# Patient Record
Sex: MALE | Race: WHITE | Employment: OTHER | URBAN - METROPOLITAN AREA
[De-identification: names, ages, dates, MRNs, and addresses within clinical notes are randomized per-mention and may not be internally consistent; named-entity substitution may affect disease eponyms.]

---

## 2019-04-10 ENCOUNTER — OFFICE VISIT (OUTPATIENT)
Dept: CARDIOLOGY CLINIC | Age: 84
End: 2019-04-10
Payer: MEDICARE

## 2019-04-10 VITALS
HEIGHT: 71 IN | WEIGHT: 181.4 LBS | RESPIRATION RATE: 18 BRPM | SYSTOLIC BLOOD PRESSURE: 136 MMHG | HEART RATE: 51 BPM | BODY MASS INDEX: 25.4 KG/M2 | DIASTOLIC BLOOD PRESSURE: 68 MMHG

## 2019-04-10 DIAGNOSIS — I25.83 CORONARY ARTERY DISEASE DUE TO LIPID RICH PLAQUE: ICD-10-CM

## 2019-04-10 DIAGNOSIS — I35.0 AORTIC VALVE STENOSIS, ETIOLOGY OF CARDIAC VALVE DISEASE UNSPECIFIED: Primary | ICD-10-CM

## 2019-04-10 DIAGNOSIS — I25.10 CORONARY ARTERY DISEASE DUE TO LIPID RICH PLAQUE: ICD-10-CM

## 2019-04-10 PROCEDURE — 4040F PNEUMOC VAC/ADMIN/RCVD: CPT | Performed by: INTERNAL MEDICINE

## 2019-04-10 PROCEDURE — 1123F ACP DISCUSS/DSCN MKR DOCD: CPT | Performed by: INTERNAL MEDICINE

## 2019-04-10 PROCEDURE — 1036F TOBACCO NON-USER: CPT | Performed by: INTERNAL MEDICINE

## 2019-04-10 PROCEDURE — G8417 CALC BMI ABV UP PARAM F/U: HCPCS | Performed by: INTERNAL MEDICINE

## 2019-04-10 PROCEDURE — 93000 ELECTROCARDIOGRAM COMPLETE: CPT | Performed by: INTERNAL MEDICINE

## 2019-04-10 PROCEDURE — G8598 ASA/ANTIPLAT THER USED: HCPCS | Performed by: INTERNAL MEDICINE

## 2019-04-10 PROCEDURE — 99214 OFFICE O/P EST MOD 30 MIN: CPT | Performed by: INTERNAL MEDICINE

## 2019-04-10 PROCEDURE — G8427 DOCREV CUR MEDS BY ELIG CLIN: HCPCS | Performed by: INTERNAL MEDICINE

## 2019-04-10 NOTE — PROGRESS NOTES
TOUCH ULTRASOFT LANCETS MISC 1 each by Does not apply route 2 times daily E11.9 100 each 5    metoprolol tartrate (LOPRESSOR) 25 MG tablet TAKE 1 TABLET BY MOUTH EVERY 12 HOURS 180 tablet 1    allopurinol (ZYLOPRIM) 100 MG tablet Take 1 tablet by mouth daily (Patient taking differently: Take 100 mg by mouth daily Indications: 2 pills daily ) 90 tablet 3    colchicine (COLCRYS) 0.6 MG tablet Take 1 tablet by mouth daily 90 tablet 5    aspirin 81 MG EC tablet Take 81 mg by mouth daily. No current facility-administered medications for this visit.         Social History     Socioeconomic History    Marital status:      Spouse name: Not on file    Number of children: Not on file    Years of education: Not on file    Highest education level: Not on file   Occupational History    Not on file   Social Needs    Financial resource strain: Not on file    Food insecurity:     Worry: Not on file     Inability: Not on file    Transportation needs:     Medical: Not on file     Non-medical: Not on file   Tobacco Use    Smoking status: Never Smoker    Smokeless tobacco: Never Used   Substance and Sexual Activity    Alcohol use: Yes     Comment: rarely- 1 beer    Drug use: No    Sexual activity: Not on file   Lifestyle    Physical activity:     Days per week: Not on file     Minutes per session: Not on file    Stress: Not on file   Relationships    Social connections:     Talks on phone: Not on file     Gets together: Not on file     Attends Evangelical service: Not on file     Active member of club or organization: Not on file     Attends meetings of clubs or organizations: Not on file     Relationship status: Not on file    Intimate partner violence:     Fear of current or ex partner: Not on file     Emotionally abused: Not on file     Physically abused: Not on file     Forced sexual activity: Not on file   Other Topics Concern    Not on file   Social History Narrative    Not on file       Family History   Problem Relation Age of Onset    High Blood Pressure Mother     Heart Disease Mother     Cancer Father      Review of Systems:  Heart: as above   Lungs: as above   Eyes: denies changes in vision or discharge. Ears: denies changes in hearing or pain. Nose: denies epistaxis or masses   Throat: denies sore throat or trouble swallowing. Neuro: denies numbness, tingling, tremors. Skin: denies rashes or itching. : denies hematuria, dysuria   GI: denies vomiting, diarrhea   Psych: denies mood changed, anxiety, depression. all others negative. Physical Exam   /68   Pulse 51   Resp 18   Ht 5' 11\" (1.803 m)   Wt 181 lb 6.4 oz (82.3 kg)   BMI 25.30 kg/m²   Constitutional: Oriented to person, place, and time. Well-developed and well-nourished. No distress. Head: Normocephalic and atraumatic. Eyes: EOM are normal. Pupils are equal, round, and reactive to light. Neck: Normal range of motion. Neck supple. No hepatojugular reflux and no JVD present. Carotid bruit is not present. No tracheal deviation present. No thyromegaly present. Cardiovascular: Normal rate, regular rhythm, MOLLY 3/6. Pulmonary/Chest: Effort normal and breath sounds normal. No respiratory distress. No wheezes. No rales. No tenderness. Abdominal: Soft. Bowel sounds are normal. No distension and no mass. No tenderness. No rebound and no guarding. Musculoskeletal: Normal range of motion. No edema and no tenderness. Lymphadenopathy:   No cervical adenopathy. Neurological: Alert and oriented to person, place, and time. Skin: Skin is warm and dry. No rash noted. Not diaphoretic. No erythema. Psychiatric: Normal mood and affect. Behavior is normal.       EKG: NSR. First degree AVB. LVH, Nonspecific ST-T wave changes.       Echo Summary 6/21/17:   Ejection fraction is visually estimated at 50-55%.   No regional wall motion abnormalities seen.  [place] Bearded is doppler evidence of stage I diastolic dysfunction.   Normal right ventricle structure and function.   Left atrial volume index of 50 ml per meters squared BSA.   The aortic valve is trileaflet. Moderate aortic stenosis is present. Peak aortic velocity is 3.26 m/s. The aortic valve area is 1.24 cm2 with a maximum gradient of 42 mmHg and a mean gradient of 22 mmHg. Mild aortic regurgitation is noted.   Mild mitral regurgitation is present.   Physiologic and/or trace tricuspid regurgitation. ASSESSMENT AND PLAN:   1. CAD/CABG: Continue ASA/BB/statin. 2. VHD: Moderate AS, mild AI and mild MR by 6/21/17 echo. Echo. 3. Atrial fibrillation:  Maintaining sinus on current medications. Patient is on therapeutic Coumadin. 4. Hyperlipidemia: Statin. 5. Hypertension: Controlled. 6. Diabetes:  As per PCP. Nigel Andrade D.O.   Cardiologist  Cardiology, [address] [facility]

## 2019-04-24 ENCOUNTER — HOSPITAL ENCOUNTER (OUTPATIENT)
Dept: CARDIOLOGY | Age: 84
Discharge: HOME | End: 2019-04-24
Payer: MEDICARE

## 2019-04-24 DIAGNOSIS — I35.0 AORTIC VALVE STENOSIS, ETIOLOGY OF CARDIAC VALVE DISEASE UNSPECIFIED: ICD-10-CM

## 2019-04-24 LAB
LV EF: 60 %
LVEF MODALITY: NORMAL

## 2019-04-24 PROCEDURE — 93306 TTE W/DOPPLER COMPLETE: CPT

## 2020-12-09 ENCOUNTER — OFFICE VISIT (OUTPATIENT)
Dept: CARDIOLOGY CLINIC | Age: 85
End: 2020-12-09
Payer: MEDICARE

## 2020-12-09 VITALS
RESPIRATION RATE: 18 BRPM | BODY MASS INDEX: 24.85 KG/M2 | HEIGHT: 71 IN | HEART RATE: 45 BPM | SYSTOLIC BLOOD PRESSURE: 160 MMHG | DIASTOLIC BLOOD PRESSURE: 84 MMHG | WEIGHT: 177.5 LBS

## 2020-12-09 PROCEDURE — 1123F ACP DISCUSS/DSCN MKR DOCD: CPT | Performed by: INTERNAL MEDICINE

## 2020-12-09 PROCEDURE — G8420 CALC BMI NORM PARAMETERS: HCPCS | Performed by: INTERNAL MEDICINE

## 2020-12-09 PROCEDURE — G8484 FLU IMMUNIZE NO ADMIN: HCPCS | Performed by: INTERNAL MEDICINE

## 2020-12-09 PROCEDURE — 93000 ELECTROCARDIOGRAM COMPLETE: CPT | Performed by: INTERNAL MEDICINE

## 2020-12-09 PROCEDURE — 99214 OFFICE O/P EST MOD 30 MIN: CPT | Performed by: INTERNAL MEDICINE

## 2020-12-09 PROCEDURE — 1036F TOBACCO NON-USER: CPT | Performed by: INTERNAL MEDICINE

## 2020-12-09 PROCEDURE — G8427 DOCREV CUR MEDS BY ELIG CLIN: HCPCS | Performed by: INTERNAL MEDICINE

## 2020-12-09 PROCEDURE — 4040F PNEUMOC VAC/ADMIN/RCVD: CPT | Performed by: INTERNAL MEDICINE

## 2020-12-09 RX ORDER — COLCHICINE 0.6 MG/1
0.6 TABLET ORAL DAILY
COMMUNITY
End: 2021-11-18

## 2020-12-09 NOTE — PROGRESS NOTES
MIRELA YOUNG   OFFICE VISIT   : 31    PROBLEM LIST:  1. Assessment for epigastric discomfort, abnormal cardiac enzymes 09. A. Heart catheterization 09. Mild mid inferior hypokinesis overall normal LV function. Calcified 20% left main. 95% calcified proximal LAD a short distance from the left main. Multiple 50-55% lesions in mid LAD. 70% lesion seen in proximal portion of circumflex. 90-95% ostial heavily calcified right coronary artery with subtotally occluded mid PDA artery. B. Status post bypass surgery 09. LIMA to LAD. Vein to first obtuse marginal branch, vein to posterior descending artery, vein to right coronary artery, vein to posterolateral branch of right coronary artery with modified Maze procedure and excision of left atrial appendage. 2. Diabetes mellitus. 3. Hypercholesterolemia. 4. Hypertension. CC: CAD/CABG/DM/HTN    HPI: The patient presents today for follow-up with a history of CAD-CABG, AS, diabetes, hypertension, and hyperlipidemia. The patient denies any chest pain, shortness of breath or any symptoms to suggest angina or heart failure.       Past Medical History:   Diagnosis Date    [facility]     BPH (benign prostatic hyperplasia)     CAD (coronary artery disease)     Diabetic neuropathy (HCC)     DM [facility]diabetes mellitus[facility]     HTN (hypertension)     Hyperlipemia     Pancreatitis 9/10    S/P CABG (coronary artery bypass graft)        No Known Allergies    Current Outpatient Medications   Medication Sig Dispense Refill    colchicine (COLCRYS) 0.6 MG tablet Take 0.6 mg by mouth daily      warfarin (COUMADIN) 5 MG tablet TAKE 1 TABLET BY MOUTH ON  AND WEDNESDAY AND THEN 1 AND 1/2 TABLETS THE REST OF THE DAYS 114 tablet 0    allopurinol (ZYLOPRIM) 100 MG tablet TAKE 1 TABLET BY MOUTH EVERY DAY 90 tablet 1    sitaGLIPtan-metformin (JANUMET)  MG per tablet TAKE 1 TABLET BY MOUTH TWICE A DAY WITH MEALS 180 tablet 0  metoprolol tartrate (LOPRESSOR) 25 MG tablet TAKE 1 TABLET BY MOUTH EVERY 12 HOURS 180 tablet 0    warfarin (COUMADIN) 5 MG tablet TAKE 1 TABLET BY MOUTH ON SUNDAY AND WEDNESDAY AND THEN 1 AND 1/2 TABLETS THE REST OF THE DAYS 114 tablet 3    doxazosin (CARDURA) 4 MG tablet TAKE 1 TABLET BY MOUTH EVERY DAY 90 tablet 1    pravastatin (PRAVACHOL) 40 MG tablet Take one daily 90 tablet 0    olmesartan-amLODIPine-HCTZ 20-5-12.5 MG TABS TAKE 1 TABLET BY MOUTH EVERY DAY 90 tablet 1    aspirin 81 MG EC tablet Take 81 mg by mouth daily. No current facility-administered medications for this visit.         Social History     Socioeconomic History    Marital status:      Spouse name: Not on file    Number of children: Not on file    Years of education: Not on file    Highest education level: Not on file   Occupational History    Not on file   Social Needs    Financial resource strain: Not on file    Food insecurity     Worry: Not on file     Inability: Not on file    Transportation needs     Medical: Not on file     Non-medical: Not on file   Tobacco Use    Smoking status: Never Smoker    Smokeless tobacco: Never Used   Substance and Sexual Activity    Alcohol use: Yes     Comment: rarely- 1 beer    Drug use: No    Sexual activity: Not on file   Lifestyle    Physical activity     Days per week: Not on file     Minutes per session: Not on file    Stress: Not on file   Relationships    Social connections     Talks on phone: Not on file     Gets together: Not on file     Attends Episcopalian service: Not on file     Active member of club or organization: Not on file     Attends meetings of clubs or organizations: Not on file     Relationship status: Not on file    Intimate partner violence     Fear of current or ex partner: Not on file     Emotionally abused: Not on file     Physically abused: Not on file     Forced sexual activity: Not on file   Other Topics Concern    Not on file   Social History Narrative    Not on file       Family History   Problem Relation Age of Onset    High Blood Pressure Mother     Heart Disease Mother     Cancer Father      Review of Systems:  Heart: as above   Lungs: as above   Eyes: denies changes in vision or discharge. Ears: denies changes in hearing or pain. Nose: denies epistaxis or masses   Throat: denies sore throat or trouble swallowing. Neuro: denies numbness, tingling, tremors. Skin: denies rashes or itching. : denies hematuria, dysuria   GI: denies vomiting, diarrhea   Psych: denies mood changed, anxiety, depression. all others negative. Physical Exam   BP (!) 160/84   Pulse (!) 45   Resp 18   Ht 5' 11\" (1.803 m)   Wt 177 lb 8 oz (80.5 kg)   BMI 24.76 kg/m²   Constitutional: Oriented to person, place, and time. Well-developed and well-nourished. No distress. Head: Normocephalic and atraumatic. Eyes: EOM are normal. Pupils are equal, round, and reactive to light. Neck: Normal range of motion. Neck supple. No hepatojugular reflux and no JVD present. Carotid bruit is not present. No tracheal deviation present. No thyromegaly present. Cardiovascular: Normal rate, regular rhythm, MOLLY 3/6. Pulmonary/Chest: Effort normal and breath sounds normal. No respiratory distress. No wheezes. No rales. No tenderness. Abdominal: Soft. Bowel sounds are normal. No distension and no mass. No tenderness. No rebound and no guarding. Musculoskeletal: Normal range of motion. No edema and no tenderness. Lymphadenopathy:   No cervical adenopathy. Neurological: Alert and oriented to person, place, and time. Skin: Skin is warm and dry. No rash noted. Not diaphoretic. No erythema. Psychiatric: Normal mood and affect. Behavior is normal.       EKG: Atrial flutter. LVH, Nonspecific ST-T wave changes. Echo Summary 4/24/2019:   Normal left ventricular systolic function. Ejection fraction is visually estimated at 60%.    Mildly dilated right ventricle with normal right ventricular function (TAPSE 1.9 cm). There is doppler evidence of stage I diastolic dysfunction. Severely dilated left atrium by volume index. Mild mitral regurgitation. Mild aortic regurgitation. Normal-flow low-gradient severe aortic stenosis (AV peak velocity 3.8 m/s, AV mean gradient 31 mmHg, AV area 1.0 cm2, dimensionless index 0.24, stroke volume index 50 mL/m2). Mild tricuspid regurgitation. PASP is estimated at 28 mmHg (normal estimated PASP). ASSESSMENT AND PLAN:   1. CAD/CABG: Continue ASA/BB/statin. 2. VHD: Severe AS, mild AI and mild MR by 4/24/19 echo. Asymptomatic. Echo next visit. 3. Atrial flutter: In aflutter. Asymptomatic. Warfarin. 4. Hyperlipidemia: Statin. 5. Hypertension: Controlled. 6. Diabetes:  As per PCP. Johnson Landers D.O.   Cardiologist  Cardiology, [address] [facility]

## 2021-10-29 ENCOUNTER — TELEPHONE (OUTPATIENT)
Dept: ADMINISTRATIVE | Age: 86
End: 2021-10-29

## 2021-10-29 NOTE — TELEPHONE ENCOUNTER
Pt's wife calling. Pt was due in June for a 6 mth OV with Dr. Karl Will. She states Dr. Williams Villalobos was supposed to be calling Dr. Karl Will - because they were having a hard time getting an apt. Please call pt with an appropriate apt. Thank you.

## 2021-11-18 ENCOUNTER — OFFICE VISIT (OUTPATIENT)
Dept: CARDIOLOGY CLINIC | Age: 86
End: 2021-11-18
Payer: MEDICARE

## 2021-11-18 VITALS
DIASTOLIC BLOOD PRESSURE: 82 MMHG | RESPIRATION RATE: 16 BRPM | SYSTOLIC BLOOD PRESSURE: 132 MMHG | BODY MASS INDEX: 22.36 KG/M2 | OXYGEN SATURATION: 98 % | WEIGHT: 159.7 LBS | HEIGHT: 71 IN | HEART RATE: 73 BPM

## 2021-11-18 DIAGNOSIS — I25.10 CORONARY ARTERY DISEASE INVOLVING NATIVE CORONARY ARTERY OF NATIVE HEART WITHOUT ANGINA PECTORIS: Primary | ICD-10-CM

## 2021-11-18 DIAGNOSIS — I35.0 AORTIC VALVE STENOSIS, ETIOLOGY OF CARDIAC VALVE DISEASE UNSPECIFIED: ICD-10-CM

## 2021-11-18 PROBLEM — M10.9 GOUT: Status: ACTIVE | Noted: 2021-11-18

## 2021-11-18 PROBLEM — I38 HEART VALVE DISEASE: Status: ACTIVE | Noted: 2021-11-18

## 2021-11-18 PROBLEM — N18.9 CHRONIC KIDNEY DISEASE: Status: ACTIVE | Noted: 2021-11-18

## 2021-11-18 PROCEDURE — 93000 ELECTROCARDIOGRAM COMPLETE: CPT | Performed by: INTERNAL MEDICINE

## 2021-11-18 PROCEDURE — 4040F PNEUMOC VAC/ADMIN/RCVD: CPT | Performed by: INTERNAL MEDICINE

## 2021-11-18 PROCEDURE — G8420 CALC BMI NORM PARAMETERS: HCPCS | Performed by: INTERNAL MEDICINE

## 2021-11-18 PROCEDURE — 1123F ACP DISCUSS/DSCN MKR DOCD: CPT | Performed by: INTERNAL MEDICINE

## 2021-11-18 PROCEDURE — G8484 FLU IMMUNIZE NO ADMIN: HCPCS | Performed by: INTERNAL MEDICINE

## 2021-11-18 PROCEDURE — 99214 OFFICE O/P EST MOD 30 MIN: CPT | Performed by: INTERNAL MEDICINE

## 2021-11-18 PROCEDURE — 1036F TOBACCO NON-USER: CPT | Performed by: INTERNAL MEDICINE

## 2021-11-18 PROCEDURE — G8427 DOCREV CUR MEDS BY ELIG CLIN: HCPCS | Performed by: INTERNAL MEDICINE

## 2021-11-18 RX ORDER — ICOSAPENT ETHYL 1000 MG/1
2 CAPSULE ORAL 2 TIMES DAILY
COMMUNITY
End: 2022-01-27 | Stop reason: ALTCHOICE

## 2021-11-18 NOTE — PROGRESS NOTES
MEALS 180 tablet 0    metoprolol tartrate (LOPRESSOR) 25 MG tablet TAKE 1 TABLET BY MOUTH EVERY 12 HOURS 180 tablet 0    warfarin (COUMADIN) 5 MG tablet TAKE 1 TABLET BY MOUTH ON SUNDAY AND WEDNESDAY AND THEN 1 AND 1/2 TABLETS THE REST OF THE DAYS 114 tablet 3    doxazosin (CARDURA) 4 MG tablet TAKE 1 TABLET BY MOUTH EVERY DAY 90 tablet 1    pravastatin (PRAVACHOL) 40 MG tablet Take one daily 90 tablet 0    aspirin 81 MG EC tablet Take 81 mg by mouth daily.  colchicine (COLCRYS) 0.6 MG tablet Take 0.6 mg by mouth daily (Patient not taking: Reported on 11/18/2021)      olmesartan-amLODIPine-HCTZ 20-5-12.5 MG TABS TAKE 1 TABLET BY MOUTH EVERY DAY (Patient not taking: Reported on 11/18/2021) 90 tablet 1     No current facility-administered medications for this visit. Social History     Socioeconomic History    Marital status:      Spouse name: Not on file    Number of children: Not on file    Years of education: Not on file    Highest education level: Not on file   Occupational History    Not on file   Tobacco Use    Smoking status: Never Smoker    Smokeless tobacco: Never Used   Vaping Use    Vaping Use: Never used   Substance and Sexual Activity    Alcohol use: Yes     Comment: rarely- 1 beer    Drug use: No    Sexual activity: Not on file   Other Topics Concern    Not on file   Social History Narrative    Not on file     Social Determinants of Health     Financial Resource Strain:     Difficulty of Paying Living Expenses: Not on file   Food Insecurity:     Worried About Running Out of Food in the Last Year: Not on file    Alisha of Food in the Last Year: Not on file   Transportation Needs:     Lack of Transportation (Medical): Not on file    Lack of Transportation (Non-Medical):  Not on file   Physical Activity:     Days of Exercise per Week: Not on file    Minutes of Exercise per Session: Not on file   Stress:     Feeling of Stress : Not on file   Social Connections:  Frequency of Communication with Friends and Family: Not on file    Frequency of Social Gatherings with Friends and Family: Not on file    Attends Protestant Services: Not on file    Active Member of Clubs or Organizations: Not on file    Attends Club or Organization Meetings: Not on file    Marital Status: Not on file   Intimate Partner Violence:     Fear of Current or Ex-Partner: Not on file    Emotionally Abused: Not on file    Physically Abused: Not on file    Sexually Abused: Not on file   Housing Stability:     Unable to Pay for Housing in the Last Year: Not on file    Number of [place] in the Last Year: Not on file    Unstable Housing in the Last Year: Not on file       Family History   Problem Relation Age of Onset    High Blood Pressure Mother     Heart Disease Mother     Cancer Father      Review of Systems:  Heart: as above   Lungs: as above   Eyes: denies changes in vision or discharge. Ears: denies changes in hearing or pain. Nose: denies epistaxis or masses   Throat: denies sore throat or trouble swallowing. Neuro: denies numbness, tingling, tremors. Skin: denies rashes or itching. : denies hematuria, dysuria   GI: denies vomiting, diarrhea   Psych: denies mood changed, anxiety, depression. all others negative. Physical Exam   /82 (Site: Left Upper Arm, Position: Sitting, Cuff Size: Medium Adult)   Pulse 73   Resp 16   Ht 5' 11\" (1.803 m)   Wt 159 lb 11.2 oz (72.4 kg)   SpO2 98%   BMI 22.27 kg/m²   Constitutional: Oriented to person, place, and time. Well-developed and well-nourished. No distress. Head: Normocephalic and atraumatic. Eyes: EOM are normal. Pupils are equal, round, and reactive to light. Neck: Normal range of motion. Neck supple. No hepatojugular reflux and no JVD present. Carotid bruit is not present. No tracheal deviation present. No thyromegaly present. Cardiovascular: Normal rate, regular rhythm, MOLLY 3/6.   Pulmonary/Chest: Effort normal and breath sounds normal. No respiratory distress. No wheezes. No rales. No tenderness. Abdominal: Soft. Bowel sounds are normal. No distension and no mass. No tenderness. No rebound and no guarding. Musculoskeletal: Normal range of motion. No edema and no tenderness. Lymphadenopathy:   No cervical adenopathy. Neurological: Alert and oriented to person, place, and time. Skin: Skin is warm and dry. No rash noted. Not diaphoretic. No erythema. Psychiatric: Normal mood and affect. Behavior is normal.       EKG: Atrial flutter. LVH, Nonspecific ST-T wave changes. Echo Summary 4/24/2019:   Normal left ventricular systolic function. Ejection fraction is visually estimated at 60%. Mildly dilated right ventricle with normal right ventricular function (TAPSE 1.9 cm). There is doppler evidence of stage I diastolic dysfunction. Severely dilated left atrium by volume index. Mild mitral regurgitation. Mild aortic regurgitation. Normal-flow low-gradient severe aortic stenosis (AV peak velocity 3.8 m/s, AV mean gradient 31 mmHg, AV area 1.0 cm2, dimensionless index 0.24, stroke volume index 50 mL/m2). Mild tricuspid regurgitation. PASP is estimated at 28 mmHg (normal estimated PASP). ASSESSMENT AND PLAN:   1. CAD/CABG: Continue ASA/BB/statin. 2. VHD: Severe AS, mild AI and mild MR by 4/24/19 echo. Asymptomatic. Echo to guide. 3. Atrial flutter: In aflutter. Asymptomatic. Warfarin. 4. Hyperlipidemia: Statin. 5. Hypertension: Controlled. 6. Diabetes:  As per PCP. Luca Yang D.O.   Cardiologist  Cardiology, [facility]

## 2022-01-13 ENCOUNTER — HOSPITAL ENCOUNTER (OUTPATIENT)
Dept: CARDIOLOGY | Age: 87
Discharge: HOME | End: 2022-01-13
Payer: MEDICARE

## 2022-01-13 DIAGNOSIS — I35.0 AORTIC VALVE STENOSIS, ETIOLOGY OF CARDIAC VALVE DISEASE UNSPECIFIED: ICD-10-CM

## 2022-01-13 LAB
LV EF: 30 %
LVEF MODALITY: NORMAL

## 2022-01-13 PROCEDURE — 93306 TTE W/DOPPLER COMPLETE: CPT | Performed by: PSYCHIATRY & NEUROLOGY

## 2022-01-26 ENCOUNTER — TELEPHONE (OUTPATIENT)
Dept: CARDIOLOGY CLINIC | Age: 87
End: 2022-01-26

## 2022-01-26 NOTE — TELEPHONE ENCOUNTER
Nurse from valve clinic called regarding echo, she said  wants patient at the valve clinic asap for severe aortic stenosis, they would like to see patient tomorrow if you agree, please advise

## 2022-01-26 NOTE — TELEPHONE ENCOUNTER
Yes, I agree with the evaluation. Kathy Doss D.O.   Cardiologist  Cardiology, [facility]

## 2022-01-26 NOTE — TELEPHONE ENCOUNTER
Miguel Fernandez, Moise's wife, called back & is agreeable to a valve clinic consultation. I told her we have an availability tomorrow & that Andie Nick will call her back with a time. MELY Celis notified.

## 2022-01-26 NOTE — TELEPHONE ENCOUNTER
I called Keanu Pineda on both his home & cell phone to let him know that he showed up on the [facility] alert\" for Critical AS & to see if he would like to come to the [facility] for a consultation. There was no answer. I left a message stating that our valve coordinator, Prabha Josselyn, [address] [facility], talked with Dr. Andrea Ceron, his cardiologist & that Dr. Andrea Ceron was in agreement with the consultation. I left my number ([phone]) so he can return the call.

## 2022-01-27 ENCOUNTER — TELEPHONE (OUTPATIENT)
Dept: CARDIOLOGY CLINIC | Age: 87
End: 2022-01-27

## 2022-01-27 ENCOUNTER — HOSPITAL ENCOUNTER (OUTPATIENT)
Age: 87
Discharge: HOME | DRG: 266 | End: 2022-01-27
Payer: MEDICARE

## 2022-01-27 ENCOUNTER — OFFICE VISIT (OUTPATIENT)
Dept: CARDIOLOGY CLINIC | Age: 87
End: 2022-01-27
Payer: MEDICARE

## 2022-01-27 VITALS
HEIGHT: 71 IN | HEART RATE: 77 BPM | RESPIRATION RATE: 24 BRPM | DIASTOLIC BLOOD PRESSURE: 90 MMHG | SYSTOLIC BLOOD PRESSURE: 192 MMHG | TEMPERATURE: 98.5 F | BODY MASS INDEX: 23.66 KG/M2 | WEIGHT: 169 LBS

## 2022-01-27 DIAGNOSIS — I35.0 NODULAR CALCIFIC AORTIC VALVE STENOSIS: Primary | ICD-10-CM

## 2022-01-27 DIAGNOSIS — I10 PRIMARY HYPERTENSION: ICD-10-CM

## 2022-01-27 LAB
ALBUMIN SERPL-MCNC: 4.2 G/DL (ref 3.5–5.2)
ALP BLD-CCNC: 84 U/L (ref 40–129)
ALT SERPL-CCNC: 17 U/L (ref 0–40)
ANION GAP SERPL CALCULATED.3IONS-SCNC: 15 MMOL/L (ref 7–16)
AST SERPL-CCNC: 20 U/L (ref 0–39)
BILIRUB SERPL-MCNC: 0.8 MG/DL (ref 0–1.2)
BUN BLDV-MCNC: 28 MG/DL (ref 6–23)
CALCIUM SERPL-MCNC: 10 MG/DL (ref 8.6–10.2)
CHLORIDE BLD-SCNC: 104 MMOL/L (ref 98–107)
CO2: 24 MMOL/L (ref 22–29)
CREAT SERPL-MCNC: 1.9 MG/DL (ref 0.7–1.2)
GFR AFRICAN AMERICAN: 40
GFR NON-AFRICAN AMERICAN: 33 ML/MIN/1.73
GLUCOSE BLD-MCNC: 144 MG/DL (ref 74–99)
POTASSIUM SERPL-SCNC: 5 MMOL/L (ref 3.5–5)
SODIUM BLD-SCNC: 143 MMOL/L (ref 132–146)
TOTAL PROTEIN: 7.6 G/DL (ref 6.4–8.3)

## 2022-01-27 PROCEDURE — 1123F ACP DISCUSS/DSCN MKR DOCD: CPT | Performed by: INTERNAL MEDICINE

## 2022-01-27 PROCEDURE — 36415 COLL VENOUS BLD VENIPUNCTURE: CPT

## 2022-01-27 PROCEDURE — 80053 COMPREHEN METABOLIC PANEL: CPT

## 2022-01-27 PROCEDURE — 1036F TOBACCO NON-USER: CPT | Performed by: INTERNAL MEDICINE

## 2022-01-27 PROCEDURE — G8484 FLU IMMUNIZE NO ADMIN: HCPCS | Performed by: INTERNAL MEDICINE

## 2022-01-27 PROCEDURE — G8427 DOCREV CUR MEDS BY ELIG CLIN: HCPCS | Performed by: INTERNAL MEDICINE

## 2022-01-27 PROCEDURE — 4040F PNEUMOC VAC/ADMIN/RCVD: CPT | Performed by: INTERNAL MEDICINE

## 2022-01-27 PROCEDURE — 99205 OFFICE O/P NEW HI 60 MIN: CPT | Performed by: INTERNAL MEDICINE

## 2022-01-27 PROCEDURE — G8420 CALC BMI NORM PARAMETERS: HCPCS | Performed by: INTERNAL MEDICINE

## 2022-01-27 RX ORDER — AMLODIPINE BESYLATE 2.5 MG/1
2.5 TABLET ORAL DAILY
Qty: 30 TABLET | Refills: 3 | Status: SHIPPED
Start: 2022-01-27 | End: 2022-02-15 | Stop reason: ALTCHOICE

## 2022-01-27 NOTE — PROGRESS NOTES
[address] [facility]        Patient name: Annette Uribe    Reason for consult: AS    Referring Physician: Dr. Noelle Angel    Primary Care Physician: Jhoana Mendoza MD    Date of service: 1/27/2022    Chief Complaint: Dyspnea on exertion    HPI:   This is a very pleasant 80-year-old [address] of the [employer] [facility] who presents as a new patient accompanied by his wife. Over the past year or so he has noted significant slowing down in his physical activity due to getting tired and short of breath. He denies chest discomfort, syncope, presyncope, palpitations, orthopnea, PND. A focused history review includes:  1. CAD status post CABG in 2009 with LIMA to LAD, SVG to OM1, SVG to RPDA, SVG to RPL with modified maze and SALENA excision (Dr. Dirk Dance)  2. PAF and atrial flutter on warfarin  3. Type 2 diabetes on orals    Allergies: No Known Allergies    Home medications:    Current Outpatient Medications   Medication Sig Dispense Refill    amLODIPine (NORVASC) 2.5 MG tablet Take 1 tablet by mouth daily 30 tablet 3    warfarin (COUMADIN) 5 MG tablet TAKE 1 TABLET BY MOUTH ON SUNDAY AND WEDNESDAY AND THEN 1 AND 1/2 TABLETS THE REST OF THE DAYS 114 tablet 0    sitaGLIPtan-metformin (JANUMET)  MG per tablet TAKE 1 TABLET BY MOUTH TWICE A DAY WITH MEALS 180 tablet 0    metoprolol tartrate (LOPRESSOR) 25 MG tablet TAKE 1 TABLET BY MOUTH EVERY 12 HOURS 180 tablet 0    warfarin (COUMADIN) 5 MG tablet TAKE 1 TABLET BY MOUTH ON SUNDAY AND WEDNESDAY AND THEN 1 AND 1/2 TABLETS THE REST OF THE DAYS 114 tablet 3    doxazosin (CARDURA) 4 MG tablet TAKE 1 TABLET BY MOUTH EVERY DAY 90 tablet 1    pravastatin (PRAVACHOL) 40 MG tablet Take one daily 90 tablet 0     No current facility-administered medications for this visit.        Past Medical History:  Past Medical History:   Diagnosis Date    [facility] [address] [facility]     BPH (benign prostatic hyperplasia)     CAD (coronary artery disease)     Diabetic neuropathy ([facility] [facility]     [facility]di[facility]     HTN (hypertension)     Hyperlipemia     Pancreatitis 9/10    S/P CABG (coronary artery bypass graft)        Past Surgical History:  Past Surgical History:   Procedure Laterality Date    CARDIAC SURGERY      CHOLECYSTECTOMY  09/27/2010    COLONOSCOPY      CORONARY ARTERY BYPASS GRAFT      CABG x4 with left internal mammary artery to the LAD. reverse saphenous vein grafts to the 1st marginal branches ofcircumflex, posterior descending branch of the right coronary artery, and posterolateral branch to the right coronary artery, and modified maze procedure with Medtronic bipolar radiofrequency ablation, and excision of left atrial appendage.  TONSILLECTOMY         Social History:  Social History     Socioeconomic History    Marital status:      Spouse name: Not on file    Number of children: Not on file    Years of education: Not on file    Highest education level: Not on file   Occupational History    Not on file   Tobacco Use    Smoking status: Never Smoker    Smokeless tobacco: Never Used   Vaping Use    Vaping Use: Never used   Substance and Sexual Activity    Alcohol use: Yes     Comment: rarely- 1 beer    Drug use: No    Sexual activity: Not on file   Other Topics Concern    Not on file   Social History Narrative    Not on file     Social Determinants of Health     Financial Resource Strain:     Difficulty of Paying Living Expenses: Not on file   Food Insecurity:     Worried About Running Out of Food in the Last Year: Not on file    Alisha of Food in the Last Year: Not on file   Transportation Needs:     Lack of Transportation (Medical): Not on file    Lack of Transportation (Non-Medical):  Not on file   Physical Activity:     Days of Exercise per Week: Not on file    Minutes of Exercise per Session: Not on file   Stress:     Feeling of Stress : Not on file   Social Connections:     Frequency of Communication with Friends and Family: Not on file    Frequency of Social Gatherings with Friends and Family: Not on file    Attends Christian Services: Not on file    Active Member of Clubs or Organizations: Not on file    Attends Club or Organization Meetings: Not on file    Marital Status: Not on file   Intimate Partner Violence:     Fear of Current or Ex-Partner: Not on file    Emotionally Abused: Not on file    Physically Abused: Not on file    Sexually Abused: Not on file   Housing Stability:     Unable to Pay for Housing in the Last Year: Not on file    Number of [place] in the Last Year: Not on file    Unstable Housing in the Last Year: Not on file       Family History:  Family History   Problem Relation Age of Onset    High Blood Pressure Mother     Heart Disease Mother     Cancer Father        Review of Systems:  Constitutional: Denies fevers, chills, or weight loss. HEENT: Denies visual changes or hearing loss. Heart: As per HPI. Lungs: Denies shortness of breath, cough, or wheezing. Gastrointestinal: Denies nausea, vomiting, constipation, or diarrhea. Genitourinary: Denies dysuria or hematuria. Psychiatric: Patient denies anxiety or depression. Neurologic: Patient denies weakness of the extremities, dizziness, or headaches. All other ROS checked and found to be negative. Objective:  Vitals BP (!) 192/90   Pulse 77   Temp 98.5 °F (36.9 °C) (Temporal)   Resp 24   Ht 5' 11\" (1.803 m)   Wt 169 lb (76.7 kg)   BMI 23.57 kg/m²   Body surface area is 1.96 meters squared. General Appearance: Pleasant 80y.o. year old male who appears stated age. Communicates well, no acute distress. HEENT: Head is normocephalic, atraumatic. EOMs intact, PERRL. Trachea midline. Lungs: Normal respiratory rate and normal effort. He is not in respiratory distress. Breath sounds clear to auscultation. No wheezes. Heart: Normal rate. Regular rhythm.   S1 is normal.  There is a late peaking 3/6 ejection murmur over the upper right sternal border with very diminished S2. Chest: Symmetric chest wall expansion. Extremities: Normal range of motion. 1+ pitting edema  Neurological: Patient is alert and oriented to person, place and time. Patient has normal reflexes. Skin: Warm and dry. Abdomen: Abdomen is soft and non-distended. Bowel sounds are normal. There is no abdominal tenderness tenderness. There is no guarding. There is no mass. Pulses: Distal pulses are intact. Skin: Warm and dry without lesions. Lab Results   Component Value Date     01/27/2022    K 5.0 01/27/2022     01/27/2022    CO2 24 01/27/2022    BUN 28 (H) 01/27/2022    CREATININE 1.9 (H) 01/27/2022    GLUCOSE 144 (H) 01/27/2022    CALCIUM 10.0 01/27/2022    PROT 7.6 01/27/2022    LABALBU 4.2 01/27/2022    BILITOT 0.8 01/27/2022    ALKPHOS 84 01/27/2022    AST 20 01/27/2022    ALT 17 01/27/2022    LABGLOM 33 01/27/2022    GFRAA 40 01/27/2022       Lab Results   Component Value Date    WBC 9.6 11/15/2015    HGB 12.4 (L) 11/15/2015    HCT 37.8 11/15/2015    MCV 90.8 11/15/2015     11/15/2015       EKG 11/18/2021:  Atrial fibrillation with right bundle branch block and left anterior fascicular block    TTE 1/13/2022:  Mildly dilated LV with severely reduced LV systolic function (LVEF 30 to 35%)  Normal RV size and mildly to moderately reduced function  Mild TR  Moderate MR  RVSP 66  Critical aortic stenosis with mean gradient of 69 and no significant AR. Assessment: This is a very functional 80-year-old  male with critical aortic stenosis and severe LV systolic dysfunction. He has no angina. We discussed the indications for AVR (i.e. TAVR given his age) and the risks and benefits versus conservative management given his severe LV dysfunction (new compared to 2019) and critical aortic stenosis.   He would like to pursue therapy to prolong life and prevent heart failure and I was clear in stating that the only way to achieve that would be to pursue AVR in an expedited fashion. NYHA class 3      Plan: We are very concerned about his severe LV dysfunction and critical aortic stenosis due to the high risk of heart failure and death in the absence of expeditious treatment of his valvulopathy. We will plan tentatively on TAVR next week. Due to his age and kidney dysfunction and lack of angina despite critical aortic stenosis we will forego coronary angiography and rely on CTA to rule out left main disease. He may have to be admitted 1 or 2 days before TAVR for medical optimization and inpatient CTAs with hydration. Severe aortic stenosis Shared Decision Making discussion took place using the [address] of Cardiology AS: TAVR/SAVR tool.  The treatment plan formulated by the Heart Team and the patient & the patients preference for AVR is TAVR Sri Farah MD, [facility]  Interventional Cardiology/Structural Heart Disease  Office: [phone]  Structural Heart Coordinator: Woodrow Nelson PA-C    Electronically signed by Leopold Cornell, MD on 1/27/2022 at 5:32 PM

## 2022-01-27 NOTE — PATIENT INSTRUCTIONS
Have blood work drawn at [facility] now - make sure to go to registration first    Please call your dentist to schedule clearance for heart valve surgery.  They can fax a clearance note to [phone] will be in touch regarding CT scans and heart valve procedure once scheduled

## 2022-01-27 NOTE — TELEPHONE ENCOUNTER
Pt's wife called and he needs a cardiac clearance for the dentist. [phone] is the fax # Dr. Angel Zarate

## 2022-01-28 ENCOUNTER — APPOINTMENT (OUTPATIENT)
Dept: CT IMAGING | Age: 87
DRG: 266 | End: 2022-01-28
Payer: MEDICARE

## 2022-01-28 ENCOUNTER — HOSPITAL ENCOUNTER (INPATIENT)
Age: 87
LOS: 8 days | Discharge: HOME | DRG: 266 | End: 2022-02-05
Attending: EMERGENCY MEDICINE | Admitting: INTERNAL MEDICINE
Payer: MEDICARE

## 2022-01-28 ENCOUNTER — APPOINTMENT (OUTPATIENT)
Dept: ULTRASOUND IMAGING | Age: 87
DRG: 266 | End: 2022-01-28
Payer: MEDICARE

## 2022-01-28 ENCOUNTER — APPOINTMENT (OUTPATIENT)
Dept: GENERAL RADIOLOGY | Age: 87
DRG: 266 | End: 2022-01-28
Payer: MEDICARE

## 2022-01-28 DIAGNOSIS — R55 SYNCOPE, UNSPECIFIED SYNCOPE TYPE: Primary | ICD-10-CM

## 2022-01-28 PROBLEM — R09.89 SYNCOPE WITHOUT OTHER CARDIOVASCULAR SYMPTOMS: Status: ACTIVE | Noted: 2022-01-28

## 2022-01-28 LAB
ALBUMIN SERPL-MCNC: 3.5 G/DL (ref 3.5–5.2)
ALP BLD-CCNC: 73 U/L (ref 40–129)
ALT SERPL-CCNC: 14 U/L (ref 0–40)
ANION GAP SERPL CALCULATED.3IONS-SCNC: 12 MMOL/L (ref 7–16)
AST SERPL-CCNC: 20 U/L (ref 0–39)
BASOPHILS ABSOLUTE: 0.03 E9/L (ref 0–0.2)
BASOPHILS RELATIVE PERCENT: 0.6 % (ref 0–2)
BILIRUB SERPL-MCNC: 0.6 MG/DL (ref 0–1.2)
BUN BLDV-MCNC: 30 MG/DL (ref 6–23)
CALCIUM SERPL-MCNC: 9.3 MG/DL (ref 8.6–10.2)
CHLORIDE BLD-SCNC: 102 MMOL/L (ref 98–107)
CO2: 24 MMOL/L (ref 22–29)
CREAT SERPL-MCNC: 1.8 MG/DL (ref 0.7–1.2)
EOSINOPHILS ABSOLUTE: 0.03 E9/L (ref 0.05–0.5)
EOSINOPHILS RELATIVE PERCENT: 0.6 % (ref 0–6)
GFR AFRICAN AMERICAN: 43
GFR NON-AFRICAN AMERICAN: 36 ML/MIN/1.73
GLUCOSE BLD-MCNC: 166 MG/DL (ref 74–99)
HCT VFR BLD CALC: 37.2 % (ref 37–54)
HEMOGLOBIN: 12.1 G/DL (ref 12.5–16.5)
IMMATURE GRANULOCYTES #: 0.04 E9/L
IMMATURE GRANULOCYTES %: 0.8 % (ref 0–5)
LYMPHOCYTES ABSOLUTE: 0.85 E9/L (ref 1.5–4)
LYMPHOCYTES RELATIVE PERCENT: 16.2 % (ref 20–42)
MAGNESIUM: 1.8 MG/DL (ref 1.6–2.6)
MCH RBC QN AUTO: 31 PG (ref 26–35)
MCHC RBC AUTO-ENTMCNC: 32.5 % (ref 32–34.5)
MCV RBC AUTO: 95.4 FL (ref 80–99.9)
METER GLUCOSE: 123 MG/DL (ref 74–99)
MONOCYTES ABSOLUTE: 0.42 E9/L (ref 0.1–0.95)
MONOCYTES RELATIVE PERCENT: 8 % (ref 2–12)
NEUTROPHILS ABSOLUTE: 3.88 E9/L (ref 1.8–7.3)
NEUTROPHILS RELATIVE PERCENT: 73.8 % (ref 43–80)
PDW BLD-RTO: 13.4 FL (ref 11.5–15)
PLATELET # BLD: 120 E9/L (ref 130–450)
PMV BLD AUTO: 11.9 FL (ref 7–12)
POTASSIUM REFLEX MAGNESIUM: 4.8 MMOL/L (ref 3.5–5)
RBC # BLD: 3.9 E12/L (ref 3.8–5.8)
SARS-COV-2, NAAT: NOT DETECTED
SODIUM BLD-SCNC: 138 MMOL/L (ref 132–146)
TOTAL PROTEIN: 6.3 G/DL (ref 6.4–8.3)
TROPONIN, HIGH SENSITIVITY: 69 NG/L (ref 0–11)
WBC # BLD: 5.3 E9/L (ref 4.5–11.5)

## 2022-01-28 PROCEDURE — 82962 GLUCOSE BLOOD TEST: CPT

## 2022-01-28 PROCEDURE — 99284 EMERGENCY DEPT VISIT MOD MDM: CPT

## 2022-01-28 PROCEDURE — 84484 ASSAY OF TROPONIN QUANT: CPT

## 2022-01-28 PROCEDURE — 71250 CT THORAX DX C-: CPT

## 2022-01-28 PROCEDURE — 99291 CRITICAL CARE FIRST HOUR: CPT | Performed by: INTERNAL MEDICINE

## 2022-01-28 PROCEDURE — 83735 ASSAY OF MAGNESIUM: CPT

## 2022-01-28 PROCEDURE — 71046 X-RAY EXAM CHEST 2 VIEWS: CPT

## 2022-01-28 PROCEDURE — 93880 EXTRACRANIAL BILAT STUDY: CPT

## 2022-01-28 PROCEDURE — 85025 COMPLETE CBC W/AUTO DIFF WBC: CPT

## 2022-01-28 PROCEDURE — 80053 COMPREHEN METABOLIC PANEL: CPT

## 2022-01-28 PROCEDURE — 6370000000 HC RX 637 (ALT 250 FOR IP): Performed by: INTERNAL MEDICINE

## 2022-01-28 PROCEDURE — 36415 COLL VENOUS BLD VENIPUNCTURE: CPT

## 2022-01-28 PROCEDURE — 70450 CT HEAD/BRAIN W/O DYE: CPT

## 2022-01-28 PROCEDURE — 93005 ELECTROCARDIOGRAM TRACING: CPT | Performed by: STUDENT IN AN ORGANIZED HEALTH CARE EDUCATION/TRAINING PROGRAM

## 2022-01-28 PROCEDURE — 2140000000 HC CCU INTERMEDIATE R&B

## 2022-01-28 PROCEDURE — 87635 SARS-COV-2 COVID-19 AMP PRB: CPT

## 2022-01-28 RX ORDER — DEXTROSE MONOHYDRATE 25 G/50ML
12.5 INJECTION, SOLUTION INTRAVENOUS PRN
Status: DISCONTINUED | OUTPATIENT
Start: 2022-01-28 | End: 2022-01-29 | Stop reason: SDUPTHER

## 2022-01-28 RX ORDER — DEXTROSE MONOHYDRATE 50 MG/ML
100 INJECTION, SOLUTION INTRAVENOUS PRN
Status: DISCONTINUED | OUTPATIENT
Start: 2022-01-28 | End: 2022-01-29 | Stop reason: SDUPTHER

## 2022-01-28 RX ORDER — CARVEDILOL 6.25 MG/1
6.25 TABLET ORAL 2 TIMES DAILY WITH MEALS
Status: DISCONTINUED | OUTPATIENT
Start: 2022-01-28 | End: 2022-02-03

## 2022-01-28 RX ORDER — ASPIRIN 81 MG/1
81 TABLET, CHEWABLE ORAL DAILY
Status: DISCONTINUED | OUTPATIENT
Start: 2022-01-28 | End: 2022-02-05 | Stop reason: HOSPADM

## 2022-01-28 RX ORDER — NICOTINE POLACRILEX 4 MG
15 LOZENGE BUCCAL PRN
Status: DISCONTINUED | OUTPATIENT
Start: 2022-01-28 | End: 2022-01-29 | Stop reason: SDUPTHER

## 2022-01-28 RX ADMIN — ASPIRIN 81 MG CHEWABLE TABLET 81 MG: 81 TABLET CHEWABLE at 19:30

## 2022-01-28 RX ADMIN — CARVEDILOL 6.25 MG: 6.25 TABLET, FILM COATED ORAL at 19:30

## 2022-01-28 ASSESSMENT — ENCOUNTER SYMPTOMS
ABDOMINAL PAIN: 0
TROUBLE SWALLOWING: 0
PHOTOPHOBIA: 0
COUGH: 0
VOMITING: 0
SHORTNESS OF BREATH: 0
NAUSEA: 0
SORE THROAT: 0
DIARRHEA: 0
VOICE CHANGE: 0

## 2022-01-28 NOTE — ED PROVIDER NOTES
HPI   Patient is a 80year old male with pmhx of CAD s/p CABG, diabetes, hyperlipidemia, HTN, afib on coumadin and severe aortic stenosis presenting to the ED due to AMS. On initial evaluation, patient was awake, alert and oriented x 3. He seemed to be mildly confused about why exactly he was in the ED but was following commands and answering questions appropriately. Patient was denying any symptoms including chest pain, shortness of breath, fever, chills, nausea, vomiting, abdominal pain, weakness, dizziness, urinary symptoms, constipation or diarrhea. Some history was gather from speaking with his wife over the phone. Apparently, around noon, patient's wife heard him groaning from the other rooms. Upon checking on him, wife noted that the patient becaome \"unresponsive\" with weak pulse and swallow breath. She called 911 and before they arrived he became more responsive, but still slightly confused. Of note, patient sees Dr. Dolores Anna and is scheduled for TAVR next week. Dr. Dolores Anna called the ED and notified that the patient coming in. Review of Systems   Constitutional: Negative for chills and fever. HENT: Negative for congestion, sore throat, trouble swallowing and voice change. Eyes: Negative for photophobia and visual disturbance. Respiratory: Negative for cough and shortness of breath. Cardiovascular: Negative for chest pain and palpitations. Gastrointestinal: Negative for abdominal pain, diarrhea, nausea and vomiting. Genitourinary: Negative for dysuria, frequency and urgency. Musculoskeletal: Negative for arthralgias. Skin: Negative for rash and wound. Neurological: Negative for dizziness and headaches. Psychiatric/Behavioral: Negative for behavioral problems and confusion. Physical Exam  Constitutional:       General: He is not in acute distress. Appearance: Normal appearance. He is not ill-appearing. HENT:      Head: Normocephalic and atraumatic.       Mouth/Throat: Mouth: Mucous membranes are moist.      Pharynx: Oropharynx is clear. Eyes:      Extraocular Movements: Extraocular movements intact. Pupils: Pupils are equal, round, and reactive to light. Cardiovascular:      Rate and Rhythm: Normal rate and regular rhythm. Pulses: Normal pulses. Heart sounds: Normal heart sounds. Pulmonary:      Effort: Pulmonary effort is normal. No respiratory distress. Breath sounds: Normal breath sounds. No wheezing or rales. Abdominal:      Tenderness: There is no abdominal tenderness. There is no guarding or rebound. Musculoskeletal:      Cervical back: Normal range of motion and neck supple. Skin:     General: Skin is warm and dry. Neurological:      General: No focal deficit present. Mental Status: He is alert and oriented to person, place, and time. Mental status is at baseline. GCS: GCS eye subscore is 4. GCS verbal subscore is 5. GCS motor subscore is 6. Cranial Nerves: No cranial nerve deficit. Coordination: Coordination normal.        Procedures   EKG: This EKG is signed and interpreted by me. atrial fibrillation with normal ventricular response. Ventricular rate of 61 bpm. Left axis deviation. QTc mildly prolonged at 473 ms. No evidence of acute STEMI. T wave inversions in the anterior and lateral leads. Lateral lead T wave inversion new compared to previous on 11/18/21    MDM   Patient is a 80year old male with pmhx of CAD s/p CABG, diabetes, hyperlipidemia, HTN, afib on coumadin and severe aortic stenosis presenting to the ED due to [address] [place] [address] Avenue. Patient was brought in by ambulance from home due to acute episode of altered mental status. Patient's wife called 911 after she found the patient \"unresponsive\" with weak pulse shallow breathing. By the time EMS came to get the patient, he became responsive and oriented. Initial vitals in the emergency department within appropriate limits.   On initial evaluation, patient was awake, alert oriented x3. He was mildly confused about why he was at the emergency department but otherwise following commands and answering questions appropriately. No focal neuro deficits noted. Patient evaluated while ambulating and there was no ataxia or unstable gait noted. He is a patient of Dr. Gayatri Youngblood who called the ED and notified that the patient was coming in for evaluation. Patient apparently has TAVR scheduled for next week. Syncope work-up completed in the ED and unremarkable. Patient Covid negative. CT head with no evidence of acute intracranial processes. Patient remained hemodynamically stable in the emergency department without any complaints. Plan to admit the patient for further work-up and evaluation until his procedure. Patient agreeable with this plan. Spoke with Dr. Suni Medina who agreed to accept the patient for admission. ED Course as of 01/28/22 1721 Fri Jan 28, 2022   1429 Spoke with wife on phone. Wife states that around 12pm today the patient was groaning and became unresponsive. Wife states that his radial pulse was weak and his breaths were shallow. She called 911 and by the time EMS came he was responsive, but confused. Patient did not know where he was. Wife initially concerned for stroke although she did not appreciate unilateral weakness or slurred speech. Normally AAO x 3. Glucose en route to ED normal by EMS [PP]   7418 Patient has TAVR done next week due to severe aortic stenosis. [PP]   2027 Patient ambulated. He was able to walk without any difficulty. No ataxia or unstable gait noted.  [PP]   1 Spoke with Dr. Suni Medina who agreed to admit the patient [PP]      ED Course User Index  [PP] Willem Burkett DO        --------------------------------------------- PAST HISTORY ---------------------------------------------  Past Medical History:  has a past medical history of A-fib ([facility] [address] BPH (benign prostatic hyperplasia), CAD (coronary artery disease), Diabetic neuropathy ([facility] [address] DM (diabetes mellitus) ([facility] [address] HTN (hypertension), Hyperlipemia, Pancreatitis, and S/P CABG (coronary artery bypass graft). Past Surgical History:  has a past surgical history that includes Cholecystectomy (09/27/2010); Cardiac surgery; Colonoscopy; Tonsillectomy; and Coronary artery bypass graft. Social History:  reports that he has never smoked. He has never used smokeless tobacco. He reports current alcohol use. He reports that he does not use drugs. Family History: family history includes Cancer in his father; Heart Disease in his mother; High Blood Pressure in his mother. The patients home medications have been reviewed. Allergies: Patient has no known allergies.     -------------------------------------------------- RESULTS -------------------------------------------------    LABS:  Results for orders placed or performed during the hospital encounter of 01/28/22   COVID-19, Rapid    Specimen: Nasopharyngeal Swab   Result Value Ref Range    SARS-CoV-2, NAAT Not Detected Not Detected   CBC Auto Differential   Result Value Ref Range    WBC 5.3 4.5 - 11.5 E9/L    RBC 3.90 3.80 - 5.80 E12/L    Hemoglobin 12.1 (L) 12.5 - 16.5 g/dL    Hematocrit 37.2 37.0 - 54.0 %    MCV 95.4 80.0 - 99.9 fL    MCH 31.0 26.0 - 35.0 pg    MCHC 32.5 32.0 - 34.5 %    RDW 13.4 11.5 - 15.0 fL    Platelets 387 (L) 114 - 450 E9/L    MPV 11.9 7.0 - 12.0 fL    Neutrophils % 73.8 43.0 - 80.0 %    Immature Granulocytes % 0.8 0.0 - 5.0 %    Lymphocytes % 16.2 (L) 20.0 - 42.0 %    Monocytes % 8.0 2.0 - 12.0 %    Eosinophils % 0.6 0.0 - 6.0 %    Basophils % 0.6 0.0 - 2.0 %    Neutrophils Absolute 3.88 1.80 - 7.30 E9/L    Immature Granulocytes # 0.04 E9/L    Lymphocytes Absolute 0.85 (L) 1.50 - 4.00 E9/L    Monocytes Absolute 0.42 0.10 - 0.95 E9/L    Eosinophils Absolute 0.03 (L) 0.05 - 0.50 E9/L    Basophils Absolute 0.03 0.00 - 0.20 E9/L   Comprehensive Metabolic Panel w/ Reflex to MG   Result Value Ref Range    Sodium 138 132 - 146 mmol/L    Potassium reflex Magnesium 4.8 3.5 - 5.0 mmol/L    Chloride 102 98 - 107 mmol/L    CO2 24 22 - 29 mmol/L    Anion Gap 12 7 - 16 mmol/L    Glucose 166 (H) 74 - 99 mg/dL    BUN 30 (H) 6 - 23 mg/dL    CREATININE 1.8 (H) 0.7 - 1.2 mg/dL    GFR Non-African American 36 >=60 mL/min/1.73    GFR African American 43     Calcium 9.3 8.6 - 10.2 mg/dL    Total Protein 6.3 (L) 6.4 - 8.3 g/dL    Albumin 3.5 3.5 - 5.2 g/dL    Total Bilirubin 0.6 0.0 - 1.2 mg/dL    Alkaline Phosphatase 73 40 - 129 U/L    ALT 14 0 - 40 U/L    AST 20 0 - 39 U/L   Troponin   Result Value Ref Range    Troponin, High Sensitivity 69 (H) 0 - 11 ng/L   Magnesium   Result Value Ref Range    Magnesium 1.8 1.6 - 2.6 mg/dL   POCT Glucose   Result Value Ref Range    Meter Glucose 123 (H) 74 - 99 mg/dL   EKG 12 Lead   Result Value Ref Range    Ventricular Rate 61 BPM    Atrial Rate 51 BPM    QRS Duration 170 ms    Q-T Interval 470 ms    QTc Calculation (Bazett) 473 ms    R Axis -56 degrees    T Axis 111 degrees       RADIOLOGY:  CT CHEST WO CONTRAST   Final Result   Moderate bilateral pleural effusions, greater towards the right. Mild compressive atelectasis in the lung bases. Developing infiltrates from   pneumonia thought less likely. Heterogeneous area along the lateral margin of the dome of the liver could   represent volume averaging. A developing neoplastic process is thought   somewhat less likely but not entirely excluded and a short-term follow-up   hepatic mass protocol MRI or CT would be useful on a nonemergent basis when   clinically feasible. Cardiomegaly. RECOMMENDATIONS:   Unavailable         US CAROTID ARTERY BILATERAL   Final Result   The right internal carotid artery demonstrates 0-50% stenosis. The left internal carotid artery demonstrates 0-50% stenosis. Bilateral vertebral arteries are patent with flow in the normal direction. Bilateral ICA atherosclerotic plaque.   Small amount of plaque in the right   carotid bulb. CT Head WO Contrast   Final Result   1. There is no acute intracranial abnormality. Specifically, there is no   intracranial hemorrhage. 2. Atrophy and periventricular leukomalacia,         XR CHEST (2 VW)   Final Result   Bilateral lower lobe pneumonia, right greater than left               ------------------------- NURSING NOTES AND VITALS REVIEWED ---------------------------  Date / Time Roomed:  1/28/2022  2:10 PM  ED Bed Assignment:  [facility]    The nursing notes within the ED encounter and vital signs as below have been reviewed. Patient Vitals for the past 24 hrs:   BP Temp Pulse Resp SpO2 Height Weight   01/28/22 1413 (!) 168/91 97.2 °F (36.2 °C) 65 17 96 % 5' 11\" (1.803 m) 169 lb (76.7 kg)       Oxygen Saturation Interpretation: Normal    ------------------------------------------ PROGRESS NOTES ------------------------------------------  Re-evaluation(s):  See ED course    Counseling:  I have spoken with the patient and discussed todays results, in addition to providing specific details for the plan of care and counseling regarding the diagnosis and prognosis. Their questions are answered at this time and they are agreeable with the plan of admission.    --------------------------------- ADDITIONAL PROVIDER NOTES ---------------------------------  Consultations:  Time: 9393. Spoke with Dr. Maureen Loza. Discussed case. They will admit the patient. This patient's ED course included: a personal history and physicial examination, re-evaluation prior to disposition, multiple bedside re-evaluations, IV medications, cardiac monitoring and continuous pulse oximetry    This patient has remained hemodynamically stable during their ED course. Diagnosis:  1. Syncope, unspecified syncope type        Disposition:  Patient's disposition: Admit to telemetry  Patient's condition is stable.          Lisa Barth DO  Resident  01/29/22 9284

## 2022-01-29 ENCOUNTER — APPOINTMENT (OUTPATIENT)
Dept: CT IMAGING | Age: 87
DRG: 266 | End: 2022-01-29
Payer: MEDICARE

## 2022-01-29 LAB
ALBUMIN SERPL-MCNC: 3.6 G/DL (ref 3.5–5.2)
ALP BLD-CCNC: 70 U/L (ref 40–129)
ALT SERPL-CCNC: 12 U/L (ref 0–40)
ANION GAP SERPL CALCULATED.3IONS-SCNC: 11 MMOL/L (ref 7–16)
ANION GAP SERPL CALCULATED.3IONS-SCNC: 9 MMOL/L (ref 7–16)
APTT: 34 SEC (ref 24.5–35.1)
APTT: 53.3 SEC (ref 24.5–35.1)
APTT: 53.9 SEC (ref 24.5–35.1)
APTT: 71.4 SEC (ref 24.5–35.1)
AST SERPL-CCNC: 15 U/L (ref 0–39)
BACTERIA: ABNORMAL /HPF
BASOPHILS ABSOLUTE: 0.03 E9/L (ref 0–0.2)
BASOPHILS RELATIVE PERCENT: 0.5 % (ref 0–2)
BILIRUB SERPL-MCNC: 0.8 MG/DL (ref 0–1.2)
BILIRUBIN URINE: NEGATIVE
BLOOD, URINE: ABNORMAL
BUN BLDV-MCNC: 28 MG/DL (ref 6–23)
BUN BLDV-MCNC: 28 MG/DL (ref 6–23)
CALCIUM SERPL-MCNC: 9.5 MG/DL (ref 8.6–10.2)
CALCIUM SERPL-MCNC: 9.6 MG/DL (ref 8.6–10.2)
CHLORIDE BLD-SCNC: 103 MMOL/L (ref 98–107)
CHLORIDE BLD-SCNC: 104 MMOL/L (ref 98–107)
CHP ED QC CHECK: NORMAL
CLARITY: CLEAR
CO2: 25 MMOL/L (ref 22–29)
CO2: 26 MMOL/L (ref 22–29)
COLOR: YELLOW
CREAT SERPL-MCNC: 1.8 MG/DL (ref 0.7–1.2)
CREAT SERPL-MCNC: 1.8 MG/DL (ref 0.7–1.2)
CREATININE URINE: 109 MG/DL (ref 40–278)
CREATININE URINE: 111 MG/DL (ref 40–278)
EOSINOPHILS ABSOLUTE: 0.09 E9/L (ref 0.05–0.5)
EOSINOPHILS RELATIVE PERCENT: 1.4 % (ref 0–6)
GFR AFRICAN AMERICAN: 43
GFR AFRICAN AMERICAN: 43
GFR NON-AFRICAN AMERICAN: 36 ML/MIN/1.73
GFR NON-AFRICAN AMERICAN: 36 ML/MIN/1.73
GLUCOSE BLD-MCNC: 105 MG/DL
GLUCOSE BLD-MCNC: 116 MG/DL (ref 74–99)
GLUCOSE BLD-MCNC: 117 MG/DL (ref 74–99)
GLUCOSE URINE: NEGATIVE MG/DL
HCT VFR BLD CALC: 39.4 % (ref 37–54)
HEMOGLOBIN: 12.8 G/DL (ref 12.5–16.5)
IMMATURE GRANULOCYTES #: 0.02 E9/L
IMMATURE GRANULOCYTES %: 0.3 % (ref 0–5)
INR BLD: 2.4
KETONES, URINE: NEGATIVE MG/DL
LEUKOCYTE ESTERASE, URINE: NEGATIVE
LYMPHOCYTES ABSOLUTE: 2.03 E9/L (ref 1.5–4)
LYMPHOCYTES RELATIVE PERCENT: 32 % (ref 20–42)
MCH RBC QN AUTO: 30.3 PG (ref 26–35)
MCHC RBC AUTO-ENTMCNC: 32.5 % (ref 32–34.5)
MCV RBC AUTO: 93.1 FL (ref 80–99.9)
METER GLUCOSE: 105 MG/DL (ref 74–99)
METER GLUCOSE: 136 MG/DL (ref 74–99)
MICROALBUMIN UR-MCNC: 2455.3 MG/L
MICROALBUMIN/CREAT UR-RTO: 2212 (ref 0–30)
MONOCYTES ABSOLUTE: 0.54 E9/L (ref 0.1–0.95)
MONOCYTES RELATIVE PERCENT: 8.5 % (ref 2–12)
NEUTROPHILS ABSOLUTE: 3.64 E9/L (ref 1.8–7.3)
NEUTROPHILS RELATIVE PERCENT: 57.3 % (ref 43–80)
NITRITE, URINE: NEGATIVE
PDW BLD-RTO: 13.4 FL (ref 11.5–15)
PH UA: 6 (ref 5–9)
PLATELET # BLD: 149 E9/L (ref 130–450)
PMV BLD AUTO: 12 FL (ref 7–12)
POTASSIUM REFLEX MAGNESIUM: 4.8 MMOL/L (ref 3.5–5)
POTASSIUM SERPL-SCNC: 4.7 MMOL/L (ref 3.5–5)
PRO-BNP: ABNORMAL PG/ML (ref 0–450)
PROTEIN PROTEIN: 321 MG/DL (ref 0–12)
PROTEIN UA: >=300 MG/DL
PROTEIN/CREAT RATIO: 2.9
PROTEIN/CREAT RATIO: 2.9 (ref 0–0.2)
PROTHROMBIN TIME: 26.4 SEC (ref 9.3–12.4)
RBC # BLD: 4.23 E12/L (ref 3.8–5.8)
RBC UA: ABNORMAL /HPF (ref 0–2)
SODIUM BLD-SCNC: 138 MMOL/L (ref 132–146)
SODIUM BLD-SCNC: 140 MMOL/L (ref 132–146)
SPECIFIC GRAVITY UA: >=1.03 (ref 1–1.03)
TOTAL PROTEIN: 6.5 G/DL (ref 6.4–8.3)
UROBILINOGEN, URINE: 0.2 E.U./DL
WBC # BLD: 6.4 E9/L (ref 4.5–11.5)
WBC UA: ABNORMAL /HPF (ref 0–5)

## 2022-01-29 PROCEDURE — 74176 CT ABD & PELVIS W/O CONTRAST: CPT

## 2022-01-29 PROCEDURE — 2580000003 HC RX 258: Performed by: INTERNAL MEDICINE

## 2022-01-29 PROCEDURE — 82570 ASSAY OF URINE CREATININE: CPT

## 2022-01-29 PROCEDURE — 6370000000 HC RX 637 (ALT 250 FOR IP): Performed by: INTERNAL MEDICINE

## 2022-01-29 PROCEDURE — 84156 ASSAY OF PROTEIN URINE: CPT

## 2022-01-29 PROCEDURE — 83880 ASSAY OF NATRIURETIC PEPTIDE: CPT

## 2022-01-29 PROCEDURE — 6360000002 HC RX W HCPCS: Performed by: INTERNAL MEDICINE

## 2022-01-29 PROCEDURE — 82962 GLUCOSE BLOOD TEST: CPT

## 2022-01-29 PROCEDURE — 80053 COMPREHEN METABOLIC PANEL: CPT

## 2022-01-29 PROCEDURE — 85610 PROTHROMBIN TIME: CPT

## 2022-01-29 PROCEDURE — 87088 URINE BACTERIA CULTURE: CPT

## 2022-01-29 PROCEDURE — 99233 SBSQ HOSP IP/OBS HIGH 50: CPT | Performed by: INTERNAL MEDICINE

## 2022-01-29 PROCEDURE — 36415 COLL VENOUS BLD VENIPUNCTURE: CPT

## 2022-01-29 PROCEDURE — 85025 COMPLETE CBC W/AUTO DIFF WBC: CPT

## 2022-01-29 PROCEDURE — 81001 URINALYSIS AUTO W/SCOPE: CPT

## 2022-01-29 PROCEDURE — 2140000000 HC CCU INTERMEDIATE R&B

## 2022-01-29 PROCEDURE — 82044 UR ALBUMIN SEMIQUANTITATIVE: CPT

## 2022-01-29 PROCEDURE — 85730 THROMBOPLASTIN TIME PARTIAL: CPT

## 2022-01-29 PROCEDURE — 80048 BASIC METABOLIC PNL TOTAL CA: CPT

## 2022-01-29 RX ORDER — SODIUM CHLORIDE 0.9 % (FLUSH) 0.9 %
5-40 SYRINGE (ML) INJECTION PRN
Status: DISCONTINUED | OUTPATIENT
Start: 2022-01-29 | End: 2022-02-02

## 2022-01-29 RX ORDER — HEPARIN SODIUM 10000 [USP'U]/100ML
5-30 INJECTION, SOLUTION INTRAVENOUS CONTINUOUS
Status: DISCONTINUED | OUTPATIENT
Start: 2022-01-29 | End: 2022-01-30

## 2022-01-29 RX ORDER — DOXAZOSIN MESYLATE 4 MG/1
4 TABLET ORAL DAILY
Status: DISCONTINUED | OUTPATIENT
Start: 2022-01-29 | End: 2022-01-31

## 2022-01-29 RX ORDER — PRAVASTATIN SODIUM 20 MG
40 TABLET ORAL NIGHTLY
Status: DISCONTINUED | OUTPATIENT
Start: 2022-01-29 | End: 2022-02-05 | Stop reason: HOSPADM

## 2022-01-29 RX ORDER — HEPARIN SODIUM 1000 [USP'U]/ML
4000 INJECTION, SOLUTION INTRAVENOUS; SUBCUTANEOUS PRN
Status: DISCONTINUED | OUTPATIENT
Start: 2022-01-29 | End: 2022-01-30

## 2022-01-29 RX ORDER — POLYETHYLENE GLYCOL 3350 17 G/17G
17 POWDER, FOR SOLUTION ORAL DAILY PRN
Status: DISCONTINUED | OUTPATIENT
Start: 2022-01-29 | End: 2022-02-05 | Stop reason: HOSPADM

## 2022-01-29 RX ORDER — ONDANSETRON 2 MG/ML
4 INJECTION INTRAMUSCULAR; INTRAVENOUS EVERY 6 HOURS PRN
Status: DISCONTINUED | OUTPATIENT
Start: 2022-01-29 | End: 2022-02-05 | Stop reason: HOSPADM

## 2022-01-29 RX ORDER — ONDANSETRON 4 MG/1
4 TABLET, ORALLY DISINTEGRATING ORAL EVERY 8 HOURS PRN
Status: DISCONTINUED | OUTPATIENT
Start: 2022-01-29 | End: 2022-02-05 | Stop reason: HOSPADM

## 2022-01-29 RX ORDER — ACETAMINOPHEN 325 MG/1
650 TABLET ORAL EVERY 6 HOURS PRN
Status: DISCONTINUED | OUTPATIENT
Start: 2022-01-29 | End: 2022-02-05 | Stop reason: HOSPADM

## 2022-01-29 RX ORDER — HEPARIN SODIUM 1000 [USP'U]/ML
2000 INJECTION, SOLUTION INTRAVENOUS; SUBCUTANEOUS PRN
Status: DISCONTINUED | OUTPATIENT
Start: 2022-01-29 | End: 2022-02-02

## 2022-01-29 RX ORDER — ACETAMINOPHEN 650 MG/1
650 SUPPOSITORY RECTAL EVERY 6 HOURS PRN
Status: DISCONTINUED | OUTPATIENT
Start: 2022-01-29 | End: 2022-02-05 | Stop reason: HOSPADM

## 2022-01-29 RX ORDER — DEXTROSE MONOHYDRATE 25 G/50ML
12.5 INJECTION, SOLUTION INTRAVENOUS PRN
Status: DISCONTINUED | OUTPATIENT
Start: 2022-01-29 | End: 2022-02-02 | Stop reason: SDUPTHER

## 2022-01-29 RX ORDER — DEXTROSE MONOHYDRATE 50 MG/ML
100 INJECTION, SOLUTION INTRAVENOUS PRN
Status: DISCONTINUED | OUTPATIENT
Start: 2022-01-29 | End: 2022-02-05 | Stop reason: HOSPADM

## 2022-01-29 RX ORDER — SODIUM CHLORIDE 0.9 % (FLUSH) 0.9 %
5-40 SYRINGE (ML) INJECTION EVERY 12 HOURS SCHEDULED
Status: DISCONTINUED | OUTPATIENT
Start: 2022-01-29 | End: 2022-02-02 | Stop reason: SDUPTHER

## 2022-01-29 RX ORDER — SODIUM CHLORIDE 9 MG/ML
25 INJECTION, SOLUTION INTRAVENOUS PRN
Status: DISCONTINUED | OUTPATIENT
Start: 2022-01-29 | End: 2022-02-02

## 2022-01-29 RX ORDER — NICOTINE POLACRILEX 4 MG
15 LOZENGE BUCCAL PRN
Status: DISCONTINUED | OUTPATIENT
Start: 2022-01-29 | End: 2022-02-02

## 2022-01-29 RX ORDER — HEPARIN SODIUM 1000 [USP'U]/ML
4000 INJECTION, SOLUTION INTRAVENOUS; SUBCUTANEOUS ONCE
Status: COMPLETED | OUTPATIENT
Start: 2022-01-29 | End: 2022-01-29

## 2022-01-29 RX ADMIN — PRAVASTATIN SODIUM 40 MG: 20 TABLET ORAL at 21:00

## 2022-01-29 RX ADMIN — Medication 12 UNITS/KG/HR: at 05:08

## 2022-01-29 RX ADMIN — SODIUM CHLORIDE, PRESERVATIVE FREE 10 ML: 5 INJECTION INTRAVENOUS at 21:00

## 2022-01-29 RX ADMIN — HEPARIN SODIUM 4000 UNITS: 1000 INJECTION INTRAVENOUS; SUBCUTANEOUS at 05:07

## 2022-01-29 RX ADMIN — SODIUM CHLORIDE, PRESERVATIVE FREE 10 ML: 5 INJECTION INTRAVENOUS at 08:33

## 2022-01-29 RX ADMIN — DOXAZOSIN 4 MG: 4 TABLET ORAL at 08:32

## 2022-01-29 RX ADMIN — ASPIRIN 81 MG CHEWABLE TABLET 81 MG: 81 TABLET CHEWABLE at 08:32

## 2022-01-29 RX ADMIN — CARVEDILOL 6.25 MG: 6.25 TABLET, FILM COATED ORAL at 08:32

## 2022-01-29 ASSESSMENT — PAIN SCALES - GENERAL: PAINLEVEL_OUTOF10: 0

## 2022-01-29 NOTE — PROGRESS NOTES
INPATIENT CARDIOLOGY FOLLOW-UP    Name: Bowen Burger    Age: 80 y.o. Date of Admission: 1/28/2022  2:10 PM    Date of Service: 1/29/2022    Chief Complaint: Follow-up for syncope and altered mental status    Interim History:  No new overnight cardiac complaints. Currently with no complaints of CP, SOB, palpitations, dizziness, or lightheadedness. SR on telemetry.     Review of Systems:   Cardiac: As per HPI  General: No fever, chills  Pulmonary: As per HPI  HEENT: No visual disturbances, difficult swallowing  GI: No nausea, vomiting  Endocrine: No thyroid disease or DM  Musculoskeletal: RIVAS x 4, no focal motor deficits  Skin: Intact, no rashes  Neuro/Psych: No headache or seizures    Problem List:  Patient Active Problem List   Diagnosis    Cellulitis    CAD (coronary artery disease)    S/P CABG (coronary artery bypass graft)    DM [facility]di[facility] mellitus) ([facility] [address]    Hyperlipemia    HTN (hypertension)    A-fib ([facility] [address]    BPH (benign prostatic hyperplasia)    Diabetic neuropathy (HCC)    Pancreatitis    Encounter for therapeutic drug monitoring    Long term current use of anticoagulant therapy    Aortic valve stenosis    Chronic kidney disease    Heart valve disease    Gout    Syncope without other cardiovascular symptoms       Allergies:  No Known Allergies    Current Medications:  Current Facility-Administered Medications   Medication Dose Route Frequency Provider Last Rate Last Admin    doxazosin (CARDURA) tablet 4 mg  4 mg Oral Daily Rona Monzon MD   4 mg at 01/29/22 0832    pravastatin (PRAVACHOL) tablet 40 mg  40 mg Oral Nightly Rona Monzon MD        heparin (porcine) injection 4,000 Units  4,000 Units IntraVENous PRN Rona Monzon MD        heparin 25,000 units in dextrose 5% 250 mL (premix) infusion  5-30 Units/kg/hr IntraVENous Continuous Rona Monzon MD 9.2 mL/hr at 01/29/22 0816 11.995 Units/kg/hr at 01/29/22 0816    insulin lispro (HUMALOG) injection vial 0-6 Units  0-6 Units SubCUTAneous TID  Jet Roca MD        glucose (GLUTOSE) 40 % oral gel 15 g  15 g Oral PRN Sana Barbosa MD        dextrose 50 % IV solution  12.5 g IntraVENous PRN Sana Barbosa MD        glucagon (rDNA) injection 1 mg  1 mg IntraMUSCular PRN Sana Barbosa MD        dextrose 5 % solution  100 mL/hr IntraVENous PRN Sana Barbosa MD        sodium chloride flush 0.9 % injection 5-40 mL  5-40 mL IntraVENous 2 times per day Sana Barbosa MD   10 mL at 01/29/22 0833    sodium chloride flush 0.9 % injection 5-40 mL  5-40 mL IntraVENous PRN Sana Barbosa MD        0.9 % sodium chloride infusion  25 mL IntraVENous DELANEY Barbosa MD        ondansetron (ZOFRAN-ODT) disintegrating tablet 4 mg  4 mg Oral Q8H PRN Sana Barbosa MD        Or    ondansetron (ZOFRAN) injection 4 mg  4 mg IntraVENous Q6H PRN Sana Barbosa MD        polyethylene glycol (GLYCOLAX) packet 17 g  17 g Oral Daily PRN Sana Barbosa MD        acetaminophen (TYLENOL) tablet 650 mg  650 mg Oral Q6H PRN Sana Barbosa MD        Or    acetaminophen (TYLENOL) suppository 650 mg  650 mg Rectal Q6H PRN Sana Barbosa MD        heparin (porcine) injection 2,000 Units  2,000 Units IntraVENous DELANEY Barbosa MD        aspirin chewable tablet 81 mg  81 mg Oral Daily David Rizzo MD   81 mg at 01/29/22 0974    carvedilol (COREG) tablet 6.25 mg  6.25 mg Oral BID  David Rizzo MD   6.25 mg at 01/29/22 1193     Current Outpatient Medications   Medication Sig Dispense Refill    amLODIPine (NORVASC) 2.5 MG tablet Take 1 tablet by mouth daily 30 tablet 3    warfarin (COUMADIN) 5 MG tablet TAKE 1 TABLET BY MOUTH ON SUNDAY AND WEDNESDAY AND THEN 1 AND 1/2 TABLETS THE REST OF THE DAYS 114 tablet 0    sitaGLIPtan-metformin (JANUMET)  MG per tablet TAKE 1 TABLET BY MOUTH TWICE A DAY WITH MEALS 180 tablet 0    metoprolol tartrate (LOPRESSOR) 25 MG tablet TAKE 1 TABLET BY MOUTH EVERY 12 HOURS 180 tablet 0    warfarin (COUMADIN) 5 MG tablet TAKE 1 TABLET BY MOUTH ON SUNDAY AND WEDNESDAY AND THEN 1 AND 1/2 TABLETS THE REST OF THE DAYS 114 tablet 3    doxazosin (CARDURA) 4 MG tablet TAKE 1 TABLET BY MOUTH EVERY DAY 90 tablet 1    pravastatin (PRAVACHOL) 40 MG tablet Take one daily 90 tablet 0      heparin (PORCINE) Infusion 11.995 Units/kg/hr (01/29/22 0816)    dextrose      sodium chloride         Physical Exam:  BP (!) 158/106   Pulse 71   Temp 98.3 °F (36.8 °C) (Oral)   Resp 20   Ht 5' 11\" (1.803 m)   Wt 169 lb (76.7 kg)   SpO2 94%   BMI 23.57 kg/m²   Wt Readings from Last 3 Encounters:   01/29/22 169 lb (76.7 kg)   01/27/22 169 lb (76.7 kg)   11/18/21 159 lb 11.2 oz (72.4 kg)     Appearance: Awake, alert, no acute respiratory distress  Skin: Intact, no rash  Head: Normocephalic, atraumatic  Eyes: EOMI, no conjunctival erythema  ENMT: No pharyngeal erythema, MMM, no rhinorrhea  Neck: Supple, no elevated JVP, no carotid bruits  Lungs: Clear to auscultation bilaterally. No wheezes, rales, or rhonchi. Cardiac: Irregularly irregular rate and rhythm, +S1S2, ESM 4/6 over right upper sternal border with radiation to carotids murmurs apparent  Abdomen: Soft, nontender, +bowel sounds  Extremities: Moves all extremities x 4, no lower extremity edema  Neurologic: No focal motor deficits apparent, normal mood and affect  Peripheral Pulses: Intact posterior tibial pulses bilaterally    Intake/Output:  No intake or output data in the 24 hours ending 01/29/22 0930  No intake/output data recorded. Laboratory Tests:  Recent Labs     01/27/22  1324 01/27/22  1324 01/28/22  1606 01/29/22  0457 01/29/22  0808     --  138 138  140  --    K 5.0  --  4.8 4.7  4.8  --      --  102 103  104  --    CO2 24  --  24 26  25  --    BUN 28*  --  30* 28*  28*  --    CREATININE 1.9*  --  1.8* 1.8*  1.8*  --    GLUCOSE 144*   < > 166* 117*  116* 105   CALCIUM 10.0  --  9.3 9.6  9.5  --     < > = values in this interval not displayed. Lab Results   Component Value Date    MG 1.8 01/28/2022     Recent Labs     01/27/22  1324 01/28/22  1606 01/29/22  0457   ALKPHOS 84 73 70   ALT 17 14 12   AST 20 20 15   PROT 7.6 6.3* 6.5   BILITOT 0.8 0.6 0.8   LABALBU 4.2 3.5 3.6     Recent Labs     01/28/22  1606 01/29/22  0457   WBC 5.3 6.4   RBC 3.90 4.23   HGB 12.1* 12.8   HCT 37.2 39.4   MCV 95.4 93.1   MCH 31.0 30.3   MCHC 32.5 32.5   RDW 13.4 13.4   * 149   MPV 11.9 12.0     No results found for: CKTOTAL, CKMB, CKMBINDEX, TROPONINI  Lab Results   Component Value Date    INR 2.4 01/29/2022    INR 2.5 06/24/2020    INR 2.3 05/20/2020    PROTIME 26.4 (H) 01/29/2022    PROTIME 29.8 06/24/2020    PROTIME 27.1 05/20/2020     No results found for: TSHFT4, TSH  Lab Results   Component Value Date    LABA1C 5.9 06/24/2020     No results found for: EAG  Lab Results   Component Value Date    CHOL 156 10/03/2018    CHOL 171 03/14/2018    CHOL 150 02/08/2017     Lab Results   Component Value Date    TRIG 182 06/24/2020    TRIG 218 02/28/2020    TRIG 122 10/30/2019     Lab Results   Component Value Date     (A) 06/24/2020    HDL 40 02/28/2020    HDL 36 10/30/2019     Lab Results   Component Value Date    LDLCALC 96 10/03/2018    LDLCALC 96 03/14/2018    LDLCALC 70 02/08/2017    LDLCHOLESTEROL 122 06/24/2020    LDLCHOLESTEROL 142 02/28/2020    LDLCHOLESTEROL 108 10/30/2019     Lab Results   Component Value Date    VLDL 145 06/24/2020    VLDL 185 02/28/2020    VLDL 132 10/30/2019     Lab Results   Component Value Date    CHOLHDLRATIO 3.3 06/24/2020    CHOLHDLRATIO 3.5 02/28/2020    CHOLHDLRATIO 3.0 10/30/2019       Cardiac Tests:  ECG: Atrial fibrillation controlled rate, left intrafascicular block, posterior infarct age undetermined, LVH, nonspecific interventricular conduction delay, abnormal EKG. Telemetry findings reviewed: Off the monitor    Labs and vitals were reviewed: Blood pressure 158/106 heart rate 71 O2 sats 94.   On room air    Labs-BUN/creatinine 28/1.9>> 28/1.8, K4.8 proBNP 38,017, troponin, high-sensitivity 69, liver LFTs normal, CBC normal.    CT chest without contrast- 1/28/2022: Moderate bilateral pleural effusions, greater towards the right.       Mild compressive atelectasis in the lung bases.  Developing infiltrates from   pneumonia thought less likely.       Heterogeneous area along the lateral margin of the dome of the liver could   represent volume averaging.  A developing neoplastic process is thought   somewhat less likely but not entirely excluded and a short-term follow-up   hepatic mass protocol MRI or CT would be useful on a nonemergent basis when   clinically feasible. Carotid Dopplers-1/28/2022: Mild carotid artery stenosis. TTE-4/24/2019:Normal left ventricular systolic function. Ejection fraction is visually estimated at 60%. Mildly dilated right ventricle with normal right ventricular function   (TAPSE 1.9 cm). There is doppler evidence of stage I diastolic dysfunction. Severely dilated left atrium by volume index. Mild mitral regurgitation. Mild aortic regurgitation. Normal-flow low-gradient severe aortic stenosis (AV peak velocity 3.8 m/s,   AV mean gradient 31 mmHg, AV area 1.0 cm2, dimensionless index 0.24,   stroke volume index 50 mL/m2). Mild tricuspid regurgitation. PASP is estimated at 28 mmHg (normal estimated PASP). Echocardiogram-1/13/2022:   Ejection fraction is visually estimated at 30%. Overall ejection fraction severely decreased. Severe left ventricular concentric hypertrophy noted. Dilated right ventricle with reduced function. Left atrial volume index of 79 ml per meters squared BSA. Severe aortic stenosis is present. The aortic valve area is 0.6 cm2 with a   maximum gradient of 113 mmHg and a mean gradient of 69 mmHg. Physiologic   and/or trace aortic regurgitation is noted. Moderate mitral regurgitation is present. Mild tricuspid regurgitation. Pulmonary hypertension is moderate to severe. RVSP is 66 mmHg. Stress test:        Cardiac catheterization:     CT head-no acute intracranial abnormality. ASSESSMENT:  · VHD: Critical aortic stenosis  · Syncope most likely from critical aortic stenosis  · Altered mental status rule out CVA, improved  · Hypertension not well controlled but stable  · New LV dysfunction EF 30%, rule out ischemic heart disease  · New HFrEF, euvolemic  · History of CAD, status post CABG x 4V (LIMA to LAD, SVG to OM1, SVG to RPDA, SVG to RPL) with modified maze and left atrial appendage exclusion-2009, no recent ischemic evaluation  · Paroxysmal atrial fibrillation/flutter on warfarin for anticoagulation  · Type 2 diabetes on oral hypoglycemic agents  · CKD stage III, creatinine 1.8  · He appears euvolemic    Plan:   · Continue Coreg 6.5 mg p.o. twice daily (added on 1/28/2022) for hypertension and also for HFrEF and underlying A. Fib. · Avoid vasodilators and nitroglycerin at this time. Will consider adding vasodilators after the TAVR implantation  · Ischemic eval as per Dr. Constantino Bermeo, pre or post TAVR. · Continue aspirin 81 mg p.o. daily and pravastatin 40 mg p.o. daily  · Continue heparin and if needed heparin can be safely stopped  · Evaluation of altered mental status and to rule out CVA as per primary service  · CT surgery consult is pending  · TAVR evaluation  · Gentle diuresis, he appears euvolemic at this time. We will use diuretics only as needed. Roger Sullivan MD., Teetee Nguyen.   [facility]) Cardiology

## 2022-01-29 NOTE — H&P
negative  EYES:  negative  HEENT:  negative  RESPIRATORY:  negative  CARDIOVASCULAR:  Syncope  GASTROINTESTINAL:  negative  GENITOURINARY:  negative  INTEGUMENT/BREAST:  negative  HEMATOLOGIC/LYMPHATIC:  negative  ALLERGIC/IMMUNOLOGIC:  negative  ENDOCRINE:  negative  MUSCULOSKELETAL:  negative  NEUROLOGICAL:  positive for syncope  BEHAVIOR/PSYCH:  negative  PHYSICAL EXAM:    Vitals:  BP (!) 158/106   Pulse 71   Temp 98.3 °F (36.8 °C) (Oral)   Resp 20   Ht 5' 11\" (1.803 m)   Wt 169 lb (76.7 kg)   SpO2 94%   BMI 23.57 kg/m²     CONSTITUTIONAL:  awake, alert, cooperative, no apparent distress, and appears stated age  EYES:  Lids and lashes normal, pupils equal, round and reactive to light, extra ocular muscles intact, sclera clear, conjunctiva normal  ENT:  Normocephalic, without obvious abnormality, atraumatic, sinuses nontender on palpation, external ears without lesions, oral pharynx with moist mucus membranes, tonsils without erythema or exudates, gums normal and good dentition. NECK:  Supple, symmetrical, trachea midline, no adenopathy, thyroid symmetric, not enlarged and no tenderness, skin normal  HEMATOLOGIC/LYMPHATICS:  no cervical lymphadenopathy  BACK:  Symmetric, no curvature, spinous processes are non-tender on palpation, paraspinous muscles are non-tender on palpation, no costal vertebral tenderness  LUNGS:  No increased work of breathing, good air exchange, clear to auscultation bilaterally, no crackles or wheezing  CARDIOVASCULAR:  Normal Sinus with  murmur posterboardABDOMEN:  Soft, nontender. Normal bs    MUSCULOSKELETAL:  There is no redness, warmth, or swelling of the joints. Full range of motion noted. Motor strength is 5 out of 5 all extremities bilaterally. Tone is normal.  NEUROLOGIC:  Awake, alert, oriented to name, place and time. Cranial nerves II-XII are grossly intact. Motor is 5 out of 5 bilaterally. Cerebellar finger to nose, heel to shin intact. Sensory is intact. Babinski down going, Romberg negative, and gait is normal.  SKIN:  no bruising or bleeding    DATA:  CBC:   Lab Results   Component Value Date    WBC 6.4 01/29/2022    RBC 4.23 01/29/2022    HGB 12.8 01/29/2022    HCT 39.4 01/29/2022    MCV 93.1 01/29/2022    MCH 30.3 01/29/2022    MCHC 32.5 01/29/2022    RDW 13.4 01/29/2022     01/29/2022    MPV 12.0 01/29/2022     CMP:    Lab Results   Component Value Date     01/29/2022     01/29/2022    K 4.7 01/29/2022    K 4.8 01/29/2022     01/29/2022     01/29/2022    CO2 26 01/29/2022    CO2 25 01/29/2022    BUN 28 01/29/2022    BUN 28 01/29/2022    CREATININE 1.8 01/29/2022    CREATININE 1.8 01/29/2022    GFRAA 43 01/29/2022    GFRAA 43 01/29/2022    LABGLOM 36 01/29/2022    LABGLOM 36 01/29/2022    GLUCOSE 105 01/29/2022    GLUCOSE 101 12/14/2011    PROT 6.5 01/29/2022    LABALBU 3.6 01/29/2022    CALCIUM 9.6 01/29/2022    CALCIUM 9.5 01/29/2022    BILITOT 0.8 01/29/2022    ALKPHOS 70 01/29/2022    AST 15 01/29/2022    ALT 12 01/29/2022     LDH:  No results found for: LDH  Warfarin PT/INR:  No components found for: PTPATWAR, PTINRWAR  Last 3 Troponin:  No results found for: TROPONINI  ABG:  No results found for: PH, PCO2, PO2, HCO3, BE, THGB, TCO2, O2SAT  HgBA1c:    Lab Results   Component Value Date    LABA1C 5.9 06/24/2020     TSH:  No results found for: TSH  VITAMIN B12: No components found for: B12  FOLATE:  No results found for: FOLATE  IRON:  No results found for: IRON  Iron Saturation:  No components found for: PERCENTFE  TIBC:  No results found for: TIBC  FERRITIN:  No results found for: FERRITIN  RPR:  No results found for: RPR  BETO:  No results found for: ANATITER, BETO  AMYLASE:  No results found for: AMYLASE  LIPASE:  No results found for: LIPASE    IMAGING    ECHO COMPLETE    Result Date: 1/13/2022  Transthoracic Echocardiography Report (TTE)  Demographics   Patient Name       [address] [place] [address]        Gender               Male [address] [place] [address] Record     [id number]       Room Number  Number   Account #          [de-identified]      Procedure Date       01/13/2022   Corporate ID                      Ordering Physician   Glenn Swift DO   Accession Number   [id number]     Referring Physician  Vanesa Friedman   Date of Birth      05/26/1931     Sonographer          Judith Timmons RDCS   Age                80 year(s)     Interpreting         Glenn Swift DO                                    Physician                                     Any Other  Procedure Type of Study   TTE procedure:Echo Complete W/ Dop & Color Flow. Procedure Date Date: 01/13/2022 Start: 10:20 AM Study Location: Echo Lab Technical Quality: Adequate visualization Indications: Aortic stenosis. Patient Status: Routine Height: 71 inches Weight: 159 pounds BSA: 1.91 m^2 BMI: 22.18 kg/m^2 BP: 132/82 mmHg  Findings   Left Ventricle  Ejection fraction is visually estimated at 30%. Overall ejection fraction  severely decreased. Severe left ventricular concentric hypertrophy noted. Left ventricle size is normal. Indeterminate diastolic function. Right Ventricle  Dilated right ventricle with reduced function. Left Atrium  Left atrial volume index of 79 ml per meters squared BSA. Right Atrium  Mildly enlarged right atrium size. Mitral Valve  Mild thickening of the mitral valve leaflets. No evidence of mitral valve  stenosis. Moderate mitral regurgitation is present. Tricuspid Valve  The tricuspid valve appears structurally normal.  Mild tricuspid regurgitation. Pulmonary hypertension is moderate to severe. RVSP is 66 mmHg. Aortic Valve  Individual aortic valve leaflets are not clearly visualized. Severe aortic  stenosis is present. The aortic valve area is 0.6 cm2 with a maximum  gradient of 113 mmHg and a mean gradient of 69 mmHg. Physiologic and/or  trace aortic regurgitation is noted.    Pulmonic Valve  Pulmonic valve is structurally normal. Physiologic and/or trace pulmonic  regurgitation present. Pericardial Effusion  No evidence for hemodynamically significant pericardial effusion. Aorta  Normal aortic root and ascending aorta. Miscellaneous  Dilated Inferior Vena Cava. Conclusions   Summary  Ejection fraction is visually estimated at 30%. Overall ejection fraction severely decreased. Severe left ventricular concentric hypertrophy noted. Dilated right ventricle with reduced function. Left atrial volume index of 79 ml per meters squared BSA. Severe aortic stenosis is present. The aortic valve area is 0.6 cm2 with a  maximum gradient of 113 mmHg and a mean gradient of 69 mmHg. Physiologic  and/or trace aortic regurgitation is noted. Moderate mitral regurgitation is present. Mild tricuspid regurgitation. Pulmonary hypertension is moderate to severe. RVSP is 66 mmHg.    Signature   ----------------------------------------------------------------  Electronically signed by Mary Abdi DO(Interpreting  physician) on 01/14/2022 04:08 PM  ----------------------------------------------------------------  M-Mode/2D Measurements & Calculations   LV Diastolic      LV Systolic          AV Cusp Separation: 1.2 cmLA  Dimension: 5.5 cm Dimension: 4.2 cm    Dimension: 4.6 cmAO Root Dimension:  LV FS:23.6 %      LV Volume Diastolic: 2.8 cm  LV PW Diastolic:  087 ml  1.5 cm            LV EDV/LV EDV Index:  LV PW Systolic:   317 HW/00 ml/m^2  1.5 cm  Septum Diastolic: LV Mass Index: 101   LA/Aorta: 2.11  1.6 cm            l/min*m^2            Ascending Aorta: 3 cm  Septum Systolic:                       LA volume/Index: 150.6 ml /79ml/m^2  1.5 cm                                 RA Area: 20.4 cm^2                    LVOT: 2.1 cm  LV Mass: 391.37 g                      IVC Expiration: 2.1 cm  Doppler Measurements & Calculations   MV Peak E-Wave: 1.55 m/s   AV Peak Velocity:    LVOT Peak Velocity: 1.03  MV Peak A-Wave: 0.34 m/s   5.33 m/s             m/s  MV E/A Ratio: 4.52         AV Peak Gradient:    LVOT Mean Velocity: 0.63  MV Peak Gradient: 8.4 mmHg 113.42 mmHg          m/s  MV Mean Gradient: 2.4 mmHg AV Mean Velocity:    LVOT Peak Gradient: 4.2  MV Mean Velocity: 0.67 m/s 3.93 m/s             mmHgLVOT Mean Gradient: 2  MV Deceleration Time:      AV Mean Gradient: 69 mmHg  158.2 msec                 mmHg                 Estimated RVSP: 66 mmHg  MV P1/2t: 75.9 msec        AV VTI: 121 cm       Estimated RAP:3 mmHg  MVA by PHT:2.9 cm^2        AV Area  MV Area (continuity): 2.3  (Continuity):0.56  cm^2                       cm^2                 TR Velocity:3.98 m/s  MV E' Septal Velocity:                          TR Gradient:63.36 mmHg  0.04 m/s                   LVOT VTI: 19.5 cm    PV Peak Velocity: 0.73 m/s  MV E' Lateral Velocity: 4  IVRT: 69.2 msec      PV Peak Gradient: 2.11  m/s                        Estimated PASP:      mmHg  MR Velocity: 6.34 m/s      66.36 mmHg           PV Mean Velocity: 0.42 m/s  MV MARGARTEH PISA: 0.15 cm^2                          PV Mean Gradient: 0.9 mmHg  MR VTI: 217.5 cm           Pulm. Vein D  Alias Velocity: 0.31       Velocity:0.91 m/s    HI ED Velocity: 1.89 m/s  m/sPISA Radius: 0.7 cm     Pulm. Vein S                             Velocity: 0.26 m/s  PISA area: 3.08 cm^2MR  flow rate: 95.48 ml/sMR  volume:32.62 ml  [email or web address] [id number]    XR CHEST (2 VW)    Result Date: 1/28/2022  EXAMINATION: TWO XRAY VIEWS OF THE CHEST 1/28/2022 3:36 pm COMPARISON: 09/27/2010 HISTORY: ORDERING SYSTEM PROVIDED HISTORY: ams TECHNOLOGIST PROVIDED HISTORY: Reason for exam:->ams What reading provider will be dictating this exam?->CRC FINDINGS: The heart is enlarged. Bilateral lower lobe infiltrates are noted more prominent within the right lower lobe. There is no pneumothorax or pneumomediastinum Postoperative sternotomy changes are noted.      Bilateral lower lobe pneumonia, right greater than left     CT Head WO Contrast    Result Date: 1/28/2022  EXAMINATION: CT OF THE HEAD WITHOUT CONTRAST  1/28/2022 3:40 pm TECHNIQUE: CT of the head was performed without the administration of intravenous contrast. Dose modulation, iterative reconstruction, and/or weight based adjustment of the mA/kV was utilized to reduce the radiation dose to as low as reasonably achievable. COMPARISON: 11/15/2015 HISTORY: ORDERING SYSTEM PROVIDED HISTORY: ams TECHNOLOGIST PROVIDED HISTORY: Reason for exam:->ams Has a \"code stroke\" or \"stroke alert\" been called? ->No Decision Support Exception - unselect if not a suspected or confirmed emergency medical condition->Emergency Medical Condition (MA) What reading provider will be dictating this exam?->CRC FINDINGS: BRAIN/VENTRICLES: There is no acute intracranial hemorrhage, mass effect or midline shift. No abnormal extra-axial fluid collection. The gray-white differentiation is maintained without evidence of an acute infarct. There is no evidence of hydrocephalus. The ventricles, cisterns and sulci are prominent consistent with atrophy. There is decreased attenuation within the periventricular white matter consistent with periventricular leukomalacia. ORBITS: The visualized portion of the orbits demonstrate no acute abnormality. SINUSES: The visualized paranasal sinuses and mastoid air cells demonstrate no acute abnormality. SOFT TISSUES/SKULL:  No acute abnormality of the visualized skull or soft tissues. 1. There is no acute intracranial abnormality. Specifically, there is no intracranial hemorrhage. 2. Atrophy and periventricular leukomalacia,     CT CHEST WO CONTRAST    Result Date: 1/28/2022  EXAMINATION: CT OF THE CHEST WITHOUT CONTRAST 1/28/2022 8:41 pm TECHNIQUE: CT of the chest was performed without the administration of intravenous contrast. Multiplanar reformatted images are provided for review.  Dose modulation, iterative reconstruction, and/or weight based adjustment of the mA/kV was utilized to reduce the radiation dose to as low as reasonably achievable. COMPARISON: Chest radiograph performed earlier today at 1526 hours HISTORY: ORDERING SYSTEM PROVIDED HISTORY: r/o pneumonia TECHNOLOGIST PROVIDED HISTORY: Reason for exam:->r/o pneumonia What reading provider will be dictating this exam?->CRC Abnormal chest radiograph with evidence of pneumonia. FINDINGS: Exam is limited without intravenous contrast.  Evidence of prior CABG with median sternotomy wires and mediastinal clips. Mild cardiomegaly. No pericardial effusion. Scattered vascular calcifications. Scattered bilateral mediastinal lymph nodes, some of which are borderline prominent. Moderate bilateral pleural effusions greater towards the right. Cholecystectomy clips. Prominent vascular calcifications in the visualized abdomen. Area of heterogeneity along the lateral margin of the dome of the liver, not seen on abdominal CT dated 09/25/2010. Thickening of the adrenal glands, likely due to hyperplasia. Visualized portions of the upper abdomen demonstrate no acute abnormality. No pneumothorax. Scattered airspace opacities in the lung bases favoring compressive atelectasis from the effusions. Developing infiltrates from pneumonia less likely. Scattered calcifications, nonspecific but suggestive of old granulomatous disease. Some images are mildly degraded by patient motion. Allowing for this, there are a few scattered areas of scarring and or linear atelectasis otherwise in each lung. Diffuse ankylosis of the spine. Degenerative changes are present in the spine and shoulders. Moderate bilateral pleural effusions, greater towards the right. Mild compressive atelectasis in the lung bases. Developing infiltrates from pneumonia thought less likely. Heterogeneous area along the lateral margin of the dome of the liver could represent volume averaging.   A developing neoplastic process is thought somewhat less likely but not entirely excluded and a short-term follow-up hepatic mass protocol MRI or CT would be useful on a nonemergent basis when clinically feasible. Cardiomegaly. RECOMMENDATIONS: Unavailable     US CAROTID ARTERY BILATERAL    Result Date: 1/28/2022  EXAMINATION: ULTRASOUND EVALUATION OF THE CAROTID ARTERIES 1/28/2022 TECHNIQUE: Duplex ultrasound using B-mode/gray scaled imaging, Doppler spectral analysis and color flow Doppler was obtained of the carotid arteries. COMPARISON: None. HISTORY: ORDERING SYSTEM PROVIDED HISTORY: pre-TAVR TECHNOLOGIST PROVIDED HISTORY: Reason for exam:->pre-TAVR What reading provider will be dictating this exam?->CRC FINDINGS: RIGHT: The right common carotid artery demonstrates peak systolic velocities of 22.9 and 56.0 cm/sec in the proximal and distal segments respectively. The right internal carotid artery demonstrates the systolic velocities of 54.8, 64.9 and 92.8  cm/sec in the proximal, mid and distal segments respectively. The external carotid artery is patent. The vertebral artery demonstrates normal antegrade flow. Atherosclerotic plaque involving the proximal ICA. Neyda Gin Small amount of plaque in the right carotid bulb. ICA/CCA ratio of 1.5 LEFT: The left common carotid artery demonstrates peak systolic velocities of 28.1 and 44.0 cm/sec in the proximal and distal segments respectively. The left internal carotid artery demonstrates the systolic velocities of 73.4, 60.0 and 72.2 cm/sec in the proximal, mid and distal segments respectively. The external carotid artery is patent. The vertebral artery demonstrates normal antegrade flow. Atherosclerotic plaque involving the proximal ICA. ICA/CCA ratio of 1.3     The right internal carotid artery demonstrates 0-50% stenosis. The left internal carotid artery demonstrates 0-50% stenosis. Bilateral vertebral arteries are patent with flow in the normal direction. Bilateral ICA atherosclerotic plaque.   Small amount of plaque in the right carotid bulb. ASSESSMENT AND PLAN:    Critical aortic stenosis with syncopal episode  CRI stage III- Nephrology consulted for preop maximization of renal function  DM - metformin On hold. Will use insulin sliding scale  A fib - Coumadin hold.  On heparin  HTN - Norvasc on hold  Questionable dentition - dentistry consult for evaluation Preop Valve replacement  Cardiology/CTS consults for AVR    I can be reached though [facility] or [facility] at [phone]

## 2022-01-29 NOTE — CONSULTS
Emergency Structural Heart consult:    I did not meet the patient in person today as he was off the floor for testing. I had seen the patient yesterday in structural heart clinic where we discussed treatment options for his severe LV dysfunction and critical aortic stenosis with expedited TAVR (tentatively planned for Wednesday, February 2). Earlier today we received a phone call from the patient's wife stating that he was en route to the emergency room due to loss of consciousness and altered mental status. Dr. Carrie Ya in ED  who examined the patient informed me that he had no neurologic deficits on exam and was back to baseline. At this juncture I think we should assume that the patient had an episode of syncope due to his critical aortic stenosis which is a very high risk symptom. · STAT EKG  · I recommend keeping the patient in the hospital until TAVR is performed. · He should be admitted to cardiac telemetry   · Chest CT without contrast:  · If pneumonia as read on the chest x-ray then consult ID  · Otherwise consult cardiology to initiate oral diuresis very gently  · Start carvedilol 6.25 mg p.o. twice daily for hypertension and HFrEF and AF  · Aspirin 81 mg daily due to history of CABG  · Hold off on anticoagulation pending TAVR. The patient had SALENA excision at time of CABG  · Consulted hospital dentistry to clear the patient for TAVR  · We also consulted CT surgery to see and examine the patient and get their opinion regarding candidacy for TAVR  · Carotid ultrasound  · TAVR CTAs will be ordered over the next 2 days depending on the patient's renal function  · The plan remains for TAVR on Wednesday, February 2.  · I am available over the weekend by phone for urgent matters regarding the TAVR; Otherwise I will see the patient on Monday. Dr. Frank Wilson will see the patient over the weekend.          Critical care time 50 minutes spent reviewing data, documentation, exam, formulating a therapeutic plan and discussing with family/care team.     Leopold Cornell, MD, [facility]  Interventional Cardiology & Structural Heart Disease  Office: [phone]  Fax: [phone]  General Coordinator: Cherri Cabrera

## 2022-01-29 NOTE — CONSULTS
[facility] MD Katherin Ferguson MD Wilfred Cai, MD Domingo Areola, DO, ALI PATIENT’S [facility] OF [place] YANET HOLGUIN .  Consultation  Patient's Name: Porsha Waldrop  3:52 PM  1/29/2022    Nephrologist: Yulia Hoang MD    Reason for Consult:  CKD III   Requesting Physician:  Miguel Tenorio MD    Chief Complaint:  syncope    History Obtained From:  Patient , recods , staff     History of Present Ilness:      79 y/o M presented to ER with cc of passing out while seated   Has hx of critical aortic Stenosis and TAVR had been planned  Has hx of CKD and baseline cr appear to be around 1.8   He was seen in ED he is on RA , appear euvolemic . I did d/w him and his wife risk of VISHAL including risk of dialysis they verbalzied understanding and informed me they d like to proceed with TAVR as scheduled   No reported n/v/d . Past Medical History:   Diagnosis Date    A-fib [facility])     BPH (benign prostatic hyperplasia)     CAD (coronary artery disease)     Diabetic neuropathy (HCC)     DM (diabetes mellitus) ([facility] [address]     HTN (hypertension)     Hyperlipemia     Pancreatitis 9/10    S/P CABG (coronary artery bypass graft)        Past Surgical History:   Procedure Laterality Date    CARDIAC SURGERY      CHOLECYSTECTOMY  09/27/2010    COLONOSCOPY      CORONARY ARTERY BYPASS GRAFT      CABG x4 with left internal mammary artery to the LAD. reverse saphenous vein grafts to the 1st marginal branches ofcircumflex, posterior descending branch of the right coronary artery, and posterolateral branch to the right coronary artery, and modified maze procedure with [employer] bipolar radiofrequency ablation, and excision of left atrial appendage.  TONSILLECTOMY         Family History   Problem Relation Age of Onset    High Blood Pressure Mother     Heart Disease Mother     Cancer Father         reports that he has never smoked. He has never used smokeless tobacco. He reports current alcohol use.  He reports that he does not use drugs. Allergies:  Patient has no known allergies. Current Medications:    doxazosin (CARDURA) tablet 4 mg, Daily  pravastatin (PRAVACHOL) tablet 40 mg, Nightly  heparin (porcine) injection 4,000 Units, PRN  heparin 25,000 units in dextrose 5% 250 mL (premix) infusion, Continuous  insulin lispro (HUMALOG) injection vial 0-6 Units, TID WC  glucose (GLUTOSE) 40 % oral gel 15 g, PRN  dextrose 50 % IV solution, PRN  glucagon (rDNA) injection 1 mg, PRN  dextrose 5 % solution, PRN  sodium chloride flush 0.9 % injection 5-40 mL, 2 times per day  sodium chloride flush 0.9 % injection 5-40 mL, PRN  0.9 % sodium chloride infusion, PRN  ondansetron (ZOFRAN-ODT) disintegrating tablet 4 mg, Q8H PRN   Or  ondansetron (ZOFRAN) injection 4 mg, Q6H PRN  polyethylene glycol (GLYCOLAX) packet 17 g, Daily PRN  acetaminophen (TYLENOL) tablet 650 mg, Q6H PRN   Or  acetaminophen (TYLENOL) suppository 650 mg, Q6H PRN  heparin (porcine) injection 2,000 Units, PRN  aspirin chewable tablet 81 mg, Daily  carvedilol (COREG) tablet 6.25 mg, BID WC        Review of Systems:   Constitutional: no fevers , no chills , feels ok   Eyes: no eye pain , no itching , no drainage  Ears, nose, mouth, throat, and face: no ear ,nose pain , hearing is ok ,no nasal drainage   Respiratory: no sob ,no cough ,no wheezing . Cardiovascular: no chest pain , no palpitation ,no sob . Gastrointestinal: no nausea, vomiting , constipation , no abdominal pain . Genitourinary:no urinary retention , no burning , dysuria . No polyuria   Hematologic/lymphatic: no bleeding , no cougulation issues . Musculoskeletal:no joint pain , no swelling . Neurological: no headaches ,no weakness , no numbness . Endocrine: no thirst , no weight issues .      Physical exam:   Vital signs /83   Pulse 58   Temp 99.7 °F (37.6 °C)   Resp 18   Ht 5' 11\" (1.803 m)   Wt 169 lb (76.7 kg)   SpO2 94%   BMI 23.57 kg/m²   Gen : NAD , appropriate for stated age . Head : at , nc   Neck : supple , no thyromegaly noted . Eyes : EOMI , PERRLA   CV : RRR , No M/R/G . Lungs: CTAB , no wheezing , good flow heard b/l   Abd : soft , NT , BS + , No Organomegaly appreciated . Skin : soft, dry . Neuro : CN  II-XII grossly intact , no focal neurologic deficit . Psych : cooperative .      Data:   Labs:  CBC with Differential:    Lab Results   Component Value Date    WBC 6.4 01/29/2022    RBC 4.23 01/29/2022    HGB 12.8 01/29/2022    HCT 39.4 01/29/2022     01/29/2022    MCV 93.1 01/29/2022    MCH 30.3 01/29/2022    MCHC 32.5 01/29/2022    RDW 13.4 01/29/2022    SEGSPCT 59 12/12/2011    LYMPHOPCT 32.0 01/29/2022    MONOPCT 8.5 01/29/2022    BASOPCT 0.5 01/29/2022    MONOSABS 0.54 01/29/2022    LYMPHSABS 2.03 01/29/2022    EOSABS 0.09 01/29/2022    BASOSABS 0.03 01/29/2022     CMP:    Lab Results   Component Value Date     01/29/2022     01/29/2022    K 4.7 01/29/2022    K 4.8 01/29/2022     01/29/2022     01/29/2022    CO2 26 01/29/2022    CO2 25 01/29/2022    BUN 28 01/29/2022    BUN 28 01/29/2022    CREATININE 1.8 01/29/2022    CREATININE 1.8 01/29/2022    GFRAA 43 01/29/2022    GFRAA 43 01/29/2022    LABGLOM 36 01/29/2022    LABGLOM 36 01/29/2022    GLUCOSE 105 01/29/2022    GLUCOSE 101 12/14/2011    PROT 6.5 01/29/2022    LABALBU 3.6 01/29/2022    CALCIUM 9.6 01/29/2022    CALCIUM 9.5 01/29/2022    BILITOT 0.8 01/29/2022    ALKPHOS 70 01/29/2022    AST 15 01/29/2022    ALT 12 01/29/2022     Ionized Calcium:  No results found for: IONCA  Magnesium:    Lab Results   Component Value Date    MG 1.8 01/28/2022     Phosphorus:  No results found for: PHOS  U/A:    Lab Results   Component Value Date    COLORU Yellow 01/29/2022    PHUR 6.0 01/29/2022    WBCUA 0-1 01/29/2022    RBCUA 0-1 01/29/2022    BACTERIA NONE SEEN 01/29/2022    CLARITYU Clear 01/29/2022    SPECGRAV >=1.030 01/29/2022    LEUKOCYTESUR Negative 01/29/2022    UROBILINOGEN and informed me they d like to proceed with TAVR as scheduled     Would start Ns at 50 cc/hr 12 hour prior to planned procedure and 12 hours after   Use the lowest iv load possible   Will follow along with you      Thank you       Electronically signed by Jnae Castorena MD on 1/29/2022 at 3:52 PM

## 2022-01-29 NOTE — ED NOTES
Pt self ambulted to BR at this time. Denies dizziness/ no complaints at this time.       Samantha De La Cruz RN  01/29/22 9941

## 2022-01-30 LAB
ALBUMIN SERPL-MCNC: 3.4 G/DL (ref 3.5–5.2)
ALP BLD-CCNC: 67 U/L (ref 40–129)
ALT SERPL-CCNC: 10 U/L (ref 0–40)
ANION GAP SERPL CALCULATED.3IONS-SCNC: 14 MMOL/L (ref 7–16)
APTT: 33.2 SEC (ref 24.5–35.1)
APTT: 47.6 SEC (ref 24.5–35.1)
APTT: 50.3 SEC (ref 24.5–35.1)
AST SERPL-CCNC: 16 U/L (ref 0–39)
BASOPHILS ABSOLUTE: 0.03 E9/L (ref 0–0.2)
BASOPHILS RELATIVE PERCENT: 0.5 % (ref 0–2)
BILIRUB SERPL-MCNC: 0.7 MG/DL (ref 0–1.2)
BUN BLDV-MCNC: 30 MG/DL (ref 6–23)
CALCIUM SERPL-MCNC: 9 MG/DL (ref 8.6–10.2)
CHLORIDE BLD-SCNC: 107 MMOL/L (ref 98–107)
CO2: 22 MMOL/L (ref 22–29)
CREAT SERPL-MCNC: 2 MG/DL (ref 0.7–1.2)
EOSINOPHILS ABSOLUTE: 0.11 E9/L (ref 0.05–0.5)
EOSINOPHILS RELATIVE PERCENT: 1.8 % (ref 0–6)
GFR AFRICAN AMERICAN: 38
GFR NON-AFRICAN AMERICAN: 31 ML/MIN/1.73
GLUCOSE BLD-MCNC: 102 MG/DL (ref 74–99)
HCT VFR BLD CALC: 35.8 % (ref 37–54)
HEMOGLOBIN: 11.5 G/DL (ref 12.5–16.5)
IMMATURE GRANULOCYTES #: 0.02 E9/L
IMMATURE GRANULOCYTES %: 0.3 % (ref 0–5)
LYMPHOCYTES ABSOLUTE: 2.24 E9/L (ref 1.5–4)
LYMPHOCYTES RELATIVE PERCENT: 36 % (ref 20–42)
MCH RBC QN AUTO: 30.7 PG (ref 26–35)
MCHC RBC AUTO-ENTMCNC: 32.1 % (ref 32–34.5)
MCV RBC AUTO: 95.5 FL (ref 80–99.9)
METER GLUCOSE: 135 MG/DL (ref 74–99)
METER GLUCOSE: 143 MG/DL (ref 74–99)
METER GLUCOSE: 213 MG/DL (ref 74–99)
METER GLUCOSE: 95 MG/DL (ref 74–99)
MONOCYTES ABSOLUTE: 0.5 E9/L (ref 0.1–0.95)
MONOCYTES RELATIVE PERCENT: 8 % (ref 2–12)
NEUTROPHILS ABSOLUTE: 3.33 E9/L (ref 1.8–7.3)
NEUTROPHILS RELATIVE PERCENT: 53.4 % (ref 43–80)
PDW BLD-RTO: 13.3 FL (ref 11.5–15)
PLATELET # BLD: 119 E9/L (ref 130–450)
PMV BLD AUTO: 11.9 FL (ref 7–12)
POTASSIUM REFLEX MAGNESIUM: 4.2 MMOL/L (ref 3.5–5)
POTASSIUM SERPL-SCNC: 4.2 MMOL/L (ref 3.5–5)
RBC # BLD: 3.75 E12/L (ref 3.8–5.8)
SODIUM BLD-SCNC: 143 MMOL/L (ref 132–146)
TOTAL PROTEIN: 6.1 G/DL (ref 6.4–8.3)
WBC # BLD: 6.2 E9/L (ref 4.5–11.5)

## 2022-01-30 PROCEDURE — 6370000000 HC RX 637 (ALT 250 FOR IP): Performed by: INTERNAL MEDICINE

## 2022-01-30 PROCEDURE — 80048 BASIC METABOLIC PNL TOTAL CA: CPT

## 2022-01-30 PROCEDURE — 36415 COLL VENOUS BLD VENIPUNCTURE: CPT

## 2022-01-30 PROCEDURE — 2580000003 HC RX 258: Performed by: INTERNAL MEDICINE

## 2022-01-30 PROCEDURE — 85025 COMPLETE CBC W/AUTO DIFF WBC: CPT

## 2022-01-30 PROCEDURE — 6370000000 HC RX 637 (ALT 250 FOR IP): Performed by: CLINICAL NURSE SPECIALIST

## 2022-01-30 PROCEDURE — 99232 SBSQ HOSP IP/OBS MODERATE 35: CPT | Performed by: INTERNAL MEDICINE

## 2022-01-30 PROCEDURE — 85730 THROMBOPLASTIN TIME PARTIAL: CPT

## 2022-01-30 PROCEDURE — 2140000000 HC CCU INTERMEDIATE R&B

## 2022-01-30 PROCEDURE — 82962 GLUCOSE BLOOD TEST: CPT

## 2022-01-30 PROCEDURE — 80053 COMPREHEN METABOLIC PANEL: CPT

## 2022-01-30 RX ORDER — SODIUM CHLORIDE 9 MG/ML
INJECTION, SOLUTION INTRAVENOUS CONTINUOUS
Status: DISCONTINUED | OUTPATIENT
Start: 2022-01-31 | End: 2022-01-31

## 2022-01-30 RX ADMIN — INSULIN LISPRO 1 UNITS: 100 INJECTION, SOLUTION INTRAVENOUS; SUBCUTANEOUS at 11:59

## 2022-01-30 RX ADMIN — PRAVASTATIN SODIUM 40 MG: 20 TABLET ORAL at 22:25

## 2022-01-30 RX ADMIN — CARVEDILOL 6.25 MG: 6.25 TABLET, FILM COATED ORAL at 08:23

## 2022-01-30 RX ADMIN — SODIUM CHLORIDE: 9 INJECTION, SOLUTION INTRAVENOUS at 23:16

## 2022-01-30 RX ADMIN — DOXAZOSIN 4 MG: 4 TABLET ORAL at 08:23

## 2022-01-30 RX ADMIN — SODIUM CHLORIDE, PRESERVATIVE FREE 10 ML: 5 INJECTION INTRAVENOUS at 22:25

## 2022-01-30 RX ADMIN — ASPIRIN 81 MG CHEWABLE TABLET 81 MG: 81 TABLET CHEWABLE at 08:23

## 2022-01-30 RX ADMIN — SODIUM CHLORIDE, PRESERVATIVE FREE 10 ML: 5 INJECTION INTRAVENOUS at 08:24

## 2022-01-30 ASSESSMENT — PAIN SCALES - GENERAL
PAINLEVEL_OUTOF10: 0

## 2022-01-30 NOTE — PATIENT CARE CONFERENCE
P Quality Flow/Interdisciplinary Rounds Progress Note        Quality Flow Rounds held on January 30, 2022    Disciplines Attending:  Bedside Nurse, ,  and Nursing Unit Leadership    Leticia Romero was admitted on 1/28/2022  2:10 PM    Anticipated Discharge Date:       Disposition:    Bhavesh Score:  Bhavesh Scale Score: 15    Readmission Risk              Risk of Unplanned Readmission:  14           Discussed patient goal for the day, patient clinical progression, and barriers to discharge.   The following Goal(s) of the Day/Commitment(s) have been identified:  Diagnostics - Report Results and Education/Learning - Alternate Learner      Aure Rolle RN  January 30, 2022

## 2022-01-30 NOTE — PLAN OF CARE
Reviewed CT abd/pelvis. Right femoral access may be amenable for use for TAVR access percutaneously. Will order CTA chest and CTA cardiac tomorrow with low contrast for rest of TAVR planning. Hydration will be ordered as well. I also d/c'd heparin due to developing thrombocytopenia. He had SALENA excision at time of CABG.  Continued anticoagulation is not necessary      Leopold Cornell, MD, [facility] [place]  Interventional Cardiology & Structural Heart Disease  Office: [phone]  Fax: [phone]  Structural Heart Coordinator: [facility], [place]

## 2022-01-30 NOTE — PROGRESS NOTES
INPATIENT CARDIOLOGY FOLLOW-UP    Name: Saad Archibald    Age: 80 y.o. Date of Admission: 1/28/2022  2:10 PM    Date of Service: 1/30/2022    Chief Complaint: Follow-up for syncope and altered mental status    Interim History:  No new overnight cardiac complaints. Currently with no complaints of CP, SOB, palpitations, dizziness, or lightheadedness. SR on telemetry.     Review of Systems:   Cardiac: As per HPI  General: No fever, chills  Pulmonary: As per HPI  HEENT: No visual disturbances, difficult swallowing  GI: No nausea, vomiting  Endocrine: No thyroid disease or DM  Musculoskeletal: RIVAS x 4, no focal motor deficits  Skin: Intact, no rashes  Neuro/Psych: No headache or seizures    Problem List:  Patient Active Problem List   Diagnosis    Cellulitis    CAD (coronary artery disease)    S/P CABG (coronary artery bypass graft)    DM ([facility] mellitus) ([facility] [address]    Hyperlipemia    HTN (hypertension)    A-fib ([facility] [address]    BPH (benign prostatic hyperplasia)    [facility]    Encounter for therapeutic drug monitoring    Long term current use of anticoagulant therapy    Aortic valve stenosis    Chronic kidney disease    Heart valve disease    Gout    Syncope without other cardiovascular symptoms       Allergies:  No Known Allergies    Current Medications:  Current Facility-Administered Medications   Medication Dose Route Frequency Provider Last Rate Last Admin    [START ON 1/31/2022] 0.9 % sodium chloride infusion   IntraVENous Continuous Nannette Gruber MD        doxazosin (CARDURA) tablet 4 mg  4 mg Oral Daily Lanny Teran MD   4 mg at 01/30/22 0823    pravastatin (PRAVACHOL) tablet 40 mg  40 mg Oral Nightly Jet Roca MD   40 mg at 01/29/22 2100    insulin lispro (HUMALOG) injection vial 0-6 Units  0-6 Units SubCUTAneous TID  Jet Roca MD   1 Units at 01/30/22 1159    glucose (GLUTOSE) 40 % oral gel 15 g  15 g Oral PRN Lanny Teran MD        dextrose 50 % IV solution 12.5 g IntraVENous PRN Bowling FriMD donny        glucagon (rDNA) injection 1 mg  1 mg IntraMUSCular PRN Bowling FriMD donny        dextrose 5 % solution  100 mL/hr IntraVENous PRN Tawnya FriMD donny        sodium chloride flush 0.9 % injection 5-40 mL  5-40 mL IntraVENous 2 times per day Tawnya Crowder MD   10 mL at 01/30/22 0824    sodium chloride flush 0.9 % injection 5-40 mL  5-40 mL IntraVENous PRN Tawnya FriMD donny        0.9 % sodium chloride infusion  25 mL IntraVENous PRN Bowling Friday, MD        ondansetron (ZOFRAN-ODT) disintegrating tablet 4 mg  4 mg Oral Q8H PRN Bowling Friday, MD        Or    ondansetron (ZOFRAN) injection 4 mg  4 mg IntraVENous Q6H PRN Bowling Friday, MD        polyethylene glycol (GLYCOLAX) packet 17 g  17 g Oral Daily PRN Bowling Friday, MD        acetaminophen (TYLENOL) tablet 650 mg  650 mg Oral Q6H PRN Bowling Friday, MD        Or    acetaminophen (TYLENOL) suppository 650 mg  650 mg Rectal Q6H PRN Bowling Friday, MD        heparin (porcine) injection 2,000 Units  2,000 Units IntraVENous PRN Bowling Friday, MD        aspirin chewable tablet 81 mg  81 mg Oral Daily Jarektyree Suarez MD   81 mg at 01/30/22 0823    carvedilol (COREG) tablet 6.25 mg  6.25 mg Oral BID [facility]   6.25 mg at 01/30/22 0823      [START ON 1/31/2022] sodium chloride      dextrose      sodium chloride         Physical Exam:  /73   Pulse 68   Temp 97.7 °F (36.5 °C)   Resp 16   Ht 5' 11\" (1.803 m)   Wt 169 lb (76.7 kg)   SpO2 96%   BMI 23.57 kg/m²   Wt Readings from Last 3 Encounters:   01/29/22 169 lb (76.7 kg)   01/27/22 169 lb (76.7 kg)   11/18/21 159 lb 11.2 oz (72.4 kg)     Appearance: Awake, alert, no acute respiratory distress  Skin: Intact, no rash  Head: Normocephalic, atraumatic  Eyes: EOMI, no conjunctival erythema  ENMT: No pharyngeal erythema, MMM, no rhinorrhea  Neck: Supple, no elevated JVP, no carotid bruits  Lungs: Clear to auscultation bilaterally.  No wheezes, rales, or rhonchi. Cardiac: Irregularly irregular rate and rhythm, +S1S2, ESM 4/6 over right upper sternal border with radiation to carotids murmurs apparent  Abdomen: Soft, nontender, +bowel sounds  Extremities: Moves all extremities x 4, no lower extremity edema  Neurologic: No focal motor deficits apparent, normal mood and affect  Peripheral Pulses: Intact posterior tibial pulses bilaterally    Intake/Output:    Intake/Output Summary (Last 24 hours) at 1/30/2022 1748  Last data filed at 1/30/2022 1420  Gross per 24 hour   Intake 660 ml   Output 250 ml   Net 410 ml     I/O this shift: In: 540 [P.O.:540]  Out: 150 [Urine:150]    Laboratory Tests:  Recent Labs     01/28/22  1606 01/28/22  1606 01/29/22  0457 01/29/22  0808 01/30/22  0608     --  138  140  --  143   K 4.8   < > 4.7  4.8  --  4.2  4.2     --  103  104  --  107   CO2 24  --  26  25  --  22   BUN 30*  --  28*  28*  --  30*   CREATININE 1.8*  --  1.8*  1.8*  --  2.0*   GLUCOSE 166*   < > 117*  116* 105 102*   CALCIUM 9.3  --  9.6  9.5  --  9.0    < > = values in this interval not displayed.      Lab Results   Component Value Date    MG 1.8 01/28/2022     Recent Labs     01/28/22  1606 01/29/22 0457 01/30/22  0608   ALKPHOS 73 70 67   ALT 14 12 10   AST 20 15 16   PROT 6.3* 6.5 6.1*   BILITOT 0.6 0.8 0.7   LABALBU 3.5 3.6 3.4*     Recent Labs     01/28/22  1606 01/29/22  0457 01/30/22  0608   WBC 5.3 6.4 6.2   RBC 3.90 4.23 3.75*   HGB 12.1* 12.8 11.5*   HCT 37.2 39.4 35.8*   MCV 95.4 93.1 95.5   MCH 31.0 30.3 30.7   MCHC 32.5 32.5 32.1   RDW 13.4 13.4 13.3   * 149 119*   MPV 11.9 12.0 11.9     No results found for: CKTOTAL, CKMB, CKMBINDEX, TROPONINI  Lab Results   Component Value Date    INR 2.4 01/29/2022    INR 2.5 06/24/2020    INR 2.3 05/20/2020    PROTIME 26.4 (H) 01/29/2022    PROTIME 29.8 06/24/2020    PROTIME 27.1 05/20/2020     No results found for: TSHFT4, TSH  Lab Results   Component Value Date    LABA1C 5.9 06/24/2020     No results found for: EAG  Lab Results   Component Value Date    CHOL 156 10/03/2018    CHOL 171 03/14/2018    CHOL 150 02/08/2017     Lab Results   Component Value Date    TRIG 182 06/24/2020    TRIG 218 02/28/2020    TRIG 122 10/30/2019     Lab Results   Component Value Date     (A) 06/24/2020    HDL 40 02/28/2020    HDL 36 10/30/2019     Lab Results   Component Value Date    LDLCALC 96 10/03/2018    LDLCALC 96 03/14/2018    LDLCALC 70 02/08/2017    LDLCHOLESTEROL 122 06/24/2020    LDLCHOLESTEROL 142 02/28/2020    LDLCHOLESTEROL 108 10/30/2019     Lab Results   Component Value Date    VLDL 145 06/24/2020    VLDL 185 02/28/2020    VLDL 132 10/30/2019     Lab Results   Component Value Date    CHOLHDLRATIO 3.3 06/24/2020    CHOLHDLRATIO 3.5 02/28/2020    CHOLHDLRATIO 3.0 10/30/2019       Cardiac Tests:  ECG: Atrial fibrillation controlled rate, left intrafascicular block, posterior infarct age undetermined, LVH, nonspecific interventricular conduction delay, abnormal EKG. Telemetry findings reviewed: Off the monitor    Labs and vitals were reviewed: Blood pressure 134/73 heart rate 68 O2 sats 96. On room air    Labs-BUN/creatinine 28/1.9>> 28/1.8>> 30/2.0, K4.8 proBNP 38,017, troponin, high-sensitivity 69, liver LFTs normal, CBC normal>> hemoglobin 11.5 and platelets 484. CT chest without contrast- 1/28/2022: Moderate bilateral pleural effusions, greater towards the right.       Mild compressive atelectasis in the lung bases.  Developing infiltrates from   pneumonia thought less likely.       Heterogeneous area along the lateral margin of the dome of the liver could   represent volume averaging.  A developing neoplastic process is thought   somewhat less likely but not entirely excluded and a short-term follow-up   hepatic mass protocol MRI or CT would be useful on a nonemergent basis when   clinically feasible.        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 Plan:   · Continue Coreg 6.5 mg p.o. twice daily (added on 1/28/2022) for hypertension and also for HFrEF and underlying A. Fib. · Avoid vasodilators and nitroglycerin at this time. Will consider adding vasodilators after the TAVR implantation  · Ischemic eval as per Dr. Krishna Escobar, prior to TAVR. Patient is scheduled for CTA chest as well as coronaries in a.m. · Renal input was noted. Patient was initiated on low-dose IV fluids. · Continue aspirin 81 mg p.o. daily and pravastatin 40 mg p.o. daily  · Agree with stopping heparin  · CT surgery consult is pending  · Gentle diuresis, he appears euvolemic at this time. We will use diuretics only as needed. Arley Sánchez MD., Latisha Lopes.   [facility]) Cardiology

## 2022-01-30 NOTE — PROGRESS NOTES
[facility] MD Hermes Venegas MD Dava Cage, MD Regena Rhein, DO, [facility] PATIENT’S [facility] .  Progress note  Patient's Name: Saad Archibald  2:37 PM  1/30/2022      Stable vitals   For cta in am per cardiology     History of Present Ilness:      81 y/o M presented to ER with cc of passing out while seated   Has hx of critical aortic Stenosis and TAVR had been planned  Has hx of CKD and baseline cr appear to be around 1.8   He was seen in ED he is on RA , appear euvolemic . I did d/w him and his wife risk of VISHAL including risk of dialysis they verbalzied understanding and informed me they d like to proceed with TAVR as scheduled   No reported n/v/d . Past Medical History:   Diagnosis Date    A-fib [facility])     BPH (benign prostatic hyperplasia)     CAD (coronary artery disease)     Diabetic neuropathy (HCC)     DM (diabetes mellitus) ([facility] [address]     HTN (hypertension)     Hyperlipemia     Pancreatitis 9/10    S/P CABG (coronary artery bypass graft)        Past Surgical History:   Procedure Laterality Date    CARDIAC SURGERY      CHOLECYSTECTOMY  09/27/2010    COLONOSCOPY      CORONARY ARTERY BYPASS GRAFT      CABG x4 with left internal mammary artery to the LAD. reverse saphenous vein grafts to the 1st marginal branches ofcircumflex, posterior descending branch of the right coronary artery, and posterolateral branch to the right coronary artery, and modified maze procedure with [employer] bipolar radiofrequency ablation, and excision of left atrial appendage.  TONSILLECTOMY         Family History   Problem Relation Age of Onset    High Blood Pressure Mother     Heart Disease Mother     Cancer Father         reports that he has never smoked. He has never used smokeless tobacco. He reports current alcohol use. He reports that he does not use drugs. Allergies:  Patient has no known allergies.     Current Medications:    [START ON 1/31/2022] 0.9 % sodium chloride infusion, Continuous  doxazosin (CARDURA) tablet 4 mg, Daily  pravastatin (PRAVACHOL) tablet 40 mg, Nightly  insulin lispro (HUMALOG) injection vial 0-6 Units, TID WC  glucose (GLUTOSE) 40 % oral gel 15 g, PRN  dextrose 50 % IV solution, PRN  glucagon (rDNA) injection 1 mg, PRN  dextrose 5 % solution, PRN  sodium chloride flush 0.9 % injection 5-40 mL, 2 times per day  sodium chloride flush 0.9 % injection 5-40 mL, PRN  0.9 % sodium chloride infusion, PRN  ondansetron (ZOFRAN-ODT) disintegrating tablet 4 mg, Q8H PRN   Or  ondansetron (ZOFRAN) injection 4 mg, Q6H PRN  polyethylene glycol (GLYCOLAX) packet 17 g, Daily PRN  acetaminophen (TYLENOL) tablet 650 mg, Q6H PRN   Or  acetaminophen (TYLENOL) suppository 650 mg, Q6H PRN  heparin (porcine) injection 2,000 Units, PRN  aspirin chewable tablet 81 mg, Daily  carvedilol (COREG) tablet 6.25 mg, BID         Review of Systems:   Constitutional: no fevers , no chills , feels ok   Eyes: no eye pain , no itching , no drainage  Ears, nose, mouth, throat, and face: no ear ,nose pain , hearing is ok ,no nasal drainage   Respiratory: no sob ,no cough ,no wheezing . Cardiovascular: no chest pain , no palpitation ,no sob . Gastrointestinal: no nausea, vomiting , constipation , no abdominal pain . Genitourinary:no urinary retention , no burning , dysuria . No polyuria   Hematologic/lymphatic: no bleeding , no cougulation issues . Musculoskeletal:no joint pain , no swelling . Neurological: no headaches ,no weakness , no numbness . Endocrine: no thirst , no weight issues . Physical exam:   Vital signs /71   Pulse 54   Temp 97.9 °F (36.6 °C)   Resp 16   Ht 5' 11\" (1.803 m)   Wt 169 lb (76.7 kg)   SpO2 96%   BMI 23.57 kg/m²   Gen : NAD , appropriate for stated age . Head : at , nc   Neck : supple , no thyromegaly noted . Eyes : EOMI , PERRLA   CV : RRR , No M/R/G .    Lungs: CTAB , no wheezing , good flow heard b/l   Abd : soft , NT , BS + , No Organomegaly appreciated . Skin : soft, dry . Neuro : CN  II-XII grossly intact , no focal neurologic deficit . Psych : cooperative .      Data:   Labs:  CBC with Differential:    Lab Results   Component Value Date    WBC 6.2 01/30/2022    RBC 3.75 01/30/2022    HGB 11.5 01/30/2022    HCT 35.8 01/30/2022     01/30/2022    MCV 95.5 01/30/2022    MCH 30.7 01/30/2022    MCHC 32.1 01/30/2022    RDW 13.3 01/30/2022    SEGSPCT 59 12/12/2011    LYMPHOPCT 36.0 01/30/2022    MONOPCT 8.0 01/30/2022    BASOPCT 0.5 01/30/2022    MONOSABS 0.50 01/30/2022    LYMPHSABS 2.24 01/30/2022    EOSABS 0.11 01/30/2022    BASOSABS 0.03 01/30/2022     CMP:    Lab Results   Component Value Date     01/30/2022    K 4.2 01/30/2022    K 4.2 01/30/2022     01/30/2022    CO2 22 01/30/2022    BUN 30 01/30/2022    CREATININE 2.0 01/30/2022    GFRAA 38 01/30/2022    LABGLOM 31 01/30/2022    GLUCOSE 102 01/30/2022    GLUCOSE 101 12/14/2011    PROT 6.1 01/30/2022    LABALBU 3.4 01/30/2022    CALCIUM 9.0 01/30/2022    BILITOT 0.7 01/30/2022    ALKPHOS 67 01/30/2022    AST 16 01/30/2022    ALT 10 01/30/2022     Ionized Calcium:  No results found for: IONCA  Magnesium:    Lab Results   Component Value Date    MG 1.8 01/28/2022     Phosphorus:  No results found for: PHOS  U/A:    Lab Results   Component Value Date    COLORU Yellow 01/29/2022    PHUR 6.0 01/29/2022    WBCUA 0-1 01/29/2022    RBCUA 0-1 01/29/2022    BACTERIA NONE SEEN 01/29/2022    CLARITYU Clear 01/29/2022    SPECGRAV >=1.030 01/29/2022    LEUKOCYTESUR Negative 01/29/2022    UROBILINOGEN 0.2 01/29/2022    BILIRUBINUR Negative 01/29/2022    BLOODU TRACE-INTACT 01/29/2022    GLUCOSEU Negative 01/29/2022     Microalbumen/Creatinine ratio:  No components found for: RUCREAT  Iron Saturation:  No components found for: PERCENTFE  TIBC:  No results found for: TIBC  FERRITIN:  No results found for: FERRITIN     Imaging:  CT ABDOMEN PELVIS WO CONTRAST Additional Contrast? None   Final Result   1. Moderate bilateral pleural effusions with adjacent compressive atelectasis   2. Advanced coronary artery calcifications   3. Nonspecific intestinal gas pattern   4. No signs of urolithiasis or obstructive uropathy. RECOMMENDATIONS:   Unavailable         CT CHEST WO CONTRAST   Final Result   Moderate bilateral pleural effusions, greater towards the right. Mild compressive atelectasis in the lung bases. Developing infiltrates from   pneumonia thought less likely. Heterogeneous area along the lateral margin of the dome of the liver could   represent volume averaging. A developing neoplastic process is thought   somewhat less likely but not entirely excluded and a short-term follow-up   hepatic mass protocol MRI or CT would be useful on a nonemergent basis when   clinically feasible. Cardiomegaly. RECOMMENDATIONS:   Unavailable         US CAROTID ARTERY BILATERAL   Final Result   The right internal carotid artery demonstrates 0-50% stenosis. The left internal carotid artery demonstrates 0-50% stenosis. Bilateral vertebral arteries are patent with flow in the normal direction. Bilateral ICA atherosclerotic plaque. Small amount of plaque in the right   carotid bulb. CT Head WO Contrast   Final Result   1. There is no acute intracranial abnormality. Specifically, there is no   intracranial hemorrhage. 2. Atrophy and periventricular leukomalacia,         XR CHEST (2 VW)   Final Result   Bilateral lower lobe pneumonia, right greater than left         CTA CHEST W CONTRAST    (Results Pending)   CTA TRANSCATHETER AORTIC VALVE REPLACEMENT    (Results Pending)       Assessment    -Chronic kidney disease stage III baseline cr 1.8-1.9 : stable   -critical aortic Stenosis and TAVR had been planned  -Hx of HTN at goal on coreg   -NIDDM     Plan :     I did d/w with mr and ms storms risk of VISHAL including risk of dialysis .  I informed then that he is considered moderated to high risk .     they verbalzied understanding and informed me they d like to proceed with TAVR as scheduled     Would start Ns at 50 cc/hr 12 hour prior to planned procedure and 12 hours after   Use the lowest iv load possible   Will follow along with you          Electronically signed by Avery Latham MD on 1/30/2022 at 2:37 PM

## 2022-01-30 NOTE — PROGRESS NOTES
PT SEEN AND EXAMINED. Chart reviewed. meds reviewed. D/w nursing + family as available. EXAM: IN GENERAL, NAD. AWAKE AND ALERT. ROS NEGx10 EXCEPT: Discuss with him process for AVR  BP (!) 154/67   Pulse 56   Temp 97.8 °F (36.6 °C)   Resp 18   Ht 5' 11\" (1.803 m)   Wt 169 lb (76.7 kg)   SpO2 96%   BMI 23.57 kg/m²   GEN: A+O NAD. HEENT: NCAT. EOMI. JESSENIA  NECK: NO JVD. TRACH MIDLINE. NO BRUITS. NO THYROMEGALY. LUNGS: CTA BL NO RALES, RHONCHI OR WHEEZES. GOOD EXCURSION. CV: Regular rate and rhythm, NO Murmurs, Rubs, Or gallops  ABD: Soft. Nontender. Normal bowel sounds. No organomegaly  EXT:No clubbing cyanosis or edema  Neuro: Alert and oriented x 3. No focal motor deficits. No sensory deficits. Reflexes appear intact.   Labs/data reviewedLABS: CBC with Differential:    Lab Results   Component Value Date    WBC 6.2 01/30/2022    RBC 3.75 01/30/2022    HGB 11.5 01/30/2022    HCT 35.8 01/30/2022     01/30/2022    MCV 95.5 01/30/2022    MCH 30.7 01/30/2022    MCHC 32.1 01/30/2022    RDW 13.3 01/30/2022    SEGSPCT 59 12/12/2011    LYMPHOPCT 36.0 01/30/2022    MONOPCT 8.0 01/30/2022    BASOPCT 0.5 01/30/2022    MONOSABS 0.50 01/30/2022    LYMPHSABS 2.24 01/30/2022    EOSABS 0.11 01/30/2022    BASOSABS 0.03 01/30/2022     Platelets:    Lab Results   Component Value Date     01/30/2022     CMP:    Lab Results   Component Value Date     01/30/2022    K 4.2 01/30/2022    K 4.2 01/30/2022     01/30/2022    CO2 22 01/30/2022    BUN 30 01/30/2022    CREATININE 2.0 01/30/2022    GFRAA 38 01/30/2022    LABGLOM 31 01/30/2022    GLUCOSE 102 01/30/2022    GLUCOSE 101 12/14/2011    PROT 6.1 01/30/2022    LABALBU 3.4 01/30/2022    CALCIUM 9.0 01/30/2022    BILITOT 0.7 01/30/2022    ALKPHOS 67 01/30/2022    AST 16 01/30/2022    ALT 10 01/30/2022     Magnesium:    Lab Results   Component Value Date    MG 1.8 01/28/2022     LDH:  No results found for: LDH  PT/INR:    Lab Results   Component Value Date PROTIME 26.4 01/29/2022    PROTIME 29.8 06/24/2020    INR 2.4 01/29/2022     Last 3 Troponin:  No results found for: TROPONINI  ABG:  No results found for: PH, PCO2, PO2, HCO3, BE, THGB, TCO2, O2SAT  IRON:  No results found for: IRON  IMAGING    ECHO COMPLETE    Result Date: 1/13/2022  Transthoracic Echocardiography Report (TTE)  Demographics   Patient Name       Yury Labor        Gender               Male                     [address] [place] [address] Record     [id number]       Room Number  Number   Account #          [de-identified]      Procedure Date       01/13/2022   Corporate ID                      Ordering Physician   Eric Dorman DO   Accession Number   [id number]     Referring Physician  [place]   Date of Birth      05/26/1931     Sonographer          Arnav Mcclelland RDCS   Age                80 year(s)     Interpreting         Eric Dorman DO                                    Physician                                     Any Other  Procedure Type of Study   TTE procedure:Echo Complete W/ Dop & Color Flow. Procedure Date Date: 01/13/2022 Start: 10:20 AM Study Location: Echo Lab Technical Quality: Adequate visualization Indications: Aortic stenosis. Patient Status: Routine Height: 71 inches Weight: 159 pounds BSA: 1.91 m^2 BMI: 22.18 kg/m^2 BP: 132/82 mmHg  Findings   Left Ventricle  Ejection fraction is visually estimated at 30%. Overall ejection fraction  severely decreased. Severe left ventricular concentric hypertrophy noted. Left ventricle size is normal. Indeterminate diastolic function. Right Ventricle  Dilated right ventricle with reduced function. Left Atrium  Left atrial volume index of 79 ml per meters squared BSA. Right Atrium  Mildly enlarged right atrium size. Mitral Valve  Mild thickening of the mitral valve leaflets. No evidence of mitral valve  stenosis. Moderate mitral regurgitation is present.    Tricuspid Valve  The tricuspid valve appears structurally normal.  Mild tricuspid regurgitation. Pulmonary hypertension is moderate to severe. RVSP is 66 mmHg. Aortic Valve  Individual aortic valve leaflets are not clearly visualized. Severe aortic  stenosis is present. The aortic valve area is 0.6 cm2 with a maximum  gradient of 113 mmHg and a mean gradient of 69 mmHg. Physiologic and/or  trace aortic regurgitation is noted. Pulmonic Valve  Pulmonic valve is structurally normal. Physiologic and/or trace pulmonic  regurgitation present. Pericardial Effusion  No evidence for hemodynamically significant pericardial effusion. Aorta  Normal aortic root and ascending aorta. Miscellaneous  Dilated Inferior Vena Cava. Conclusions   Summary  Ejection fraction is visually estimated at 30%. Overall ejection fraction severely decreased. Severe left ventricular concentric hypertrophy noted. Dilated right ventricle with reduced function. Left atrial volume index of 79 ml per meters squared BSA. Severe aortic stenosis is present. The aortic valve area is 0.6 cm2 with a  maximum gradient of 113 mmHg and a mean gradient of 69 mmHg. Physiologic  and/or trace aortic regurgitation is noted. Moderate mitral regurgitation is present. Mild tricuspid regurgitation. Pulmonary hypertension is moderate to severe. RVSP is 66 mmHg.    Signature   ----------------------------------------------------------------  Electronically signed by Chetna Mcleod DO(Interpreting  physician) on 01/14/2022 04:08 PM  ----------------------------------------------------------------  M-Mode/2D Measurements & Calculations   LV Diastolic      LV Systolic          AV Cusp Separation: 1.2 cmLA  Dimension: 5.5 cm Dimension: 4.2 cm    Dimension: 4.6 cmAO Root Dimension:  LV FS:23.6 %      LV Volume Diastolic: 2.8 cm  LV PW Diastolic:  512 ml  1.5 cm            LV EDV/LV EDV Index:  LV PW Systolic:   159 RP/70 ml/m^2  1.5 cm  Septum Diastolic: LV Mass Index: 442   LA/Aorta: 2.11  1.6 cm            l/min*m^2            Ascending Aorta: 3 cm  Septum Systolic:                       LA volume/Index: 150.6 ml /79ml/m^2  1.5 cm                                 RA Area: 20.4 cm^2                    LVOT: 2.1 cm  LV Mass: 391.37 g                      IVC Expiration: 2.1 cm  Doppler Measurements & Calculations   MV Peak E-Wave: 1.55 m/s   AV Peak Velocity:    LVOT Peak Velocity: 1.03  MV Peak A-Wave: 0.34 m/s   5.33 m/s             m/s  MV E/A Ratio: 4.52         AV Peak Gradient:    LVOT Mean Velocity: 0.63  MV Peak Gradient: 8.4 mmHg 113.42 mmHg          m/s  MV Mean Gradient: 2.4 mmHg AV Mean Velocity:    LVOT Peak Gradient: 4.2  MV Mean Velocity: 0.67 m/s 3.93 m/s             mmHgLVOT Mean Gradient: 2  MV Deceleration Time:      AV Mean Gradient: 69 mmHg  158.2 msec                 mmHg                 Estimated RVSP: 66 mmHg  MV P1/2t: 75.9 msec        AV VTI: 121 cm       Estimated RAP:3 mmHg  MVA by PHT:2.9 cm^2        AV Area  MV Area (continuity): 2.3  (Continuity):0.56  cm^2                       cm^2                 TR Velocity:3.98 m/s  MV E' Septal Velocity:                          TR Gradient:63.36 mmHg  0.04 m/s                   LVOT VTI: 19.5 cm    PV Peak Velocity: 0.73 m/s  MV E' Lateral Velocity: 4  IVRT: 69.2 msec      PV Peak Gradient: 2.11  m/s                        Estimated PASP:      mmHg  MR Velocity: 6.34 m/s      66.36 mmHg           PV Mean Velocity: 0.42 m/s  MV MARGARETH PISA: 0.15 cm^2                          PV Mean Gradient: 0.9 mmHg  MR VTI: 217.5 cm           Pulm. Vein D  Alias Velocity: 0.31       Velocity:0.91 m/s    DC ED Velocity: 1.89 m/s  m/sPISA Radius: 0.7 cm     Pulm. Vein S                             Velocity: 0.26 m/s  PISA area: 3.08 cm^2MR  flow rate: 95.48 ml/sMR  volume:32.62 ml  [email or web address] [id number]    CT ABDOMEN PELVIS WO CONTRAST Additional Contrast? None    Result Date: 1/29/2022  EXAMINATION: CT OF THE ABDOMEN AND PELVIS WITHOUT CONTRAST 1/29/2022 8:03 pm TECHNIQUE: CT of the abdomen and pelvis was performed without the administration of intravenous contrast. Multiplanar reformatted images are provided for review. Dose modulation, iterative reconstruction, and/or weight based adjustment of the mA/kV was utilized to reduce the radiation dose to as low as reasonably achievable. COMPARISON: 09/25/2010 HISTORY: ORDERING SYSTEM PROVIDED HISTORY: preTAVR TECHNOLOGIST PROVIDED HISTORY: Reason for exam:->preTAVR Additional Contrast?->None What reading provider will be dictating this exam?->CRC FINDINGS: Lower Chest: There are moderate bilateral pleural effusions with adjacent atelectasis. No discrete pulmonary nodules are identified. The heart size is felt to be at the upper limits of normal.  There are advanced coronary artery calcifications as well as aortic valvular calcifications present. No significant pericardial effusion is observed. Organs: The liver reveals a tiny low-density focus in the right lobe measuring 7 mm on axial image 73 that could represent a cyst.  There are no signs of abnormal duct dilation. The gallbladder is surgically absent. Pancreas spleen and adrenal glands appear within the normal range. There is a subtle nodular appearance of the left adrenal gland but this is unchanged when compared the study of 09/25/2010. Given the chronicity of this finding this is felt to be benign. The kidneys appear normal in size shape and position without signs of stones or hydronephrosis. Arterial vascular calcifications are observed. GI/Bowel: The stomach appears distended and food filled. The small bowel pattern is within the normal range. There are no signs of mechanical obstruction. The region of the terminal ileum appendix and cecum revealed no acute abnormalities. There is a moderate volume of retained fecal material within the colon.   Scattered sigmoid diverticula are observed but there are no signs of diverticulitis. Pelvis: The urinary bladder reveals normal rounded contour. The prostate gland measures 4.8 cm that is within the normal range there are no signs of significant lymphadenopathy or ascites within the pelvis Peritoneum/Retroperitoneum: There are no signs of retroperitoneal lymphadenopathy aneurysm or hemorrhage present. Bones/Soft Tissues: No acute abnormalities of the spine or pelvis are observed. At the L4-L5 level there is a 3 mm anterolisthesis of L4 on L5. There is sclerosis within the facet joints suggestive of facet arthropathy. No fractures are identified. 1. Moderate bilateral pleural effusions with adjacent compressive atelectasis 2. Advanced coronary artery calcifications 3. Nonspecific intestinal gas pattern 4. No signs of urolithiasis or obstructive uropathy. RECOMMENDATIONS: Unavailable     XR CHEST (2 VW)    Result Date: 1/28/2022  EXAMINATION: TWO XRAY VIEWS OF THE CHEST 1/28/2022 3:36 pm COMPARISON: 09/27/2010 HISTORY: ORDERING SYSTEM PROVIDED HISTORY: [facility] TECHNOLOGIST PROVIDED HISTORY: Reason for exam:->[facility] What reading provider will be dictating this exam?->CRC FINDINGS: The heart is enlarged. Bilateral lower lobe infiltrates are noted more prominent within the right lower lobe. There is no pneumothorax or pneumomediastinum Postoperative sternotomy changes are noted. Bilateral lower lobe pneumonia, right greater than left     CT Head WO Contrast    Result Date: 1/28/2022  EXAMINATION: CT OF THE HEAD WITHOUT CONTRAST  1/28/2022 3:40 pm TECHNIQUE: CT of the head was performed without the administration of intravenous contrast. Dose modulation, iterative reconstruction, and/or weight based adjustment of the mA/kV was utilized to reduce the radiation dose to as low as reasonably achievable.  COMPARISON: 11/15/2015 HISTORY: ORDERING SYSTEM PROVIDED HISTORY: [facility] TECHNOLOGIST PROVIDED HISTORY: Reason for exam:->[facility] Has a \"code stroke\" or \"stroke alert\" been called? ->No Decision Support Exception - unselect if not a suspected or confirmed emergency medical condition->Emergency Medical Condition (MA) What reading provider will be dictating this exam?->CRC FINDINGS: BRAIN/VENTRICLES: There is no acute intracranial hemorrhage, mass effect or midline shift. No abnormal extra-axial fluid collection. The gray-white differentiation is maintained without evidence of an acute infarct. There is no evidence of hydrocephalus. The ventricles, cisterns and sulci are prominent consistent with atrophy. There is decreased attenuation within the periventricular white matter consistent with periventricular leukomalacia. ORBITS: The visualized portion of the orbits demonstrate no acute abnormality. SINUSES: The visualized paranasal sinuses and mastoid air cells demonstrate no acute abnormality. SOFT TISSUES/SKULL:  No acute abnormality of the visualized skull or soft tissues. 1. There is no acute intracranial abnormality. Specifically, there is no intracranial hemorrhage. 2. Atrophy and periventricular leukomalacia,     CT CHEST WO CONTRAST    Result Date: 1/28/2022  EXAMINATION: CT OF THE CHEST WITHOUT CONTRAST 1/28/2022 8:41 pm TECHNIQUE: CT of the chest was performed without the administration of intravenous contrast. Multiplanar reformatted images are provided for review. Dose modulation, iterative reconstruction, and/or weight based adjustment of the mA/kV was utilized to reduce the radiation dose to as low as reasonably achievable. COMPARISON: Chest radiograph performed earlier today at 1526 hours HISTORY: ORDERING SYSTEM PROVIDED HISTORY: r/o pneumonia TECHNOLOGIST PROVIDED HISTORY: Reason for exam:->r/o pneumonia What reading provider will be dictating this exam?->CRC Abnormal chest radiograph with evidence of pneumonia.  FINDINGS: Exam is limited without intravenous contrast.  Evidence of prior CABG with median sternotomy wires and mediastinal clips. Mild cardiomegaly. No pericardial effusion. Scattered vascular calcifications. Scattered bilateral mediastinal lymph nodes, some of which are borderline prominent. Moderate bilateral pleural effusions greater towards the right. Cholecystectomy clips. Prominent vascular calcifications in the visualized abdomen. Area of heterogeneity along the lateral margin of the dome of the liver, not seen on abdominal CT dated 09/25/2010. Thickening of the adrenal glands, likely due to hyperplasia. Visualized portions of the upper abdomen demonstrate no acute abnormality. No pneumothorax. Scattered airspace opacities in the lung bases favoring compressive atelectasis from the effusions. Developing infiltrates from pneumonia less likely. Scattered calcifications, nonspecific but suggestive of old granulomatous disease. Some images are mildly degraded by patient motion. Allowing for this, there are a few scattered areas of scarring and or linear atelectasis otherwise in each lung. Diffuse ankylosis of the spine. Degenerative changes are present in the spine and shoulders. Moderate bilateral pleural effusions, greater towards the right. Mild compressive atelectasis in the lung bases. Developing infiltrates from pneumonia thought less likely. Heterogeneous area along the lateral margin of the dome of the liver could represent volume averaging. A developing neoplastic process is thought somewhat less likely but not entirely excluded and a short-term follow-up hepatic mass protocol MRI or CT would be useful on a nonemergent basis when clinically feasible. Cardiomegaly. RECOMMENDATIONS: Unavailable     US CAROTID ARTERY BILATERAL    Result Date: 1/28/2022  EXAMINATION: ULTRASOUND EVALUATION OF THE CAROTID ARTERIES 1/28/2022 TECHNIQUE: Duplex ultrasound using B-mode/gray scaled imaging, Doppler spectral analysis and color flow Doppler was obtained of the carotid arteries. COMPARISON: None.  HISTORY: ORDERING SYSTEM PROVIDED HISTORY: pre-TAVR TECHNOLOGIST PROVIDED HISTORY: Reason for exam:->pre-TAVR What reading provider will be dictating this exam?->CRC FINDINGS: RIGHT: The right common carotid artery demonstrates peak systolic velocities of 78.7 and 56.0 cm/sec in the proximal and distal segments respectively. The right internal carotid artery demonstrates the systolic velocities of 45.9, 64.9 and 92.8  cm/sec in the proximal, mid and distal segments respectively. The external carotid artery is patent. The vertebral artery demonstrates normal antegrade flow. Atherosclerotic plaque involving the proximal ICA. Oneil Settler Small amount of plaque in the right carotid bulb. ICA/CCA ratio of 1.5 LEFT: The left common carotid artery demonstrates peak systolic velocities of 61.6 and 44.0 cm/sec in the proximal and distal segments respectively. The left internal carotid artery demonstrates the systolic velocities of 45.8, 60.0 and 72.2 cm/sec in the proximal, mid and distal segments respectively. The external carotid artery is patent. The vertebral artery demonstrates normal antegrade flow. Atherosclerotic plaque involving the proximal ICA. ICA/CCA ratio of 1.3     The right internal carotid artery demonstrates 0-50% stenosis. The left internal carotid artery demonstrates 0-50% stenosis. Bilateral vertebral arteries are patent with flow in the normal direction. Bilateral ICA atherosclerotic plaque. Small amount of plaque in the right carotid bulb. DIET:  ADULT DIET; Regular;  Low Sodium (2 gm)    Medications:    Scheduled Meds:   doxazosin  4 mg Oral Daily    pravastatin  40 mg Oral Nightly    insulin lispro  0-6 Units SubCUTAneous TID WC    sodium chloride flush  5-40 mL IntraVENous 2 times per day    aspirin  81 mg Oral Daily    carvedilol  6.25 mg Oral BID WC       Continuous Infusions:   heparin (PORCINE) Infusion 11.995 Units/kg/hr (01/29/22 1318)    dextrose      sodium chloride         PRN Meds:heparin (porcine), glucose, dextrose, glucagon (rDNA), dextrose, sodium chloride flush, sodium chloride, ondansetron **OR** ondansetron, polyethylene glycol, acetaminophen **OR** acetaminophen, heparin (porcine)    A/P:      Patient Active Problem List   Diagnosis    Cellulitis    CAD (coronary artery disease)    S/P CABG (coronary artery bypass graft)    DM (diabetes mellitus) ([facility]    Hyperlipemia    HTN (hypertension)    [facility]    BPH (benign prostatic hyperplasia)    Diabetic neuropathy [facility]    Encounter for therapeutic drug monitoring    Long term current use of anticoagulant therapy    Aortic valve stenosis    Chronic kidney disease    Heart valve disease    Gout    Syncope without other cardiovascular symptoms    I don't think there was any altered mental status just syncope. That was from the critical aortic stenosis. PLAN:Will wait for CTS and dental input and then plan for TAVR. Maximize renal function per nephrology.  Continue Heparin for now    I can be reached though [employer] or [facility] [place] at [phone]

## 2022-01-31 ENCOUNTER — APPOINTMENT (OUTPATIENT)
Dept: CT IMAGING | Age: 87
DRG: 266 | End: 2022-01-31
Payer: MEDICARE

## 2022-01-31 PROBLEM — N19 RENAL FAILURE: Status: ACTIVE | Noted: 2022-01-31

## 2022-01-31 LAB
ALBUMIN SERPL-MCNC: 2.8 G/DL (ref 3.5–5.2)
ALP BLD-CCNC: 52 U/L (ref 40–129)
ALT SERPL-CCNC: 8 U/L (ref 0–40)
ANION GAP SERPL CALCULATED.3IONS-SCNC: 10 MMOL/L (ref 7–16)
APTT: 30.5 SEC (ref 24.5–35.1)
AST SERPL-CCNC: 15 U/L (ref 0–39)
BASOPHILS ABSOLUTE: 0.02 E9/L (ref 0–0.2)
BASOPHILS RELATIVE PERCENT: 0.3 % (ref 0–2)
BILIRUB SERPL-MCNC: 0.5 MG/DL (ref 0–1.2)
BUN BLDV-MCNC: 29 MG/DL (ref 6–23)
CALCIUM SERPL-MCNC: 7.4 MG/DL (ref 8.6–10.2)
CHLORIDE BLD-SCNC: 112 MMOL/L (ref 98–107)
CO2: 23 MMOL/L (ref 22–29)
CREAT SERPL-MCNC: 1.6 MG/DL (ref 0.7–1.2)
EKG ATRIAL RATE: 51 BPM
EKG Q-T INTERVAL: 470 MS
EKG QRS DURATION: 170 MS
EKG QTC CALCULATION (BAZETT): 473 MS
EKG R AXIS: -56 DEGREES
EKG T AXIS: 111 DEGREES
EKG VENTRICULAR RATE: 61 BPM
EOSINOPHILS ABSOLUTE: 0.09 E9/L (ref 0.05–0.5)
EOSINOPHILS RELATIVE PERCENT: 1.5 % (ref 0–6)
GFR AFRICAN AMERICAN: 49
GFR NON-AFRICAN AMERICAN: 41 ML/MIN/1.73
GLUCOSE BLD-MCNC: 81 MG/DL (ref 74–99)
HCT VFR BLD CALC: 34 % (ref 37–54)
HEMOGLOBIN: 10.9 G/DL (ref 12.5–16.5)
IMMATURE GRANULOCYTES #: 0.01 E9/L
IMMATURE GRANULOCYTES %: 0.2 % (ref 0–5)
LYMPHOCYTES ABSOLUTE: 2.1 E9/L (ref 1.5–4)
LYMPHOCYTES RELATIVE PERCENT: 36.1 % (ref 20–42)
MCH RBC QN AUTO: 30.7 PG (ref 26–35)
MCHC RBC AUTO-ENTMCNC: 32.1 % (ref 32–34.5)
MCV RBC AUTO: 95.8 FL (ref 80–99.9)
METER GLUCOSE: 117 MG/DL (ref 74–99)
METER GLUCOSE: 128 MG/DL (ref 74–99)
METER GLUCOSE: 179 MG/DL (ref 74–99)
METER GLUCOSE: 94 MG/DL (ref 74–99)
MONOCYTES ABSOLUTE: 0.52 E9/L (ref 0.1–0.95)
MONOCYTES RELATIVE PERCENT: 8.9 % (ref 2–12)
NEUTROPHILS ABSOLUTE: 3.08 E9/L (ref 1.8–7.3)
NEUTROPHILS RELATIVE PERCENT: 53 % (ref 43–80)
PDW BLD-RTO: 13.4 FL (ref 11.5–15)
PLATELET # BLD: 103 E9/L (ref 130–450)
PMV BLD AUTO: 12.5 FL (ref 7–12)
POTASSIUM REFLEX MAGNESIUM: 3.6 MMOL/L (ref 3.5–5)
POTASSIUM SERPL-SCNC: 3.6 MMOL/L (ref 3.5–5)
RBC # BLD: 3.55 E12/L (ref 3.8–5.8)
SODIUM BLD-SCNC: 145 MMOL/L (ref 132–146)
TOTAL PROTEIN: 4.9 G/DL (ref 6.4–8.3)
URINE CULTURE, ROUTINE: NORMAL
WBC # BLD: 5.8 E9/L (ref 4.5–11.5)

## 2022-01-31 PROCEDURE — 2140000000 HC CCU INTERMEDIATE R&B

## 2022-01-31 PROCEDURE — 80048 BASIC METABOLIC PNL TOTAL CA: CPT

## 2022-01-31 PROCEDURE — 6370000000 HC RX 637 (ALT 250 FOR IP): Performed by: INTERNAL MEDICINE

## 2022-01-31 PROCEDURE — 82962 GLUCOSE BLOOD TEST: CPT

## 2022-01-31 PROCEDURE — 2580000003 HC RX 258: Performed by: INTERNAL MEDICINE

## 2022-01-31 PROCEDURE — 75572 CT HRT W/3D IMAGE: CPT

## 2022-01-31 PROCEDURE — 2580000003 HC RX 258: Performed by: RADIOLOGY

## 2022-01-31 PROCEDURE — 93010 ELECTROCARDIOGRAM REPORT: CPT | Performed by: INTERNAL MEDICINE

## 2022-01-31 PROCEDURE — 85025 COMPLETE CBC W/AUTO DIFF WBC: CPT

## 2022-01-31 PROCEDURE — 99233 SBSQ HOSP IP/OBS HIGH 50: CPT | Performed by: INTERNAL MEDICINE

## 2022-01-31 PROCEDURE — 99222 1ST HOSP IP/OBS MODERATE 55: CPT | Performed by: THORACIC SURGERY (CARDIOTHORACIC VASCULAR SURGERY)

## 2022-01-31 PROCEDURE — 36415 COLL VENOUS BLD VENIPUNCTURE: CPT

## 2022-01-31 PROCEDURE — 6370000000 HC RX 637 (ALT 250 FOR IP): Performed by: CLINICAL NURSE SPECIALIST

## 2022-01-31 PROCEDURE — 80053 COMPREHEN METABOLIC PANEL: CPT

## 2022-01-31 PROCEDURE — 85730 THROMBOPLASTIN TIME PARTIAL: CPT

## 2022-01-31 PROCEDURE — 6360000002 HC RX W HCPCS: Performed by: INTERNAL MEDICINE

## 2022-01-31 PROCEDURE — 71275 CT ANGIOGRAPHY CHEST: CPT

## 2022-01-31 PROCEDURE — 6360000004 HC RX CONTRAST MEDICATION: Performed by: RADIOLOGY

## 2022-01-31 RX ORDER — TAMSULOSIN HYDROCHLORIDE 0.4 MG/1
0.4 CAPSULE ORAL DAILY
Status: DISCONTINUED | OUTPATIENT
Start: 2022-01-31 | End: 2022-02-05 | Stop reason: HOSPADM

## 2022-01-31 RX ORDER — FUROSEMIDE 10 MG/ML
20 INJECTION INTRAMUSCULAR; INTRAVENOUS ONCE
Status: COMPLETED | OUTPATIENT
Start: 2022-01-31 | End: 2022-01-31

## 2022-01-31 RX ORDER — SODIUM CHLORIDE 0.9 % (FLUSH) 0.9 %
10 SYRINGE (ML) INJECTION ONCE
Status: COMPLETED | OUTPATIENT
Start: 2022-01-31 | End: 2022-01-31

## 2022-01-31 RX ORDER — DIPHENHYDRAMINE HCL 25 MG
25 TABLET ORAL EVERY 8 HOURS PRN
Status: DISCONTINUED | OUTPATIENT
Start: 2022-01-31 | End: 2022-02-05 | Stop reason: HOSPADM

## 2022-01-31 RX ADMIN — IOPAMIDOL 50 ML: 755 INJECTION, SOLUTION INTRAVENOUS at 07:59

## 2022-01-31 RX ADMIN — PRAVASTATIN SODIUM 40 MG: 20 TABLET ORAL at 22:27

## 2022-01-31 RX ADMIN — FUROSEMIDE 20 MG: 10 INJECTION, SOLUTION INTRAMUSCULAR; INTRAVENOUS at 08:57

## 2022-01-31 RX ADMIN — CARVEDILOL 6.25 MG: 6.25 TABLET, FILM COATED ORAL at 08:58

## 2022-01-31 RX ADMIN — SODIUM CHLORIDE, PRESERVATIVE FREE 10 ML: 5 INJECTION INTRAVENOUS at 22:27

## 2022-01-31 RX ADMIN — Medication 10 ML: at 07:59

## 2022-01-31 RX ADMIN — TAMSULOSIN HYDROCHLORIDE 0.4 MG: 0.4 CAPSULE ORAL at 08:57

## 2022-01-31 RX ADMIN — ASPIRIN 81 MG CHEWABLE TABLET 81 MG: 81 TABLET CHEWABLE at 08:58

## 2022-01-31 ASSESSMENT — PAIN SCALES - GENERAL
PAINLEVEL_OUTOF10: 0
PAINLEVEL_OUTOF10: 0

## 2022-01-31 NOTE — CONSULTS
CTS Consult    Patient name: Armando Miller    Reason for consult: Critical AS    Primary Care Physician: Mecca Hernandez MD    Date of service: 1/31/2022    Chief Complaint:  Syncope     HPI:  79 yo male previously evaluated in the valve clinic for critical AS. He was noted to have LV dysfunction as well. The plan was for elective TAVR in the future. However, he experienced a syncopal episode at home at which point his wife called EMS and he presented to the ED.       Allergies: No Known Allergies    Home medications:    Current Facility-Administered Medications   Medication Dose Route Frequency Provider Last Rate Last Admin    tamsulosin (FLOMAX) capsule 0.4 mg  0.4 mg Oral Daily Samy Alvarez MD   0.4 mg at 01/31/22 0857    0.9 % sodium chloride infusion   IntraVENous Continuous Samy Alvarez MD 75 mL/hr at 01/30/22 2316 New Bag at 01/30/22 2316    pravastatin (PRAVACHOL) tablet 40 mg  40 mg Oral Nightly Billie Artis MD   40 mg at 01/30/22 2225    insulin lispro (HUMALOG) injection vial 0-6 Units  0-6 Units SubCUTAneous TID STEVE Artis MD   1 Units at 01/30/22 1159    glucose (GLUTOSE) 40 % oral gel 15 g  15 g Oral PRN Billie Artis MD        dextrose 50 % IV solution  12.5 g IntraVENous PRN Billie Artis MD        glucagon (rDNA) injection 1 mg  1 mg IntraMUSCular PRN Billie Artis MD        dextrose 5 % solution  100 mL/hr IntraVENous PRN Billie Artis MD        sodium chloride flush 0.9 % injection 5-40 mL  5-40 mL IntraVENous 2 times per day Billie Artis MD   10 mL at 01/30/22 2225    sodium chloride flush 0.9 % injection 5-40 mL  5-40 mL IntraVENous PRN Jet Roca MD        0.9 % sodium chloride infusion  25 mL IntraVENous PRN Billie Artis MD        ondansetron (ZOFRAN-ODT) disintegrating tablet 4 mg  4 mg Oral Q8H PRN Billie Artis MD        Or    ondansetron (ZOFRAN) injection 4 mg  4 mg IntraVENous Q6H PRN Billie Artis MD        polyethylene glycol (GLYCOLAX) packet 17 g  17 g Oral Daily PRN Hugo Verdugo MD        acetaminophen (TYLENOL) tablet 650 mg  650 mg Oral Q6H PRN Hugo Verdugo MD        Or    acetaminophen (TYLENOL) suppository 650 mg  650 mg Rectal Q6H PRN Hugo Verdugo MD        heparin (porcine) injection 2,000 Units  2,000 Units IntraVENous PRN Hugo Verdugo MD        aspirin chewable tablet 81 mg  81 mg Oral Daily Ronel Marquez MD   81 mg at 01/31/22 0858    carvedilol (COREG) tablet 6.25 mg  6.25 mg Oral BID  Ygnacio Rubinstein, APRN - CNS   6.25 mg at 01/31/22 8015       Past Medical History:  Past Medical History:   Diagnosis Date    A-fib [facility])     BPH (benign prostatic hyperplasia)     CAD (coronary artery disease)     Di[facility] neuropathy [facility] [facility]     DM (diabetes mellitus) ([facility] [facility]     HTN (hypertension)     Hyperlipemia     Pancreatitis 9/10    S/P CABG (coronary artery bypass graft)        Past Surgical History:  Past Surgical History:   Procedure Laterality Date    CARDIAC SURGERY      CHOLECYSTECTOMY  09/27/2010    COLONOSCOPY      CORONARY ARTERY BYPASS GRAFT      CABG x4 with left internal mammary artery to the LAD. reverse saphenous vein grafts to the 1st marginal branches ofcircumflex, posterior descending branch of the right coronary artery, and posterolateral branch to the right coronary artery, and modified maze procedure with [employer] bipolar radiofrequency ablation, and excision of left atrial appendage.     TONSILLECTOMY         Social History:  Social History     Socioeconomic History    Marital status:      Spouse name: Not on file    Number of children: Not on file    Years of education: Not on file    Highest education level: Not on file   Occupational History    Not on file   Tobacco Use    Smoking status: Never Smoker    Smokeless tobacco: Never Used   Vaping Use    Vaping Use: Never used   Substance and Sexual Activity    Alcohol use: Yes     Comment: rarely- 1 beer    Drug use: No    Sexual activity: Not on file   Other Topics Concern    Not on file   Social History Narrative    Not on file     Social Determinants of Health     Financial Resource Strain:     Difficulty of Paying Living Expenses: Not on file   Food Insecurity:     Worried About [address] in the Last Year: Not on file    Alisha of Food in the Last Year: Not on file   Transportation Needs:     Lack of Transportation (Medical): Not on file    Lack of Transportation (Non-Medical): Not on file   Physical Activity:     Days of Exercise per Week: Not on file    Minutes of Exercise per Session: Not on file   Stress:     Feeling of Stress : Not on file   Social Connections:     Frequency of Communication with Friends and Family: Not on file    Frequency of Social Gatherings with Friends and Family: Not on file    Attends [employer] Services: Not on file    Active Member of [address] [place] [address] or Organizations: Not on file    Attends Club or Organization Meetings: Not on file    Marital Status: Not on file   Intimate Partner Violence:     Fear of Current or Ex-Partner: Not on file    Emotionally Abused: Not on file    Physically Abused: Not on file    Sexually Abused: Not on file   Housing Stability:     Unable to Pay for Housing in the Last Year: Not on file    Number of [place] in the Last Year: Not on file    Unstable Housing in the Last Year: Not on file       Family History:  Family History   Problem Relation Age of Onset    High Blood Pressure Mother     Heart Disease Mother     Cancer Father        Review of Systems:  Constitutional: Denies fevers, chills, or weight loss. HEENT: Denies visual changes or hearing loss. Heart: As per HPI. Lungs: Denies shortness of breath, cough, or wheezing. Gastrointestinal: Denies nausea, vomiting, constipation, or diarrhea. Genitourinary: dysuria or hematuria. Psychiatric: Patient denies anxiety or depression.    Neurologic: Patient denies weakness of the extremities, dizziness, or headaches. All other ROS checked and found to be negative. Labs:  Recent Labs     01/29/22  0457 01/30/22  0608 01/31/22  0638   WBC 6.4 6.2 5.8   HGB 12.8 11.5* 10.9*   HCT 39.4 35.8* 34.0*    119* 103*      Recent Labs     01/29/22  0457 01/30/22  0608 01/31/22  0638   BUN 28*  28* 30* 29*   CREATININE 1.8*  1.8* 2.0* 1.6*       Objective:  Vitals BP (!) 153/72   Pulse 67   Temp 98.5 °F (36.9 °C)   Resp 18   Ht 5' 11\" (1.803 m)   Wt 169 lb (76.7 kg)   SpO2 95%   BMI 23.57 kg/m²   General Appearance: Pleasant 80y.o. year old male who appears stated age. Communicates well, no acute distress. HEENT: Head is normocephalic, atraumatic. EOMs intact, PERRL. Trachea midline. Lungs: Normal respiratory rate and normal effort. He is not in respiratory distress. Breath sounds clear to auscultation. No wheezes. Heart: Normal rate. Regular rhythm. S1 normal and S2 normal. Positive for murmur. Chest: Symmetric chest wall expansion. Extremities: Normal range of motion. Neurological: Patient is alert and oriented to person, place and time. Patient has normal reflexes. Skin: Warm and dry. Abdomen: Abdomen is soft and non-distended. Bowel sounds are normal. There is no abdominal tenderness tenderness. There is no guarding. There is no mass. Pulses: Distal pulses are intact. Skin: Warm and dry without lesions. Echocardiogram-1/13/2022:   Ejection fraction is visually estimated at 30%.   Overall ejection fraction severely decreased.   Severe left ventricular concentric hypertrophy noted.   Dilated right ventricle with reduced function.   Left atrial volume index of 79 ml per meters squared BSA.   Severe aortic stenosis is present. The aortic valve area is 0.6 cm2 with a   maximum gradient of 113 mmHg and a mean gradient of 69 mmHg.  Physiologic   and/or trace aortic regurgitation is noted.   Moderate mitral regurgitation is present.   Mild tricuspid regurgitation.   Pulmonary hypertension is moderate to severe.  RVSP is 66 mmHg.       Assessment/plan  79 yo male s/p previous sternotomy for CABG now with critical AS   Admitted for syncope   Given his advanced age, redo status, comorbidities, and frailty he is high risk for open AVR is best served with TAVR       Electronically signed by Sampson Staples DO on 1/31/2022 at 10:06 AM

## 2022-01-31 NOTE — PROGRESS NOTES
[facility] MD Jay Jay Mercado MD Kathyanne Christmas, MD Inis Schooner, DO, ALI PATIENT’S [facility]ILO HOLT MD .  Progress note  Patient's Name: Mere Given  3:11 PM  1/31/2022 1/31: Overall doing better, back to his baseline according to his wife was at the bedside. His BUN today is 29 and creatinine down to 1.6  Stable vitals   For cta in am per cardiology     History of Present Ilness:      81 y/o M presented to ER with cc of passing out while seated   Has hx of critical aortic Stenosis and TAVR had been planned  Has hx of CKD and baseline cr appear to be around 1.8   He was seen in ED he is on RA , appear euvolemic . I did d/w him and his wife risk of VISHAL including risk of dialysis they verbalzied understanding and informed me they d like to proceed with TAVR as scheduled   No reported n/v/d . Past Medical History:   Diagnosis Date    A-fib [facility])     BPH (benign prostatic hyperplasia)     CAD (coronary artery disease)     Diabetic neuropathy (H[facility])     DM [facility]diabetes mellitus) ([facility] [address]     HTN (hypertension)     Hyperlipemia     Pancreatitis 9/10    S/P CABG (coronary artery bypass graft)        Past Surgical History:   Procedure Laterality Date    CARDIAC SURGERY      CHOLECYSTECTOMY  09/27/2010    COLONOSCOPY      CORONARY ARTERY BYPASS GRAFT      CABG x4 with left internal mammary artery to the LAD. reverse saphenous vein grafts to the 1st marginal branches ofcircumflex, posterior descending branch of the right coronary artery, and posterolateral branch to the right coronary artery, and modified maze procedure with [employer] bipolar radiofrequency ablation, and excision of left atrial appendage.  TONSILLECTOMY         Family History   Problem Relation Age of Onset    High Blood Pressure Mother     Heart Disease Mother     Cancer Father         reports that he has never smoked. He has never used smokeless tobacco. He reports current alcohol use.  He reports that he does not use drugs. Allergies:  Patient has no known allergies. Current Medications:    tamsulosin (FLOMAX) capsule 0.4 mg, Daily  pravastatin (PRAVACHOL) tablet 40 mg, Nightly  insulin lispro (HUMALOG) injection vial 0-6 Units, TID WC  glucose (GLUTOSE) 40 % oral gel 15 g, PRN  dextrose 50 % IV solution, PRN  glucagon (rDNA) injection 1 mg, PRN  dextrose 5 % solution, PRN  sodium chloride flush 0.9 % injection 5-40 mL, 2 times per day  sodium chloride flush 0.9 % injection 5-40 mL, PRN  0.9 % sodium chloride infusion, PRN  ondansetron (ZOFRAN-ODT) disintegrating tablet 4 mg, Q8H PRN   Or  ondansetron (ZOFRAN) injection 4 mg, Q6H PRN  polyethylene glycol (GLYCOLAX) packet 17 g, Daily PRN  acetaminophen (TYLENOL) tablet 650 mg, Q6H PRN   Or  acetaminophen (TYLENOL) suppository 650 mg, Q6H PRN  heparin (porcine) injection 2,000 Units, PRN  aspirin chewable tablet 81 mg, Daily  carvedilol (COREG) tablet 6.25 mg, BID WC        Review of Systems:   Constitutional: no fevers , no chills , feels ok   Eyes: no eye pain , no itching , no drainage  Ears, nose, mouth, throat, and face: no ear ,nose pain , hearing is ok ,no nasal drainage   Respiratory: no sob ,no cough ,no wheezing . Cardiovascular: no chest pain , no palpitation ,no sob . Gastrointestinal: no nausea, vomiting , constipation , no abdominal pain . Genitourinary:no urinary retention , no burning , dysuria . No polyuria   Hematologic/lymphatic: no bleeding , no cougulation issues . Musculoskeletal:no joint pain , no swelling . Neurological: no headaches ,no weakness , no numbness . Endocrine: no thirst , no weight issues . Physical exam:   Vital signs BP (!) 182/84   Pulse 66   Temp 96.3 °F (35.7 °C)   Resp 16   Ht 5' 11\" (1.803 m)   Wt 169 lb (76.7 kg)   SpO2 95%   BMI 23.57 kg/m²   Gen : NAD , appropriate for stated age . Head : at , nc   Neck : supple , no thyromegaly noted . Eyes : EOMI , PERRLA   CV : RRR , No M/R/G . Lungs: CTAB , no wheezing , good flow heard b/l   Abd : soft , NT , BS + , No Organomegaly appreciated . Skin : soft, dry . Neuro : CN  II-XII grossly intact , no focal neurologic deficit . Psych : cooperative .      Data:   Labs:  CBC with Differential:    Lab Results   Component Value Date    WBC 5.8 01/31/2022    RBC 3.55 01/31/2022    HGB 10.9 01/31/2022    HCT 34.0 01/31/2022     01/31/2022    MCV 95.8 01/31/2022    MCH 30.7 01/31/2022    MCHC 32.1 01/31/2022    RDW 13.4 01/31/2022    SEGSPCT 59 12/12/2011    LYMPHOPCT 36.1 01/31/2022    MONOPCT 8.9 01/31/2022    BASOPCT 0.3 01/31/2022    MONOSABS 0.52 01/31/2022    LYMPHSABS 2.10 01/31/2022    EOSABS 0.09 01/31/2022    BASOSABS 0.02 01/31/2022     CMP:    Lab Results   Component Value Date     01/31/2022    K 3.6 01/31/2022    K 3.6 01/31/2022     01/31/2022    CO2 23 01/31/2022    BUN 29 01/31/2022    CREATININE 1.6 01/31/2022    GFRAA 49 01/31/2022    LABGLOM 41 01/31/2022    GLUCOSE 81 01/31/2022    GLUCOSE 101 12/14/2011    PROT 4.9 01/31/2022    LABALBU 2.8 01/31/2022    CALCIUM 7.4 01/31/2022    BILITOT 0.5 01/31/2022    ALKPHOS 52 01/31/2022    AST 15 01/31/2022    ALT 8 01/31/2022     Ionized Calcium:  No results found for: IONCA  Magnesium:    Lab Results   Component Value Date    MG 1.8 01/28/2022     Phosphorus:  No results found for: PHOS  U/A:    Lab Results   Component Value Date    COLORU Yellow 01/29/2022    PHUR 6.0 01/29/2022    WBCUA 0-1 01/29/2022    RBCUA 0-1 01/29/2022    BACTERIA NONE SEEN 01/29/2022    CLARITYU Clear 01/29/2022    SPECGRAV >=1.030 01/29/2022    LEUKOCYTESUR Negative 01/29/2022    UROBILINOGEN 0.2 01/29/2022    BILIRUBINUR Negative 01/29/2022    BLOODU TRACE-INTACT 01/29/2022    GLUCOSEU Negative 01/29/2022     Microalbumen/Creatinine ratio:  No components found for: RUCREAT  Iron Saturation:  No components found for: PERCENTFE  TIBC:  No results found for: TIBC  FERRITIN:  No results found for: FERRITIN     Imaging:  CTA CHEST W CONTRAST   Final Result   There is small to moderate bilateral pleural effusions right greater than   left with overlying atelectasis. Clinical correlation and follow-up to   resolution recommended. Diffuse coronary artery disease status post CABG. Scattered atherosclerotic   plaque thoracic and visualized abdominal aorta. However diameter of the   subclavian measures down to 5.3 mm on the right and 5.9 mm on the left. Heterogeneously enhancing at lesion anterior segment right lobe of the liver   peripherally. Correlation with any history of malignancy recommended. Recommend further characterization with MRI with contrast or dedicated liver   protocol CT abdomen pelvis to re-evaluate this lesion and potentially screen   for primary. Extensive calcification aortic valve. Correlation with pre TAVR study same   day. There is a lymph node in the subcarinal region which is poorly defined but   does just meet size criteria to be considered adenopathy. Clinical   correlation and follow-up to resolution recommended. CT ABDOMEN PELVIS WO CONTRAST Additional Contrast? None   Final Result   1. Moderate bilateral pleural effusions with adjacent compressive atelectasis   2. Advanced coronary artery calcifications   3. Nonspecific intestinal gas pattern   4. No signs of urolithiasis or obstructive uropathy. RECOMMENDATIONS:   Unavailable         CT CHEST WO CONTRAST   Final Result   Moderate bilateral pleural effusions, greater towards the right. Mild compressive atelectasis in the lung bases. Developing infiltrates from   pneumonia thought less likely. Heterogeneous area along the lateral margin of the dome of the liver could   represent volume averaging.   A developing neoplastic process is thought   somewhat less likely but not entirely excluded and a short-term follow-up   hepatic mass protocol MRI or CT would be useful on a nonemergent basis when   clinically feasible. Cardiomegaly. RECOMMENDATIONS:   Unavailable         US CAROTID ARTERY BILATERAL   Final Result   The right internal carotid artery demonstrates 0-50% stenosis. The left internal carotid artery demonstrates 0-50% stenosis. Bilateral vertebral arteries are patent with flow in the normal direction. Bilateral ICA atherosclerotic plaque. Small amount of plaque in the right   carotid bulb. CT Head WO Contrast   Final Result   1. There is no acute intracranial abnormality. Specifically, there is no   intracranial hemorrhage. 2. Atrophy and periventricular leukomalacia,         XR CHEST (2 VW)   Final Result   Bilateral lower lobe pneumonia, right greater than left         CTA TRANSCATHETER AORTIC VALVE REPLACEMENT    (Results Pending)       Assessment    -Chronic kidney disease stage III baseline cr 1.8-1.9 : stable   -critical aortic Stenosis and TAVR had been planned  -Hx of HTN at goal on coreg   -NIDDM     Plan :     I did d/w with mr and [place] risk of VISHAL including risk of dialysis . I informed then that he is considered moderated to high risk .     they verbalzied understanding and informed me they d like to proceed with TAVR as scheduled     Would start Ns at 50 cc/hr 12 hour prior to planned procedure and 12 hours after   Use the lowest iv load possible   Will follow along with you      We will follow patient daily reviewing labs and reviewing all the consultants notes overall doing well,    Electronically signed by Janene Teresa MD on 1/31/2022 at 3:11 PM

## 2022-01-31 NOTE — PATIENT CARE CONFERENCE
P Quality Flow/Interdisciplinary Rounds Progress Note        Quality Flow Rounds held on January 31, 2022    Disciplines Attending:  Bedside Nurse, ,  and Nursing Unit Leadership    Annette Uribe was admitted on 1/28/2022  2:10 PM    Anticipated Discharge Date:       Disposition:    Bhavesh Score:  Bhavesh Scale Score: 15    Readmission Risk              Risk of Unplanned Readmission:  14           Discussed patient goal for the day, patient clinical progression, and barriers to discharge.   The following Goal(s) of the Day/Commitment(s) have been identified:  Diagnostics - Report Results      Yunior Manuel RN  January 31, 2022

## 2022-01-31 NOTE — CARE COORDINATION
SOCIAL WORK/CASEMANAGEMENT TRANSITION OF CARE [id number] Elizabeth Torres, [address], [place], [phone]): I met with pt and his wife this after noon to complete the assessment. The couple is both retired and they live in a 2 story home with 2nd floor bed and bath. No 1/2 bath on the first floor but a bath is in the basement. Pt has been sleeping on a recliner on the first floor. There is no steps from the garage to enter but there is 2 steps to get in to the kitchen. Pt is a  but doesn't use the [place]. No [facility] pta. Pt is independent and drives very little pta. Wife drives most of the time. The pt has a used 3:1 commode, fww, and cane. Pt is diabetic not on insulin pta but has a working glucometer. The couple has 2 grown children with 1 local and 1 in [place], both work. The plan is home after TAVR. No needs anticipated. Minor/clyde to follow.  Theresa Hammans, LSW  1/31/2022

## 2022-01-31 NOTE — CONSULTS
ConsultationNote    Patient's Name/Date of Birth: Alexandra Fair / 5/26/1931 (82 y.o.)    Date: January 31, 2022     Reason for Consult:  Pt presents to dental clinic for clearance prior to TARV. Requesting Physician:  DO JANETH Lyons  TARV scheduled for Wednesday 02/02/2022    Past Medical History:   Diagnosis Date    A-fib [facility])     BPH (benign prostatic hyperplasia)     CAD (coronary artery disease)     Diabetic neuropathy ([facility]     DM (diabetes mellitus) ([facility] [address]     HTN (hypertension)     Hyperlipemia     Pancreatitis 9/10    S/P CABG (coronary artery bypass graft)        Past Surgical History:   Procedure Laterality Date    CARDIAC SURGERY      CHOLECYSTECTOMY  09/27/2010    COLONOSCOPY      CORONARY ARTERY BYPASS GRAFT      CABG x4 with left internal mammary artery to the LAD. reverse saphenous vein grafts to the 1st marginal branches ofcircumflex, posterior descending branch of the right coronary artery, and posterolateral branch to the right coronary artery, and modified maze procedure with Hospitalists Nowtronic bipolar radiofrequency ablation, and excision of left atrial appendage. TONSILLECTOMY         Prior to Admission medications    Medication Sig Start Date End Date Taking?  Authorizing Provider   amLODIPine (NORVASC) 2.5 MG tablet Take 1 tablet by mouth daily 1/27/22   MELY Torres   warfarin (COUMADIN) 5 MG tablet TAKE 1 TABLET BY MOUTH ON SUNDAY AND WEDNESDAY AND THEN 1 AND 1/2 TABLETS THE REST OF THE DAYS 7/13/20   Umesh Alexandre MD   sitaGLIPtan-metformin (JANUMET)  MG per tablet TAKE 1 TABLET BY MOUTH TWICE A DAY WITH MEALS 5/26/20   Umesh Alexandre MD   metoprolol tartrate (LOPRESSOR) 25 MG tablet TAKE 1 TABLET BY MOUTH EVERY 12 HOURS 4/21/20   Umesh Alexandre MD   warfarin (COUMADIN) 5 MG tablet TAKE 1 TABLET BY MOUTH ON SUNDAY AND WEDNESDAY AND THEN 1 AND 1/2 TABLETS THE REST OF THE DAYS 2/13/20   Umesh Alexandre MD   doxazosin (CARDURA) 4 MG tablet TAKE 1 TABLET BY MOUTH EVERY DAY 2/5/20   Donavan Worley MD   pravastatin (PRAVACHOL) 40 MG tablet Take one daily 1/30/20   Donavan Worley MD       No Known Allergies    Family History   Problem Relation Age of Onset    High Blood Pressure Mother     Heart Disease Mother     Cancer Father        Social History     Socioeconomic History    Marital status:      Spouse name: Not on file    Number of children: Not on file    Years of education: Not on file    Highest education level: Not on file   Occupational History    Not on file   Tobacco Use    Smoking status: Never Smoker    Smokeless tobacco: Never Used   Vaping Use    Vaping Use: Never used   Substance and Sexual Activity    Alcohol use: Yes     Comment: rarely- 1 beer    Drug use: No    Sexual activity: Not on file   Other Topics Concern    Not on file   Social History Narrative    Not on file     Social Determinants of Health     Financial Resource Strain:     Difficulty of Paying Living Expenses: Not on file   Food Insecurity:     Worried About [address] in the Last Year: Not on file    Ran Out of Food in the Last Year: Not on file   Transportation Needs:     Lack of Transportation (Medical): Not on file    Lack of Transportation (Non-Medical):  Not on file   Physical Activity:     Days of Exercise per Week: Not on file    Minutes of Exercise per Session: Not on file   Stress:     Feeling of Stress : Not on file   Social Connections:     Frequency of Communication with Friends and Family: Not on file    Frequency of Social Gatherings with Friends and Family: Not on file    Attends Shinto Services: Not on file    Active Member of Clubs or Organizations: Not on file    Attends Club or Organization Meetings: Not on file    Marital Status: Not on file   Intimate Partner Violence:     Fear of Current or Ex-Partner: Not on file    Emotionally Abused: Not on file    Physically Abused: Not on file    Sexually Abused: Not on file   Housing Stability:     Unable to Pay for Housing in the Last Year: Not on file    Number of Places Lived in the Last Year: Not on file    Unstable Housing in the Last Year: Not on file       Review of Systems      Vitals:    01/30/22 1630 01/30/22 2007 01/30/22 2320 01/31/22 0754   BP:  138/76 (!) 156/87 (!) 153/72   Pulse: 52 60 60 67   Resp:  16 18 18   Temp:  97.6 °F (36.4 °C) 97.3 °F (36.3 °C) 98.5 °F (36.9 °C)   TempSrc:  Temporal Temporal    SpO2:  97% 95% 95%   Weight:       Height:         Physical Exam  80 y.o. male presents to dental clinic for clearance prior to TARV. Pt has no chief complaints. Assessment:  Active Problems:    Syncope without other cardiovascular symptoms  Resolved Problems:    * No resolved hospital problems. *      Plan:  Panoramic radiograph and 3 periapical radiographs taken and reviewed. Radiographs reveal PARL's present on teeth # 5, #13, and #19. Clinical exam reveals gross caries #5, #13, and #19. #5, #13, and #19 require extraction prior to cardiac surgery. Pt is to be scheduled early Tuesday 02/01/2022 for extractions. Patient not yet cleared from a dental standpoint. Electronically signed by David Britton DMD on 1/31/22 at 11:14 AM EST      Inpatient Dental Consult on 1/31/2022 : I saw and evaluated the patient and agree with the dental resident's findings and the plan of care as documented in the dental resident's note on 1/31/2022. Please see dental resident's inpatient dental consult note for complete details.       Teaching Attending:  Judah Miller DDS

## 2022-01-31 NOTE — PLAN OF CARE
Patient's chart updated to reflect:      . - HF care plan, HF education points and HF discharge instructions.  -Orders: 2 gram sodium diet, daily weights, I/O.  -PCP and cardiology follow up appointments to be scheduled within 7 days of hospital discharge.   -CHF education session will be provided to the patient prior to hospital discharge.  -See patient following TAVR 2/3/22    Elsa Oleary RN RN, BSN  Heart Failure Navigator

## 2022-01-31 NOTE — PROGRESS NOTES
Structural Cardiology Progress Note:    Narrative:  · NAEON  · New rash, papular, erythematous and pruritic on the back since yesterday. Objective:  -150s    BP (!) 156/87   Pulse 60   Temp 97.3 °F (36.3 °C) (Temporal)   Resp 18   Ht 5' 11\" (1.803 m)   Wt 169 lb (76.7 kg)   SpO2 95%   BMI 23.57 kg/m²    General: NAD  Lungs: CTAB  Heart: RRR. No M/R/G  Abdomen: soft, NT/ND  Extremities: no pitting edema. Warm. Neuro: A&Ox3, MAEx4    Lab Results   Component Value Date    WBC 6.2 01/30/2022    HGB 11.5 (L) 01/30/2022    HCT 35.8 (L) 01/30/2022    MCV 95.5 01/30/2022     (L) 01/30/2022     Lab Results   Component Value Date     01/30/2022    K 4.2 01/30/2022    K 4.2 01/30/2022     01/30/2022    CO2 22 01/30/2022    BUN 30 01/30/2022    CREATININE 2.0 01/30/2022    GLUCOSE 102 01/30/2022    GLUCOSE 101 12/14/2011    CALCIUM 9.0 01/30/2022      TAVR CTAs personally reviewed:  Valve morphology: Trileaflet with heavy calcification. Single nodule of protruding annular calcium between the left and noncoronary cusps. No significant LVOT calcium. Moderate STJ protruding calcium. Annular area: 593  Annular perimeter: 88  Annular dimensions: 24.7 x 31.8  LVOT dimensions: Area 630, premature 96, 22.5 x 33.78 -4 mm  Sinus of Valsalva dimensions: L 35.3, R 32.9, N 35.1  STJ height: 25.2  STJ dimensions: 27.0 x 29.7  Left coronary height: 14.4  Right coronary height: 18.7  Annular angle to the horizontal plane: 48 (3-cusp view); 39 (2-cusp view)  Implant angle: 3-cusp view LAO38/CAU12; 2-cusp view LAO12/CAU36    Aortic arch: Tortuous brachiocephalic  Bypass grafts: patent LIMA to LAD, patent aortocoronary SVG to OM, patent aortocoronary SVG to RPDA, patent aortocoronary SVG to RPL. Conclusion: anatomy amenable to TAVR with 34-mm Evolut PRO+ with pre-BAV (22-mm ZMED)        Assessment:  1. Syncope in setting of critical aortic stenosis with severe LV dysfunction  1.  Certainly hypervolemic as evidenced by his markedly elevated proBNP and moderate bilateral pleural effusions  2. CAD with history of CABG in 2009 with LIMA to LAD, SVG to OM1, SVG to RPDA, SVG to RPL, modified Maze and SALENA exclusion  3. PAF, on warfarin PTA  4. Type 2 diabetes on orals  5. DGO0T-1 -at baseline        Recommendations:  · Continue aspirin 81 mg daily for history of CAD  · Continue carvedilol 6.25 mg p.o. twice daily for HFrEF, hypertension, PAF rate control  · Continue pravastatin as is-less important given the patient's age  · Switch doxazosin to tamsulosin to minimize the vasodilator effect in the setting of critical aortic stenosis   · TAVR planning:  · Dermatology consult for back rash  · CTS to see today for second opinion  · Dental consult today  · CT abdomen/pelvis without contrast reviewed. The right femoral artery appears grossly amenable to TAVR access  · CTA chest and cardiac today for rest of TAVR planning using low contrast and pre and post hydration. Will give a dose of IV diuretic after the procedure. · See results above  · Tentatively TAVR on Wednesday second case. We will follow.       Ankit Ortiz MD, [facility] [place]  Interventional Cardiology/Structural Heart Disease  Office: [phone]

## 2022-02-01 ENCOUNTER — APPOINTMENT (OUTPATIENT)
Dept: GENERAL RADIOLOGY | Age: 87
DRG: 266 | End: 2022-02-01
Payer: MEDICARE

## 2022-02-01 ENCOUNTER — ANESTHESIA EVENT (OUTPATIENT)
Dept: OPERATING ROOM | Age: 87
DRG: 266 | End: 2022-02-01
Payer: MEDICARE

## 2022-02-01 PROBLEM — E44.1 MILD PROTEIN-CALORIE MALNUTRITION (HCC): Chronic | Status: ACTIVE | Noted: 2022-02-01

## 2022-02-01 LAB
ABO/RH: NORMAL
ALBUMIN SERPL-MCNC: 3.5 G/DL (ref 3.5–5.2)
ALP BLD-CCNC: 66 U/L (ref 40–129)
ALT SERPL-CCNC: 11 U/L (ref 0–40)
ANION GAP SERPL CALCULATED.3IONS-SCNC: 14 MMOL/L (ref 7–16)
ANTIBODY SCREEN: NORMAL
APTT: 31.7 SEC (ref 24.5–35.1)
AST SERPL-CCNC: 17 U/L (ref 0–39)
BASOPHILS ABSOLUTE: 0.03 E9/L (ref 0–0.2)
BASOPHILS RELATIVE PERCENT: 0.5 % (ref 0–2)
BILIRUB SERPL-MCNC: 0.8 MG/DL (ref 0–1.2)
BUN BLDV-MCNC: 35 MG/DL (ref 6–23)
CALCIUM SERPL-MCNC: 9.5 MG/DL (ref 8.6–10.2)
CHLORIDE BLD-SCNC: 104 MMOL/L (ref 98–107)
CO2: 24 MMOL/L (ref 22–29)
CREAT SERPL-MCNC: 1.9 MG/DL (ref 0.7–1.2)
EKG ATRIAL RATE: 46 BPM
EKG Q-T INTERVAL: 484 MS
EKG QRS DURATION: 156 MS
EKG QTC CALCULATION (BAZETT): 471 MS
EKG R AXIS: -50 DEGREES
EKG T AXIS: 147 DEGREES
EKG VENTRICULAR RATE: 57 BPM
EOSINOPHILS ABSOLUTE: 0.11 E9/L (ref 0.05–0.5)
EOSINOPHILS RELATIVE PERCENT: 1.7 % (ref 0–6)
GFR AFRICAN AMERICAN: 40
GFR NON-AFRICAN AMERICAN: 33 ML/MIN/1.73
GLUCOSE BLD-MCNC: 114 MG/DL (ref 74–99)
HBA1C MFR BLD: 5.9 % (ref 4–5.6)
HCT VFR BLD CALC: 37.6 % (ref 37–54)
HEMOGLOBIN: 12.3 G/DL (ref 12.5–16.5)
IMMATURE GRANULOCYTES #: 0.02 E9/L
IMMATURE GRANULOCYTES %: 0.3 % (ref 0–5)
INR BLD: 1.6
LYMPHOCYTES ABSOLUTE: 2.09 E9/L (ref 1.5–4)
LYMPHOCYTES RELATIVE PERCENT: 32.7 % (ref 20–42)
MCH RBC QN AUTO: 30.8 PG (ref 26–35)
MCHC RBC AUTO-ENTMCNC: 32.7 % (ref 32–34.5)
MCV RBC AUTO: 94 FL (ref 80–99.9)
METER GLUCOSE: 102 MG/DL (ref 74–99)
METER GLUCOSE: 115 MG/DL (ref 74–99)
METER GLUCOSE: 125 MG/DL (ref 74–99)
METER GLUCOSE: 161 MG/DL (ref 74–99)
MONOCYTES ABSOLUTE: 0.57 E9/L (ref 0.1–0.95)
MONOCYTES RELATIVE PERCENT: 8.9 % (ref 2–12)
NEUTROPHILS ABSOLUTE: 3.57 E9/L (ref 1.8–7.3)
NEUTROPHILS RELATIVE PERCENT: 55.9 % (ref 43–80)
PDW BLD-RTO: 13.2 FL (ref 11.5–15)
PLATELET # BLD: 117 E9/L (ref 130–450)
PMV BLD AUTO: 12.1 FL (ref 7–12)
POTASSIUM REFLEX MAGNESIUM: 4.1 MMOL/L (ref 3.5–5)
POTASSIUM SERPL-SCNC: 4.1 MMOL/L (ref 3.5–5)
PROTHROMBIN TIME: 17.7 SEC (ref 9.3–12.4)
RBC # BLD: 4 E12/L (ref 3.8–5.8)
SODIUM BLD-SCNC: 142 MMOL/L (ref 132–146)
TOTAL PROTEIN: 6.3 G/DL (ref 6.4–8.3)
WBC # BLD: 6.4 E9/L (ref 4.5–11.5)

## 2022-02-01 PROCEDURE — 6370000000 HC RX 637 (ALT 250 FOR IP): Performed by: PHYSICIAN ASSISTANT

## 2022-02-01 PROCEDURE — 86901 BLOOD TYPING SEROLOGIC RH(D): CPT

## 2022-02-01 PROCEDURE — 6370000000 HC RX 637 (ALT 250 FOR IP): Performed by: INTERNAL MEDICINE

## 2022-02-01 PROCEDURE — 83036 HEMOGLOBIN GLYCOSYLATED A1C: CPT

## 2022-02-01 PROCEDURE — 2580000003 HC RX 258: Performed by: INTERNAL MEDICINE

## 2022-02-01 PROCEDURE — 36415 COLL VENOUS BLD VENIPUNCTURE: CPT

## 2022-02-01 PROCEDURE — 80053 COMPREHEN METABOLIC PANEL: CPT

## 2022-02-01 PROCEDURE — 80048 BASIC METABOLIC PNL TOTAL CA: CPT

## 2022-02-01 PROCEDURE — 2580000003 HC RX 258: Performed by: PHYSICIAN ASSISTANT

## 2022-02-01 PROCEDURE — 6370000000 HC RX 637 (ALT 250 FOR IP): Performed by: CLINICAL NURSE SPECIALIST

## 2022-02-01 PROCEDURE — 85025 COMPLETE CBC W/AUTO DIFF WBC: CPT

## 2022-02-01 PROCEDURE — 85610 PROTHROMBIN TIME: CPT

## 2022-02-01 PROCEDURE — 85730 THROMBOPLASTIN TIME PARTIAL: CPT

## 2022-02-01 PROCEDURE — 82962 GLUCOSE BLOOD TEST: CPT

## 2022-02-01 PROCEDURE — 99233 SBSQ HOSP IP/OBS HIGH 50: CPT | Performed by: INTERNAL MEDICINE

## 2022-02-01 PROCEDURE — 86850 RBC ANTIBODY SCREEN: CPT

## 2022-02-01 PROCEDURE — 6360000002 HC RX W HCPCS: Performed by: INTERNAL MEDICINE

## 2022-02-01 PROCEDURE — 2140000000 HC CCU INTERMEDIATE R&B

## 2022-02-01 PROCEDURE — 71045 X-RAY EXAM CHEST 1 VIEW: CPT

## 2022-02-01 PROCEDURE — 86900 BLOOD TYPING SEROLOGIC ABO: CPT

## 2022-02-01 PROCEDURE — 86923 COMPATIBILITY TEST ELECTRIC: CPT

## 2022-02-01 PROCEDURE — 93010 ELECTROCARDIOGRAM REPORT: CPT | Performed by: INTERNAL MEDICINE

## 2022-02-01 PROCEDURE — 93005 ELECTROCARDIOGRAM TRACING: CPT | Performed by: PHYSICIAN ASSISTANT

## 2022-02-01 RX ORDER — CHLORHEXIDINE GLUCONATE 4 G/100ML
SOLUTION TOPICAL SEE ADMIN INSTRUCTIONS
Status: DISCONTINUED | OUTPATIENT
Start: 2022-02-01 | End: 2022-02-02 | Stop reason: HOSPADM

## 2022-02-01 RX ORDER — CHLORHEXIDINE GLUCONATE 0.12 MG/ML
15 RINSE ORAL ONCE
Status: COMPLETED | OUTPATIENT
Start: 2022-02-02 | End: 2022-02-02

## 2022-02-01 RX ORDER — SODIUM CHLORIDE 0.9 % (FLUSH) 0.9 %
5-40 SYRINGE (ML) INJECTION EVERY 12 HOURS SCHEDULED
Status: DISCONTINUED | OUTPATIENT
Start: 2022-02-01 | End: 2022-02-02

## 2022-02-01 RX ORDER — SODIUM CHLORIDE 0.9 % (FLUSH) 0.9 %
10 SYRINGE (ML) INJECTION PRN
Status: DISCONTINUED | OUTPATIENT
Start: 2022-02-01 | End: 2022-02-02 | Stop reason: SDUPTHER

## 2022-02-01 RX ORDER — PREDNISONE 10 MG/1
10 TABLET ORAL EVERY 8 HOURS
Status: DISCONTINUED | OUTPATIENT
Start: 2022-02-01 | End: 2022-02-01

## 2022-02-01 RX ORDER — METHYLPREDNISOLONE SODIUM SUCCINATE 125 MG/2ML
80 INJECTION, POWDER, LYOPHILIZED, FOR SOLUTION INTRAMUSCULAR; INTRAVENOUS ONCE
Status: COMPLETED | OUTPATIENT
Start: 2022-02-01 | End: 2022-02-01

## 2022-02-01 RX ORDER — SODIUM CHLORIDE 9 MG/ML
INJECTION, SOLUTION INTRAVENOUS CONTINUOUS
Status: DISCONTINUED | OUTPATIENT
Start: 2022-02-01 | End: 2022-02-02 | Stop reason: SDUPTHER

## 2022-02-01 RX ORDER — SODIUM CHLORIDE 9 MG/ML
25 INJECTION, SOLUTION INTRAVENOUS PRN
Status: DISCONTINUED | OUTPATIENT
Start: 2022-02-01 | End: 2022-02-02 | Stop reason: SDUPTHER

## 2022-02-01 RX ADMIN — SODIUM CHLORIDE, PRESERVATIVE FREE 10 ML: 5 INJECTION INTRAVENOUS at 20:35

## 2022-02-01 RX ADMIN — SODIUM CHLORIDE, PRESERVATIVE FREE 10 ML: 5 INJECTION INTRAVENOUS at 09:16

## 2022-02-01 RX ADMIN — METHYLPREDNISOLONE SODIUM SUCCINATE 80 MG: 125 INJECTION, POWDER, FOR SOLUTION INTRAMUSCULAR; INTRAVENOUS at 13:35

## 2022-02-01 RX ADMIN — INSULIN LISPRO 1 UNITS: 100 INJECTION, SOLUTION INTRAVENOUS; SUBCUTANEOUS at 17:05

## 2022-02-01 RX ADMIN — ASPIRIN 81 MG CHEWABLE TABLET 81 MG: 81 TABLET CHEWABLE at 09:15

## 2022-02-01 RX ADMIN — CARVEDILOL 6.25 MG: 6.25 TABLET, FILM COATED ORAL at 17:05

## 2022-02-01 RX ADMIN — MUPIROCIN 1 EACH: 20 OINTMENT TOPICAL at 20:30

## 2022-02-01 RX ADMIN — SODIUM CHLORIDE, PRESERVATIVE FREE 10 ML: 5 INJECTION INTRAVENOUS at 20:37

## 2022-02-01 RX ADMIN — CARVEDILOL 6.25 MG: 6.25 TABLET, FILM COATED ORAL at 09:15

## 2022-02-01 RX ADMIN — TAMSULOSIN HYDROCHLORIDE 0.4 MG: 0.4 CAPSULE ORAL at 09:15

## 2022-02-01 RX ADMIN — SODIUM CHLORIDE: 9 INJECTION, SOLUTION INTRAVENOUS at 20:37

## 2022-02-01 RX ADMIN — SODIUM CHLORIDE, PRESERVATIVE FREE 10 ML: 5 INJECTION INTRAVENOUS at 13:36

## 2022-02-01 RX ADMIN — PRAVASTATIN SODIUM 40 MG: 20 TABLET ORAL at 20:30

## 2022-02-01 ASSESSMENT — PAIN SCALES - GENERAL
PAINLEVEL_OUTOF10: 0

## 2022-02-01 ASSESSMENT — PAIN SCALES - WONG BAKER
WONGBAKER_NUMERICALRESPONSE: 0
WONGBAKER_NUMERICALRESPONSE: 0

## 2022-02-01 ASSESSMENT — LIFESTYLE VARIABLES: SMOKING_STATUS: 0

## 2022-02-01 NOTE — PROGRESS NOTES
Spoke with Dr Huy Valdez office (derm) re: cx. Explained pt is 2nd case tomorrow and needs seen today If possible. They will discuss with  and call back.

## 2022-02-01 NOTE — PROGRESS NOTES
Subjective: The patient is awake and alert. No problems overnight. Denies chest pain, angina, and dyspnea. Denies abdominal pain. Tolerating diet. No nausea or vomiting. He states he feels better overall. Objective:  Pt is resting in nad   BP (!) 147/78   Pulse 56   Temp 97.1 °F (36.2 °C) (Temporal)   Resp 16   Ht 5' 11\" (1.803 m)   Wt 169 lb (76.7 kg)   SpO2 96%   BMI 23.57 kg/m²   HEENT no adenopathy no bruits  Heart:  RRR, no murmurs, gallops, or rubs.   Lungs:  CTA bilaterally, no wheeze, rales or rhonchi  Abd: bowel sounds present, nontender, nondistended, no masses  Extrem:  No clubbing, cyanosis, or edema  WBC/Hgb/Hct/Plts:  6.4/12.3/37.6/117 (02/01 3523) basic metabolic panel     Assessment:    Patient Active Problem List   Diagnosis    Cellulitis    CAD (coronary artery disease)    S/P CABG (coronary artery bypass graft)    DM (diabetes mellitus) ([address]    Hyperlipemia    HTN (hypertension)    A-fib ([address]    BPH (benign prostatic hyperplasia)    Diabetic neuropathy ([address]    Encounter for therapeutic drug monitoring    Long term current use of anticoagulant therapy    Aortic valve stenosis    Chronic kidney disease    Heart valve disease    Gout    Syncope without other cardiovascular symptoms    Renal failure       Plan:  He is to have TAVR in next couple of days        Robin Leonard DO  7:14 AM  2/1/2022

## 2022-02-01 NOTE — PATIENT CARE CONFERENCE
P Quality Flow/Interdisciplinary Rounds Progress Note        Quality Flow Rounds held on February 1, 2022    Disciplines Attending:  Bedside Nurse, ,  and Nursing Unit Leadership    Melva Lazaro was admitted on 1/28/2022  2:10 PM    Anticipated Discharge Date:  Expected Discharge Date: 02/03/22    Disposition:    Bhavesh Score:  Bhavesh Scale Score: 19    Readmission Risk              Risk of Unplanned Readmission:  16           Discussed patient goal for the day, patient clinical progression, and barriers to discharge. The following Goal(s) of the Day/Commitment(s) have been identified:   To dental clinic today      Jr Calloway RN  February 1, 2022

## 2022-02-01 NOTE — PROGRESS NOTES
Comprehensive Nutrition Assessment    Type and Reason for Visit:  Initial,Consult    Nutrition Recommendations/Plan: Patient with mild malnutrition d/t 5.6% wt loss x 2 months, unable to due full malnutrition assessment at this with patient not present in room upon assessment. Will add Gelatein BID and continue to monitor. Nutrition Assessment:  Pt adm s/p syncope w/ critical aortic stenosis, pending TAVR. Note CKD/DM/HTN/Afib/CAD s/p CABG. Note mild malnutriton w/ 5.6% wt loss x 2 months. Pt currently tolerating diet, eating >75% meals, will add ONS & monitor. Malnutrition Assessment:  Malnutrition Status:  Mild malnutrition    Context:  Chronic Illness     Findings of the 6 clinical characteristics of malnutrition:  Energy Intake:  Mild decrease in energy intake (Comment)  Weight Loss:   (5.6% x 2 months)     Body Fat Loss:  Unable to assess (not in room)     Muscle Mass Loss:  Unable to assess (not in room)    Fluid Accumulation:  No significant fluid accumulation     Strength:  Not Performed    Estimated Daily Nutrient Needs:  Energy (kcal):  3234-5865; Weight Used for Energy Requirements:  Current     Protein (g):  100-115; Weight Used for Protein Requirements:  Current (1.5-1.7)        Fluid (ml/day):  6926-4305; Method Used for Fluid Requirements:  1 ml/kcal      Nutrition Related Findings:  A&Ox4 w/ intermittent confusion, abd WNL, +1 BLE edema, I/Os WNL      Wounds:  None       Current Nutrition Therapies:    ADULT DIET; Regular; Low Sodium (2 gm)  Diet NPO  ADULT ORAL NUTRITION SUPPLEMENT; Lunch, Dinner;  Other Oral Supplement; Gelatein    Anthropometric Measures:  · Height: 5' 11\" (180.3 cm)  · Current Body Weight: 150 lb 12.8 oz (68.4 kg) (2/1 standing scale)   · Admission Body Weight: 150 lb 12.8 oz (68.4 kg) (2/1 first taken standing)    · Usual Body Weight: 159 lb 11.2 oz (72.4 kg) (11/2021 actual)     · Ideal Body Weight: 172 lbs; % Ideal Body Weight 87.7 %   · BMI: 21  · BMI

## 2022-02-01 NOTE — PROGRESS NOTES
Structural Cardiology Progress Note:    Narrative:  · NAEON  · Rash on the back is less pruritic today. · Creatinine 1.9  · Platelets slowly recovering. Objective:  BP (!) 147/69   Pulse 56   Temp 96.8 °F (36 °C) (Temporal)   Resp 16   Ht 5' 11\" (1.803 m)   Wt 150 lb 12.8 oz (68.4 kg)   SpO2 95%   BMI 21.03 kg/m²    General: NAD  Lungs: CTAB  Heart: RRR.  3/6 late peaking systolic ejection murmur over the upper right sternal border with very diminished S2. Abdomen: soft, NT/ND  Extremities: no pitting edema. Warm. Neuro: A&Ox3, MAEx4    Lab Results   Component Value Date    WBC 6.4 02/01/2022    HGB 12.3 (L) 02/01/2022    HCT 37.6 02/01/2022    MCV 94.0 02/01/2022     (L) 02/01/2022     Lab Results   Component Value Date     02/01/2022    K 4.1 02/01/2022    K 4.1 02/01/2022     02/01/2022    CO2 24 02/01/2022    BUN 35 02/01/2022    CREATININE 1.9 02/01/2022    GLUCOSE 114 02/01/2022    GLUCOSE 101 12/14/2011    CALCIUM 9.5 02/01/2022      TAVR CTAs personally reviewed:  Valve morphology: Trileaflet with heavy calcification. Single nodule of protruding annular calcium between the left and noncoronary cusps. No significant LVOT calcium. Moderate STJ protruding calcium. Annular area: 593  Annular perimeter: 88  Annular dimensions: 24.7 x 31.8  LVOT dimensions: Area 630, premature 96, 22.5 x 33.78 -4 mm  Sinus of Valsalva dimensions: L 35.3, R 32.9, N 35.1  STJ height: 25.2  STJ dimensions: 27.0 x 29.7  Left coronary height: 14.4  Right coronary height: 18.7  Annular angle to the horizontal plane: 48 (3-cusp view); 39 (2-cusp view)  Implant angle: 3-cusp view LAO38/CAU12; 2-cusp view LAO12/CAU36    Aortic arch: Tortuous brachiocephalic  Bypass grafts: patent LIMA to LAD, patent aortocoronary SVG to OM, patent aortocoronary SVG to RPDA, patent aortocoronary SVG to RPL.     Conclusion: anatomy amenable to TAVR with 34-mm Evolut PRO+ with pre-BAV (22-mm ZMED)        Assessment:  1. Syncope in setting of critical aortic stenosis with severe LV dysfunction  1. Certainly hypervolemic as evidenced by his markedly elevated proBNP and moderate bilateral pleural effusions  2. We will consider diuresis after TAVR  2. CAD with history of CABG in 2009 with LIMA to LAD, SVG to OM1, SVG to RPDA, SVG to RPL, modified Maze and SALENA exclusion  3. PAF, on warfarin PTA  4. Type 2 diabetes on orals  5. NPX2Y-0 -at baseline        Recommendations:  · Continue aspirin 81 mg daily for history of CAD  · Continue carvedilol 6.25 mg p.o. twice daily for HFrEF, hypertension, PAF rate control  · Continue pravastatin as is-less important given the patient's age  · Switch doxazosin to tamsulosin to minimize the vasodilator effect in the setting of critical aortic stenosis   · TAVR planning:  · Dermatology consult for back rash  · Cleared by dental.  I appreciate their assistance. · CT abdomen/pelvis without contrast reviewed. The right femoral artery appears grossly amenable to TAVR access  · Tentatively TAVR tomorrow second case. We will follow.       Ila Rosenbaum MD, [facility] [place]  Interventional Cardiology/Structural Heart Disease  Office: [phone]

## 2022-02-01 NOTE — PROGRESS NOTES
Dr Merlyn Goldberg paged re: opinion on back rash. Awaiting call back. Ok to give 80 solumed x 1. Will also place more orders later.

## 2022-02-01 NOTE — ANESTHESIA PRE PROCEDURE
Department of Anesthesiology  Preprocedure Note       Name:  Elvira Caro   Age:  80 y.o.  :  1931                                          MRN:  [id number]         Date:  2022      Surgeon: Jasvir Chavez):  Ray Langdon, MD Isidor Dakin, DO    Procedure: Procedure(s):  TRANSCATHETER AORTIC VALVE REPLACEMENT FEMORAL APPROACH    Medications prior to admission:   Prior to Admission medications    Medication Sig Start Date End Date Taking?  Authorizing Provider   amLODIPine (NORVASC) 2.5 MG tablet Take 1 tablet by mouth daily 22   MELY Delong   warfarin (COUMADIN) 5 MG tablet TAKE 1 TABLET BY MOUTH ON  AND WEDNESDAY AND THEN 1 AND 1/2 TABLETS THE REST OF THE DAYS 20   Umesh Alexandre MD   sitaGLIPtan-metformin (JANUMET)  MG per tablet TAKE 1 TABLET BY MOUTH TWICE A DAY WITH MEALS 20   Umesh Alexandre MD   metoprolol tartrate (LOPRESSOR) 25 MG tablet TAKE 1 TABLET BY MOUTH EVERY 12 HOURS 20   Umesh Alexandre MD   warfarin (COUMADIN) 5 MG tablet TAKE 1 TABLET BY MOUTH ON  AND WEDNESDAY AND THEN 1 AND 1/2 TABLETS THE REST OF THE DAYS 20   Vanesa Friedman MD   doxazosin (CARDURA) 4 MG tablet TAKE 1 TABLET BY MOUTH EVERY DAY 20   Umesh Alexandre MD   pravastatin (PRAVACHOL) 40 MG tablet Take one daily 20   Vanesa Friedman MD       Current medications:    Current Facility-Administered Medications   Medication Dose Route Frequency Provider Last Rate Last Admin    tamsulosin (FLOMAX) capsule 0.4 mg  0.4 mg Oral Daily Dorian Wong MD   0.4 mg at 22 0857    diphenhydrAMINE (BENADRYL) tablet 25 mg  25 mg Oral Q8H PRN Dorian Wong MD        pravastatin (PRAVACHOL) tablet 40 mg  40 mg Oral Nightly Jet Roca MD   40 mg at 22 2227    insulin lispro (HUMALOG) injection vial 0-6 Units  0-6 Units SubCUTAneous TID  Jet Roca MD   1 Units at 22 1159    glucose (GLUTOSE) 40 % oral gel 15 g  15 g Oral PRN Florentino Bell MD        dextrose 50 % IV solution  12.5 g IntraVENous PRN Florentino Bell MD        glucagon (rDNA) injection 1 mg  1 mg IntraMUSCular PRN Florentino Bell MD        dextrose 5 % solution  100 mL/hr IntraVENous PRN Florentino Bell MD        sodium chloride flush 0.9 % injection 5-40 mL  5-40 mL IntraVENous 2 times per day Florentino Bell MD   10 mL at 01/31/22 2227    sodium chloride flush 0.9 % injection 5-40 mL  5-40 mL IntraVENous PRN Florentino Bell MD        0.9 % sodium chloride infusion  25 mL IntraVENous PRN Florentino Bell MD        ondansetron (ZOFRAN-ODT) disintegrating tablet 4 mg  4 mg Oral Q8H PRN Florentino Bell MD        Or    ondansetron (ZOFRAN) injection 4 mg  4 mg IntraVENous Q6H PRN Florentino Bell MD        polyethylene glycol (GLYCOLAX) packet 17 g  17 g Oral Daily PRN Florentino Bell MD        acetaminophen (TYLENOL) tablet 650 mg  650 mg Oral Q6H PRN Florentino Bell MD        Or    acetaminophen (TYLENOL) suppository 650 mg  650 mg Rectal Q6H PRN Florentino Bell MD        heparin (porcine) injection 2,000 Units  2,000 Units IntraVENous PRN Florentino Bell MD        aspirin chewable tablet 81 mg  81 mg Oral Daily Jorge Robledo MD   81 mg at 01/31/22 0858    carvedilol (COREG) tablet 6.25 mg  6.25 mg Oral BID  EDMUNDO Anthony - CNS   6.25 mg at 01/31/22 7731       Allergies:  No Known Allergies    Problem List:    Patient Active Problem List   Diagnosis Code    Cellulitis L03.90    CAD (coronary artery disease) I25.10    S/P CABG (coronary artery bypass graft) Z95.1    DM (diabetes mellitus) ([facility] E11.9    Hyperlipemia E78.5    HTN (hypertension) I10    A-fib ([facility] I48.91    BPH (benign prostatic hyperplasia) N40.0    Diabetic neuropathy ([facility] E11.40    Encounter for therapeutic drug monitoring Z51.81    Long term current use of anticoagulant therapy Z79.01    Aortic valve stenosis I35.0    Chronic kidney disease N18.9    Heart valve disease I38    Gout M10.9    Syncope without other cardiovascular symptoms R55, R09.89    Renal failure N19       Past Medical History:        Diagnosis Date    [facility]     BPH (benign prostatic hyperplasia)     CAD (coronary artery disease)     Diabetic neuropathy (HCC)     DM [facility]diabetes mellitus) ([facility]     HTN (hypertension)     Hyperlipemia     Pancreatitis 9/10    S/P CABG (coronary artery bypass graft)        Past Surgical History:        Procedure Laterality Date    CARDIAC SURGERY      CHOLECYSTECTOMY  09/27/2010    COLONOSCOPY      CORONARY ARTERY BYPASS GRAFT      CABG x4 with left internal mammary artery to the LAD. reverse saphenous vein grafts to the 1st marginal branches ofcircumflex, posterior descending branch of the right coronary artery, and posterolateral branch to the right coronary artery, and modified maze procedure with [employer] bipolar radiofrequency ablation, and excision of left atrial appendage.  TONSILLECTOMY         Social History:    Social History     Tobacco Use    Smoking status: Never Smoker    Smokeless tobacco: Never Used   Substance Use Topics    Alcohol use: Yes     Comment: rarely- 1 beer                                Counseling given: Not Answered      Vital Signs (Current):   Vitals:    01/31/22 1714 01/31/22 2000 02/01/22 0000 02/01/22 0735   BP:  128/78 (!) 147/78    Pulse: (!) 49 58 56    Resp:  16 16    Temp:  36.2 °C (97.2 °F) 36.2 °C (97.1 °F)    TempSrc:  Temporal Temporal    SpO2:  97% 96%    Weight:    150 lb 12.8 oz (68.4 kg)   Height:                                                  BP Readings from Last 3 Encounters:   02/01/22 (!) 147/78   01/27/22 (!) 192/90   11/18/21 132/82       NPO Status:                                                                                 BMI:   Wt Readings from Last 3 Encounters:   02/01/22 150 lb 12.8 oz (68.4 kg)   01/27/22 169 lb (76.7 kg)   11/18/21 159 lb 11.2 oz (72.4 kg)     Body mass index is 21.03 kg/m².     CBC: Lab Results   Component Value Date    WBC 6.4 02/01/2022    RBC 4.00 02/01/2022    HGB 12.3 02/01/2022    HCT 37.6 02/01/2022    MCV 94.0 02/01/2022    RDW 13.2 02/01/2022     02/01/2022       CMP:   Lab Results   Component Value Date     02/01/2022    K 4.1 02/01/2022    K 4.1 02/01/2022     02/01/2022    CO2 24 02/01/2022    BUN 35 02/01/2022    CREATININE 1.9 02/01/2022    GFRAA 40 02/01/2022    LABGLOM 33 02/01/2022    GLUCOSE 114 02/01/2022    GLUCOSE 101 12/14/2011    PROT 6.3 02/01/2022    CALCIUM 9.5 02/01/2022    BILITOT 0.8 02/01/2022    ALKPHOS 66 02/01/2022    AST 17 02/01/2022    ALT 11 02/01/2022       POC Tests: No results for input(s): POCGLU, POCNA, POCK, POCCL, POCBUN, POCHEMO, POCHCT in the last 72 hours. Coags:   Lab Results   Component Value Date    PROTIME 26.4 01/29/2022    PROTIME 29.8 06/24/2020    INR 2.4 01/29/2022    APTT 30.5 01/31/2022       HCG (If Applicable): No results found for: PREGTESTUR, PREGSERUM, HCG, HCGQUANT     ABGs: No results found for: PHART, PO2ART, QFV8FNS, XUJ2JHX, BEART, A8LDKANM     Type & Screen (If Applicable):  No results found for: LABABO, LABRH    Drug/Infectious Status (If Applicable):  No results found for: HIV, HEPCAB    COVID-19 Screening (If Applicable):   Lab Results   Component Value Date    COVID19 Not Detected 01/28/2022     EKG 01/28/22  Narrative & Impression    Atrial fibrillation  Left axis deviation  Left ventricular hypertrophy with QRS widening  Cannot rule out Anterior infarct , age undetermined  Abnormal ECG  No previous ECGs available  Confirmed by Morales Thurman ([id number]) on 1/31/2022 9:53:16 AM     ECHO 1/13/22   Summary   Ejection fraction is visually estimated at 30%. Overall ejection fraction severely decreased. Severe left ventricular concentric hypertrophy noted. Dilated right ventricle with reduced function. Left atrial volume index of 79 ml per meters squared BSA. Severe aortic stenosis is present.  The aortic valve area is 0.6 cm2 with a   maximum gradient of 113 mmHg and a mean gradient of 69 mmHg. Physiologic   and/or trace aortic regurgitation is noted. Moderate mitral regurgitation is present. Mild tricuspid regurgitation. Pulmonary hypertension is moderate to severe. RVSP is 66 mmHg. Signature      ----------------------------------------------------------------   Electronically signed by Amanda Aleman DO(Interpreting   physician) on 01/14/2022 04:08 PM   ----------------------------------------------------------------        Anesthesia Evaluation  Patient summary reviewed and Nursing notes reviewed no history of anesthetic complications:   Airway: Mallampati: III  TM distance: >3 FB   Neck ROM: full  Mouth opening: > = 3 FB Dental:          Pulmonary:Negative Pulmonary ROS breath sounds clear to auscultation      (-) not a current smoker                           Cardiovascular:  Exercise tolerance: poor (<4 METS),   (+) hypertension:, valvular problems/murmurs: AS and MR, CAD:, CABG/stent:, dysrhythmias: atrial fibrillation,       ECG reviewed  Rhythm: regular  Rate: abnormal  Echocardiogram reviewed         Beta Blocker:  Dose within 24 Hrs         Neuro/Psych:   Negative Neuro/Psych ROS              GI/Hepatic/Renal:            ROS comment: BPH. Endo/Other:    (+) Diabetes, blood dyscrasia: anticoagulation therapy, arthritis:., .                 Abdominal:             Vascular: negative vascular ROS. Other Findings:           Anesthesia Plan      MAC     ASA 4     (General as back up)  Induction: intravenous. arterial line and BIS    Anesthetic plan and risks discussed with patient. Use of blood products discussed with patient whom. Plan discussed with CRNA and attending.                 Betina Harrell, RN   2/1/2022

## 2022-02-01 NOTE — PLAN OF CARE
Problem: Skin Integrity:  Goal: Will show no infection signs and symptoms  Description: Will show no infection signs and symptoms  Outcome: Met This Shift     Problem: Falls - Risk of:  Goal: Will remain free from falls  Description: Will remain free from falls  Outcome: Met This Shift     Problem: OXYGENATION/RESPIRATORY FUNCTION  Goal: Patient will maintain patent airway  Outcome: Met This Shift     Problem: HEMODYNAMIC STATUS  Goal: Patient has stable vital signs and fluid balance  Outcome: Met This Shift

## 2022-02-02 ENCOUNTER — ANESTHESIA (OUTPATIENT)
Dept: OPERATING ROOM | Age: 87
DRG: 266 | End: 2022-02-02
Payer: MEDICARE

## 2022-02-02 VITALS — DIASTOLIC BLOOD PRESSURE: 107 MMHG | OXYGEN SATURATION: 91 % | SYSTOLIC BLOOD PRESSURE: 171 MMHG

## 2022-02-02 LAB
ANION GAP SERPL CALCULATED.3IONS-SCNC: 9 MMOL/L (ref 7–16)
BASOPHILS ABSOLUTE: 0.03 E9/L (ref 0–0.2)
BASOPHILS RELATIVE PERCENT: 0.4 % (ref 0–2)
BUN BLDV-MCNC: 33 MG/DL (ref 6–23)
CALCIUM SERPL-MCNC: 9.1 MG/DL (ref 8.6–10.2)
CHLORIDE BLD-SCNC: 105 MMOL/L (ref 98–107)
CO2: 27 MMOL/L (ref 22–29)
CREAT SERPL-MCNC: 1.9 MG/DL (ref 0.7–1.2)
EKG ATRIAL RATE: 30 BPM
EKG Q-T INTERVAL: 608 MS
EKG QRS DURATION: 158 MS
EKG QTC CALCULATION (BAZETT): 422 MS
EKG R AXIS: -45 DEGREES
EKG T AXIS: 171 DEGREES
EKG VENTRICULAR RATE: 29 BPM
EOSINOPHILS ABSOLUTE: 0.11 E9/L (ref 0.05–0.5)
EOSINOPHILS RELATIVE PERCENT: 1.6 % (ref 0–6)
GFR AFRICAN AMERICAN: 40
GFR NON-AFRICAN AMERICAN: 33 ML/MIN/1.73
GLUCOSE BLD-MCNC: 110 MG/DL (ref 74–99)
HCT VFR BLD CALC: 38.1 % (ref 37–54)
HEMOGLOBIN: 12.2 G/DL (ref 12.5–16.5)
IMMATURE GRANULOCYTES #: 0.02 E9/L
IMMATURE GRANULOCYTES %: 0.3 % (ref 0–5)
LYMPHOCYTES ABSOLUTE: 1.74 E9/L (ref 1.5–4)
LYMPHOCYTES RELATIVE PERCENT: 25.3 % (ref 20–42)
MAGNESIUM: 1.6 MG/DL (ref 1.6–2.6)
MCH RBC QN AUTO: 30.4 PG (ref 26–35)
MCHC RBC AUTO-ENTMCNC: 32 % (ref 32–34.5)
MCV RBC AUTO: 95 FL (ref 80–99.9)
METER GLUCOSE: 119 MG/DL (ref 74–99)
METER GLUCOSE: 123 MG/DL (ref 74–99)
METER GLUCOSE: 99 MG/DL (ref 74–99)
MONOCYTES ABSOLUTE: 0.6 E9/L (ref 0.1–0.95)
MONOCYTES RELATIVE PERCENT: 8.7 % (ref 2–12)
NEUTROPHILS ABSOLUTE: 4.38 E9/L (ref 1.8–7.3)
NEUTROPHILS RELATIVE PERCENT: 63.7 % (ref 43–80)
PDW BLD-RTO: 13.4 FL (ref 11.5–15)
PHOSPHORUS: 3.4 MG/DL (ref 2.5–4.5)
PLATELET # BLD: 118 E9/L (ref 130–450)
PMV BLD AUTO: 12 FL (ref 7–12)
POTASSIUM REFLEX MAGNESIUM: 4.6 MMOL/L (ref 3.5–5)
POTASSIUM SERPL-SCNC: 4.6 MMOL/L (ref 3.5–5)
RBC # BLD: 4.01 E12/L (ref 3.8–5.8)
SODIUM BLD-SCNC: 141 MMOL/L (ref 132–146)
WBC # BLD: 6.9 E9/L (ref 4.5–11.5)

## 2022-02-02 PROCEDURE — 2580000003 HC RX 258: Performed by: NURSE ANESTHETIST, CERTIFIED REGISTERED

## 2022-02-02 PROCEDURE — 2720000010 HC SURG SUPPLY STERILE: Performed by: INTERNAL MEDICINE

## 2022-02-02 PROCEDURE — 02RF38Z REPLACEMENT OF AORTIC VALVE WITH ZOOPLASTIC TISSUE, PERCUTANEOUS APPROACH: ICD-10-PCS | Performed by: INTERNAL MEDICINE

## 2022-02-02 PROCEDURE — 33361 REPLACE AORTIC VALVE PERQ: CPT | Performed by: STUDENT IN AN ORGANIZED HEALTH CARE EDUCATION/TRAINING PROGRAM

## 2022-02-02 PROCEDURE — 92953 TEMPORARY EXTERNAL PACING: CPT

## 2022-02-02 PROCEDURE — 2500000003 HC RX 250 WO HCPCS: Performed by: INTERNAL MEDICINE

## 2022-02-02 PROCEDURE — C1769 GUIDE WIRE: HCPCS

## 2022-02-02 PROCEDURE — 36415 COLL VENOUS BLD VENIPUNCTURE: CPT

## 2022-02-02 PROCEDURE — 6370000000 HC RX 637 (ALT 250 FOR IP): Performed by: INTERNAL MEDICINE

## 2022-02-02 PROCEDURE — 7100000001 HC PACU RECOVERY - ADDTL 15 MIN

## 2022-02-02 PROCEDURE — C1894 INTRO/SHEATH, NON-LASER: HCPCS | Performed by: INTERNAL MEDICINE

## 2022-02-02 PROCEDURE — A4217 STERILE WATER/SALINE, 500 ML: HCPCS | Performed by: INTERNAL MEDICINE

## 2022-02-02 PROCEDURE — C1760 CLOSURE DEV, VASC: HCPCS | Performed by: INTERNAL MEDICINE

## 2022-02-02 PROCEDURE — 80048 BASIC METABOLIC PNL TOTAL CA: CPT

## 2022-02-02 PROCEDURE — 37799 UNLISTED PX VASCULAR SURGERY: CPT

## 2022-02-02 PROCEDURE — 6360000002 HC RX W HCPCS: Performed by: PHYSICIAN ASSISTANT

## 2022-02-02 PROCEDURE — 2580000003 HC RX 258: Performed by: INTERNAL MEDICINE

## 2022-02-02 PROCEDURE — 82962 GLUCOSE BLOOD TEST: CPT

## 2022-02-02 PROCEDURE — 3600000018 HC SURGERY OHS ADDTL 15MIN: Performed by: INTERNAL MEDICINE

## 2022-02-02 PROCEDURE — 3700000001 HC ADD 15 MINUTES (ANESTHESIA): Performed by: INTERNAL MEDICINE

## 2022-02-02 PROCEDURE — B3101ZZ FLUOROSCOPY OF THORACIC AORTA USING LOW OSMOLAR CONTRAST: ICD-10-PCS | Performed by: INTERNAL MEDICINE

## 2022-02-02 PROCEDURE — 2780000006 HC MISC HEART VALVE: Performed by: INTERNAL MEDICINE

## 2022-02-02 PROCEDURE — 6370000000 HC RX 637 (ALT 250 FOR IP): Performed by: PHYSICIAN ASSISTANT

## 2022-02-02 PROCEDURE — C1894 INTRO/SHEATH, NON-LASER: HCPCS

## 2022-02-02 PROCEDURE — 2000000000 HC ICU R&B

## 2022-02-02 PROCEDURE — 93010 ELECTROCARDIOGRAM REPORT: CPT | Performed by: INTERNAL MEDICINE

## 2022-02-02 PROCEDURE — 2500000003 HC RX 250 WO HCPCS: Performed by: NURSE ANESTHETIST, CERTIFIED REGISTERED

## 2022-02-02 PROCEDURE — 2580000003 HC RX 258: Performed by: PHYSICIAN ASSISTANT

## 2022-02-02 PROCEDURE — 93005 ELECTROCARDIOGRAM TRACING: CPT | Performed by: NURSE PRACTITIONER

## 2022-02-02 PROCEDURE — 6360000002 HC RX W HCPCS: Performed by: NURSE ANESTHETIST, CERTIFIED REGISTERED

## 2022-02-02 PROCEDURE — 83735 ASSAY OF MAGNESIUM: CPT

## 2022-02-02 PROCEDURE — 84100 ASSAY OF PHOSPHORUS: CPT

## 2022-02-02 PROCEDURE — 2709999900 HC NON-CHARGEABLE SUPPLY: Performed by: INTERNAL MEDICINE

## 2022-02-02 PROCEDURE — 85025 COMPLETE CBC W/AUTO DIFF WBC: CPT

## 2022-02-02 PROCEDURE — C1760 CLOSURE DEV, VASC: HCPCS

## 2022-02-02 PROCEDURE — 85347 COAGULATION TIME ACTIVATED: CPT

## 2022-02-02 PROCEDURE — 93308 TTE F-UP OR LMTD: CPT

## 2022-02-02 PROCEDURE — 6360000002 HC RX W HCPCS: Performed by: INTERNAL MEDICINE

## 2022-02-02 PROCEDURE — 3700000000 HC ANESTHESIA ATTENDED CARE: Performed by: INTERNAL MEDICINE

## 2022-02-02 PROCEDURE — 7100000000 HC PACU RECOVERY - FIRST 15 MIN

## 2022-02-02 PROCEDURE — C1725 CATH, TRANSLUMIN NON-LASER: HCPCS

## 2022-02-02 PROCEDURE — 33361 REPLACE AORTIC VALVE PERQ: CPT | Performed by: INTERNAL MEDICINE

## 2022-02-02 PROCEDURE — 3600000008 HC SURGERY OHS BASE: Performed by: INTERNAL MEDICINE

## 2022-02-02 PROCEDURE — 99223 1ST HOSP IP/OBS HIGH 75: CPT | Performed by: STUDENT IN AN ORGANIZED HEALTH CARE EDUCATION/TRAINING PROGRAM

## 2022-02-02 PROCEDURE — C1769 GUIDE WIRE: HCPCS | Performed by: INTERNAL MEDICINE

## 2022-02-02 PROCEDURE — 2709999900 HC NON-CHARGEABLE SUPPLY

## 2022-02-02 DEVICE — ANGIO-SEAL VIP VASCULAR CLOSURE DEVICE
Type: IMPLANTABLE DEVICE | Site: HEART | Status: FUNCTIONAL
Brand: ANGIO-SEAL

## 2022-02-02 DEVICE — CATHETER PACE 5FR L110CM 6FR INTRO D10CM 1MM SPACE POLYUR: Type: IMPLANTABLE DEVICE | Site: HEART | Status: FUNCTIONAL

## 2022-02-02 DEVICE — VLV EVPROPLUS-34 COMM US
Type: IMPLANTABLE DEVICE | Site: HEART | Status: FUNCTIONAL
Brand: EVOLUT™ PRO+

## 2022-02-02 RX ORDER — NITROGLYCERIN 5 MG/ML
INJECTION, SOLUTION INTRAVENOUS PRN
Status: DISCONTINUED | OUTPATIENT
Start: 2022-02-02 | End: 2022-02-02 | Stop reason: SDUPTHER

## 2022-02-02 RX ORDER — SODIUM CHLORIDE 9 MG/ML
25 INJECTION, SOLUTION INTRAVENOUS PRN
Status: DISCONTINUED | OUTPATIENT
Start: 2022-02-02 | End: 2022-02-05 | Stop reason: HOSPADM

## 2022-02-02 RX ORDER — SODIUM CHLORIDE 9 MG/ML
INJECTION, SOLUTION INTRAVENOUS CONTINUOUS
Status: DISCONTINUED | OUTPATIENT
Start: 2022-02-02 | End: 2022-02-02

## 2022-02-02 RX ORDER — SODIUM CHLORIDE 0.9 % (FLUSH) 0.9 %
5-40 SYRINGE (ML) INJECTION EVERY 12 HOURS SCHEDULED
Status: DISCONTINUED | OUTPATIENT
Start: 2022-02-02 | End: 2022-02-05 | Stop reason: HOSPADM

## 2022-02-02 RX ORDER — FENTANYL CITRATE 50 UG/ML
INJECTION, SOLUTION INTRAMUSCULAR; INTRAVENOUS PRN
Status: DISCONTINUED | OUTPATIENT
Start: 2022-02-02 | End: 2022-02-02 | Stop reason: SDUPTHER

## 2022-02-02 RX ORDER — EPINEPHRINE 1 MG/ML
INJECTION, SOLUTION, CONCENTRATE INTRAVENOUS PRN
Status: DISCONTINUED | OUTPATIENT
Start: 2022-02-02 | End: 2022-02-02 | Stop reason: SDUPTHER

## 2022-02-02 RX ORDER — CEFAZOLIN SODIUM 1 G/3ML
INJECTION, POWDER, FOR SOLUTION INTRAMUSCULAR; INTRAVENOUS PRN
Status: DISCONTINUED | OUTPATIENT
Start: 2022-02-02 | End: 2022-02-02 | Stop reason: SDUPTHER

## 2022-02-02 RX ORDER — PANTOPRAZOLE SODIUM 20 MG/1
20 TABLET, DELAYED RELEASE ORAL
Status: DISCONTINUED | OUTPATIENT
Start: 2022-02-03 | End: 2022-02-05 | Stop reason: HOSPADM

## 2022-02-02 RX ORDER — DEXTROSE MONOHYDRATE 50 MG/ML
100 INJECTION, SOLUTION INTRAVENOUS PRN
Status: DISCONTINUED | OUTPATIENT
Start: 2022-02-02 | End: 2022-02-05 | Stop reason: HOSPADM

## 2022-02-02 RX ORDER — LIDOCAINE HYDROCHLORIDE 10 MG/ML
INJECTION, SOLUTION EPIDURAL; INFILTRATION; INTRACAUDAL; PERINEURAL PRN
Status: DISCONTINUED | OUTPATIENT
Start: 2022-02-02 | End: 2022-02-02 | Stop reason: ALTCHOICE

## 2022-02-02 RX ORDER — SODIUM CHLORIDE, SODIUM LACTATE, POTASSIUM CHLORIDE, CALCIUM CHLORIDE 600; 310; 30; 20 MG/100ML; MG/100ML; MG/100ML; MG/100ML
INJECTION, SOLUTION INTRAVENOUS CONTINUOUS PRN
Status: DISCONTINUED | OUTPATIENT
Start: 2022-02-02 | End: 2022-02-02 | Stop reason: SDUPTHER

## 2022-02-02 RX ORDER — HEPARIN SODIUM 1000 [USP'U]/ML
INJECTION, SOLUTION INTRAVENOUS; SUBCUTANEOUS PRN
Status: DISCONTINUED | OUTPATIENT
Start: 2022-02-02 | End: 2022-02-02 | Stop reason: SDUPTHER

## 2022-02-02 RX ORDER — NOREPINEPHRINE BITARTRATE 1 MG/ML
INJECTION, SOLUTION INTRAVENOUS PRN
Status: DISCONTINUED | OUTPATIENT
Start: 2022-02-02 | End: 2022-02-02 | Stop reason: SDUPTHER

## 2022-02-02 RX ORDER — DEXTROSE MONOHYDRATE 25 G/50ML
12.5 INJECTION, SOLUTION INTRAVENOUS PRN
Status: DISCONTINUED | OUTPATIENT
Start: 2022-02-02 | End: 2022-02-05 | Stop reason: HOSPADM

## 2022-02-02 RX ORDER — PROPOFOL 10 MG/ML
INJECTION, EMULSION INTRAVENOUS PRN
Status: DISCONTINUED | OUTPATIENT
Start: 2022-02-02 | End: 2022-02-02 | Stop reason: SDUPTHER

## 2022-02-02 RX ORDER — SODIUM CHLORIDE 0.9 % (FLUSH) 0.9 %
5-40 SYRINGE (ML) INJECTION PRN
Status: DISCONTINUED | OUTPATIENT
Start: 2022-02-02 | End: 2022-02-05 | Stop reason: HOSPADM

## 2022-02-02 RX ORDER — VASOPRESSIN 20 U/ML
INJECTION PARENTERAL PRN
Status: DISCONTINUED | OUTPATIENT
Start: 2022-02-02 | End: 2022-02-02 | Stop reason: SDUPTHER

## 2022-02-02 RX ORDER — HYDRALAZINE HYDROCHLORIDE 20 MG/ML
INJECTION INTRAMUSCULAR; INTRAVENOUS PRN
Status: DISCONTINUED | OUTPATIENT
Start: 2022-02-02 | End: 2022-02-02 | Stop reason: SDUPTHER

## 2022-02-02 RX ORDER — NICOTINE POLACRILEX 4 MG
15 LOZENGE BUCCAL PRN
Status: DISCONTINUED | OUTPATIENT
Start: 2022-02-02 | End: 2022-02-05 | Stop reason: HOSPADM

## 2022-02-02 RX ORDER — PROTAMINE SULFATE 10 MG/ML
INJECTION, SOLUTION INTRAVENOUS PRN
Status: DISCONTINUED | OUTPATIENT
Start: 2022-02-02 | End: 2022-02-02 | Stop reason: SDUPTHER

## 2022-02-02 RX ORDER — OXYCODONE HYDROCHLORIDE AND ACETAMINOPHEN 5; 325 MG/1; MG/1
1 TABLET ORAL EVERY 4 HOURS PRN
Status: DISCONTINUED | OUTPATIENT
Start: 2022-02-02 | End: 2022-02-05 | Stop reason: HOSPADM

## 2022-02-02 RX ADMIN — PHENYLEPHRINE HYDROCHLORIDE 100 MCG: 10 INJECTION INTRAVENOUS at 11:45

## 2022-02-02 RX ADMIN — PROPOFOL 20 MG: 10 INJECTION, EMULSION INTRAVENOUS at 11:42

## 2022-02-02 RX ADMIN — NITROGLYCERIN 50 MCG: 5 INJECTION, SOLUTION INTRAVENOUS at 11:08

## 2022-02-02 RX ADMIN — FENTANYL CITRATE 25 MCG: 50 INJECTION, SOLUTION INTRAMUSCULAR; INTRAVENOUS at 11:41

## 2022-02-02 RX ADMIN — PROPOFOL 20 MG: 10 INJECTION, EMULSION INTRAVENOUS at 11:41

## 2022-02-02 RX ADMIN — PHENYLEPHRINE HYDROCHLORIDE 100 MCG: 10 INJECTION INTRAVENOUS at 11:38

## 2022-02-02 RX ADMIN — Medication 2000 MG: at 18:43

## 2022-02-02 RX ADMIN — EPINEPHRINE 20 MCG: 1 INJECTION, SOLUTION INTRAMUSCULAR; SUBCUTANEOUS at 11:45

## 2022-02-02 RX ADMIN — EPINEPHRINE 80 MCG: 1 INJECTION, SOLUTION INTRAMUSCULAR; SUBCUTANEOUS at 11:46

## 2022-02-02 RX ADMIN — 0.12% CHLORHEXIDINE GLUCONATE 15 ML: 1.2 RINSE ORAL at 05:26

## 2022-02-02 RX ADMIN — CEFAZOLIN SODIUM 2000 MG: 1 INJECTION, POWDER, FOR SOLUTION INTRAMUSCULAR; INTRAVENOUS at 11:06

## 2022-02-02 RX ADMIN — PRAVASTATIN SODIUM 40 MG: 20 TABLET ORAL at 20:28

## 2022-02-02 RX ADMIN — PROPOFOL 20 MG: 10 INJECTION, EMULSION INTRAVENOUS at 11:33

## 2022-02-02 RX ADMIN — EPINEPHRINE 1 MG: 1 INJECTION, SOLUTION INTRAMUSCULAR; SUBCUTANEOUS at 11:47

## 2022-02-02 RX ADMIN — FENTANYL CITRATE 25 MCG: 50 INJECTION, SOLUTION INTRAMUSCULAR; INTRAVENOUS at 11:43

## 2022-02-02 RX ADMIN — PROPOFOL 20 MG: 10 INJECTION, EMULSION INTRAVENOUS at 11:06

## 2022-02-02 RX ADMIN — VASOPRESSIN 2 UNITS: 20 INJECTION INTRAVENOUS at 11:48

## 2022-02-02 RX ADMIN — PHENYLEPHRINE HYDROCHLORIDE 200 MCG: 10 INJECTION INTRAVENOUS at 11:44

## 2022-02-02 RX ADMIN — SODIUM CHLORIDE, POTASSIUM CHLORIDE, SODIUM LACTATE AND CALCIUM CHLORIDE: 600; 310; 30; 20 INJECTION, SOLUTION INTRAVENOUS at 10:52

## 2022-02-02 RX ADMIN — NITROGLYCERIN 50 MCG: 5 INJECTION, SOLUTION INTRAVENOUS at 11:21

## 2022-02-02 RX ADMIN — NOREPINEPHRINE BITARTRATE 8 MCG: 1 INJECTION INTRAVENOUS at 11:37

## 2022-02-02 RX ADMIN — SODIUM CHLORIDE 5 MG/HR: 9 INJECTION, SOLUTION INTRAVENOUS at 12:25

## 2022-02-02 RX ADMIN — NITROGLYCERIN 50 MCG: 5 INJECTION, SOLUTION INTRAVENOUS at 11:10

## 2022-02-02 RX ADMIN — ASPIRIN 81 MG CHEWABLE TABLET 81 MG: 81 TABLET CHEWABLE at 08:06

## 2022-02-02 RX ADMIN — VASOPRESSIN 2 UNITS: 20 INJECTION INTRAVENOUS at 11:49

## 2022-02-02 RX ADMIN — FENTANYL CITRATE 25 MCG: 50 INJECTION, SOLUTION INTRAMUSCULAR; INTRAVENOUS at 11:08

## 2022-02-02 RX ADMIN — PROPOFOL 20 MG: 10 INJECTION, EMULSION INTRAVENOUS at 11:22

## 2022-02-02 RX ADMIN — Medication 10 ML: at 20:22

## 2022-02-02 RX ADMIN — HEPARIN SODIUM 7000 UNITS: 1000 INJECTION INTRAVENOUS; SUBCUTANEOUS at 11:20

## 2022-02-02 RX ADMIN — PROTAMINE SULFATE 70 MG: 10 INJECTION, SOLUTION INTRAVENOUS at 12:06

## 2022-02-02 RX ADMIN — CARVEDILOL 6.25 MG: 6.25 TABLET, FILM COATED ORAL at 17:07

## 2022-02-02 RX ADMIN — NITROGLYCERIN 50 MCG: 5 INJECTION, SOLUTION INTRAVENOUS at 11:50

## 2022-02-02 RX ADMIN — PHENYLEPHRINE HYDROCHLORIDE 100 MCG: 10 INJECTION INTRAVENOUS at 11:42

## 2022-02-02 RX ADMIN — MUPIROCIN: 20 OINTMENT TOPICAL at 20:22

## 2022-02-02 RX ADMIN — PHENYLEPHRINE HYDROCHLORIDE 100 MCG: 10 INJECTION INTRAVENOUS at 11:43

## 2022-02-02 RX ADMIN — SODIUM CHLORIDE: 9 INJECTION, SOLUTION INTRAVENOUS at 08:06

## 2022-02-02 RX ADMIN — OXYCODONE AND ACETAMINOPHEN 1 TABLET: 5; 325 TABLET ORAL at 16:07

## 2022-02-02 RX ADMIN — MUPIROCIN: 20 OINTMENT TOPICAL at 08:06

## 2022-02-02 RX ADMIN — PROPOFOL 20 MG: 10 INJECTION, EMULSION INTRAVENOUS at 11:43

## 2022-02-02 RX ADMIN — HYDRALAZINE HYDROCHLORIDE 5 MG: 20 INJECTION INTRAMUSCULAR; INTRAVENOUS at 11:24

## 2022-02-02 RX ADMIN — FENTANYL CITRATE 25 MCG: 50 INJECTION, SOLUTION INTRAMUSCULAR; INTRAVENOUS at 10:52

## 2022-02-02 ASSESSMENT — PULMONARY FUNCTION TESTS
PIF_VALUE: 1
PIF_VALUE: 0
PIF_VALUE: 1
PIF_VALUE: 0
PIF_VALUE: 1
PIF_VALUE: 0
PIF_VALUE: 0
PIF_VALUE: 1
PIF_VALUE: 0
PIF_VALUE: 1
PIF_VALUE: 0
PIF_VALUE: 1
PIF_VALUE: 0
PIF_VALUE: 1
PIF_VALUE: 0
PIF_VALUE: 1
PIF_VALUE: 0
PIF_VALUE: 1
PIF_VALUE: 0
PIF_VALUE: 1
PIF_VALUE: 0
PIF_VALUE: 1
PIF_VALUE: 1
PIF_VALUE: 0
PIF_VALUE: 1
PIF_VALUE: 0
PIF_VALUE: 1
PIF_VALUE: 0
PIF_VALUE: 0
PIF_VALUE: 1
PIF_VALUE: 1
PIF_VALUE: 0
PIF_VALUE: 1
PIF_VALUE: 0
PIF_VALUE: 1
PIF_VALUE: 0
PIF_VALUE: 1
PIF_VALUE: 1
PIF_VALUE: 0
PIF_VALUE: 1
PIF_VALUE: 0
PIF_VALUE: 1
PIF_VALUE: 0
PIF_VALUE: 1
PIF_VALUE: 1
PIF_VALUE: 0
PIF_VALUE: 1
PIF_VALUE: 0
PIF_VALUE: 0

## 2022-02-02 ASSESSMENT — PAIN SCALES - GENERAL
PAINLEVEL_OUTOF10: 0
PAINLEVEL_OUTOF10: 6
PAINLEVEL_OUTOF10: 0
PAINLEVEL_OUTOF10: 2
PAINLEVEL_OUTOF10: 0
PAINLEVEL_OUTOF10: 6

## 2022-02-02 ASSESSMENT — PAIN DESCRIPTION - ORIENTATION: ORIENTATION: MID;LOWER

## 2022-02-02 ASSESSMENT — PAIN DESCRIPTION - ONSET: ONSET: ON-GOING

## 2022-02-02 ASSESSMENT — PAIN DESCRIPTION - FREQUENCY: FREQUENCY: CONTINUOUS

## 2022-02-02 ASSESSMENT — PAIN DESCRIPTION - PAIN TYPE: TYPE: ACUTE PAIN

## 2022-02-02 ASSESSMENT — PAIN DESCRIPTION - LOCATION: LOCATION: BACK

## 2022-02-02 ASSESSMENT — PAIN DESCRIPTION - DESCRIPTORS: DESCRIPTORS: ACHING;CONSTANT

## 2022-02-02 NOTE — CONSULTS
Patient currently admitted with diagnosis of Systolic heart failure. Patient  And wife  Wife was in room, patient in bed sitting up and wife in chair during the consultation. He was engaged and asked appropriate questions throughout the education session. No future appointments. Post TAVR follow up cardiology to follow. We reviewed the introduction to Heart Failure, the HF zones, signs and symptoms to report on day 1 of onset, medications, medication compliance, the importance of obtaining daily weights, following a low sodium diet, reading food labels for the sodium content, keeping physician appointments, and smoking cessation. We discussed writing / tracking daily weights on a calendar / log because a 5 pound gain in 1 week can sneak up if you are not tracking it. You can also take your charted weights to your doctor appointments to be reviewed. Contributing risk factors for Heart Failure are identified as Sleeps in recliner. Was to have TAVR set but had syncope. Came in with Syncopal episode . Severe Aortic STenosis. follows day [facility]- cardiology and DR Talia Goetz- structural.  .  Has good system to remember to take meds. Wife and patient shop and cook, she is low sodium cooking. Discussed with patient and wife f/u visits with cardiology and structural heart. He and wife will check with surgeon on when f/u will be needed his wife does not drive. He will need to know how long he can't drive for and would during that time need transportation assistance. ([employer] has [employer] can [employer], also discussed he would need to verify with his insurance if any transportation services available. If  or  on his case they can assist with this). I advised patient they can reduce the risk for Heart Failure exacerbations by modifying / controlling the risk factors.  We discussed self-managed care which includes the following:  to take medications as prescribed, report any intolerable side effects of medications to the cardiologist / doctor, do not just stop taking the medication; follow a cardiac heart healthy / low sodium diet; weigh yourself daily, exercise regularly- per doctor recommendation and not to smoke or use an excess amount of alcohol. We discussed calling the cardiologist / doctor with a weight gain of 3 pounds in one day or a total of 5 pounds or more in one week. Also, if you should have a significant weight loss of 3# or more in one day to call the doctor, they may need to decrease or hold the diuretic dose. On days you feels nauseated and not eating / drinking, having emesis or diarrhea,  informed to call the cardiologist  / doctor, they may need to decrease or hold diuretic to avoid dehydration. I stressed the importance of informing their cardiologist the first day of onset of any of the signs and symptoms in the \"Yellow Zone\" of the HF Zones. Patient verbalizes understanding. Greater than 30 minutes was spent educating patient. BNP:   Lab Results   Component Value Date    PROBNP 38,017 (H) 01/29/2022       History of:    has a past medical history of A-fib ([facility] BPH (benign prostatic hyperplasia), CAD (coronary artery disease), Diabetic neuropathy ([facility] DM (diabetes mellitus) ([facility] HTN (hypertension), Hyperlipemia, Pancreatitis, and S/P CABG (coronary artery bypass graft). has a past surgical history that includes Cholecystectomy (09/27/2010); Cardiac surgery; Colonoscopy; Tonsillectomy; and Coronary artery bypass graft.     Medications:    sodium chloride flush  5-40 mL IntraVENous 2 times per day    mupirocin   Nasal BID    chlorhexidine   Topical See Admin Instructions    ceFAZolin (ANCEF) IVPB  2,000 mg IntraVENous See Admin Instructions    tamsulosin  0.4 mg Oral Daily    pravastatin  40 mg Oral Nightly    insulin lispro  0-6 Units SubCUTAneous TID WC    sodium chloride flush  5-40 mL IntraVENous 2 times per day    aspirin  81 mg Oral Daily    carvedilol  6.25 mg Oral BID WC      sodium chloride      sodium chloride      dextrose      sodium chloride         Code Status:Full Code    The patient is ordered:  Diet: Diet NPO   Sodium controlled diet No   NPO for TAVR today. Providers will decide /determine diet post op  Fluid restriction daily ordered (fluid restriction recommended if patient is hyponatremic and/or diuretic is initiated or increased) No  FR:   Daily Weights:   Patient Vitals for the past 96 hrs (Last 3 readings):   Weight   02/02/22 0655 150 lb 3.2 oz (68.1 kg)   02/01/22 0735 150 lb 12.8 oz (68.4 kg)     I/O:     Intake/Output Summary (Last 24 hours) at 2/2/2022 0805  Last data filed at 2/2/2022 6713  Gross per 24 hour   Intake 990 ml   Output 610 ml   Net 380 ml        The Heart Failure Booklet given to the patient with additional patient education addressing:  · What is Heart Failure? · Things You Can Do to Live Well with HF  · How to Take Your Medications  · How to Eat Less Salt  · Exercising Well with Heart Failure  · Signs and symptoms of HF to report  · Weight Yourself Each Day  · Home Self Management- activity, weight tracking, taking medications as prescribed, meals /diet planning (sodium and fluid restriction), how to read food labels, keeping physician follow ups, smoking cessation, follow the Heart Failure Zones  · The Heart Failure zones  · Sodium content pamphlet  · What foods I should avoid tip sheet    Instructed  to call 911 if you have any of the following symptoms:    ·    Struggling to breathe unrelieved with rest,  ·    Having chest pain, confusion or can't think clearly  ·    Have confusion or cant think clearly    Readmission Risk Score: 14.3 ( )        Discharge Plan:  I placed the Heart Failure Home Instructions in patient's discharge instructions. Per Heart Failure GWTG, the patient should have a follow-up appointment made within 7 days of discharge.     Bernabe Richardson RN BSN, RN  Heart Failure Navigator        CONGESTIVE HEART FAILURE (CHF) AHA GUIDELINES  (Must be completed for Primary Diagnosis CHF or History of CHF)    Type of CHF:    [] Acute   [] Chronic     [x] Acute on Chronic     Ventricular Function Assessment (check):    [] HFpEF  [] HFpEF, borderline  [] HFpEF, improved  [x] HFrEF    Echocardiogram:  1 13 22  Ejection Fraction (%): 30       Summary   Ejection fraction is visually estimated at 30%. Overall ejection fraction severely decreased. Severe left ventricular concentric hypertrophy noted. Dilated right ventricle with reduced function. Left atrial volume index of 79 ml per meters squared BSA. Severe aortic stenosis is present. The aortic valve area is 0.6 cm2 with a   maximum gradient of 113 mmHg and a mean gradient of 69 mmHg. Physiologic   and/or trace aortic regurgitation is noted. Moderate mitral regurgitation is present. Mild tricuspid regurgitation. Pulmonary hypertension is moderate to severe. RVSP is 66 mmHg.                                                              Angiotensin-Converting-Enzyme (ACE) inhibitor ordered:  [] Yes  [x] No (specify contraindication):    [x] Contraindicated  [] Hypotensive patient who was at immediate risk of cardiogenic shock  [] Hospitalized patient who experienced marked azotemia  [] Other Contraindications  [] Not Eligible  [] Not Tolerant  [] Patient Reason  [] System Reason  [x] Other Reason severe AS  Angiotensin II receptor blockers (ARB) ordered:  [] Yes  [x] No (specify contraindication):    [x] Contraindicated  [] Hypotensive patient who was at immediate risk of cardiogenic shock  [] Hospitalized patient who experienced marked azotemia  [] Other Contraindications    ARNI - Angiotensin Receptor Neprilysin Inhibitor ordered:  [] Yes  [x] No (specify contraindication):    [] ACE inhibitor use within the prior 36 hours  [] Allergy  [] Hyperkalemia  [] Hypotension  [] Renal dysfunction defined as creatinine > 2.5 mg/dL in men or > 2.0 mg/dL in women  [] Other Contraindications  [] Not Eligible  [] Not Tolerant  [] Patient Reason  []System Reason  []Other Reason      Beta Blocker (Carvedilol, Metoprolol Succinate, or Bisoprolol) ordered:    [x] Yes  [] No (specify contraindication):    [] Contraindicated  [] Asthma  [] Fluid Overload  [] Low Blood Pressure  [] Patient recently treated with an intravenous positive inotropic agent  [] Other Contraindications  [] Not Eligible  [] Not Tolerant  [] Patient Reason  [] System Reason    SGLT2 Inhibitor ordered:  [] Yes  [x] No (specify contraindication):    [] Contraindicated  [] Patient currently on dialysis  [] Ketoacidosis  [] Known hypersensitivity to the medication  [] Type I diabetes (not approved for use in patients with Type I diabetes due to increased risk of ketoacidosis)  [] Other Contraindications  [] Not Eligible  [] Not Tolerant  [] Patient Reason  [] System Reason  [] Other Reason    Aldosterone Antagonist ordered:  [] Yes  [x] No (specify contraindication):    [x] Contraindicated  [] Allergy due to aldosterone receptor antagonist  [] Hyperkalemia  [] Renal dysfunction defined as creatinine >2.5 mg/dL in men or >2.0 mg/dL in women.   [] Other contraindications  [] Not Eligible  [] Not Tolerant  [] Patient Reason  [] System Reason  [] Other Reason   Nephrology on

## 2022-02-02 NOTE — PROGRESS NOTES
Patient arrived to CVICU room [phone], from PACU post-TAVR. Sites are clean dry and intact, no hematomas. vitals stable at this time.

## 2022-02-02 NOTE — CONSULTS
[facility],[address] and [address] [facility] Electrophysiology  Consultation Report  PATIENT: Shawn Esparza  MEDICAL RECORD NUMBER: [id number]  DATE OF SERVICE:  2/2/2022  ATTENDING ELECTROPHYSIOLOGIST: Devika Redd DO   PRIMARY ELECTROPHYSIOLOGIST: Devika Redd DO   REFERRING PHYSICIAN: Tayo Clancy MD  and Abdirahman Rosado MD  CHIEF COMPLAINT: Altered Mental status. HPI: Shawn Esparza  is a 80 y.o. 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 He is managed by Dr. Eleazar Morris with Norvasc 2.5 mg daily, Coumadin, Lopressor, Cardura, and pravastatin. At some point prior to 2009 he was diagnosed with nonvalvular AF and CAD. In 2009, he was treated with CABG, surgical MAZE, and SALENA exclusion. The details of this procedure are not available to me. In 2017, he was diagnosed with moderate AS in the setting of LVEF of 50-55% and SR with 1* AVB and LAFB. In 4/2019, he was noted to have moderate-severe AS, LVEF of 60%. In 2020, he was diagnosed with recurrence of AF/AFL with RBBB and LAFB. Appears no attempt to restore SR was made. In 1/2022, he was diagnosed with severe AS and LVEF of 30% in setting of ongoing AF/AFL. He had a syncopal event at home and was admitted to [facility] (1-). Due to his CKD he saw nephrology who noted he was a moderate to high risk of progression of his CKD. He underwent extraction of multiple teeth on 02/01/22. CTA chest was performed instead of [facility]. Per Interventional Cardiology grafts were felt to be patent on CTA chest. CT also reported a heterogenous liver mass concerning for malignancy. On 2/2/22, he underwent TAVR, which was complicated by 3* AV block. A temporary transvenous pacing wire via right groin was placed during TAVR.  EP service is now consulted by admitting physician for evaluation and management of 3* AV block. His wife is at bedside. Patient denies any complaints at this time. Prior Cardiac testing   · ECG (02/02/22): AF with VE rhythm rate 29 bpm.  · CTA (01/31/22): Prior to TAVR Patent LIMA-LAD, SVG-OM, SVG to RPDA, and SVG RPL    · ECG (02/01/22): AF rate 57 bpm, RBBB, LAFB   · ECG (01/28/22): AF with LAD rate 61 bpm   · TTE (01/14/22): LVEF 30%, severe AS valve area 0.6 mean gradient 69mmHg   · ECG (12/09/20): AF rate 45 BPM with LAFB and RBBB   · TTE (04/24/19): LVEF 60%, severly dilated LA moderate AS   · ECG (04/10/19): SB rate 51 BPM.   · TTE (06/22/17):LVEF 50-55%, Moderate AS. Past Medical History:   Diagnosis Date    A-fib [facility])     BPH (benign prostatic hyperplasia)     CAD (coronary artery disease)     Diabetic neuropathy (HCC)     DM (diabetes mellitus) ([facility] [address]     HTN (hypertension)     Hyperlipemia     Pancreatitis 9/10    S/P CABG (coronary artery bypass graft)      Past Surgical History:   Procedure Laterality Date    CARDIAC SURGERY      CHOLECYSTECTOMY  09/27/2010    COLONOSCOPY      CORONARY ARTERY BYPASS GRAFT      CABG x4 with left internal mammary artery to the LAD. reverse saphenous vein grafts to the 1st marginal branches ofcircumflex, posterior descending branch of the right coronary artery, and posterolateral branch to the right coronary artery, and modified maze procedure with Medtronic bipolar radiofrequency ablation, and excision of left atrial appendage.     TONSILLECTOMY        Family History   Problem Relation Age of Onset    High Blood Pressure Mother     Heart Disease Mother     Cancer Father        Social History     Tobacco Use    Smoking status: Never Smoker    Smokeless tobacco: Never Used   Substance Use Topics    Alcohol use: Yes     Comment: rarely- 1 beer       Current Facility-Administered Medications   Medication Dose Route Frequency Provider Last Rate Last Admin    0.9 % sodium chloride infusion   IntraVENous Continuous MELY Scott   Stopped at 02/02/22 1226    niCARdipine (CARDENE) 50 mg in sodium chloride 0.9 % 250 mL infusion  3-15 mg/hr IntraVENous Continuous MELY Scott 25 mL/hr at 02/02/22 1348 5 mg/hr at 02/02/22 1348    sodium chloride flush 0.9 % injection 5-40 mL  5-40 mL IntraVENous 2 times per day MELY Scott        sodium chloride flush 0.9 % injection 5-40 mL  5-40 mL IntraVENous PRN MELY Scott        0.9 % sodium chloride infusion  25 mL IntraVENous PRN MELY Scott        oxyCODONE-acetaminophen (PERCOCET) 5-325 MG per tablet 1 tablet  1 tablet Oral Q4H PRN MELY Scott        glucose (GLUTOSE) 40 % oral gel 15 g  15 g Oral PRN MELY Scott        dextrose 50 % IV solution  12.5 g IntraVENous PRN MELY Scott        glucagon (rDNA) injection 1 mg  1 mg IntraMUSCular PRN MELY Scott        dextrose 5 % solution  100 mL/hr IntraVENous PRN MELY Scott        ceFAZolin (ANCEF) 2000 mg in sterile water 20 mL IV syringe  2,000 mg IntraVENous Q8H MELY Scott        mupirocin (BACTROBAN) 2 % ointment   Nasal BID MELY Scott   Given at 02/02/22 4299    ceFAZolin (ANCEF) 2000 mg in sterile water 20 mL IV syringe  2,000 mg IntraVENous See Admin Instructions MELY Scott        tamsulosin [facility]) capsule 0.4 mg  0.4 mg Oral Daily MELY Scott   0.4 mg at 02/01/22 0915    diphenhydrAMINE (BENADRYL) tablet 25 mg  25 mg Oral Q8H PRN MELY Scott        pravastatin (PRAVACHOL) tablet 40 mg  40 mg Oral Nightly MELY Scott   40 mg at 02/01/22 2030    insulin lispro (HUMALOG) injection vial 0-6 Units  0-6 Units SubCUTAneous TID WC MELY Scott   1 Units at 02/01/22 1705    dextrose 5 % solution  100 mL/hr IntraVENous PRN MELY Scott        ondansetron (ZOFRAN-ODT) disintegrating tablet 4 mg  4 mg Oral Q8H PRN MELY Roth        Or    ondansetron [facility]) injection 4 mg  4 mg IntraVENous Q6H PRN MELY Roth        polyethylene glycol (GLYCOLAX) packet 17 g  17 g Oral Daily PRN MELY Roth        acetaminophen (TYLENOL) tablet 650 mg  650 mg Oral Q6H PRN MELY Roth        Or    acetaminophen (TYLENOL) suppository 650 mg  650 mg Rectal Q6H PRN MELY Roth        aspirin chewable tablet 81 mg  81 mg Oral Daily MELY Roth   81 mg at 02/02/22 0806    carvedilol (COREG) tablet 6.25 mg  6.25 mg Oral BID WC MELY Roth   6.25 mg at 02/01/22 1705        No Known Allergies    PHYSICAL EXAM:   Vitals:    02/02/22 1400 02/02/22 1416 02/02/22 1430 02/02/22 1445   BP:       Pulse: 58 58 58 58   Resp: 9 15 14 16   Temp: 97 °F (36.1 °C)      TempSrc: Rectal      SpO2: 97% 96% 96% 96%   Weight:       Height:          Constitutional: In NAD, lethargic   Head: Normocephalic and atraumatic. Neck: No hepatojugular reflux and no JVD present  Cardiovascular: S1 , S2 present bradycardic   Pulmonary/Chest:  No respiratory distress. Laying nearly flat   Abdominal: Soft. Normal appearance   Musculoskeletal: Normal range of motion of all extremities  Neurological: lethargic   Skin: Skin is warm and dry. Extremity:   No edema. Psychiatric: Normal mood and affect. Thought content normal.     I have personally reviewed the laboratory, cardiac diagnostic and radiographic testing as outlined below:    Data:    Recent Labs     01/31/22  0638 02/01/22  0606 02/02/22  0526   WBC 5.8 6.4 6.9   HGB 10.9* 12.3* 12.2*   HCT 34.0* 37.6 38.1   * 117* 118*     Recent Labs     01/31/22  0638 01/31/22  0638 02/01/22  0606 02/02/22  0526     --  142 141   K 3.6  3.6   < > 4.1  4.1 4.6  4.6   *  --  104 105   CO2 23  --  24 27   BUN 29*  --  35* 33*   CREATININE 1.6*  --  1.9* 1.9*   CALCIUM 7.4*  --  9.5 9.1    < > = values in this interval not displayed. Lab Results   Component Value Date    MG 1.6 02/02/2022     No results for input(s): TSH in the last 72 hours. Recent Labs     02/01/22  1437   INR 1.6     Telemetry:  at 60 bpm       I have independently reviewed all of the ECGs and rhythm strips per above     Assessment/Plan:   1. Complete heart block   - Pre TAVR noted to have RBBB and LAFB. - Temporary transvenous pacer placed during TAVR via right groin. Threshold at 1 mA. Programmed VVI 60 bpm.  - Currently 100% RV paced with underling VE at 29 bpm.   - Continue bedrest with telemetry monitoring.  - TTE pending to re-evaluate LVEF. Anticipate CRT-P implant as LVEF reportedly improved on intraop GILDA to > 35%, but no official report yet available. Additionally, GILDA not ideal method to evaluate LVEF.  - NPO at midnight. If LVEF < 50%, plan for CRT-P. If LVEF > 50%, plan for dual chamber pacemaker. 2. Nonvalvular Longstanding Persistent AF sp MAZE and SALENA exlusion (2009)  -Initially diagnosed at some point prior to 2009. -CHADSVASC = (age, HTN, CAD, HF, DM). Recommend [address] in males with score >/= 1.  -HAS-BLED = 2 (age, Concomitant antiplatelet therapy). Moderate bleeding risk. Recommend PPI to reduce risk of GI bleed. -Patient had SALENA exclusion in 2009. Details are not available to me. No SALENA evaluation available since that time. Surgical SALENA ligation is not an alternative to [address], but rather complimentary to [address] for further reduction in thromboembolic events. He is on VKA at home. INR goal 2-3. Consider change to DOAC after CIED implant. I will discuss with Cardiology.  -He had surgical MAZE in 2009. Details of procedure are unavailable to me. Patient appears to have been left in AF/AFL since ~2020. No prior DCCV or AA per available chart review. Recommend rhythm control in the setting of HFrEF. His LVEF reduced from 60% to 30% after being left in AF/AFL.  Will consider attempt to restore SR in future after discharge.   -Modifiable risk factors:  --Obesity: BMI goal < 27  --GUILLERMO: consider screening in future  --HTN: BP goal < 130/80  --EtOH: avoid    3. HFrEF-presumed nonischemic 2/2 AS +/- AF  - Medical management per Cardiology    4. Severe AS sp 34mm Medtronic Evolut Pro+ TAVR (2/2/22)  -Management per Cardiology/Structural Heart Disease. Aspirin x 3-6 months. 5. CAD sp CABG x 4 (2009)   -Per Cardiology, all graft patient on CTA of the chest prior to TAVR. -Management per Cardiology. 6. Acantholytic dermatosis, or Mynor's disease  - Seen by Demonology who note no contraindication to TAVR and treatment to be Triamcinolone cream.     7. Liver Mass  -Management per Primary Service. -CT comments: Heterogeneously enhancing at lesion anterior segment right lobe of the liver peripherally.  Correlation with any history of malignancy recommended. Recommend further characterization with MRI with contrast or dedicated liver protocol CT abdomen pelvis to re-evaluate this lesion and potentially screen for primary. Thank you for allowing me to participate in your patient's care. Please call me if there are any questions or concerns. Discussed with Dr. Reggie Herrera.    EDMUNDO Padron - CNP  Cardiac Electrophysiology  [facility]) Physicians  [facility] [place]: [place] [facility]  3:08 PM  2/2/2022    Attending Supervising Physicians Attestation Statement  I was present with the nurse practitioner during the history and exam. I discussed the findings and plans with the nurse practitioner and agree as documented in his note     Electronically signed by Rolando Duque DO on 2/2/22 at 7:11 PM EST

## 2022-02-02 NOTE — CONSULTS
Dermatology Consult Note      Reason for Consult: New rash on back  Requesting Physician:  Dr. Daryn Mancia:  New rash on back    History Obtained From: Patient    HISTORY OF PRESENT ILLNESS:                The patient is a 80 y.o. male who presented to the ER following a syncopal episode and was admitted. He has know to have critical aortic stenosis with LV dysfunction. He is currently scheduled to have a TAVR tomorrow. The first mention of the rash on the patient's back is on January 31st in Dr. Campos Screen note. The patient said his back occasionally gets itchy but he was unaware of the rash. Current Medications:    Reviewed    Allergies:  Patient has no known allergies. PHYSICAL EXAM:    Vitals:    BP (!) 153/83   Pulse 53   Temp 97.5 °F (36.4 °C) (Tympanic)   Resp 18   Ht 5' 11\" (1.803 m)   Wt 150 lb 12.8 oz (68.4 kg)   SpO2 98%   BMI 21.03 kg/m²     On physical exam, the patient is noted to have 2-3 mm erythematous superficial excoriated and crusted papules involving only his back. His abdomen, extremities, face and buttock are spared. There is no evidence of excoriation noted. DATA:  Reviewed. IMPRESSION/RECOMMENDATIONS:    The findings are consistent with transient acantholytic dermatosis, or Mynor's disease. This rash can come and go,  is often triggered by heat, and can be seen with prolonged confinement in bed. Mr. Musa Winchester was unaware of the rash and states he is not interested in pursuing treatment at this time. Triamcinolone 0.1% cream twice a day may be beneficial should he become symptomatic. This rash is not a contraindication to having his TAVR tomorrow.    Any questions, please feel free to call me.  ([phone] cell)    Jose Sommer DO  February 1, 2022   8:12 PM

## 2022-02-02 NOTE — PROGRESS NOTES
HEART VALVE TEAM PROGRESS NOTE    Evaluated in ICU POD # 0. No complaints. No pain in groins. Bradycardia post valve deployment, TVP in left groin. PE:   BP (!) 173/77   Pulse 53   Temp 97.5 °F (36.4 °C) (Temporal)   Resp 16   Ht 5' 11\" (1.803 m)   Wt 150 lb 3.2 oz (68.1 kg)   SpO2 96%   BMI 20.95 kg/m²   CARDIOVASCULAR:   Heart Inspection shows no noted pulsations  Heart Palpation: no heaves or thrills  Heart Ausculation -Normal S1 and S2, RRR  PV: no lower extremity edema. BLE +DP/PT signals, no clubbing or cyanosis     Monitor: EKG pending       Lab Review     Recent Labs     01/31/22 0638 02/01/22  0606 02/02/22  0526   WBC 5.8 6.4 6.9   HGB 10.9* 12.3* 12.2*   HCT 34.0* 37.6 38.1   * 117* 118*       Recent Labs     01/31/22 0638 01/31/22 0638 02/01/22  0606 02/02/22  0526     --  142 141   K 3.6  3.6   < > 4.1  4.1 4.6  4.6   *  --  104 105   CO2 23  --  24 27   PHOS  --   --   --  3.4   BUN 29*  --  35* 33*   CREATININE 1.6*  --  1.9* 1.9*    < > = values in this interval not displayed. Recent Labs     02/01/22  0606   AST 17   ALT 11   ALKPHOS 66       No results for input(s): BNP, CKTOTAL, CKMB in the last 72 hours. Recent Labs     02/01/22  1437   INR 1.6         Assessment:  1. POD # 0 TAVR with 34mm Medtronic Evolut Pro+ valve. 2. Stable groin sites bilaterally, Left groin with TVP and sheath remaining. 3. Bradycardia- TVP @ VVI @ 40, mA 10  4. CAD h/o CABG (LIMA-LAD, SVG- OM1, SVG-RPDA), MAZE and SALENA exclusion 2006  5. PAF- on warfarin  6. DM Type 2   7. CKD 3b-4- Nephrology following  8. Transient acantholytic dermatosis- evaluated by Dermatology, recommends Triamcinoloine 0.1% cream     Plan:  1. Continue ICU care. Monitor rhythm closely. 2. Limited TTE, EKG, labs in AM  3.  ASA, warfarin daily- resume when okay with Cardiology

## 2022-02-02 NOTE — ANESTHESIA PROCEDURE NOTES
Arterial Line:    An arterial line was placed using ultrasound guidance, in the OR for the following indication(s): continuous blood pressure monitoring and blood sampling needed. A 20 gauge (size), 1 and 3/4 inch (length), Arrow (type) catheter was placed, Seldinger technique used, into the right radial artery, secured by Tegaderm. Anesthesia type: Local  Local infiltration: Injection    Events:  hematoma during insertion, greater than 3 attempts and patient tolerated procedure well with no complications.   Anesthesiologist: Tanja Zhao MD  Performed: Anesthesiologist   Preanesthetic Checklist  Completed: patient identified, IV checked, site marked, risks and benefits discussed, surgical consent, monitors and equipment checked, pre-op evaluation, timeout performed, anesthesia consent given, oxygen available and patient being monitored

## 2022-02-02 NOTE — PROGRESS NOTES
[facility] MD Chris Henry MD Leeroy Holster, MD Gwendalyn Kind, DO, ALI PATIENT’S [facility]IL[facility] HOLT MD .  Progress note  Patient's Name: Javier Ingram  8:26 PM  2/1/2022 1/31: Overall doing better, back to his baseline according to his wife was at the bedside. His BUN today is 29 and creatinine down to 1.6  Stable vitals   For cta in am per cardiology (note: Had the CTA on Monday, 1/31)    2/1: Status post dental surgery today without complication. Feeling better. Denies complaint including dyspnea, pain, chest pain or palpitations. No peripheral swelling. History of Present Ilness:      79 y/o M presented to ER with cc of passing out while seated   Has hx of critical aortic Stenosis and TAVR had been planned  Has hx of CKD and baseline cr appear to be around 1.8   He was seen in ED he is on RA , appear euvolemic . I did d/w him and his wife risk of VISHAL including risk of dialysis they verbalzied understanding and informed me they d like to proceed with TAVR as scheduled   No reported n/v/d . Past Medical History:   Diagnosis Date    A-fib [facility])     BPH (benign prostatic hyperplasia)     CAD (coronary artery disease)     Diabetic neuropathy (HCC)     DM (diabetes mellitus) ([facility] [address]     HTN (hypertension)     Hyperlipemia     Pancreatitis 9/10    S/P CABG (coronary artery bypass graft)        Past Surgical History:   Procedure Laterality Date    CARDIAC SURGERY      CHOLECYSTECTOMY  09/27/2010    COLONOSCOPY      CORONARY ARTERY BYPASS GRAFT      CABG x4 with left internal mammary artery to the LAD. reverse saphenous vein grafts to the 1st marginal branches ofcircumflex, posterior descending branch of the right coronary artery, and posterolateral branch to the right coronary artery, and modified maze procedure with [employer]tr[employer] bipolar radiofrequency ablation, and excision of left atrial appendage.     TONSILLECTOMY         Family History   Problem Relation Age of Onset    High Blood Pressure Mother     Heart Disease Mother     Cancer Father         reports that he has never smoked. He has never used smokeless tobacco. He reports current alcohol use. He reports that he does not use drugs. Allergies:  Patient has no known allergies. Current Medications:    sodium chloride flush 0.9 % injection 5-40 mL, 2 times per day  sodium chloride flush 0.9 % injection 10 mL, PRN  0.9 % sodium chloride infusion, PRN  mupirocin (BACTROBAN) 2 % ointment, BID  [START ON 2/2/2022] chlorhexidine (PERIDEX) 0.12 % solution 15 mL, Once  chlorhexidine (HIBICLENS) 4 % liquid, See Admin Instructions  [START ON 2/2/2022] ceFAZolin (ANCEF) 2000 mg in sterile water 20 mL IV syringe, See Admin Instructions  tamsulosin (FLOMAX) capsule 0.4 mg, Daily  diphenhydrAMINE (BENADRYL) tablet 25 mg, Q8H PRN  pravastatin (PRAVACHOL) tablet 40 mg, Nightly  insulin lispro (HUMALOG) injection vial 0-6 Units, TID WC  glucose (GLUTOSE) 40 % oral gel 15 g, PRN  dextrose 50 % IV solution, PRN  glucagon (rDNA) injection 1 mg, PRN  dextrose 5 % solution, PRN  sodium chloride flush 0.9 % injection 5-40 mL, 2 times per day  sodium chloride flush 0.9 % injection 5-40 mL, PRN  0.9 % sodium chloride infusion, PRN  ondansetron (ZOFRAN-ODT) disintegrating tablet 4 mg, Q8H PRN   Or  ondansetron (ZOFRAN) injection 4 mg, Q6H PRN  polyethylene glycol (GLYCOLAX) packet 17 g, Daily PRN  acetaminophen (TYLENOL) tablet 650 mg, Q6H PRN   Or  acetaminophen (TYLENOL) suppository 650 mg, Q6H PRN  heparin (porcine) injection 2,000 Units, PRN  aspirin chewable tablet 81 mg, Daily  carvedilol (COREG) tablet 6.25 mg, BID         Review of Systems:   Constitutional: no fevers , no chills , feels ok   Eyes: no eye pain , no itching , no drainage  Ears, nose, mouth, throat, and face: no ear ,nose pain , hearing is ok ,no nasal drainage   Respiratory: no sob ,no cough ,no wheezing .    Cardiovascular: no chest pain , no palpitation ,no sob .   Gastrointestinal: no nausea, vomiting , constipation , no abdominal pain . Genitourinary:no urinary retention , no burning , dysuria . No polyuria   Hematologic/lymphatic: no bleeding , no cougulation issues . Musculoskeletal:no joint pain , no swelling . Neurological: no headaches ,no weakness , no numbness . Endocrine: no thirst , no weight issues . Physical exam:   Vital signs BP (!) 153/83   Pulse 53   Temp 97.5 °F (36.4 °C) (Tympanic)   Resp 18   Ht 5' 11\" (1.803 m)   Wt 150 lb 12.8 oz (68.4 kg)   SpO2 98%   BMI 21.03 kg/m²   Gen : NAD , appropriate for stated age . Head : at , nc   Neck : supple , no thyromegaly noted . Eyes : EOMI , PERRLA   CV : RRR , No M/R/G . Lungs: CTAB , no wheezing , good flow heard b/l   Abd : soft , NT , BS + , No Organomegaly appreciated . Skin : soft, dry . Neuro : CN  II-XII grossly intact , no focal neurologic deficit . Psych : cooperative .      Data:   Labs:  CBC with Differential:    Lab Results   Component Value Date    WBC 6.4 02/01/2022    RBC 4.00 02/01/2022    HGB 12.3 02/01/2022    HCT 37.6 02/01/2022     02/01/2022    MCV 94.0 02/01/2022    MCH 30.8 02/01/2022    MCHC 32.7 02/01/2022    RDW 13.2 02/01/2022    SEGSPCT 59 12/12/2011    LYMPHOPCT 32.7 02/01/2022    MONOPCT 8.9 02/01/2022    BASOPCT 0.5 02/01/2022    MONOSABS 0.57 02/01/2022    LYMPHSABS 2.09 02/01/2022    EOSABS 0.11 02/01/2022    BASOSABS 0.03 02/01/2022     CMP:    Lab Results   Component Value Date     02/01/2022    K 4.1 02/01/2022    K 4.1 02/01/2022     02/01/2022    CO2 24 02/01/2022    BUN 35 02/01/2022    CREATININE 1.9 02/01/2022    GFRAA 40 02/01/2022    LABGLOM 33 02/01/2022    GLUCOSE 114 02/01/2022    GLUCOSE 101 12/14/2011    PROT 6.3 02/01/2022    LABALBU 3.5 02/01/2022    CALCIUM 9.5 02/01/2022    BILITOT 0.8 02/01/2022    ALKPHOS 66 02/01/2022    AST 17 02/01/2022    ALT 11 02/01/2022     Ionized Calcium:  No results found for: IONCA  Magnesium:    Lab Results   Component Value Date    MG 1.8 01/28/2022     Phosphorus:  No results found for: PHOS  U/A:    Lab Results   Component Value Date    COLORU Yellow 01/29/2022    PHUR 6.0 01/29/2022    WBCUA 0-1 01/29/2022    RBCUA 0-1 01/29/2022    BACTERIA NONE SEEN 01/29/2022    CLARITYU Clear 01/29/2022    SPECGRAV >=1.030 01/29/2022    LEUKOCYTESUR Negative 01/29/2022    UROBILINOGEN 0.2 01/29/2022    BILIRUBINUR Negative 01/29/2022    BLOODU TRACE-INTACT 01/29/2022    GLUCOSEU Negative 01/29/2022     Microalbumen/Creatinine ratio:  No components found for: RUCREAT  Iron Saturation:  No components found for: PERCENTFE  TIBC:  No results found for: TIBC  FERRITIN:  No results found for: FERRITIN     Imaging:  XR CHEST PORTABLE   Final Result   Development of right basilar atelectasis and small effusion         CTA CHEST W CONTRAST   Final Result   There is small to moderate bilateral pleural effusions right greater than   left with overlying atelectasis. Clinical correlation and follow-up to   resolution recommended. Diffuse coronary artery disease status post CABG. Scattered atherosclerotic   plaque thoracic and visualized abdominal aorta. However diameter of the   subclavian measures down to 5.3 mm on the right and 5.9 mm on the left. Heterogeneously enhancing at lesion anterior segment right lobe of the liver   peripherally. Correlation with any history of malignancy recommended. Recommend further characterization with MRI with contrast or dedicated liver   protocol CT abdomen pelvis to re-evaluate this lesion and potentially screen   for primary. Extensive calcification aortic valve. Correlation with pre TAVR study same   day. There is a lymph node in the subcarinal region which is poorly defined but   does just meet size criteria to be considered adenopathy. Clinical   correlation and follow-up to resolution recommended.          CT ABDOMEN PELVIS WO CONTRAST Additional Contrast? None   Final Result   1. Moderate bilateral pleural effusions with adjacent compressive atelectasis   2. Advanced coronary artery calcifications   3. Nonspecific intestinal gas pattern   4. No signs of urolithiasis or obstructive uropathy. RECOMMENDATIONS:   Unavailable         CT CHEST WO CONTRAST   Final Result   Moderate bilateral pleural effusions, greater towards the right. Mild compressive atelectasis in the lung bases. Developing infiltrates from   pneumonia thought less likely. Heterogeneous area along the lateral margin of the dome of the liver could   represent volume averaging. A developing neoplastic process is thought   somewhat less likely but not entirely excluded and a short-term follow-up   hepatic mass protocol MRI or CT would be useful on a nonemergent basis when   clinically feasible. Cardiomegaly. RECOMMENDATIONS:   Unavailable         US CAROTID ARTERY BILATERAL   Final Result   The right internal carotid artery demonstrates 0-50% stenosis. The left internal carotid artery demonstrates 0-50% stenosis. Bilateral vertebral arteries are patent with flow in the normal direction. Bilateral ICA atherosclerotic plaque. Small amount of plaque in the right   carotid bulb. CT Head WO Contrast   Final Result   1. There is no acute intracranial abnormality. Specifically, there is no   intracranial hemorrhage. 2. Atrophy and periventricular leukomalacia,         XR CHEST (2 VW)   Final Result   Bilateral lower lobe pneumonia, right greater than left         CTA TRANSCATHETER AORTIC VALVE REPLACEMENT    (Results Pending)       Assessment  -Chronic kidney disease stage III baseline cr 1.8-1.9 : stable   -critical aortic Stenosis and TAVR had been planned  -Hx of HTN at goal on coreg   -NIDDM       N.p.o earlie. , and oral intake is quite poor currently. Feels little better though.   Creatinine did bump since yesterday, possibly the result of contrast-induced nephropathy despite NS drip, which was continued for about 6 hours post procedure     Recommendations  IV normal saline at 50 cc/h overnight  Reevaluate in a.m., consider discontinuing it  Continue supportive care  Follow labs, UO    Electronically signed by Barry Miramontes MD on 2/1/2022

## 2022-02-02 NOTE — CONSULTS
Seen patient in consult today . See 805am note today.  Patient name remains on consult list.  Attaching this note in attempt to complete the consult which was completed this am.

## 2022-02-02 NOTE — DISCHARGE INSTR - COC
Therapy:  Wound 22 Buttocks Inner (Active)   Dressing/Treatment Open to air 22   Wound Assessment Pink/red 22   Drainage Amount None 22   Lexis-wound Assessment Blanchable erythema 22   Number of days: 1        Elimination:  Continence: Bowel: {YES / YJ:63541}  Bladder: {YES / RF:78769}  Urinary Catheter: {Urinary Catheter:281438854}   Colostomy/Ileostomy/Ileal Conduit: {YES / BU:74209}       Date of Last BM: ***    Intake/Output Summary (Last 24 hours) at 2022 0805  Last data filed at 2022 0529  Gross per 24 hour   Intake 990 ml   Output 610 ml   Net 380 ml     I/O last 3 completed shifts:   In: 18 [P.O.:540; I.V.:450]  Out: 1185 [Urine:1185]    Safety Concerns:     [address] [employer] Safety Concerns:847912766}    Impairments/Disabilities:      [address] [facility] KWABENA Impairments/Disabilities:175540169}    Nutrition Therapy:  Current Nutrition Therapy:   {[facility] Diet List:973395399}    Routes of Feeding: {[facility] Other Feedings:066239920}  Liquids: {Slp liquid thickness:33649}  Daily Fluid Restriction: {[facility] Yes amt example:850837541}  Last Modified Barium Swallow with Video (Video Swallowing Test): {Done Not Done SNCP:119959253}    Treatments at the Time of Hospital Discharge:   Respiratory Treatments: ***  Oxygen Therapy:  {Therapy; copd oxygen:40822}  Ventilator:    { CC Vent GDDU:690805349}    Rehab Therapies: {THERAPEUTIC INTERVENTION:7201391222}  Weight Bearing Status/Restrictions: [address] [facility] Weight Bearin}  Other Medical Equipment (for information only, NOT a DME order):  {EQUIPMENT:004170848}  Other Treatments: ***    Patient's personal belongings (please select all that are sent with patient):  {[facility] Belongings:727040996}    RN SIGNATURE:  {Esignature:303291738}    CASE MANAGEMENT/SOCIAL WORK SECTION    Inpatient Status Date: ***    Readmission Risk Assessment Score:  Readmission Risk              Risk of Unplanned Readmission:  14           Discharging to Facility/ Agency   Name:   Address:  Phone:  Fax:    Dialysis Facility (if applicable)   Name:  Address:  Dialysis Schedule:  Phone:  Fax:    / signature: {Esignature:801542617}    PHYSICIAN SECTION    Prognosis: {Prognosis:0956270976}    Condition at Discharge: Tahir Orellana Patient Condition:436133264}    Rehab Potential (if transferring to Rehab): {Prognosis:3420143255}    Recommended Labs or Other Treatments After Discharge: ***    Physician Certification: I certify the above information and transfer of Eloise Beatty  is necessary for the continuing treatment of the diagnosis listed and that he requires {Admit to Appropriate Level of Care:99477} for {GREATER/LESS:630985064} 30 days. Update Admission H&P: {CHP DME Changes in XGCIR:231054452}    PHYSICIAN SIGNATURE:  {Esignature:653502593}    ***HEART FAILURE - CONGESTIVE HEART FAILURE***  DISCHARGE INSTRUCTIONS:  GUIDELINES TO FOLLOW AT [facility]/LTAC/SNF/ Assisted Living    No future appointments. MEDICATIONS:  Please notify the doctor if patient is not able to take their medications or if medications are being held for any reasons (such as low blood pressure ect.)  Do not give the patient ibuprofen (Advil or Motrin), naproxen (Aleve) without talking to the doctor first. This could make their heart failure worse.          WEIGHT MONITORING:   Weigh patient every day in the morning after they void (If patient is able to stand, please get a standing weight.)   Notify the doctor of a weight gain of 3 pounds or more in 1 day   OR  a total of 5 pounds or more in 1 week             DIET   Cardiac heart healthy diet:  Low sodium diet: no  more than 2,000mg (2 grams) of salt / sodium per day (which equals to a little less than  a teaspoon of salt)/ Cardiac Diet: Low saturated / low trans fat, no added salt, caffeine restricted    If patient is there for rehab and will be returning home in the near future; reinforce with the patient and the family to follow a low sodium diet (2,000 mg)- avoid using salt at the table, avoid / limit use of canned soups, processed / packaged foods, salted snacks, olives and pickles. Do not use a salt substitute without checking with the doctor. (Mrs. Cecilia King is safe to use).        NOTIFY THE DOCTOR THE FIRST DAY OF ONSET OF ANY OF THESE   SYMPTOMS:   Weight gain of 3 pounds or more in 1 day         OR 5 pounds or more in one week  More shortness of breath  More swelling in stomach, legs, ankles or feet  Feeling more tired, No energy  Dry hacky cough  Dizziness  More chest pain / discomfort  Hard time breathing laying down

## 2022-02-02 NOTE — ANESTHESIA POSTPROCEDURE EVALUATION
Department of Anesthesiology  Postprocedure Note    Patient: Adelfo Lynch  MRN: [id number]  YOB: 1931  Date of evaluation: 2/2/2022  Time:  12:49 PM     Procedure Summary     Date: 02/02/22 Room / Location: [facility] OR 03 / [facility]    Anesthesia Start: 5074 Anesthesia Stop: 1244    Procedure: TRANSCATHETER AORTIC VALVE REPLACEMENT FEMORAL APPROACH (N/A ) Diagnosis: (AORTIC STENOSIS)    Surgeons: Abran Tillman MD Responsible Provider: Ary Perry MD    Anesthesia Type: MAC ASA Status: 4          Anesthesia Type: MAC    Woody Phase I:      Woody Phase II:      Last vitals: Reviewed and per EMR flowsheets.        Anesthesia Post Evaluation    Patient location during evaluation: PACU  Patient participation: complete - patient participated  Level of consciousness: awake  Pain score: 0  Airway patency: patent  Nausea & Vomiting: no nausea  Complications: no  Cardiovascular status: hemodynamically stable  Respiratory status: acceptable  Hydration status: stable

## 2022-02-02 NOTE — PROGRESS NOTES
[facility] MD Steven Eisenberg MD Bradly Bast, MD Lillia Second, DO, ALI PATIENT’S [facility] MD .  Progress note  Patient's Name: Armando Miller  12:51 PM  2/2/2022 1/31: Overall doing better, back to his baseline according to his wife was at the bedside. His BUN today is 29 and creatinine down to 1.6  Stable vitals   For cta in am per cardiology (note: Had the CTA on Monday, 1/31)    2/1: Status post dental surgery today without complication. Feeling better. Denies complaint including dyspnea, pain, chest pain or palpitations. No peripheral swelling. 2/2:  Off Unit to surgery for TAVR. History of Present Ilness:      79 y/o M presented to ER with cc of passing out while seated   Has hx of critical aortic Stenosis and TAVR had been planned  Has hx of CKD and baseline cr appear to be around 1.8   He was seen in ED he is on RA , appear euvolemic . I did d/w him and his wife risk of VISHAL including risk of dialysis they verbalzied understanding and informed me they d like to proceed with TAVR as scheduled   No reported n/v/d . Past Medical History:   Diagnosis Date    A-fib [facility])     BPH (benign prostatic hyperplasia)     CAD (coronary artery disease)     Diabetic neuropathy (HCC)     DM (diabetes mellitus) ([facility] [address]     HTN (hypertension)     Hyperlipemia     Pancreatitis 9/10    S/P CABG (coronary artery bypass graft)        Past Surgical History:   Procedure Laterality Date    CARDIAC SURGERY      CHOLECYSTECTOMY  09/27/2010    COLONOSCOPY      CORONARY ARTERY BYPASS GRAFT      CABG x4 with left internal mammary artery to the LAD. reverse saphenous vein grafts to the 1st marginal branches ofcircumflex, posterior descending branch of the right coronary artery, and posterolateral branch to the right coronary artery, and modified maze procedure with Medtronic bipolar radiofrequency ablation, and excision of left atrial appendage.     TONSILLECTOMY Family History   Problem Relation Age of Onset    High Blood Pressure Mother     Heart Disease Mother     Cancer Father         reports that he has never smoked. He has never used smokeless tobacco. He reports current alcohol use. He reports that he does not use drugs. Allergies:  Patient has no known allergies.     Current Medications:    0.9 % sodium chloride infusion, Continuous  perflutren lipid microspheres (DEFINITY) injection 1.65 mg, ONCE PRN  dexmedetomidine (PRECEDEX) 1,000 mcg in sodium chloride 0.9 % 250 mL infusion, Continuous  niCARdipine (CARDENE) 50 mg in sodium chloride 0.9 % 250 mL infusion, Continuous  sodium chloride flush 0.9 % injection 5-40 mL, 2 times per day  sodium chloride flush 0.9 % injection 10 mL, PRN  0.9 % sodium chloride infusion, PRN  mupirocin (BACTROBAN) 2 % ointment, BID  ceFAZolin (ANCEF) 2000 mg in sterile water 20 mL IV syringe, See Admin Instructions  tamsulosin (FLOMAX) capsule 0.4 mg, Daily  diphenhydrAMINE (BENADRYL) tablet 25 mg, Q8H PRN  pravastatin (PRAVACHOL) tablet 40 mg, Nightly  insulin lispro (HUMALOG) injection vial 0-6 Units, TID WC  glucose (GLUTOSE) 40 % oral gel 15 g, PRN  dextrose 50 % IV solution, PRN  glucagon (rDNA) injection 1 mg, PRN  dextrose 5 % solution, PRN  sodium chloride flush 0.9 % injection 5-40 mL, 2 times per day  sodium chloride flush 0.9 % injection 5-40 mL, PRN  0.9 % sodium chloride infusion, PRN  ondansetron (ZOFRAN-ODT) disintegrating tablet 4 mg, Q8H PRN   Or  ondansetron (ZOFRAN) injection 4 mg, Q6H PRN  polyethylene glycol (GLYCOLAX) packet 17 g, Daily PRN  acetaminophen (TYLENOL) tablet 650 mg, Q6H PRN   Or  acetaminophen (TYLENOL) suppository 650 mg, Q6H PRN  heparin (porcine) injection 2,000 Units, PRN  aspirin chewable tablet 81 mg, Daily  carvedilol (COREG) tablet 6.25 mg, BID WC        Review of Systems:   Constitutional: no fevers , no chills , feels ok   Eyes: no eye pain , no itching , no drainage  Ears, nose, mouth, throat, and face: no ear ,nose pain , hearing is ok ,no nasal drainage   Respiratory: no sob ,no cough ,no wheezing . Cardiovascular: no chest pain , no palpitation ,no sob . Gastrointestinal: no nausea, vomiting , constipation , no abdominal pain . Genitourinary:no urinary retention , no burning , dysuria . No polyuria   Hematologic/lymphatic: no bleeding , no cougulation issues . Musculoskeletal:no joint pain , no swelling . Neurological: no headaches ,no weakness , no numbness . Endocrine: no thirst , no weight issues . Physical exam:   Vital signs BP (!) 130/35   Pulse (!) 38   Temp (!) 36.5 °F (2.5 °C) (Temporal)   Resp 16   Ht 5' 11\" (1.803 m)   Wt 150 lb 3.2 oz (68.1 kg)   SpO2 92%   BMI 20.95 kg/m²   Gen : NAD , appropriate for stated age . Head : at , nc   Neck : supple , no thyromegaly noted . Eyes : EOMI , PERRLA   CV : RRR , No M/R/G . Lungs: CTAB , no wheezing , good flow heard b/l   Abd : soft , NT , BS + , No Organomegaly appreciated . Skin : soft, dry . Neuro : CN  II-XII grossly intact , no focal neurologic deficit . Psych : cooperative .      Data:   Labs:  CBC with Differential:    Lab Results   Component Value Date    WBC 6.9 02/02/2022    RBC 4.01 02/02/2022    HGB 12.2 02/02/2022    HCT 38.1 02/02/2022     02/02/2022    MCV 95.0 02/02/2022    MCH 30.4 02/02/2022    MCHC 32.0 02/02/2022    RDW 13.4 02/02/2022    SEGSPCT 59 12/12/2011    LYMPHOPCT 25.3 02/02/2022    MONOPCT 8.7 02/02/2022    BASOPCT 0.4 02/02/2022    MONOSABS 0.60 02/02/2022    LYMPHSABS 1.74 02/02/2022    EOSABS 0.11 02/02/2022    BASOSABS 0.03 02/02/2022     CMP:    Lab Results   Component Value Date     02/02/2022    K 4.6 02/02/2022    K 4.6 02/02/2022     02/02/2022    CO2 27 02/02/2022    BUN 33 02/02/2022    CREATININE 1.9 02/02/2022    GFRAA 40 02/02/2022    LABGLOM 33 02/02/2022    GLUCOSE 110 02/02/2022    GLUCOSE 101 12/14/2011    PROT 6.3 02/01/2022 LABALBU 3.5 02/01/2022    CALCIUM 9.1 02/02/2022    BILITOT 0.8 02/01/2022    ALKPHOS 66 02/01/2022    AST 17 02/01/2022    ALT 11 02/01/2022     Ionized Calcium:  No results found for: IONCA  Magnesium:    Lab Results   Component Value Date    MG 1.6 02/02/2022     Phosphorus:    Lab Results   Component Value Date    PHOS 3.4 02/02/2022     U/A:    Lab Results   Component Value Date    COLORU Yellow 01/29/2022    PHUR 6.0 01/29/2022    WBCUA 0-1 01/29/2022    RBCUA 0-1 01/29/2022    BACTERIA NONE SEEN 01/29/2022    CLARITYU Clear 01/29/2022    SPECGRAV >=1.030 01/29/2022    LEUKOCYTESUR Negative 01/29/2022    UROBILINOGEN 0.2 01/29/2022    BILIRUBINUR Negative 01/29/2022    BLOODU TRACE-INTACT 01/29/2022    GLUCOSEU Negative 01/29/2022     Microalbumen/Creatinine ratio:  No components found for: RUCREAT  Iron Saturation:  No components found for: PERCENTFE  TIBC:  No results found for: TIBC  FERRITIN:  No results found for: FERRITIN     Imaging:  XR CHEST PORTABLE   Final Result   Development of right basilar atelectasis and small effusion         CTA CHEST W CONTRAST   Final Result   There is small to moderate bilateral pleural effusions right greater than   left with overlying atelectasis. Clinical correlation and follow-up to   resolution recommended. Diffuse coronary artery disease status post CABG. Scattered atherosclerotic   plaque thoracic and visualized abdominal aorta. However diameter of the   subclavian measures down to 5.3 mm on the right and 5.9 mm on the left. Heterogeneously enhancing at lesion anterior segment right lobe of the liver   peripherally. Correlation with any history of malignancy recommended. Recommend further characterization with MRI with contrast or dedicated liver   protocol CT abdomen pelvis to re-evaluate this lesion and potentially screen   for primary. Extensive calcification aortic valve. Correlation with pre TAVR study same   day.       There is a lymph node in the subcarinal region which is poorly defined but   does just meet size criteria to be considered adenopathy. Clinical   correlation and follow-up to resolution recommended. CT ABDOMEN PELVIS WO CONTRAST Additional Contrast? None   Final Result   1. Moderate bilateral pleural effusions with adjacent compressive atelectasis   2. Advanced coronary artery calcifications   3. Nonspecific intestinal gas pattern   4. No signs of urolithiasis or obstructive uropathy. RECOMMENDATIONS:   Unavailable         CT CHEST WO CONTRAST   Final Result   Moderate bilateral pleural effusions, greater towards the right. Mild compressive atelectasis in the lung bases. Developing infiltrates from   pneumonia thought less likely. Heterogeneous area along the lateral margin of the dome of the liver could   represent volume averaging. A developing neoplastic process is thought   somewhat less likely but not entirely excluded and a short-term follow-up   hepatic mass protocol MRI or CT would be useful on a nonemergent basis when   clinically feasible. Cardiomegaly. RECOMMENDATIONS:   Unavailable         US CAROTID ARTERY BILATERAL   Final Result   The right internal carotid artery demonstrates 0-50% stenosis. The left internal carotid artery demonstrates 0-50% stenosis. Bilateral vertebral arteries are patent with flow in the normal direction. Bilateral ICA atherosclerotic plaque. Small amount of plaque in the right   carotid bulb. CT Head WO Contrast   Final Result   1. There is no acute intracranial abnormality. Specifically, there is no   intracranial hemorrhage.    2. Atrophy and periventricular leukomalacia,         XR CHEST (2 VW)   Final Result   Bilateral lower lobe pneumonia, right greater than left         CTA TRANSCATHETER AORTIC VALVE REPLACEMENT    (Results Pending)       Assessment  -Chronic kidney disease stage III baseline cr 1.8-1.9 : stable   -critical aortic Stenosis and TAVR had been planned  -Hx of HTN at goal on coreg   -NIDDM       Creatinine did bump since yesterday, possibly the result of contrast-induced nephropathy despite NS drip, which was continued for about 6 hours post procedure   -Creatinine remains at 1.9 mg/dl      Recommendations  Continue supportive care  Follow labs, UO    Electronically signed by EDMUNDO Lawrence CNP on 2/2/2022

## 2022-02-02 NOTE — PROGRESS NOTES
Nutrition Note    Following planned TAVR, will start Glucerna supplement BID and Adal wound healing supplement BID to help meet increased nutritional needs from surgical wound healing. See full nutrition assessment on 2/1.      Electronically signed by Jonathan Sauceda RD, ANGELA on 2/2/22 at 9:54 AM EST    Contact: [phone]

## 2022-02-02 NOTE — OP NOTE
TRANSCATHETER AORTIC VALVE REPLACEMENT  Operative Note      Date of procedure:  02/02/22     Interventional Cardiologist: Carson Bauer MD  Cardiothoracic Surgeon: Nathen Ortiz DO    Pre-operative diagnosis: Severe symptomatic aortic stenosis. Post-operative diagnosis: Successful transcatheter aortic valve replacement (TAVR) using a 34-mm Evolut PRO+ valve. shelter (Major co morbidities and conditions):   CAD status post CABG in 2009 with LIMA to LAD, SVG to OM1, SVG to RPDA, SVG to RPL with modified maze and SALENA exclusion -grafts patent on CTA  PAF on warfarin prior to admission  Type 2 diabetes on orals  CKD 3B-4  PAD  HFrEF with severely reduced LVEF  Chronic bifascicular heart block  Presented with syncope  NYHA class 4    STS-PROM estimated risk for 30-day mortality for isolated AVR:  10.6%      TAVR access: right common femoral artery percutaneous approach  Pigtail access: left common femoral artery  Appropriate arterial and venous lines were additionally placed by anesthesia as needed. TAVR access closure: Proglide/Prostyle Perclose   Pigtail access closure: Angioseal    Procedure:   Arterial access in the TAVR and Pigtail access sites as above  Limited peripheral angiography   Temporary transvenous pacer insertion via left femoral vein   Aortic root angiography  Transfemoral TAVR using a 34-mm Evolut PRO+ valve      Anesthesia: monitored anesthesia care (MAC)    Indication for procedure:   Severe symptomatic aortic stenosis    Severe aortic stenosis Shared Decision Making discussion took place using the [address] of [facility] [place]: TAVR/SAVR tool. The treatment plan formulated by the Heart Team and the patient & the patients preference for AVR is TAVR    Description of the Procedure: Following informed consent, the patient was bought to the hybrid OR and placed under anesthesia as above. Transthoracic echo was used to aid in guidance of the procedure.       Using ultrasound guidance and angiographic confirmation, a a micropuncture needle  was used to access the RCFA and a placeholder sheath was placed; two Prostyle Perclose devices were placed in a \"pre-close\" fashion; this access will be used to deliver the trascatheter heart valve eventually. Using ultrasound guidance, a micropuncture needle was used to access the LCFA and appropriate site was confirmed using angiography and a 5F sheath was placed; this access will be used for the Pigtail catheter subsequently. The left femoral vein was accessed and a locking 6F sheath was placed for the temporary pacer. Unfractionated heparin was administered to achieve therapeutic ACT with additional heparin administered as needed     A balloon-tipped temporary transvenous pacemaker was inserted through the femoral venous sheath and into the RV apex. Pacing threshold were obtained and the pacemaker was placed in standby mode. A 5F multipurpose catheter was advanced into the descending aorta over a J wire and used to place a Lunderquist wire in the descending aorta. An 18F Medtronic sheath was placed over the Lunderquist wire and sutured in place; the wire was subsequently removed. Next, a 5F AL1 catheter was used to guide a straight-tipped 0.035\" wire to cross the aortic valve. The catheter was advanced into the left ventricle and then exchanged for a second Pigtail catheter. A Safari wire was then placed in the LV and the Pigtail catheter removed. Balloon aortic valvuloplasty was performed prior to THV placement using a 22 mm Z-Med balloon. The 18F sheath was removed; a 34-mm Evolut PRO+ delivery system with an inline sheath was advanced around the arch into the LV. Rapid pacing was performed while deploying the valve up to 80%. Multiple fluoroscopic views were obtained to ensure proper depth of the valve implant while eliminating parallax. This was estimated to be 4-6 mm at the noncoronary cusp.   Subsequently the valve was released and the delivery system was withdrawn to the descending thoracic aorta and the nose cone recaptured. The delivery system and the stiff wire were removed from the body and the large sheath was then placed back. 2 perpendicular views were again obtained to ensure valve expansion and proper placement. Post-dilation was not performed. Transthoracic echo demonstrated a well-seated prosthesis with a mean transvalvular gradient of 5 and mild paravalvular leak. There was no pericardial effusion and the LV systolic function was severe. Aortic root angiogram revealed 1+ AR. The patient developed transient complete heart block after valve deployment and thus the temporary pacer was left in place. IV protamine was administered to reverse anticoagulation. The temporary pacer was not removed. Hemostasis was achieved at the arterial sites as above and at the venous site using manual pressure. A selective angiogram of the right iliofemoral system was performed from the left femoral access using an YOKO catheter in an up and over fashion. There was no significant dissection, stenosis, or extravasation at the TAVR access site. Complications: Complete heart block  Blood loss: Less than 50 mL  Contrast use: 65 mL  Air Kerma: 765 mGy  Dose-Area Product: 2376 mcGy. m2  Fluoroscopy time: 16.2 minutes  Specimens: none    Plan:   1. PACU to CVICU  observation   2. EKG in the morning  Limited TTE in the morning  CBC and BMP in the morning  3. Antithrombotic regimen: Aspirin for 3 to 6 months plus low-dose apixaban (in lieu of warfarin)    The interventional cardiologist and cardiothoracic surgeon were scrubbed in for the entirety of the procedure and contributed equally.     [id number]  Cardiothoracic Surgery

## 2022-02-02 NOTE — OP NOTE
TRANSCATHETER AORTIC VALVE REPLACEMENT  Operative Note      Date of procedure:  02/02/22     Interventional Cardiologist: Ana Solorio MD  Cardiothoracic Surgeon: John Geiger DO    Pre-operative diagnosis: Severe symptomatic aortic stenosis. Post-operative diagnosis: Successful transcatheter aortic valve replacement (TAVR) using a 34-mm Evolut PRO+ valve. correction (Major co morbidities and conditions):   CAD status post CABG in 2009 with LIMA to LAD, SVG to OM1, SVG to RPDA, SVG to RPL with modified maze and SALENA exclusion -grafts patent on CTA  PAF on warfarin prior to admission  Type 2 diabetes on orals  CKD 3B-4  PAD  HFrEF with severely reduced LVEF  Chronic bifascicular heart block  Presented with syncope  NYHA class 4    STS-PROM estimated risk for 30-day mortality for isolated AVR:  10.6%      TAVR access: right common femoral artery percutaneous approach  Pigtail access: left common femoral artery  Appropriate arterial and venous lines were additionally placed by anesthesia as needed. TAVR access closure: Proglide/Prostyle Perclose   Pigtail access closure: Angioseal    Procedure:   Arterial access in the TAVR and Pigtail access sites as above  Limited peripheral angiography   Temporary transvenous pacer insertion via left femoral vein   Aortic root angiography  Transfemoral TAVR using a 34-mm Evolut PRO+ valve      Anesthesia: monitored anesthesia care (MAC)    Indication for procedure:   Severe symptomatic aortic stenosis    Severe aortic stenosis Shared Decision Making discussion took place using the [address] of Cardiology [place]: TAVR/SAVR tool. The treatment plan formulated by the Heart Team and the patient & the patients preference for AVR is TAVR    Description of the Procedure: Following informed consent, the patient was bought to the hybrid OR and placed under anesthesia as above. Transthoracic echo was used to aid in guidance of the procedure.       Using ultrasound guidance and angiographic confirmation, a a micropuncture needle  was used to access the RCFA and a placeholder sheath was placed; two Prostyle Perclose devices were placed in a \"pre-close\" fashion; this access will be used to deliver the trascatheter heart valve eventually. Using ultrasound guidance, a micropuncture needle was used to access the LCFA and appropriate site was confirmed using angiography and a 5F sheath was placed; this access will be used for the Pigtail catheter subsequently. The left femoral vein was accessed and a locking 6F sheath was placed for the temporary pacer. Unfractionated heparin was administered to achieve therapeutic ACT with additional heparin administered as needed     A balloon-tipped temporary transvenous pacemaker was inserted through the femoral venous sheath and into the RV apex. Pacing threshold were obtained and the pacemaker was placed in standby mode. A 5F multipurpose catheter was advanced into the descending aorta over a J wire and used to place a Lunderquist wire in the descending aorta. An 18F Medtronic sheath was placed over the Lunderquist wire and sutured in place; the wire was subsequently removed. Next, a 5F AL1 catheter was used to guide a straight-tipped 0.035\" wire to cross the aortic valve. The catheter was advanced into the left ventricle and then exchanged for a second Pigtail catheter. A Safari wire was then placed in the LV and the Pigtail catheter removed. Balloon aortic valvuloplasty was performed prior to THV placement using a 22 mm Z-Med balloon. The 18F sheath was removed; a 34-mm Evolut PRO+ delivery system with an inline sheath was advanced around the arch into the LV. Rapid pacing was performed while deploying the valve up to 80%. Multiple fluoroscopic views were obtained to ensure proper depth of the valve implant while eliminating parallax. This was estimated to be 4-6 mm at the noncoronary cusp.   Subsequently the valve was released and the delivery system was withdrawn to the descending thoracic aorta and the nose cone recaptured. The delivery system and the stiff wire were removed from the body and the large sheath was then placed back. 2 perpendicular views were again obtained to ensure valve expansion and proper placement. Post-dilation was not performed. Transthoracic echo demonstrated a well-seated prosthesis with a mean transvalvular gradient of 5 and mild paravalvular leak. There was no pericardial effusion and the LV systolic function was severe. Aortic root angiogram revealed 1+ AR. The patient developed transient complete heart block after valve deployment and thus the temporary pacer was left in place. IV protamine was administered to reverse anticoagulation. The temporary pacer was not removed. Hemostasis was achieved at the arterial sites as above and at the venous site using manual pressure. A selective angiogram of the right iliofemoral system was performed from the left femoral access using an YOKO catheter in an up and over fashion. There was no significant dissection, stenosis, or extravasation at the TAVR access site. Complications: Complete heart block  Blood loss: Less than 50 mL  Contrast use: 65 mL  Air Kerma: 765 mGy  Dose-Area Product: 3543 mcGy. m2  Fluoroscopy time: 16.2 minutes  Specimens: none    Plan:   1. PACU to CVICU  observation   2. EKG in the morning  Limited TTE in the morning  CBC and BMP in the morning  3. Antithrombotic regimen: Aspirin for 3 to 6 months plus low-dose apixaban (in lieu of warfarin)    The interventional cardiologist and cardiothoracic surgeon were scrubbed in for the entirety of the procedure and contributed equally.       Cherelle Rhodes MD, [facility]  Interventional Cardiology/Structural Heart Disease

## 2022-02-02 NOTE — PROGRESS NOTES
Called Echo and left message requesting patient gets Echo first thing in the morning d/t pacemaker placement later in the day per dr. Estela Doherty.

## 2022-02-02 NOTE — PLAN OF CARE
Problem: Skin Integrity:  Goal: Will show no infection signs and symptoms  Description: Will show no infection signs and symptoms  Outcome: Met This Shift     Problem: Skin Integrity:  Goal: Absence of new skin breakdown  Description: Absence of new skin breakdown  Outcome: Met This Shift     Problem: Falls - Risk of:  Goal: Will remain free from falls  Description: Will remain free from falls  Outcome: Met This Shift     Problem: Falls - Risk of:  Goal: Absence of physical injury  Description: Absence of physical injury  Outcome: Met This Shift     Problem: HEMODYNAMIC STATUS  Goal: Patient has stable vital signs and fluid balance  Outcome: Met This Shift     Problem: Cardiac:  Goal: Hemodynamic stability will improve  Description: Hemodynamic stability will improve  2/2/2022 1649 by Martinez Kim RN  Outcome: Met This Shift

## 2022-02-02 NOTE — PLAN OF CARE
Problem: Cardiac:  Goal: Hemodynamic stability will improve  Description: Hemodynamic stability will improve  Outcome: Met This Shift     Problem: Airway Clearance - Ineffective:  Goal: Ability to maintain a clear airway will improve  Description: Ability to maintain a clear airway will improve  Outcome: Met This Shift     Problem: Anxiety/Stress:  Goal: Level of anxiety will decrease  Description: Level of anxiety will decrease  Outcome: Met This Shift     Problem: Aspiration:  Goal: Absence of aspiration  Description: Absence of aspiration  Outcome: Met This Shift     Problem: Cardiac Output - Decreased:  Goal: Hemodynamic stability will improve  Description: Hemodynamic stability will improve  Outcome: Met This Shift     Problem: Gas Exchange - Impaired:  Goal: Levels of oxygenation will improve  Description: Levels of oxygenation will improve  Outcome: Met This Shift     Problem: Pain:  Goal: Pain level will decrease  Description: Pain level will decrease  Outcome: Met This Shift     Problem: Skin Integrity - Impaired:  Goal: Will show no infection signs and symptoms  Description: Will show no infection signs and symptoms  Outcome: Met This Shift  Goal: Absence of new skin breakdown  Description: Absence of new skin breakdown  Outcome: Met This Shift     Problem: Tissue Perfusion - Cardiopulmonary, Altered:  Goal: Hemodynamic stability will improve  Description: Hemodynamic stability will improve  Outcome: Met This Shift

## 2022-02-02 NOTE — PROGRESS NOTES
Subjective: The patient is awake and alert. No problems overnight. Denies chest pain, angina, and dyspnea. Denies abdominal pain. Tolerating diet. No nausea or vomiting. He is resting in  nad. Objective:  Pt is aox3 in nad   BP (!) 145/78   Pulse 51   Temp 98.2 °F (36.8 °C) (Oral)   Resp 18   Ht 5' 11\" (1.803 m)   Wt 150 lb 3.2 oz (68.1 kg)   SpO2 97%   BMI 20.95 kg/m²   HEENT no adenopathy no bruits  Heart:  RRR, no murmurs, gallops, or rubs. Lungs:  CTA bilaterally, no wheeze, rales or rhonchi  Abd: bowel sounds present, nontender, nondistended, no masses  Extrem:  No clubbing, cyanosis, or edema  WBC/Hgb/Hct/Plts:  6.9/12.2/38.1/118 (02/02 6860) basic metabolic panel     Assessment:    Patient Active Problem List   Diagnosis    Cellulitis    CAD (coronary artery disease)    S/P CABG (coronary artery bypass graft)    DM (diabetes mellitus) ([address]    Hyperlipemia    HTN (hypertension)    A-fib ([address]    BPH (benign prostatic hyperplasia)    Diabetic neuropathy ([address]    Encounter for therapeutic drug monitoring    Long term current use of anticoagulant therapy    Aortic valve stenosis    Chronic kidney disease    Heart valve disease    Gout    Syncope without other cardiovascular symptoms    Renal failure    Mild protein-calorie malnutrition ([address]       Plan:   For TAVR this am         Marni Godwin DO  7:39 AM  2/2/2022

## 2022-02-02 NOTE — PROGRESS NOTES
Dr. Ifeanyi Nick contacted regarding the order for coreg and wether or not it be given d/t patient being paced. Dr. Ifeanyi Nick ordered it be given.

## 2022-02-03 ENCOUNTER — ANESTHESIA EVENT (OUTPATIENT)
Dept: CARDIAC CATH/INVASIVE PROCEDURES | Age: 87
DRG: 266 | End: 2022-02-03
Payer: MEDICARE

## 2022-02-03 ENCOUNTER — ANESTHESIA (OUTPATIENT)
Dept: CARDIAC CATH/INVASIVE PROCEDURES | Age: 87
DRG: 266 | End: 2022-02-03
Payer: MEDICARE

## 2022-02-03 ENCOUNTER — APPOINTMENT (OUTPATIENT)
Dept: ULTRASOUND IMAGING | Age: 87
DRG: 266 | End: 2022-02-03
Payer: MEDICARE

## 2022-02-03 ENCOUNTER — APPOINTMENT (OUTPATIENT)
Dept: GENERAL RADIOLOGY | Age: 87
DRG: 266 | End: 2022-02-03
Payer: MEDICARE

## 2022-02-03 VITALS — SYSTOLIC BLOOD PRESSURE: 138 MMHG | DIASTOLIC BLOOD PRESSURE: 50 MMHG | OXYGEN SATURATION: 96 %

## 2022-02-03 LAB
ALBUMIN SERPL-MCNC: 3.1 G/DL (ref 3.5–5.2)
ALBUMIN SERPL-MCNC: 3.2 G/DL (ref 3.5–5.2)
ALP BLD-CCNC: 59 U/L (ref 40–129)
ALP BLD-CCNC: 60 U/L (ref 40–129)
ALT SERPL-CCNC: 5 U/L (ref 0–40)
ALT SERPL-CCNC: 8 U/L (ref 0–40)
ANION GAP SERPL CALCULATED.3IONS-SCNC: 10 MMOL/L (ref 7–16)
ANION GAP SERPL CALCULATED.3IONS-SCNC: 13 MMOL/L (ref 7–16)
AST SERPL-CCNC: 16 U/L (ref 0–39)
AST SERPL-CCNC: 16 U/L (ref 0–39)
BASOPHILS ABSOLUTE: 0.03 E9/L (ref 0–0.2)
BASOPHILS ABSOLUTE: 0.04 E9/L (ref 0–0.2)
BASOPHILS RELATIVE PERCENT: 0.4 % (ref 0–2)
BASOPHILS RELATIVE PERCENT: 0.5 % (ref 0–2)
BILIRUB SERPL-MCNC: 0.4 MG/DL (ref 0–1.2)
BILIRUB SERPL-MCNC: 0.5 MG/DL (ref 0–1.2)
BUN BLDV-MCNC: 30 MG/DL (ref 6–23)
BUN BLDV-MCNC: 31 MG/DL (ref 6–23)
CALCIUM SERPL-MCNC: 8.7 MG/DL (ref 8.6–10.2)
CALCIUM SERPL-MCNC: 8.9 MG/DL (ref 8.6–10.2)
CEA: 1 NG/ML (ref 0–5.2)
CHLORIDE BLD-SCNC: 107 MMOL/L (ref 98–107)
CHLORIDE BLD-SCNC: 108 MMOL/L (ref 98–107)
CO2: 22 MMOL/L (ref 22–29)
CO2: 26 MMOL/L (ref 22–29)
CREAT SERPL-MCNC: 1.5 MG/DL (ref 0.7–1.2)
CREAT SERPL-MCNC: 1.9 MG/DL (ref 0.7–1.2)
EKG ATRIAL RATE: 51 BPM
EKG Q-T INTERVAL: 564 MS
EKG QRS DURATION: 210 MS
EKG QTC CALCULATION (BAZETT): 553 MS
EKG R AXIS: -48 DEGREES
EKG T AXIS: 123 DEGREES
EKG VENTRICULAR RATE: 58 BPM
EOSINOPHILS ABSOLUTE: 0.13 E9/L (ref 0.05–0.5)
EOSINOPHILS ABSOLUTE: 0.13 E9/L (ref 0.05–0.5)
EOSINOPHILS RELATIVE PERCENT: 1.5 % (ref 0–6)
EOSINOPHILS RELATIVE PERCENT: 1.6 % (ref 0–6)
GFR AFRICAN AMERICAN: 40
GFR AFRICAN AMERICAN: 53
GFR NON-AFRICAN AMERICAN: 33 ML/MIN/1.73
GFR NON-AFRICAN AMERICAN: 44 ML/MIN/1.73
GLUCOSE BLD-MCNC: 130 MG/DL (ref 74–99)
GLUCOSE BLD-MCNC: 97 MG/DL (ref 74–99)
HCT VFR BLD CALC: 33.8 % (ref 37–54)
HCT VFR BLD CALC: 34 % (ref 37–54)
HEMOGLOBIN: 10.9 G/DL (ref 12.5–16.5)
HEMOGLOBIN: 11 G/DL (ref 12.5–16.5)
IMMATURE GRANULOCYTES #: 0.03 E9/L
IMMATURE GRANULOCYTES #: 0.03 E9/L
IMMATURE GRANULOCYTES %: 0.3 % (ref 0–5)
IMMATURE GRANULOCYTES %: 0.4 % (ref 0–5)
LYMPHOCYTES ABSOLUTE: 1.63 E9/L (ref 1.5–4)
LYMPHOCYTES ABSOLUTE: 2.1 E9/L (ref 1.5–4)
LYMPHOCYTES RELATIVE PERCENT: 20.4 % (ref 20–42)
LYMPHOCYTES RELATIVE PERCENT: 24.2 % (ref 20–42)
MAGNESIUM: 1.5 MG/DL (ref 1.6–2.6)
MCH RBC QN AUTO: 30.5 PG (ref 26–35)
MCH RBC QN AUTO: 31.1 PG (ref 26–35)
MCHC RBC AUTO-ENTMCNC: 32.2 % (ref 32–34.5)
MCHC RBC AUTO-ENTMCNC: 32.4 % (ref 32–34.5)
MCV RBC AUTO: 94.7 FL (ref 80–99.9)
MCV RBC AUTO: 96 FL (ref 80–99.9)
METER GLUCOSE: 104 MG/DL (ref 74–99)
METER GLUCOSE: 109 MG/DL (ref 74–99)
METER GLUCOSE: 123 MG/DL (ref 74–99)
METER GLUCOSE: 98 MG/DL (ref 74–99)
MONOCYTES ABSOLUTE: 0.73 E9/L (ref 0.1–0.95)
MONOCYTES ABSOLUTE: 0.89 E9/L (ref 0.1–0.95)
MONOCYTES RELATIVE PERCENT: 10.3 % (ref 2–12)
MONOCYTES RELATIVE PERCENT: 9.1 % (ref 2–12)
NEUTROPHILS ABSOLUTE: 5.44 E9/L (ref 1.8–7.3)
NEUTROPHILS ABSOLUTE: 5.47 E9/L (ref 1.8–7.3)
NEUTROPHILS RELATIVE PERCENT: 63.2 % (ref 43–80)
NEUTROPHILS RELATIVE PERCENT: 68.1 % (ref 43–80)
PDW BLD-RTO: 13.4 FL (ref 11.5–15)
PDW BLD-RTO: 13.4 FL (ref 11.5–15)
PHOSPHORUS: 3.6 MG/DL (ref 2.5–4.5)
PLATELET # BLD: 107 E9/L (ref 130–450)
PLATELET # BLD: 117 E9/L (ref 130–450)
PMV BLD AUTO: 12.1 FL (ref 7–12)
PMV BLD AUTO: 12.8 FL (ref 7–12)
POC ACT LR: 154 SECONDS
POC ACT LR: 164 SECONDS
POC ACT LR: 357 SECONDS
POTASSIUM SERPL-SCNC: 4.1 MMOL/L (ref 3.5–5)
POTASSIUM SERPL-SCNC: 4.8 MMOL/L (ref 3.5–5)
RBC # BLD: 3.54 E12/L (ref 3.8–5.8)
RBC # BLD: 3.57 E12/L (ref 3.8–5.8)
SODIUM BLD-SCNC: 143 MMOL/L (ref 132–146)
SODIUM BLD-SCNC: 143 MMOL/L (ref 132–146)
TOTAL PROTEIN: 5.5 G/DL (ref 6.4–8.3)
TOTAL PROTEIN: 5.8 G/DL (ref 6.4–8.3)
WBC # BLD: 8 E9/L (ref 4.5–11.5)
WBC # BLD: 8.7 E9/L (ref 4.5–11.5)

## 2022-02-03 PROCEDURE — 93005 ELECTROCARDIOGRAM TRACING: CPT | Performed by: INTERNAL MEDICINE

## 2022-02-03 PROCEDURE — 37799 UNLISTED PX VASCULAR SURGERY: CPT

## 2022-02-03 PROCEDURE — 82962 GLUCOSE BLOOD TEST: CPT

## 2022-02-03 PROCEDURE — 93010 ELECTROCARDIOGRAM REPORT: CPT | Performed by: INTERNAL MEDICINE

## 2022-02-03 PROCEDURE — 6360000002 HC RX W HCPCS: Performed by: NURSE ANESTHETIST, CERTIFIED REGISTERED

## 2022-02-03 PROCEDURE — 2580000003 HC RX 258: Performed by: NURSE ANESTHETIST, CERTIFIED REGISTERED

## 2022-02-03 PROCEDURE — 6360000002 HC RX W HCPCS: Performed by: INTERNAL MEDICINE

## 2022-02-03 PROCEDURE — 36415 COLL VENOUS BLD VENIPUNCTURE: CPT

## 2022-02-03 PROCEDURE — 82105 ALPHA-FETOPROTEIN SERUM: CPT

## 2022-02-03 PROCEDURE — 02HK3NZ INSERTION OF INTRACARDIAC PACEMAKER INTO RIGHT VENTRICLE, PERCUTANEOUS APPROACH: ICD-10-PCS | Performed by: INTERNAL MEDICINE

## 2022-02-03 PROCEDURE — 93005 ELECTROCARDIOGRAM TRACING: CPT | Performed by: PHYSICIAN ASSISTANT

## 2022-02-03 PROCEDURE — 33274 TCAT INSJ/RPL PERM LDLS PM: CPT

## 2022-02-03 PROCEDURE — 6360000002 HC RX W HCPCS: Performed by: NURSE PRACTITIONER

## 2022-02-03 PROCEDURE — 2580000003 HC RX 258: Performed by: PHYSICIAN ASSISTANT

## 2022-02-03 PROCEDURE — 6370000000 HC RX 637 (ALT 250 FOR IP): Performed by: INTERNAL MEDICINE

## 2022-02-03 PROCEDURE — 71045 X-RAY EXAM CHEST 1 VIEW: CPT

## 2022-02-03 PROCEDURE — C1786 PMKR, SINGLE, RATE-RESP: HCPCS

## 2022-02-03 PROCEDURE — 6360000002 HC RX W HCPCS: Performed by: PHYSICIAN ASSISTANT

## 2022-02-03 PROCEDURE — 86316 IMMUNOASSAY TUMOR OTHER: CPT

## 2022-02-03 PROCEDURE — C1769 GUIDE WIRE: HCPCS

## 2022-02-03 PROCEDURE — 86301 IMMUNOASSAY TUMOR CA 19-9: CPT

## 2022-02-03 PROCEDURE — C1894 INTRO/SHEATH, NON-LASER: HCPCS

## 2022-02-03 PROCEDURE — 2700000000 HC OXYGEN THERAPY PER DAY

## 2022-02-03 PROCEDURE — 93308 TTE F-UP OR LMTD: CPT

## 2022-02-03 PROCEDURE — 84100 ASSAY OF PHOSPHORUS: CPT

## 2022-02-03 PROCEDURE — 33274 TCAT INSJ/RPL PERM LDLS PM: CPT | Performed by: INTERNAL MEDICINE

## 2022-02-03 PROCEDURE — 2580000003 HC RX 258

## 2022-02-03 PROCEDURE — 82378 CARCINOEMBRYONIC ANTIGEN: CPT

## 2022-02-03 PROCEDURE — 6370000000 HC RX 637 (ALT 250 FOR IP): Performed by: PHYSICIAN ASSISTANT

## 2022-02-03 PROCEDURE — 76705 ECHO EXAM OF ABDOMEN: CPT

## 2022-02-03 PROCEDURE — 85025 COMPLETE CBC W/AUTO DIFF WBC: CPT

## 2022-02-03 PROCEDURE — 93926 LOWER EXTREMITY STUDY: CPT

## 2022-02-03 PROCEDURE — 2709999900 HC NON-CHARGEABLE SUPPLY

## 2022-02-03 PROCEDURE — 83735 ASSAY OF MAGNESIUM: CPT

## 2022-02-03 PROCEDURE — 99233 SBSQ HOSP IP/OBS HIGH 50: CPT | Performed by: INTERNAL MEDICINE

## 2022-02-03 PROCEDURE — 2140000000 HC CCU INTERMEDIATE R&B

## 2022-02-03 PROCEDURE — 93926 LOWER EXTREMITY STUDY: CPT | Performed by: RADIOLOGY

## 2022-02-03 PROCEDURE — 2500000003 HC RX 250 WO HCPCS

## 2022-02-03 PROCEDURE — 6360000002 HC RX W HCPCS

## 2022-02-03 PROCEDURE — 80053 COMPREHEN METABOLIC PANEL: CPT

## 2022-02-03 PROCEDURE — 2580000003 HC RX 258: Performed by: INTERNAL MEDICINE

## 2022-02-03 RX ORDER — HYDRALAZINE HYDROCHLORIDE 20 MG/ML
10 INJECTION INTRAMUSCULAR; INTRAVENOUS EVERY 6 HOURS PRN
Status: DISCONTINUED | OUTPATIENT
Start: 2022-02-03 | End: 2022-02-05 | Stop reason: HOSPADM

## 2022-02-03 RX ORDER — SODIUM CHLORIDE 0.9 % (FLUSH) 0.9 %
5-40 SYRINGE (ML) INJECTION EVERY 12 HOURS SCHEDULED
Status: DISCONTINUED | OUTPATIENT
Start: 2022-02-03 | End: 2022-02-05 | Stop reason: HOSPADM

## 2022-02-03 RX ORDER — FENTANYL CITRATE 50 UG/ML
INJECTION, SOLUTION INTRAMUSCULAR; INTRAVENOUS PRN
Status: DISCONTINUED | OUTPATIENT
Start: 2022-02-03 | End: 2022-02-03 | Stop reason: SDUPTHER

## 2022-02-03 RX ORDER — DEXTROSE MONOHYDRATE 50 MG/ML
INJECTION, SOLUTION INTRAVENOUS CONTINUOUS PRN
Status: DISCONTINUED | OUTPATIENT
Start: 2022-02-03 | End: 2022-02-03 | Stop reason: SDUPTHER

## 2022-02-03 RX ORDER — CARVEDILOL 6.25 MG/1
6.25 TABLET ORAL 2 TIMES DAILY WITH MEALS
Status: DISCONTINUED | OUTPATIENT
Start: 2022-02-03 | End: 2022-02-05 | Stop reason: HOSPADM

## 2022-02-03 RX ORDER — SODIUM CHLORIDE 9 MG/ML
25 INJECTION, SOLUTION INTRAVENOUS PRN
Status: DISCONTINUED | OUTPATIENT
Start: 2022-02-03 | End: 2022-02-05 | Stop reason: HOSPADM

## 2022-02-03 RX ORDER — VANCOMYCIN HYDROCHLORIDE 1 G/20ML
INJECTION, POWDER, LYOPHILIZED, FOR SOLUTION INTRAVENOUS PRN
Status: DISCONTINUED | OUTPATIENT
Start: 2022-02-03 | End: 2022-02-03 | Stop reason: SDUPTHER

## 2022-02-03 RX ORDER — MAGNESIUM SULFATE IN WATER 40 MG/ML
4000 INJECTION, SOLUTION INTRAVENOUS ONCE
Status: COMPLETED | OUTPATIENT
Start: 2022-02-03 | End: 2022-02-03

## 2022-02-03 RX ORDER — SODIUM CHLORIDE 9 MG/ML
INJECTION, SOLUTION INTRAVENOUS CONTINUOUS PRN
Status: DISCONTINUED | OUTPATIENT
Start: 2022-02-03 | End: 2022-02-03 | Stop reason: SDUPTHER

## 2022-02-03 RX ORDER — PROPOFOL 10 MG/ML
INJECTION, EMULSION INTRAVENOUS CONTINUOUS PRN
Status: DISCONTINUED | OUTPATIENT
Start: 2022-02-03 | End: 2022-02-03 | Stop reason: SDUPTHER

## 2022-02-03 RX ORDER — SODIUM CHLORIDE 0.9 % (FLUSH) 0.9 %
5-40 SYRINGE (ML) INJECTION PRN
Status: DISCONTINUED | OUTPATIENT
Start: 2022-02-03 | End: 2022-02-05 | Stop reason: HOSPADM

## 2022-02-03 RX ORDER — HEPARIN SODIUM 10000 [USP'U]/ML
INJECTION, SOLUTION INTRAVENOUS; SUBCUTANEOUS PRN
Status: DISCONTINUED | OUTPATIENT
Start: 2022-02-03 | End: 2022-02-03 | Stop reason: SDUPTHER

## 2022-02-03 RX ORDER — MIDAZOLAM HYDROCHLORIDE 1 MG/ML
INJECTION INTRAMUSCULAR; INTRAVENOUS PRN
Status: DISCONTINUED | OUTPATIENT
Start: 2022-02-03 | End: 2022-02-03 | Stop reason: SDUPTHER

## 2022-02-03 RX ORDER — ACETAMINOPHEN 325 MG/1
650 TABLET ORAL EVERY 4 HOURS PRN
Status: DISCONTINUED | OUTPATIENT
Start: 2022-02-03 | End: 2022-02-05 | Stop reason: HOSPADM

## 2022-02-03 RX ADMIN — Medication 2000 MG: at 02:48

## 2022-02-03 RX ADMIN — Medication 2000 MG: at 13:19

## 2022-02-03 RX ADMIN — PROPOFOL 20 MG: 10 INJECTION, EMULSION INTRAVENOUS at 13:52

## 2022-02-03 RX ADMIN — HYDRALAZINE HYDROCHLORIDE 10 MG: 20 INJECTION INTRAMUSCULAR; INTRAVENOUS at 21:05

## 2022-02-03 RX ADMIN — MUPIROCIN: 20 OINTMENT TOPICAL at 08:28

## 2022-02-03 RX ADMIN — Medication 2000 MG: at 22:28

## 2022-02-03 RX ADMIN — MIDAZOLAM 0.5 MG: 1 INJECTION INTRAMUSCULAR; INTRAVENOUS at 13:22

## 2022-02-03 RX ADMIN — FENTANYL CITRATE 25 MCG: 50 INJECTION, SOLUTION INTRAMUSCULAR; INTRAVENOUS at 13:22

## 2022-02-03 RX ADMIN — PRAVASTATIN SODIUM 40 MG: 20 TABLET ORAL at 22:28

## 2022-02-03 RX ADMIN — ASPIRIN 81 MG CHEWABLE TABLET 81 MG: 81 TABLET CHEWABLE at 08:26

## 2022-02-03 RX ADMIN — HEPARIN SODIUM 5000 UNITS: 10000 INJECTION INTRAVENOUS; SUBCUTANEOUS at 13:30

## 2022-02-03 RX ADMIN — Medication 10 ML: at 21:05

## 2022-02-03 RX ADMIN — TAMSULOSIN HYDROCHLORIDE 0.4 MG: 0.4 CAPSULE ORAL at 08:26

## 2022-02-03 RX ADMIN — SODIUM CHLORIDE: 9 INJECTION, SOLUTION INTRAVENOUS at 13:14

## 2022-02-03 RX ADMIN — DEXTROSE MONOHYDRATE: 50 INJECTION, SOLUTION INTRAVENOUS at 13:25

## 2022-02-03 RX ADMIN — MAGNESIUM SULFATE HEPTAHYDRATE 4000 MG: 40 INJECTION, SOLUTION INTRAVENOUS at 08:25

## 2022-02-03 RX ADMIN — Medication 10 ML: at 08:26

## 2022-02-03 RX ADMIN — FENTANYL CITRATE 25 MCG: 50 INJECTION, SOLUTION INTRAMUSCULAR; INTRAVENOUS at 14:15

## 2022-02-03 RX ADMIN — PROPOFOL 20 MCG/KG/MIN: 10 INJECTION, EMULSION INTRAVENOUS at 13:14

## 2022-02-03 RX ADMIN — VANCOMYCIN HYDROCHLORIDE 1000 MG: 1 INJECTION, POWDER, LYOPHILIZED, FOR SOLUTION INTRAVENOUS at 13:25

## 2022-02-03 RX ADMIN — FENTANYL CITRATE 25 MCG: 50 INJECTION, SOLUTION INTRAMUSCULAR; INTRAVENOUS at 13:53

## 2022-02-03 RX ADMIN — CARVEDILOL 6.25 MG: 6.25 TABLET, FILM COATED ORAL at 17:27

## 2022-02-03 ASSESSMENT — PULMONARY FUNCTION TESTS
PIF_VALUE: 0

## 2022-02-03 ASSESSMENT — PAIN SCALES - GENERAL
PAINLEVEL_OUTOF10: 0

## 2022-02-03 ASSESSMENT — ENCOUNTER SYMPTOMS
NAUSEA: 0
ABDOMINAL PAIN: 0
ALLERGIC/IMMUNOLOGIC NEGATIVE: 1
VOMITING: 0
EYE PAIN: 0
CHEST TIGHTNESS: 0
PHOTOPHOBIA: 0
COLOR CHANGE: 0
SHORTNESS OF BREATH: 0
RHINORRHEA: 0
BACK PAIN: 0

## 2022-02-03 ASSESSMENT — PAIN SCALES - WONG BAKER
WONGBAKER_NUMERICALRESPONSE: 0

## 2022-02-03 ASSESSMENT — LIFESTYLE VARIABLES: SMOKING_STATUS: 0

## 2022-02-03 NOTE — PROGRESS NOTES
[facility] MD Yves Avila MD Jessie Caprice, MD Dannie Aguas, DO, [facility] PATIENT’S [facility] MD .  Progress note  Patient's Name: Eloise Beatty  12:15 PM  2/3/2022    1/31: Overall doing better, back to his baseline according to his wife was at the bedside. His BUN today is 29 and creatinine down to 1.6  Stable vitals   For cta in am per cardiology (note: Had the CTA on Monday, 1/31)    2/1: Status post dental surgery today without complication. Feeling better. Denies complaint including dyspnea, pain, chest pain or palpitations. No peripheral swelling. 2/2:  Off Unit to surgery for TAVR. 2/3:  Awake and alert. Denies chest pain or SOB. Denies palpitations or pain. History of Present Ilness:      81 y/o M presented to ER with cc of passing out while seated   Has hx of critical aortic Stenosis and TAVR had been planned  Has hx of CKD and baseline cr appear to be around 1.8   He was seen in ED he is on RA , appear euvolemic . I did d/w him and his wife risk of VISHAL including risk of dialysis they verbalzied understanding and informed me they d like to proceed with TAVR as scheduled   No reported n/v/d . Past Medical History:   Diagnosis Date    A-fib ([address] [address]     BPH (benign prostatic hyperplasia)     CAD (coronary artery disease)     Diabetic neuropathy (HCC)     DM (diabetes mellitus) ([address]     HTN (hypertension)     Hyperlipemia     Pancreatitis 9/10    S/P CABG (coronary artery bypass graft)        Past Surgical History:   Procedure Laterality Date    AORTIC VALVE REPLACEMENT N/A 2/2/2022    TRANSCATHETER AORTIC VALVE REPLACEMENT FEMORAL APPROACH performed by Lucho Sanchez MD at [id number]  09/27/2010    COLONOSCOPY      CORONARY ARTERY BYPASS GRAFT      CABG x4 with left internal mammary artery to the LAD. reverse saphenous vein grafts to the 1st marginal branches ofcircumflex, posterior descending branch of the right coronary artery, and posterolateral branch to the right coronary artery, and modified maze procedure with [employer] bipolar radiofrequency ablation, and excision of left atrial appendage.  TONSILLECTOMY         Family History   Problem Relation Age of Onset    High Blood Pressure Mother     Heart Disease Mother     Cancer Father         reports that he has never smoked. He has never used smokeless tobacco. He reports current alcohol use. He reports that he does not use drugs. Allergies:  Patient has no known allergies.     Current Medications:    magnesium sulfate 4000 mg in 100 mL IVPB premix, Once  niCARdipine (CARDENE) 50 mg in sodium chloride 0.9 % 250 mL infusion, Continuous  sodium chloride flush 0.9 % injection 5-40 mL, 2 times per day  sodium chloride flush 0.9 % injection 5-40 mL, PRN  0.9 % sodium chloride infusion, PRN  oxyCODONE-acetaminophen (PERCOCET) 5-325 MG per tablet 1 tablet, Q4H PRN  glucose (GLUTOSE) 40 % oral gel 15 g, PRN  dextrose 50 % IV solution, PRN  glucagon (rDNA) injection 1 mg, PRN  dextrose 5 % solution, PRN  ceFAZolin (ANCEF) 2000 mg in sterile water 20 mL IV syringe, Q8H  perflutren lipid microspheres (DEFINITY) injection 1.65 mg, ONCE PRN  pantoprazole (PROTONIX) tablet 20 mg, QAM AC  mupirocin (BACTROBAN) 2 % ointment, BID  tamsulosin (FLOMAX) capsule 0.4 mg, Daily  diphenhydrAMINE (BENADRYL) tablet 25 mg, Q8H PRN  pravastatin (PRAVACHOL) tablet 40 mg, Nightly  insulin lispro (HUMALOG) injection vial 0-6 Units, TID WC  dextrose 5 % solution, PRN  ondansetron (ZOFRAN-ODT) disintegrating tablet 4 mg, Q8H PRN   Or  ondansetron (ZOFRAN) injection 4 mg, Q6H PRN  polyethylene glycol (GLYCOLAX) packet 17 g, Daily PRN  acetaminophen (TYLENOL) tablet 650 mg, Q6H PRN   Or  acetaminophen (TYLENOL) suppository 650 mg, Q6H PRN  aspirin chewable tablet 81 mg, Daily  [Held by provider] carvedilol (COREG) tablet 6.25 mg, BID         Review of Systems:   Constitutional: no fevers , no chills , feels ok   Eyes: no eye pain , no itching , no drainage  Ears, nose, mouth, throat, and face: no ear ,nose pain , hearing is ok ,no nasal drainage   Respiratory: no sob ,no cough ,no wheezing . Cardiovascular: no chest pain , no palpitation ,no sob . Gastrointestinal: no nausea, vomiting , constipation , no abdominal pain . Genitourinary:no urinary retention , no burning , dysuria . No polyuria   Hematologic/lymphatic: no bleeding , no cougulation issues . Musculoskeletal:no joint pain , no swelling . Neurological: no headaches ,no weakness , no numbness . Endocrine: no thirst , no weight issues . Physical exam:   Vital signs BP (!) 156/51   Pulse 58   Temp 97.2 °F (36.2 °C)   Resp 20   Ht 5' 11\" (1.803 m)   Wt 160 lb (72.6 kg)   SpO2 97%   BMI 22.32 kg/m²   Gen : NAD , appropriate for stated age . Head : at , nc   Neck : supple , no thyromegaly noted . Eyes : EOMI , PERRLA   CV : RRR , No M/R/G . Lungs: CTAB , no wheezing , good flow heard b/l   Abd : soft , NT , BS + , No Organomegaly appreciated . Skin : soft, dry . Neuro : CN  II-XII grossly intact , no focal neurologic deficit . Psych : cooperative .      Data:   Labs:  CBC with Differential:    Lab Results   Component Value Date    WBC 8.0 02/03/2022    RBC 3.54 02/03/2022    HGB 11.0 02/03/2022    HCT 34.0 02/03/2022     02/03/2022    MCV 96.0 02/03/2022    MCH 31.1 02/03/2022    MCHC 32.4 02/03/2022    RDW 13.4 02/03/2022    SEGSPCT 59 12/12/2011    LYMPHOPCT 20.4 02/03/2022    MONOPCT 9.1 02/03/2022    BASOPCT 0.4 02/03/2022    MONOSABS 0.73 02/03/2022    LYMPHSABS 1.63 02/03/2022    EOSABS 0.13 02/03/2022    BASOSABS 0.03 02/03/2022     CMP:    Lab Results   Component Value Date     02/03/2022    K 4.1 02/03/2022    K 4.6 02/02/2022     02/03/2022    CO2 22 02/03/2022    BUN 30 02/03/2022    CREATININE 1.5 02/03/2022    GFRAA 53 02/03/2022    LABGLOM 44 02/03/2022    GLUCOSE 97 02/03/2022 GLUCOSE 101 12/14/2011    PROT 5.5 02/03/2022    LABALBU 3.1 02/03/2022    CALCIUM 8.9 02/03/2022    BILITOT 0.5 02/03/2022    ALKPHOS 59 02/03/2022    AST 16 02/03/2022    ALT 8 02/03/2022     Ionized Calcium:  No results found for: IONCA  Magnesium:    Lab Results   Component Value Date    MG 1.5 02/03/2022     Phosphorus:    Lab Results   Component Value Date    PHOS 3.6 02/03/2022     U/A:    Lab Results   Component Value Date    COLORU Yellow 01/29/2022    PHUR 6.0 01/29/2022    WBCUA 0-1 01/29/2022    RBCUA 0-1 01/29/2022    BACTERIA NONE SEEN 01/29/2022    CLARITYU Clear 01/29/2022    SPECGRAV >=1.030 01/29/2022    LEUKOCYTESUR Negative 01/29/2022    UROBILINOGEN 0.2 01/29/2022    BILIRUBINUR Negative 01/29/2022    BLOODU TRACE-INTACT 01/29/2022    GLUCOSEU Negative 01/29/2022     Microalbumen/Creatinine ratio:  No components found for: RUCREAT  Iron Saturation:  No components found for: PERCENTFE  TIBC:  No results found for: TIBC  FERRITIN:  No results found for: FERRITIN     Imaging:  XR CHEST PORTABLE   Final Result   Development of right basilar atelectasis and small effusion         CTA CHEST W CONTRAST   Final Result   There is small to moderate bilateral pleural effusions right greater than   left with overlying atelectasis. Clinical correlation and follow-up to   resolution recommended. Diffuse coronary artery disease status post CABG. Scattered atherosclerotic   plaque thoracic and visualized abdominal aorta. However diameter of the   subclavian measures down to 5.3 mm on the right and 5.9 mm on the left. Heterogeneously enhancing at lesion anterior segment right lobe of the liver   peripherally. Correlation with any history of malignancy recommended. Recommend further characterization with MRI with contrast or dedicated liver   protocol CT abdomen pelvis to re-evaluate this lesion and potentially screen   for primary. Extensive calcification aortic valve.   Correlation with pre TAVR study same   day. There is a lymph node in the subcarinal region which is poorly defined but   does just meet size criteria to be considered adenopathy. Clinical   correlation and follow-up to resolution recommended. CT ABDOMEN PELVIS WO CONTRAST Additional Contrast? None   Final Result   1. Moderate bilateral pleural effusions with adjacent compressive atelectasis   2. Advanced coronary artery calcifications   3. Nonspecific intestinal gas pattern   4. No signs of urolithiasis or obstructive uropathy. RECOMMENDATIONS:   Unavailable         CT CHEST WO CONTRAST   Final Result   Moderate bilateral pleural effusions, greater towards the right. Mild compressive atelectasis in the lung bases. Developing infiltrates from   pneumonia thought less likely. Heterogeneous area along the lateral margin of the dome of the liver could   represent volume averaging. A developing neoplastic process is thought   somewhat less likely but not entirely excluded and a short-term follow-up   hepatic mass protocol MRI or CT would be useful on a nonemergent basis when   clinically feasible. Cardiomegaly. RECOMMENDATIONS:   Unavailable         US CAROTID ARTERY BILATERAL   Final Result   The right internal carotid artery demonstrates 0-50% stenosis. The left internal carotid artery demonstrates 0-50% stenosis. Bilateral vertebral arteries are patent with flow in the normal direction. Bilateral ICA atherosclerotic plaque. Small amount of plaque in the right   carotid bulb. CT Head WO Contrast   Final Result   1. There is no acute intracranial abnormality. Specifically, there is no   intracranial hemorrhage.    2. Atrophy and periventricular leukomalacia,         XR CHEST (2 VW)   Final Result   Bilateral lower lobe pneumonia, right greater than left         CTA TRANSCATHETER AORTIC VALVE REPLACEMENT    (Results Pending)       Assessment  -Chronic kidney disease stage III baseline cr 1.8-1.9 : stable   -critical aortic Stenosis and TAVR had been planned  -Hx of HTN at goal on coreg   -NIDDM       Creatinine improving  1.9-->1.5 mg/dl which is below baseline.   Mg 1.5  euvolemic      Recommendations  -Magnesium replacement IV  -Continue supportive care  -Monitor labs  -Monitor UO      Electronically signed by EDMUNDO Bardales - SOLITARIO on 2/3/2022

## 2022-02-03 NOTE — PLAN OF CARE
Problem: Skin Integrity:  Goal: Will show no infection signs and symptoms  Description: Will show no infection signs and symptoms  2/2/2022 2036 by Paz Uribe RN  Outcome: Met This Shift     Problem: Skin Integrity:  Goal: Absence of new skin breakdown  Description: Absence of new skin breakdown  2/2/2022 2036 by Paz Uribe RN  Outcome: Met This Shift     Problem: Falls - Risk of:  Goal: Will remain free from falls  Description: Will remain free from falls  2/2/2022 2036 by Paz Uribe RN  Outcome: Met This Shift     Problem: Falls - Risk of:  Goal: Absence of physical injury  Description: Absence of physical injury  2/2/2022 2036 by Paz Uribe RN  Outcome: Met This Shift     Problem: HEMODYNAMIC STATUS  Goal: Patient has stable vital signs and fluid balance  2/2/2022 2036 by Paz Uribe RN  Outcome: Met This Shift     Problem: Fluid Volume:  Goal: Ability to achieve and maintain adequate urine output will improve  Description: Ability to achieve and maintain adequate urine output will improve  Outcome: Met This Shift     Problem: Respiratory:  Goal: Respiratory status will improve  Description: Respiratory status will improve  Outcome: Met This Shift

## 2022-02-03 NOTE — H&P
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 He denies any abdominal pain, nausea, vomiting, GERD, fevers, chills, melena, or hematochezia. He does report having about a 40lb unintentional weight loss over the last month. He is currently retired. He has never had a colonoscopy. He has had a previous cholecystectomy. He has a family hx of cancer in his father, but does not know what kind of cancer. Past Medical History:   Diagnosis Date    [facility]fib ([facility] [address]     BPH (benign prostatic hyperplasia)     CAD (coronary artery disease)     Diabetic neuropathy (HCC)     DM (diabetes mellitus) ([facility] [address]     HTN (hypertension)     Hyperlipemia     Pancreatitis 9/10    S/P CABG (coronary artery bypass graft)        Past Surgical History:   Procedure Laterality Date    AORTIC VALVE REPLACEMENT N/A 2/2/2022    TRANSCATHETER AORTIC VALVE REPLACEMENT FEMORAL APPROACH performed by Mario Carson MD at [address]  09/27/2010    COLONOSCOPY      CORONARY ARTERY BYPASS GRAFT      CABG x4 with left internal mammary artery to the LAD. reverse saphenous vein grafts to the 1st marginal branches ofcircumflex, posterior descending branch of the right coronary artery, and posterolateral branch to the right coronary artery, and modified maze procedure with Medtronic bipolar radiofrequency ablation, and excision of left atrial appendage.     TONSILLECTOMY         Current Facility-Administered Medications   Medication Dose Route Frequency Provider Last Rate Last Admin    carvedilol (COREG) tablet 6.25 mg  6.25 mg Oral BID  Mg Marcial MD        sodium Patient Education       Having a Spinal Tap (Lumbar Puncture)  A spinal tap (lumbar puncture) may be used to help diagnose certain problems in your brain or spinal cord. Prepare for your test as instructed. You may be asked to stop taking aspirin, nonsteroidal anti-inflammatory drugs (NSAIDs), or blood thinners a few days before your test. From start to finish, your spinal tap will take about 15-20 minutes. The test may be done in a medical office. Or it may be done in the emergency room or the hospital. Sometimes the test is done in a radiology office with X-ray guidance or with ultrasound imaging. Your healthcare provider may screen you for bleeding disorders. They may also order a head or spine CT scan and an MRI before the test. This is done to be sure that there is no increased risk for you in having a spinal tap done.   During the test  You will lie on your side with your knees drawn into your chest. (This is called the fetal position.) Or you may be asked to sit bent forward, with your chin down. First, your low back will be wiped with a special disinfectant. Then your skin will be numbed with a medicine (local anesthetic). The healthcare provider will insert a sterile spinal needle through the skin of your lower back until it reaches the sac that contains the cerebrospinal fluid (CSF). You may feel some pain or pressure when this happens. It is important to stay still during the test. Some spinal fluid will be taken out through the needle. The needle is then removed. Finally, a small bandage is placed over the skin puncture site. You may be asked to lie still for a short time before you leave.      During a spinal tap, a sample of cerebrosprinal fluid (CSF) is removed near the base of the spine. The spinal cord is not touched.     After the test  · When you’re able to leave, have an adult family member or friend drive you home.   · When you get home, rest lying down for a certain period of time as directed  by your healthcare provider.  · If you get a headache, lying flat and drinking plenty of fluids may help relieve it. Often the headache is more painful when you stand. It gets better over time. You may also want to take an over-the-counter pain reliever. But don't take aspirin. Caffeine drinks may help relieve a headache after a spinal tap. These include soda, coffee, or tea. A headache may also occur with an upset stomach (nausea), vomiting, or dizziness.  · The day after your spinal tap, you can remove your bandage.  · Your healthcare provider will tell you when your test results are ready.    Call your healthcare provider if you have:  · A severe headache or a headache that lasts 2 or more days  · Double vision  · Pain in your back that doesn't go away  · Tingling in your groin or legs  · Fever  · Change in bowel or urination functions  · New symptoms or symptoms get worse    Beata last reviewed this educational content on 7/1/2021 © 2000-2021 [employer]. All rights reserved. This information is not intended as a substitute for professional medical care. Always follow your healthcare professional's instructions.            chloride flush 0.9 % injection 5-40 mL  5-40 mL IntraVENous 2 times per day Alyssa Martin MD        sodium chloride flush 0.9 % injection 5-40 mL  5-40 mL IntraVENous PRN Alyssa Martin MD        0.9 % sodium chloride infusion  25 mL IntraVENous PRN Alyssa Matrin MD        acetaminophen (TYLENOL) tablet 650 mg  650 mg Oral Q4H PRN Alyssa Martin MD        [START ON 2/4/2022] vancomycin 1000 mg IVPB in 250 mL D5W addavial  1,000 mg IntraVENous Once Alyssa Martin MD        niCARdipine (CARDENE) 50 mg in sodium chloride 0.9 % 250 mL infusion  3-15 mg/hr IntraVENous Continuous Alyssa Martin MD   Stopped at 02/03/22 0837    sodium chloride flush 0.9 % injection 5-40 mL  5-40 mL IntraVENous 2 times per day Alyssa Martin MD   10 mL at 02/03/22 0826    sodium chloride flush 0.9 % injection 5-40 mL  5-40 mL IntraVENous PRN Alyssa Martin MD        0.9 % sodium chloride infusion  25 mL IntraVENous PRN Alyssa Martin MD        oxyCODONE-acetaminophen (PERCOCET) 5-325 MG per tablet 1 tablet  1 tablet Oral Q4H PRN Alyssa Martin MD   1 tablet at 02/02/22 1607    glucose (GLUTOSE) 40 % oral gel 15 g  15 g Oral PRN Belinda Gaytan MD        dextrose 50 % IV solution  12.5 g IntraVENous PRN Alyssa Martin MD        glucagon (rDNA) injection 1 mg  1 mg IntraMUSCular PRN Alyssa Martin MD        dextrose 5 % solution  100 mL/hr IntraVENous PRN Alyssa Martin MD        ceFAZolin (ANCEF) 2000 mg in sterile water 20 mL IV syringe  2,000 mg IntraVENous Q8H Alyssa Martin MD   2,000 mg at 02/03/22 1319    perflutren lipid microspheres (DEFINITY) injection 1.65 mg  1.5 mL IntraVENous ONCE PRN Belinda Gaytan MD        pantoprazole (PROTONIX) tablet 20 mg  20 mg Oral QAM AC Alyssa Martin MD        mupirocin (BACTROBAN) 2 % ointment   Nasal BID Alyssa Martin MD   Given at 02/03/22 0828    tamsulosin (FLOMAX) capsule 0.4 mg  0.4 mg Oral Daily Lyndsey Lopez MD   0.4 mg at 02/03/22 5977    diphenhydrAMINE (BENADRYL) tablet 25 mg  25 mg Oral Q8H PRN Belinda Gaytan MD        pravastatin (PRAVACHOL) tablet 40 mg  40 mg Oral Nightly Lyndsey Lopez MD   40 mg at 02/02/22 2028    insulin lispro (HUMALOG) injection vial 0-6 Units  0-6 Units SubCUTAneous TID WC Lyndsey Lopez MD   1 Units at 02/01/22 1705    dextrose 5 % solution  100 mL/hr IntraVENous PRN Belinda Gaytan MD        ondansetron (ZOFRAN-ODT) disintegrating tablet 4 mg  4 mg Oral Q8H PRN Belinda Gaytan MD        Or    ondansetron (ZOFRAN) injection 4 mg  4 mg IntraVENous Q6H PRN Belinda Gaytan MD        polyethylene glycol (GLYCOLAX) packet 17 g  17 g Oral Daily PRN Belinda Gaytan MD        acetaminophen (TYLENOL) tablet 650 mg  650 mg Oral Q6H PRN Belinda Gaytan MD        Or    acetaminophen (TYLENOL) suppository 650 mg  650 mg Rectal Q6H PRN Lyndsey Lopez MD        aspirin chewable tablet 81 mg  81 mg Oral Daily Lyndsey Lopez MD   81 mg at 02/03/22 4727       No Known Allergies    Family History   Problem Relation Age of Onset    High Blood Pressure Mother     Heart Disease Mother     Cancer Father        Social History     Socioeconomic History    Marital status:      Spouse name: Not on file    Number of children: Not on file    Years of education: Not on file    Highest education level: Not on file   Occupational History    Not on file   Tobacco Use    Smoking status: Never Smoker    Smokeless tobacco: Never Used   Vaping Use    Vaping Use: Never used   Substance and Sexual Activity    Alcohol use: Yes     Comment: rarely- 1 beer    Drug use: No    Sexual activity: Not on file   Other Topics Concern    Not on file   Social History Narrative    Not on file     Social Determinants of Health     Financial Resource Strain:     Difficulty of Paying Living Expenses: negative. Physical Exam:  Vitals:    02/03/22 1541   BP: (!) 156/68   Pulse: 60   Resp:    Temp:    SpO2:        PSYCH: Awake, alert and oriented x 3: No apparent distress, comfortable  EYES: Sclera white, pupils equal round and reactive to light  ENMT:  Hearing normal, trachea midline  LYMPH: no obvious lympadenopathy in neck.    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 Maria Ines Cox    Electronically signed by Mandy Greene MD on 2/3/2022 at 5:22 PM

## 2022-02-03 NOTE — CARE COORDINATION
S/p tavr. Pt developed Chb post op. EP consulted. TVP inserted. Planning perm pacer insertion today. gen surgery consulted for Liver mass found on cta chest. Spoke with pt's wife. Plan remains for pt to return home at discharge. Discussed hhc. She declines at this time. Wife is a retired nurse and feels confident in caring for him. She has my number to contact me if she changes her mind.  Wife will transport him home at discharge

## 2022-02-03 NOTE — ANESTHESIA PRE PROCEDURE
Department of Anesthesiology  Preprocedure Note       Name:  Albertina Glover   Age:  80 y.o.  :  1931                                          MRN:  [id number]         Date:  2/3/2022      Surgeon: Vicenta Montgomery    Procedure: Micra pacer implant, removal TVP    Medications prior to admission:   Prior to Admission medications    Medication Sig Start Date End Date Taking?  Authorizing Provider   amLODIPine (NORVASC) 2.5 MG tablet Take 1 tablet by mouth daily 22   MELY Prado   warfarin (COUMADIN) 5 MG tablet TAKE 1 TABLET BY MOUTH ON  AND WEDNESDAY AND THEN 1 AND 1/2 TABLETS THE REST OF THE DAYS 20   Umesh Alexandre MD   sitaGLIPtan-metformin (JANUMET)  MG per tablet TAKE 1 TABLET BY MOUTH TWICE A DAY WITH MEALS 20   Umesh Alexandre MD   metoprolol tartrate (LOPRESSOR) 25 MG tablet TAKE 1 TABLET BY MOUTH EVERY 12 HOURS 20   Umesh Alexandre MD   warfarin (COUMADIN) 5 MG tablet TAKE 1 TABLET BY MOUTH ON  AND WEDNESDAY AND THEN 1 AND 1/2 TABLETS THE REST OF THE DAYS 20   Umesh Alexandre MD   doxazosin (CARDURA) 4 MG tablet TAKE 1 TABLET BY MOUTH EVERY DAY 20   Umesh Alexandre MD   pravastatin (PRAVACHOL) 40 MG tablet Take one daily 20   Donavan Worley MD       Current medications:    Current Facility-Administered Medications   Medication Dose Route Frequency Provider Last Rate Last Admin    niCARdipine (CARDENE) 50 mg in sodium chloride 0.9 % 250 mL infusion  3-15 mg/hr IntraVENous Continuous MELY Prado   Stopped at 22 0837    sodium chloride flush 0.9 % injection 5-40 mL  5-40 mL IntraVENous 2 times per day MELY Prado   10 mL at 22 0826    sodium chloride flush 0.9 % injection 5-40 mL  5-40 mL IntraVENous PRN MELY Prado        0.9 % sodium chloride infusion  25 mL IntraVENous PRN MELY Prado        oxyCODONE-acetaminophen (PERCOCET) 5-325 MG per tablet 1 tablet  1 tablet Oral Q4H PRN MELY Priest   1 tablet at 02/02/22 1607    glucose (GLUTOSE) 40 % oral gel 15 g  15 g Oral PRN MELY Priest        dextrose 50 % IV solution  12.5 g IntraVENous PRN MELY Priets        glucagon (rDNA) injection 1 mg  1 mg IntraMUSCular PRN MELY Priest        dextrose 5 % solution  100 mL/hr IntraVENous PRN MELY Priest        ceFAZolin (ANCEF) 2000 mg in sterile water 20 mL IV syringe  2,000 mg IntraVENous Q8H MELY Priest   2,000 mg at 02/03/22 0248    perflutren lipid microspheres (DEFINITY) injection 1.65 mg  1.5 mL IntraVENous ONCE PRN Ila Rosenbaum MD        pantoprazole (PROTONIX) tablet 20 mg  20 mg Oral QAM AC Isaac Tran DO        mupirocin (BACTROBAN) 2 % ointment   Nasal BID Audrey Borrego [place]   Given at 02/03/22 4979    tamsulosin (FLOMAX) capsule 0.4 mg  0.4 mg Oral Daily MELY Priest   0.4 mg at 02/03/22 0826    diphenhydrAMINE (BENADRYL) tablet 25 mg  25 mg Oral Q8H PRN MELY Priest        pravastatin (PRAVACHOL) tablet 40 mg  40 mg Oral Nightly MELY Priest   40 mg at 02/02/22 2028    insulin lispro (HUMALOG) injection vial 0-6 Units  0-6 Units SubCUTAneous TID WC MELY Priest   1 Units at 02/01/22 1705    dextrose 5 % solution  100 mL/hr IntraVENous PRN MELY Priest        ondansetron (ZOFRAN-ODT) disintegrating tablet 4 mg  4 mg Oral Q8H PRN MELY Priest        Or    ondansetron (ZOFRAN) injection 4 mg  4 mg IntraVENous Q6H PRN MELY Priest        polyethylene glycol (GLYCOLAX) packet 17 g  17 g Oral Daily PRN MELY Priest        acetaminophen (TYLENOL) tablet 650 mg  650 mg Oral Q6H PRN MELY Priest        Or    acetaminophen (TYLENOL) suppository 650 mg  650 mg Rectal Q6H PRN MELY Priest        aspirin chewable tablet 81 mg  81 mg Oral Daily MELY Priest   81 mg at 02/03/22 0826    [Held by provider] carvedilol (COREG) tablet 6.25 mg  6.25 mg Oral BID  MELY Olmos   6.25 mg at 02/02/22 1707       Allergies:  No Known Allergies    Problem List:    Patient Active Problem List   Diagnosis Code    Cellulitis L03.90    CAD (coronary artery disease) I25.10    S/P CABG (coronary artery bypass graft) Z95.1    DM (diabetes mellitus) ([facility] [address] E11.9    Hyperlipemia E78.5    HTN (hypertension) I10    A-fib (HCC) I48.91    BPH (benign prostatic hyperplasia) N40.0    Diabetic neuropathy ([address] E11.40    Encounter for therapeutic drug monitoring Z51.81    Long term current use of anticoagulant therapy Z79.01    Aortic valve stenosis I35.0    Chronic kidney disease N18.9    Heart valve disease I38    Gout M10.9    Syncope without other cardiovascular symptoms R55, R09.89    Renal failure N19    Mild protein-calorie malnutrition (HCC) E44.1    Syncope R55       Past Medical History:        Diagnosis Date    A-fib ([facility] [address]     BPH (benign prostatic hyperplasia)     CAD (coronary artery disease)     Diabetic neuropathy (HCC)     DM (diabetes mellitus) ([address]     HTN (hypertension)     Hyperlipemia     Pancreatitis 9/10    S/P CABG (coronary artery bypass graft)        Past Surgical History:        Procedure Laterality Date    AORTIC VALVE REPLACEMENT N/A 2/2/2022    TRANSCATHETER AORTIC VALVE REPLACEMENT FEMORAL APPROACH performed by Tayo Clancy MD at [address]  09/27/2010    COLONOSCOPY      CORONARY ARTERY BYPASS GRAFT      CABG x4 with left internal mammary artery to the LAD. reverse saphenous vein grafts to the 1st marginal branches ofcircumflex, posterior descending branch of the right coronary artery, and posterolateral branch to the right coronary artery, and modified maze procedure with Medtronic bipolar radiofrequency ablation, and excision of left atrial appendage.     TONSILLECTOMY         Social History:    Social History     Tobacco Use    Smoking status: Never Smoker    Smokeless tobacco: Never Used   Substance Use Topics    Alcohol use: Yes     Comment: rarely- 1 beer                                Counseling given: Not Answered      Vital Signs (Current):   Vitals:    02/03/22 0800 02/03/22 0900 02/03/22 1000 02/03/22 1100   BP: (!) 141/53 (!) 147/51 (!) 152/48 (!) 156/51   Pulse: 58 58 58 58   Resp: 22 13 18 20   Temp: 36.2 °C (97.2 °F)      TempSrc:       SpO2: 96% 97% 98% 97%   Weight:       Height:                                                  BP Readings from Last 3 Encounters:   02/03/22 (!) 156/51   02/02/22 (!) 171/107   01/27/22 (!) 192/90       NPO Status: Time of last liquid consumption: 0813                        Time of last solid consumption: 1800                        Date of last liquid consumption: 02/02/22 (sip w/ med)                        Date of last solid food consumption: 02/01/22    BMI:   Wt Readings from Last 3 Encounters:   02/03/22 160 lb (72.6 kg)   01/27/22 169 lb (76.7 kg)   11/18/21 159 lb 11.2 oz (72.4 kg)     Body mass index is 22.32 kg/m². CBC:   Lab Results   Component Value Date    WBC 8.0 02/03/2022    RBC 3.54 02/03/2022    HGB 11.0 02/03/2022    HCT 34.0 02/03/2022    MCV 96.0 02/03/2022    RDW 13.4 02/03/2022     02/03/2022       CMP:   Lab Results   Component Value Date     02/03/2022    K 4.1 02/03/2022    K 4.6 02/02/2022     02/03/2022    CO2 22 02/03/2022    BUN 30 02/03/2022    CREATININE 1.5 02/03/2022    GFRAA 53 02/03/2022    LABGLOM 44 02/03/2022    GLUCOSE 97 02/03/2022    GLUCOSE 101 12/14/2011    PROT 5.5 02/03/2022    CALCIUM 8.9 02/03/2022    BILITOT 0.5 02/03/2022    ALKPHOS 59 02/03/2022    AST 16 02/03/2022    ALT 8 02/03/2022       POC Tests: No results for input(s): POCGLU, POCNA, POCK, POCCL, POCBUN, POCHEMO, POCHCT in the last 72 hours.     Coags:   Lab Results   Component Value Date    PROTIME 17.7 02/01/2022    PROTIME 29.8 06/24/2020    INR 1.6 02/01/2022    APTT 31.7 02/01/2022       HCG (If Applicable): No results found for: PREGTESTUR, PREGSERUM, HCG, HCGQUANT     ABGs: No results found for: PHART, PO2ART, ICR1VHN, JTN2VVS, BEART, E4GUMJCV     Type & Screen (If Applicable):  No results found for: LABABO, LABRH    Drug/Infectious Status (If Applicable):  No results found for: HIV, HEPCAB    COVID-19 Screening (If Applicable):   Lab Results   Component Value Date    COVID19 Not Detected 01/28/2022           Anesthesia Evaluation  Patient summary reviewed and Nursing notes reviewed no history of anesthetic complications:   Airway: Mallampati: III  TM distance: >3 FB   Neck ROM: full  Comment: O2 3L nc  Mouth opening: > = 3 FB Dental:          Pulmonary: breath sounds clear to auscultation      (-) not a current smoker                          ROS comment: On 3L O2 nc   Cardiovascular:  Exercise tolerance: poor (<4 METS),   (+) hypertension:, valvular problems/murmurs (s/p TAVR 2/2/22, mild TR): AS and MR, pacemaker (TVP L jo ann): pacemaker, CAD:, CABG/stent (4 vessel CABG):, dysrhythmias (CHB): atrial fibrillation, DESAI:, pulmonary hypertension: severe and moderate, hyperlipidemia      NYHA Classification: IV  ECG reviewed  Rhythm: regular  Rate: normal  Echocardiogram reviewed    Cleared by cardiology           ROS comment: EF 50-55%    PE comment: FL via TVP L groin   Neuro/Psych:   Negative Neuro/Psych ROS              GI/Hepatic/Renal:   (+) renal disease: CRI,          ROS comment: BPH  S/p cholecystectomy. Endo/Other:    (+) Diabetes, blood dyscrasia: anticoagulation therapy:., .          Pt had no PAT visit        ROS comment: gout Abdominal:       Abdomen: soft. Vascular: negative vascular ROS. Other Findings:        Order: [id number]  · Status: Final result   · Visible to patient: No (not released)    · Next appt:  Today at 02:00 PM in IP Unit [facility] EP LAB 1)   · 0 Result Notes    Details    Reading Physician Reading Date Result Priority   Maura Oliveira, DO  [phone] 2/3/2022      Narrative & Impression  Transthoracic Echocardiography Report (TTE)      Demographics      Patient Name    Nela Gillespie       Gender            Male                   [address]  [id number]      Room Number       2052   Number      Account #       [de-identified]     Procedure Date    02/03/2022      Corporate ID                  Ordering          Devika Redd DO                                 Physician      Accession       [id number]    Referring   Number                        Physician      Date of Birth   05/26/1931    Sonographer       Clifton Leach RDCS      Age             80 year(s)    Interpreting      [address]                                 Physician         Physician Cardiology                                                   Margoth Chavira DO                                    Any Other     Procedure     Type of Study      TTE procedure:Echo Limited Study. Procedure Date  Date: 02/03/2022 Start: 07:56 AM     Study Location: Portable  Technical Quality: Adequate visualization     Indications:S/P TAVR. Patient Status: STAT     Height: 70.87 inches Weight: 150 pounds BSA: 1.86 m^2 BMI: 21 kg/m^2     HR: 58 bpm     Allergies    - No known allergies. Findings      Left Ventricle   Ejection fraction is visually estimated at 50-55%. No regional wall motion   abnormalities seen. Severe left ventricular concentric hypertrophy noted. Left ventricle size is normal. Indeterminate diastolic function. Right Ventricle   Normal right ventricle structure and function. Left Atrium   Severely dilated left atrium. Right Atrium   Normal right atrium. Mitral Valve   Mild thickening of the mitral valve leaflets. No evidence of mitral valve   stenosis. Mild mitral regurgitation is present. Tricuspid Valve   The tricuspid valve appears structurally normal.   Mild tricuspid regurgitation. RVSP is 36 mmHg.       Aortic Valve   Hx of TAVR with a 34 mm Evolut Pro+ 2/2/2022. The aortic valve area is 1.9   cm2 with a maximum gradient of 13 mmHg and a mean gradient of 6 mmHg. Mild   paravalvular aortic regurgitation is noted. Pulmonic Valve   Pulmonic valve is structurally normal. Physiologic and/or trace pulmonic   regurgitation present. Pericardial Effusion   No evidence for hemodynamically significant pericardial effusion. Aorta   Normal aortic root and ascending aorta. Miscellaneous   Normal Inferior Vena Cava diameter and respiratory variation. Conclusions      Summary   Ejection fraction is visually estimated at 50-55%. No regional wall motion abnormalities seen. Severe left ventricular concentric hypertrophy noted. Normal right ventricle structure and function. Hx of TAVR with a 34 mm Evolut Pro+ 2/2/2022. The aortic valve area is 1.9 cm2 with a maximum gradient of 13 mmHg and a   mean gradient of 6 mmHg. Mild paravalvular aortic regurgitation is noted. Mild mitral regurgitation is present. Mild tricuspid regurgitation. RVSP is 36 mmHg. Physiologic and/or trace pulmonic regurgitation present. No evidence for hemodynamically significant pericardial effusion. Signature      ----------------------------------------------------------------   Electronically signed by Irais Walker DO(Interpreting   physician) on 02/03/2022 09:11 AM          Anesthesia Plan      MAC     ASA 4     (R radial A line  Mag gtt infusing  PIV x2)  Induction: intravenous. Anesthetic plan and risks discussed with patient and spouse. Use of blood products discussed with patient and spouse whom. Plan discussed with attending.                   EDMUNDO Linares - MARGARITA   2/3/2022

## 2022-02-03 NOTE — PROGRESS NOTES
WBC 8.0 02/03/2022    HGB 11.0 (L) 02/03/2022    HCT 34.0 (L) 02/03/2022    MCV 96.0 02/03/2022     (L) 02/03/2022     Lab Results   Component Value Date     02/02/2022    K 4.6 02/02/2022    K 4.6 02/02/2022     02/02/2022    CO2 27 02/02/2022    BUN 33 02/02/2022    CREATININE 1.9 02/02/2022    GLUCOSE 110 02/02/2022    GLUCOSE 101 12/14/2011    CALCIUM 9.1 02/02/2022        ASSESSMENT  1. Syncope, unspecified syncope type    2. S/p TAVR  3.  Bradycardia, CHB   PLAN    Critical care  Possible pacemaker insertion  I will consult GI for liver mass

## 2022-02-03 NOTE — PROGRESS NOTES
TAVR TEAM PROGRESS NOTE      POD # 1 s/p TAVR with #34-mm Medtronic Evolut PRO+ valve; Destinee Millard/[place]    TAVR access: right common femoral artery percutaneous approach  Pigtail access: left common femoral artery    Subjective:    Resting comfortably in bed; echo just completed   Denies complaints; afebrile    Currently on 4L NC with sats 94-98%   Labs and vitals stable   Awaiting permanent pacemaker implantation later today      Current Facility-Administered Medications   Medication Dose Route Frequency Provider Last Rate Last Admin    magnesium sulfate 4000 mg in 100 mL IVPB premix  4,000 mg IntraVENous Once Dima EDMUNDO Levin - CNP 25 mL/hr at 02/03/22 0825 4,000 mg at 02/03/22 0825    niCARdipine (CARDENE) 50 mg in sodium chloride 0.9 % 250 mL infusion  3-15 mg/hr IntraVENous Continuous MELY Torres   Stopped at 02/03/22 3095    sodium chloride flush 0.9 % injection 5-40 mL  5-40 mL IntraVENous 2 times per day MELY Torres   10 mL at 02/03/22 0826    sodium chloride flush 0.9 % injection 5-40 mL  5-40 mL IntraVENous PRN MELY Torres        0.9 % sodium chloride infusion  25 mL IntraVENous PRN MELY Torres        oxyCODONE-acetaminophen (PERCOCET) 5-325 MG per tablet 1 tablet  1 tablet Oral Q4H PRN MELY Torres   1 tablet at 02/02/22 1607    glucose (GLUTOSE) 40 % oral gel 15 g  15 g Oral PRN MELY Torres        dextrose 50 % IV solution  12.5 g IntraVENous PRN MELY Torres        glucagon (rDNA) injection 1 mg  1 mg IntraMUSCular PRN MELY Torres        dextrose 5 % solution  100 mL/hr IntraVENous PRN MELY Torres        ceFAZolin (ANCEF) 2000 mg in sterile water 20 mL IV syringe  2,000 mg IntraVENous Q8H MELY Torres   2,000 mg at 02/03/22 0248    perflutren lipid microspheres (DEFINITY) injection 1.65 mg  1.5 mL IntraVENous ONCE PRN Erendira Jacques MD        pantoprazole (PROTONIX) tablet 20 mg  20 mg Oral QAM AC Baljeet Barkley,         mupirocin (BACTROBAN) 2 % ointment   Nasal BID Clide Mood, [address]   Given at 02/03/22 9155    tamsulosin (FLOMAX) capsule 0.4 mg  0.4 mg Oral Daily Clide Mood, PA   0.4 mg at 02/03/22 8725    diphenhydrAMINE (BENADRYL) tablet 25 mg  25 mg Oral Q8H PRN Clide Mood, PA        pravastatin (PRAVACHOL) tablet 40 mg  40 mg Oral Nightly Clide Mood, PA   40 mg at 02/02/22 2028    insulin lispro (HUMALOG) injection vial 0-6 Units  0-6 Units SubCUTAneous TID WC Clide Mood, PA   1 Units at 02/01/22 1705    dextrose 5 % solution  100 mL/hr IntraVENous PRN Clide Mood, PA        ondansetron (ZOFRAN-ODT) disintegrating tablet 4 mg  4 mg Oral Q8H PRN Clide Mood, PA        Or    ondansetron (ZOFRAN) injection 4 mg  4 mg IntraVENous Q6H PRN Clide Mood, PA        polyethylene glycol (GLYCOLAX) packet 17 g  17 g Oral Daily PRN Clide Mood, PA        acetaminophen (TYLENOL) tablet 650 mg  650 mg Oral Q6H PRN Clide Mood, PA        Or    acetaminophen (TYLENOL) suppository 650 mg  650 mg Rectal Q6H PRN Clide Mood, PA        aspirin chewable tablet 81 mg  81 mg Oral Daily Clide Mood, PA   81 mg at 02/03/22 0826    [Held by provider] carvedilol (COREG) tablet 6.25 mg  6.25 mg Oral BID WC Clide Mood, PA   6.25 mg at 02/02/22 1707           Intake/Output Summary (Last 24 hours) at 2/3/2022 1048  Last data filed at 2/3/2022 0700  Gross per 24 hour   Intake 900 ml   Output 900 ml   Net 0 ml       Patient Vitals for the past 96 hrs (Last 3 readings):   Weight   02/03/22 0558 160 lb (72.6 kg)   02/02/22 0655 150 lb 3.2 oz (68.1 kg)   02/01/22 0735 150 lb 12.8 oz (68.4 kg)          Physical Exam:  BP (!) 152/48   Pulse 58   Temp 97.2 °F (36.2 °C)   Resp 18   Ht 5' 11\" (1.803 m)   Wt 160 lb (72.6 kg)   SpO2 98%   BMI 22.32 kg/m²     General: No acute distress, appears his stated age, nonicteric  Head: Atraumatic, no gross abnormalities or bruises  Neck: Supple and nontender, no carotid bruits, no JVP  Lungs: Clear to auscultation bilaterally, no wheezes, rales, or rhonchi  Heart: Regular rate and rhythm, no murmurs, rubs, or gallops  Abdomen: Soft, nontender, nondistended, normal bowel sounds  Extremities: No obvious deformities, no cyanosis, no edema  Neurological: Alert and oriented x3, EOMI, moving all extremities x4  Psychological: Normal mood and affect, cooperative  Skin: Color, texture, and turgor normal for age     Access sites: soft, non tender with minimal ecchymoses. No hematoma or pulsatile masses. Distal pulses normal b/l. TVP in left groin       Lab Review     Recent Labs     02/01/22  0606 02/02/22  0526 02/03/22  0530   WBC 6.4 6.9 8.0   HGB 12.3* 12.2* 11.0*   HCT 37.6 38.1 34.0*   * 118* 107*       Recent Labs     02/01/22  0606 02/01/22  0606 02/02/22  0526 02/03/22  0530     --  141 143   K 4.1  4.1   < > 4.6  4.6 4.1     --  105 108*   CO2 24  --  27 22   PHOS  --   --  3.4 3.6   BUN 35*  --  33* 30*   CREATININE 1.9*  --  1.9* 1.5*    < > = values in this interval not displayed. Recent Labs     02/03/22  0530   AST 16   ALT 8   ALKPHOS 59       No results for input(s): BNP, CKTOTAL, CKMB in the last 72 hours. Recent Labs     02/01/22  1437   INR 1.6       EKG pre-TAVR: atrial fibrillation, RBBB/LAFB    EKG today: AV paced    POD1 TTE:   Ejection fraction is visually estimated at 50-55%. No regional wall motion abnormalities seen. Severe left ventricular concentric hypertrophy noted. Normal right ventricle structure and function. Hx of TAVR with a 34 mm Evolut Pro+ 2/2/2022. The aortic valve area is 1.9 cm2 with a maximum gradient of 13 mmHg and a mean gradient of 6 mmHg. Mild paravalvular aortic regurgitation is noted. Mild mitral regurgitation is present. Mild tricuspid regurgitation. RVSP is 36 mmHg. Physiologic and/or trace pulmonic regurgitation present.   No evidence for hemodynamically significant pericardial effusion. Assessment:  1. Critical aortic stenosis s/p TAVR using 34-mm Medtronic Evolut PRO+ valve  2. CAD status post CABG in 2009 with LIMA to LAD, SVG to OM1, SVG to RPDA, SVG to RPL with modified maze and SALENA exclusion -grafts patent on CTA  3. PAF on warfarin prior to admission  4. Type 2 diabetes on orals  5. CKD 3B-4  6. PAD  7. HFrEF with severely reduced LVEF  8. 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 Presented with syncope    NYHA class IV      Plan:  1. For permanent pacemaker today - resume carvedilol post procedure   2. Increase activity as tolerated  3. Antithrombotic therapy: Aspirin for 3 to 6 months plus low-dose apixaban (in lieu of warfarin)  4. Access site check in 2 weeks  5. Valve Clinic follow-up in 4 weeks  6. Endocarditis prophylaxis indefinitely before high-risk procedures  7. Inpatient followed by outpatient cardiac rehab  8. Anticipate discharge tomorrow     Discussed with Dr. Sylvia Gutierrez PA-C    I independently performed an evaluation on the patient. I have reviewed the above documentation completed by the advance practitioner. Please see my additional contributions to the HPI, physical exam, assessment and medical decision making. Physical Exam   BP (!) 152/48   Pulse 58   Temp 97.2 °F (36.2 °C)   Resp 18   Ht 5' 11\" (1.803 m)   Wt 160 lb (72.6 kg)   SpO2 98%   BMI 22.32 kg/m²   Constitutional: Oriented to person, place, and time. Well-developed and well-nourished. No distress. Head: Normocephalic and atraumatic. Eyes: EOM are normal. Pupils are equal, round, and reactive to light. Neck: Normal range of motion. Neck supple. No hepatojugular reflux and no JVD present. Carotid bruit is not present. No tracheal deviation present. No thyromegaly present. Cardiovascular: Normal rate, regular rhythm, normal heart sounds and intact distal pulses.   Exam reveals no gallop and no friction rub. No murmur heard. Pulmonary/Chest: Effort normal and breath sounds normal. No respiratory distress. No wheezes. No rales. No tenderness. Abdominal: Soft. Bowel sounds are normal. No distension and no mass. No tenderness. No rebound and no guarding. Musculoskeletal: Normal range of motion. No edema and no tenderness. Lymphadenopathy:   No cervical adenopathy. Neurological: Alert and oriented to person, place, and time. Skin: Skin is warm and dry. No rash noted. Not diaphoretic. No erythema. Psychiatric: Normal mood and affect. Behavior is normal.     See accompanying documentation for full progress note. Neftali Lan D.O.   Cardiologist  Cardiology, [address] [facility]

## 2022-02-03 NOTE — PROGRESS NOTES
[facility] Electrophysiology  Inpatient Progress Report  PATIENT: Martha Martinez  MEDICAL RECORD NUMBER: [id number]  DATE OF SERVICE:  2/3/2022  ATTENDING ELECTROPHYSIOLOGIST: Pat Childs DO   PRIMARY ELECTROPHYSIOLOGIST: Pat Childs DO   REFERRING PHYSICIAN: Rainer Spencer MD  and Jacki Smith MD  CHIEF COMPLAINT: Altered Mental status. HPI: Martha Martinez  is a 80 y.o. male with a history of severe AS sp 34-mm Evolut PRO+ TAVR (4/4/00) complicated by 3* AV block, nonvalvular longstanding persistent AF sp MAZE and SALENA exclusion (2009), HFrEF-presumed nonischemic, HTN, CAD sp CABG x 4 (2009: LIMA to LAD, SVG to OM1, SVG to RPDA, SVG to RPL), DM2, and CKD-3. He is managed by Dr. Salma Maria with Norvasc 2.5 mg daily, Coumadin, Lopressor, Cardura, and pravastatin. At some point prior to 2009 he was diagnosed with nonvalvular AF and CAD. In 2009, he was treated with CABG, surgical MAZE, and SALENA exclusion. The details of this procedure are not available to me. In 2017, he was diagnosed with moderate AS in the setting of LVEF of 50-55% and SR with 1* AVB and LAFB. In 4/2019, he was noted to have moderate-severe AS, LVEF of 60%. In 2020, he was diagnosed with recurrence of AF/AFL with RBBB and LAFB. Appears no attempt to restore SR was made. In 1/2022, he was diagnosed with severe AS and LVEF of 30% in setting of ongoing AF/AFL. He had a syncopal event at home and was admitted to [facility] (1-). Due to his CKD he saw nephrology who noted he was a moderate to high risk of progression of his CKD. He underwent extraction of multiple teeth on 02/01/22. CTA chest was performed instead of [facility]. Per Interventional Cardiology grafts were felt to be patent on CTA chest. CT also reported a heterogenous liver mass concerning for malignancy. On 2/2/22, he underwent TAVR, which was complicated by 3* AV block.  A temporary transvenous pacing wire via right groin was placed during TAVR. EP service is now consulted by admitting physician for evaluation and management of 3* AV block. A limited echo showed a recovered LVEF of 50-55% and he is agreeable to a leadless device, he remains pacer dependent with stable TVP function. Prior Cardiac testing   · TTE (02/03/22): LVEF 50-55%   · ECG (02/02/22): AF with VE rhythm rate 29 bpm.  · CTA (01/31/22): Prior to TAVR Patent LIMA-LAD, SVG-OM, SVG to RPDA, and SVG RPL    · ECG (02/01/22): AF rate 57 bpm, RBBB, LAFB   · ECG (01/28/22): AF with LAD rate 61 bpm   · TTE (01/14/22): LVEF 30%, severe AS valve area 0.6 mean gradient 69mmHg   · ECG (12/09/20): AF rate 45 BPM with LAFB and RBBB   · TTE (04/24/19): LVEF 60%, severly dilated LA moderate AS   · ECG (04/10/19): SB rate 51 BPM.   · TTE (06/22/17):LVEF 50-55%, Moderate AS. Past Medical History:   Diagnosis Date    A-fib ([facility]     BPH (benign prostatic hyperplasia)     CAD (coronary artery disease)     Diabetic neuropathy (HCC)     DM (diabetes mellitus) ([facility]     HTN (hypertension)     Hyperlipemia     Pancreatitis 9/10    S/P CABG (coronary artery bypass graft)      Past Surgical History:   Procedure Laterality Date    AORTIC VALVE REPLACEMENT N/A 2/2/2022    TRANSCATHETER AORTIC VALVE REPLACEMENT FEMORAL APPROACH performed by Georgi Lozoya MD at [id number]  09/27/2010    COLONOSCOPY      CORONARY ARTERY BYPASS GRAFT      CABG x4 with left internal mammary artery to the LAD. reverse saphenous vein grafts to the 1st marginal branches ofcircumflex, posterior descending branch of the right coronary artery, and posterolateral branch to the right coronary artery, and modified maze procedure with Medtronic bipolar radiofrequency ablation, and excision of left atrial appendage.     TONSILLECTOMY        Family History   Problem Relation Age of Onset    High Blood Pressure Mother     Heart Disease Mother     Cancer Father        Social History lispro (HUMALOG) injection vial 0-6 Units  0-6 Units SubCUTAneous TID  MELY Bolden   1 Units at 02/01/22 1705    dextrose 5 % solution  100 mL/hr IntraVENous PRN MELY Bolden        ondansetron (ZOFRAN-ODT) disintegrating tablet 4 mg  4 mg Oral Q8H PRN MELY Bolden        Or    ondansetron (ZOFRAN) injection 4 mg  4 mg IntraVENous Q6H PRN MELY Bolden        polyethylene glycol (GLYCOLAX) packet 17 g  17 g Oral Daily PRN MELY Bolden        acetaminophen (TYLENOL) tablet 650 mg  650 mg Oral Q6H PRN MELY Bolden        Or    acetaminophen (TYLENOL) suppository 650 mg  650 mg Rectal Q6H PRN MELY Bolden        aspirin chewable tablet 81 mg  81 mg Oral Daily MELY Bolden   81 mg at 02/03/22 1034    [Held by provider] carvedilol (COREG) tablet 6.25 mg  6.25 mg Oral BID  MELY Bolden   6.25 mg at 02/02/22 1707     Facility-Administered Medications Ordered in Other Encounters   Medication Dose Route Frequency Provider Last Rate Last Admin    propofol injection   IntraVENous Continuous PRN Learta Shearing, APRN - CRNA 4.356 mL/hr at 02/03/22 1339 10 mcg/kg/min at 02/03/22 1339    0.9 % sodium chloride infusion   IntraVENous Continuous PRN Learta Shearing, APRN - CRNA   New Bag at 02/03/22 1314    midazolam (VERSED) injection   IntraVENous PRN Learta Shearing, APRN - CRNA   0.5 mg at 02/03/22 1322    fentaNYL (SUBLIMAZE) injection   IntraVENous PRN Learta Shearing, APRN - CRNA   25 mcg at 02/03/22 1322    heparin (porcine) injection   IntraVENous PRN Learta Shearing, APRN - CRNA   5,000 Units at 02/03/22 1330        No Known Allergies    PHYSICAL EXAM:   Vitals:    02/03/22 0800 02/03/22 0900 02/03/22 1000 02/03/22 1100   BP: (!) 141/53 (!) 147/51 (!) 152/48 (!) 156/51   Pulse: 58 58 58 58   Resp: 22 13 18 20   Temp: 97.2 °F (36.2 °C)      TempSrc:       SpO2: 96% 97% 98% 97%   Weight:       Height: Constitutional: In NAD, lethargic   Head: Normocephalic and atraumatic. Neck: No hepatojugular reflux and no JVD present  Cardiovascular: S1 , S2 present bradycardic   Pulmonary/Chest:  No respiratory distress. Laying nearly flat   Abdominal: Soft. Normal appearance   Musculoskeletal: Normal range of motion of all extremities  Neurological: lethargic   Skin: Skin is warm and dry. R groin intact L groin no hematoma TVP in place   Extremity:   No edema. Psychiatric: Normal mood and affect. Thought content normal.     I have personally reviewed the laboratory, cardiac diagnostic and radiographic testing as outlined below:    Data:    Recent Labs     02/01/22  0606 02/02/22  0526 02/03/22  0530   WBC 6.4 6.9 8.0   HGB 12.3* 12.2* 11.0*   HCT 37.6 38.1 34.0*   * 118* 107*     Recent Labs     02/01/22  0606 02/01/22  0606 02/02/22 0526 02/03/22  0530     --  141 143   K 4.1  4.1   < > 4.6  4.6 4.1     --  105 108*   CO2 24  --  27 22   BUN 35*  --  33* 30*   CREATININE 1.9*  --  1.9* 1.5*   CALCIUM 9.5  --  9.1 8.9    < > = values in this interval not displayed. Lab Results   Component Value Date    MG 1.5 02/03/2022     No results for input(s): TSH in the last 72 hours. Recent Labs     02/01/22  1437   INR 1.6     Telemetry:  at 60 bpm  No underlying       I have independently reviewed all of the ECGs and rhythm strips per above     Assessment/Plan:   1. Complete heart block   - Pre TAVR noted to have RBBB and LAFB. - Temporary transvenous pacer placed during TAVR via right groin. Threshold at 1 mA. Programmed VVI 60 bpm.  - Currently 100% RV paced with underling VE at 29 bpm.   - Continue bedrest with telemetry monitoring.  - LVEF improved to 50-55% will plan for MICRA placement today. 2. Nonvalvular permanent AF sp MAZE and SALENA exlusion (2009)  -Initially diagnosed at some point prior to 2009. -CHADSVASC = (age, HTN, CAD, HF, DM).  Recommend [address] in males with score >/= 1.  -HAS-BLED = 2 (age, Concomitant antiplatelet therapy). Moderate bleeding risk. Recommend PPI to reduce risk of GI bleed. -Patient had SALENA exclusion in 2009. Details are not available to me. No SALENA evaluation available since that time. Surgical SALENA ligation is not an alternative to [address], but rather complimentary to [address] for further reduction in thromboembolic events. He is on VKA at home. INR goal 2-3. Consider change to DOAC after CIED implant. I will discuss with Cardiology.  -He had surgical MAZE in 2009. Details of procedure are unavailable to me. Patient appears to have been left in AF/AFL since ~2020. No prior DCCV or AA per available chart review. Due to improved LVEF post TAVR will pursue rate control strategy at this time. -Modifiable risk factors:  --Obesity: BMI goal < 27  --GUILLERMO: consider screening in future  --HTN: BP goal < 130/80  --EtOH: avoid    3. HFpEF-recovered/nonischemic 2/2 AS  - Medical management per Cardiology    4. Severe AS sp 34mm Medtronic Evolut Pro+ TAVR (2/2/22)  -Management per Cardiology/Structural Heart Disease. Aspirin x 3-6 months. 5. CAD sp CABG x 4 (2009)   -Per Cardiology, all graft patient on CTA of the chest prior to TAVR. -Management per Cardiology. 6. 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     7. Liver Mass  -Management per Primary Service. -CT comments: Heterogeneously enhancing at lesion anterior segment right lobe of the liver peripherally.  Correlation with any history of malignancy recommended. Recommend further characterization with MRI with contrast or dedicated liver protocol CT abdomen pelvis to re-evaluate this lesion and potentially screen for primary. Thank you for allowing me to participate in your patient's care. Please call me if there are any questions or concerns. Discussed with Dr. Janine Hodgkins.    Marlo Santos, APRN - CNP  Cardiac Electrophysiology  [facility]) Physicians  [facility]: [facility]  1:50 PM  2/3/2022    Attending Supervising Physicians Attestation Statement  I was present with the nurse practitioner during the history and exam. I discussed the findings and plans with the nurse practitioner and agree as documented in his note     Electronically signed by Angie Lantigua DO on 2/3/22 at 9:33 PM EST

## 2022-02-03 NOTE — OP NOTE
[address]  [place] [address] [facility]  Procedure Report  PATIENT: Kali Roth  MEDICAL RECORD NUMBER: [id number]  DATE OF PROCEDURE:  2/3/2022  ATTENDING ELECTROPHYSIOLOGIST:  Tamara Vargas MD  REFERRING PHYSICIAN: Dr. Odilia Alanis) PERFORMED:  1. Leadless pacemaker implantation   2. Ultrasound guided venous access   3. Temporary pacing wire removal    INDICATION FOR PROCEDURE:  80 y.o. male with a history of permanent atrial fibrillation with complete AV block post TAVR. ANESTHESIOLOGIST/CRNA: Cathleen Mckeon CRNA    MEDICATIONS:  1. Heparin (please see the nursing report for details)    CONTRAST: 30 cc    ESTIMATED BLOOD LOSS: minimal    COMPLICATIONS: None immediately apparent    FLUOROSCOPY TIME: 10.6 minutes. Implanted device information:  1. Leadless Pacemaker Capsule  Medtronic Model Micra # [id number]. Serial # [id number]  Placement: Right ventricular apical septum  Date of implant: 2/3/2022  R-waves: 6.3 mV  Pacing threshold: 0.63 V at 0.24 ms. Impedance: 680 ohms. 3. Bradycardia parameters:  Mode: VVIR  Base Rate: 60  Upper Activity Rate: 120    DESCRIPTION OF PROCEDURE: The patient came to the cardiac electrophysiology laboratory in a fasting and nonsedated state. Informed consent was obtained in advance. The patient was moved to the laboratory table and prepped and draped in the usual sterile fashion for trans-catheter pacemaker implantation. They were under the care of anesthesiology for deep sedation and hemodynamic monitoring. For full details of which please see the anesthesiology records. The skin and fascia overlying the right femoral vein was infiltrated with 2% lidocaine using a 25-gauge needle. Using the modified Seldinger technique, the right femoral vein were accessed once using ultrasound guidance to confirm venous patency and directly visualize venipuncture.   A short J tipped guide wire was advance into the right femoral veins under direct ultrasound guidance. Over the guidewire in the right femoral vein one 7-Fr sheath was introduced to the right femoral vein. The 7-Fr sheath was removed over an exchange length Amplatzer super stiff guidewire for a seriess of dilators up to 24Fr to help the insertion of the final sheath. A 5000 unit bolus of heparin was given at this time. The Micra delivery sheath was prepped in the usual fashion. The Micra delivery sheath was advanced over the Amzplatzer guidewire up into the high right atrium. The Micra delivery catheter was also prepped in the usual fashion and inserted into the delivery sheath. Heparinized saline flush was attached to the side-arm of the delivery sheath. The Micra deliver catheter was advanced into the high right atria and curved across the tricuspid valve. The device was then advanced to the apical septum which was confirmed with numerous contrast injections using orthogonal flouroscopic VALENZUELA/Swedish views. The tether was unlocked and the device was delivered using adequate forward tension to ensure adequate fixation into the ventricular myocardium. Multiple Cine images were taken in orthogonal views again to confirm that at least 2 active fixation splines were well attached. A tug test was performed and this was confirmed. Device testing was performed and adequate sensing, impedance and capture threshold were determined. Please see above for details. Once we were satisfied with the device interrogation/reprogramming we decided to release the Micra device from the delivery catheter. The tether pin was cut and the tether mechanism was slowly removed. Final flouroscopy and testing confirmed no significant changes in position or electrical measurements. The delivery catheter was removed and the delivery sheath was also removed. The temporary pacing wire was removed under fluoroscopic guidance.  The sheath was removed and hemostasis was obtained using manual compression. The right femoral vein access site was closed using an 0-Ethibond sutures using a \"figure of eight\" technique. Additional manual compression was held for hemostasis. At the end of the procedure the patient was hemodynamically stable and under the care of the anesthesiology staff and will be brought back to the recovery area for post procedural monitoring. Conclusions:  Successful implantation of leadless pacemaker (Medtronic Micra VR) into the right ventricle via trans-catheter technique. Recommendations  1. Six hours of bed-rest is recommended and suture removal will be performed after 3 hours. 2. Stat portable chest x-ray to check device placement now and in the morning. 4. EKG to be performed now and in the morning. 5. Post-procedural monitoring on the telemetry. 6. The patient will receive 24-hours of cassidy-operative intravenous antibiotics. 7. The patient's device will be interrogated in the morning by a representative of the device . 8. The patient is to follow-up in device clinic within 1-2 weeks upon discharge for a wound and device checks. 9. The patient is advised follow all post-operative device and wound care instructions as per the information provided in the pre-operative clinic    Georgia Barillas MD  Cardiac Electrophysiology  [facility]  [facility] [place]: [facility]  12:46 PM  2/3/2022    Addendum:  The patient developed small left groin hematoma after left sided TVP sheath was removed and during manual compression suspected failure of arterial closure device that was deployed yesterday during TAVR. Prolonged manual compression was performed and no further bleeding or expanding of hematoma noted. Will check CBC and ultrasound of left groin to exclude AV fistula or pseudoaneurysm of left femoral artery. Will keep sand bag on and continue bedrest for 6 hours.     Georgia Barillas MD  Cardiac Electrophysiology  [facility]  [facility] [place]: [facility]  2:49 PM  2/3/2022

## 2022-02-03 NOTE — PROGRESS NOTES
RN ntoified echo of patient needed one as soon as possible. They are not able to be reached. Message left on answering machine. Will continue to monitor patient.

## 2022-02-04 ENCOUNTER — APPOINTMENT (OUTPATIENT)
Dept: GENERAL RADIOLOGY | Age: 87
DRG: 266 | End: 2022-02-04
Payer: MEDICARE

## 2022-02-04 LAB
ALBUMIN SERPL-MCNC: 3.4 G/DL (ref 3.5–5.2)
ALP BLD-CCNC: 60 U/L (ref 40–129)
ALT SERPL-CCNC: <5 U/L (ref 0–40)
ANION GAP SERPL CALCULATED.3IONS-SCNC: 13 MMOL/L (ref 7–16)
AST SERPL-CCNC: 14 U/L (ref 0–39)
BASOPHILS ABSOLUTE: 0.05 E9/L (ref 0–0.2)
BASOPHILS RELATIVE PERCENT: 0.6 % (ref 0–2)
BILIRUB SERPL-MCNC: 0.5 MG/DL (ref 0–1.2)
BUN BLDV-MCNC: 33 MG/DL (ref 6–23)
CALCIUM SERPL-MCNC: 9.1 MG/DL (ref 8.6–10.2)
CHLORIDE BLD-SCNC: 103 MMOL/L (ref 98–107)
CO2: 22 MMOL/L (ref 22–29)
CREAT SERPL-MCNC: 1.7 MG/DL (ref 0.7–1.2)
EOSINOPHILS ABSOLUTE: 0.13 E9/L (ref 0.05–0.5)
EOSINOPHILS RELATIVE PERCENT: 1.6 % (ref 0–6)
GFR AFRICAN AMERICAN: 46
GFR NON-AFRICAN AMERICAN: 38 ML/MIN/1.73
GLUCOSE BLD-MCNC: 120 MG/DL (ref 74–99)
HCT VFR BLD CALC: 33.7 % (ref 37–54)
HEMOGLOBIN: 11 G/DL (ref 12.5–16.5)
IMMATURE GRANULOCYTES #: 0.03 E9/L
IMMATURE GRANULOCYTES %: 0.4 % (ref 0–5)
LYMPHOCYTES ABSOLUTE: 1.88 E9/L (ref 1.5–4)
LYMPHOCYTES RELATIVE PERCENT: 23.6 % (ref 20–42)
MAGNESIUM: 2.1 MG/DL (ref 1.6–2.6)
MAGNESIUM: 2.3 MG/DL (ref 1.6–2.6)
MCH RBC QN AUTO: 30.7 PG (ref 26–35)
MCHC RBC AUTO-ENTMCNC: 32.6 % (ref 32–34.5)
MCV RBC AUTO: 94.1 FL (ref 80–99.9)
METER GLUCOSE: 123 MG/DL (ref 74–99)
METER GLUCOSE: 158 MG/DL (ref 74–99)
METER GLUCOSE: 249 MG/DL (ref 74–99)
MONOCYTES ABSOLUTE: 0.86 E9/L (ref 0.1–0.95)
MONOCYTES RELATIVE PERCENT: 10.8 % (ref 2–12)
NEUTROPHILS ABSOLUTE: 5.03 E9/L (ref 1.8–7.3)
NEUTROPHILS RELATIVE PERCENT: 63 % (ref 43–80)
PDW BLD-RTO: 13.6 FL (ref 11.5–15)
PLATELET # BLD: 110 E9/L (ref 130–450)
PMV BLD AUTO: 12 FL (ref 7–12)
POTASSIUM SERPL-SCNC: 4.2 MMOL/L (ref 3.5–5)
RBC # BLD: 3.58 E12/L (ref 3.8–5.8)
SODIUM BLD-SCNC: 138 MMOL/L (ref 132–146)
TOTAL PROTEIN: 6 G/DL (ref 6.4–8.3)
WBC # BLD: 8 E9/L (ref 4.5–11.5)

## 2022-02-04 PROCEDURE — 80053 COMPREHEN METABOLIC PANEL: CPT

## 2022-02-04 PROCEDURE — 99233 SBSQ HOSP IP/OBS HIGH 50: CPT | Performed by: INTERNAL MEDICINE

## 2022-02-04 PROCEDURE — 71045 X-RAY EXAM CHEST 1 VIEW: CPT

## 2022-02-04 PROCEDURE — 97530 THERAPEUTIC ACTIVITIES: CPT

## 2022-02-04 PROCEDURE — 2580000003 HC RX 258: Performed by: INTERNAL MEDICINE

## 2022-02-04 PROCEDURE — 6370000000 HC RX 637 (ALT 250 FOR IP): Performed by: INTERNAL MEDICINE

## 2022-02-04 PROCEDURE — 85025 COMPLETE CBC W/AUTO DIFF WBC: CPT

## 2022-02-04 PROCEDURE — 2140000000 HC CCU INTERMEDIATE R&B

## 2022-02-04 PROCEDURE — 99222 1ST HOSP IP/OBS MODERATE 55: CPT | Performed by: TRANSPLANT SURGERY

## 2022-02-04 PROCEDURE — 2700000000 HC OXYGEN THERAPY PER DAY

## 2022-02-04 PROCEDURE — 6370000000 HC RX 637 (ALT 250 FOR IP): Performed by: NURSE PRACTITIONER

## 2022-02-04 PROCEDURE — 97161 PT EVAL LOW COMPLEX 20 MIN: CPT

## 2022-02-04 PROCEDURE — 36415 COLL VENOUS BLD VENIPUNCTURE: CPT

## 2022-02-04 PROCEDURE — 82962 GLUCOSE BLOOD TEST: CPT

## 2022-02-04 PROCEDURE — 6360000002 HC RX W HCPCS: Performed by: INTERNAL MEDICINE

## 2022-02-04 PROCEDURE — 83735 ASSAY OF MAGNESIUM: CPT

## 2022-02-04 PROCEDURE — 93005 ELECTROCARDIOGRAM TRACING: CPT | Performed by: INTERNAL MEDICINE

## 2022-02-04 RX ADMIN — SODIUM CHLORIDE, PRESERVATIVE FREE 10 ML: 5 INJECTION INTRAVENOUS at 21:10

## 2022-02-04 RX ADMIN — Medication 10 ML: at 21:00

## 2022-02-04 RX ADMIN — ASPIRIN 81 MG CHEWABLE TABLET 81 MG: 81 TABLET CHEWABLE at 10:03

## 2022-02-04 RX ADMIN — PANTOPRAZOLE SODIUM 20 MG: 20 TABLET, DELAYED RELEASE ORAL at 06:57

## 2022-02-04 RX ADMIN — CARVEDILOL 6.25 MG: 6.25 TABLET, FILM COATED ORAL at 10:02

## 2022-02-04 RX ADMIN — PRAVASTATIN SODIUM 40 MG: 20 TABLET ORAL at 21:08

## 2022-02-04 RX ADMIN — APIXABAN 2.5 MG: 2.5 TABLET, FILM COATED ORAL at 13:12

## 2022-02-04 RX ADMIN — CARVEDILOL 6.25 MG: 6.25 TABLET, FILM COATED ORAL at 19:23

## 2022-02-04 RX ADMIN — INSULIN LISPRO 1 UNITS: 100 INJECTION, SOLUTION INTRAVENOUS; SUBCUTANEOUS at 13:12

## 2022-02-04 RX ADMIN — SODIUM CHLORIDE, PRESERVATIVE FREE 10 ML: 5 INJECTION INTRAVENOUS at 02:21

## 2022-02-04 RX ADMIN — APIXABAN 2.5 MG: 2.5 TABLET, FILM COATED ORAL at 21:08

## 2022-02-04 RX ADMIN — VANCOMYCIN HYDROCHLORIDE 1000 MG: 1 INJECTION, POWDER, LYOPHILIZED, FOR SOLUTION INTRAVENOUS at 02:21

## 2022-02-04 RX ADMIN — INSULIN LISPRO 2 UNITS: 100 INJECTION, SOLUTION INTRAVENOUS; SUBCUTANEOUS at 19:23

## 2022-02-04 RX ADMIN — SODIUM CHLORIDE, PRESERVATIVE FREE 10 ML: 5 INJECTION INTRAVENOUS at 10:04

## 2022-02-04 RX ADMIN — HYDRALAZINE HYDROCHLORIDE 10 MG: 20 INJECTION INTRAMUSCULAR; INTRAVENOUS at 03:48

## 2022-02-04 RX ADMIN — SODIUM CHLORIDE, PRESERVATIVE FREE 10 ML: 5 INJECTION INTRAVENOUS at 03:48

## 2022-02-04 RX ADMIN — Medication 2000 MG: at 06:19

## 2022-02-04 RX ADMIN — TAMSULOSIN HYDROCHLORIDE 0.4 MG: 0.4 CAPSULE ORAL at 10:02

## 2022-02-04 RX ADMIN — MUPIROCIN: 20 OINTMENT TOPICAL at 10:03

## 2022-02-04 ASSESSMENT — PAIN SCALES - GENERAL
PAINLEVEL_OUTOF10: 0

## 2022-02-04 NOTE — PROGRESS NOTES
B SURGERY  DAILY PROGRESS NOTE  2/4/2022  Chief Complaint   Patient presents with    Altered Mental Status     GDX 2247 per wife became altered, became oriented for ems       Subjective:  Consulted for liver mass at segment 4 which was incidentally seen on CTA chest as workup prior to TAVR placement. Went yesterday for pacemaker placement. US of liver was done which showed architecture consistent with hemangioma. Tumor markers ordered - CEA is back which was normal.    Objective:  BP (!) 163/51   Pulse 59   Temp 97.6 °F (36.4 °C) (Temporal)   Resp 16   Ht 5' 11\" (1.803 m)   Wt 158 lb 14.4 oz (72.1 kg)   SpO2 99%   BMI 22.16 kg/m²     In: 165 [P.O.:165]  Out: 325 [Urine:325]    General appearance: NAD, responds to questions appropriately  Head: NCAT, PERRLA, EOMI, red conjunctiva  Neck: supple, no masses, trachea midline  Lungs: Equal chest rise bilaterally, no retractions, no audible wheezing  Heart: warm throughout  Abdomen: soft, NTTP throughout, ND, no fluid wave, no caput medusa  Skin: no obvious rashes or lesions appreciated, no jaundice  Extremities: atraumatic, no focal motor deficits, no open wounds  Psych: no tremor, visual hallucinations      Assessment/Plan:  80 y.o. male s/p TAVR for critical AS and pacemaker placement yesterday w/ incidentally found segment 4 liver mass. Tumor markers pending and ultrasound c/w hemangioma. Will need CTA triphasic to better characterize mass but renal function is prohibitive. Creatinine 1.7 from 1.5 yesterday.     - await tumor markers  - CTA triphasic liver when renal function has improved        Edgar Tobin MD  General Surgery Resident, PGY-4    Electronically signed on 2/4/2022 at 8:19 AM

## 2022-02-04 NOTE — CARE COORDINATION
2/4/22 Update CM note; Patient is s/p tavr, pacer insertion. EP has signed off. Per surgery no further work up. Patient being started on a diet. He is starting Eliquis. PT eval is pending. His wife has declined homecare. He will discharge to home with her. Will follow for readiness to discharge.  Electronically signed by Marshall Champagne RN CM on 2/4/2022 at 11:46 AM

## 2022-02-04 NOTE — CARE COORDINATION
2/4/22 Update CM note; Patient with need for wheeled walker for discharge. Order in and [facility] dme notified and will deliver walker to the room for discharge tomorrow. Electronically signed by Audi Clarke RN CM on 2/4/2022 at 3:03 PM      The Plan for Transition of Care is related to the following treatment goals: dme equipment     The Patient and/or patient representative was provided with a choice of provider and agrees   with the discharge plan. [x] Yes [] No    Freedom of choice list was provided with basic dialogue that supports the patient's individualized plan of care/goals, treatment preferences and shares the quality data associated with the providers.  [x] Yes [] No

## 2022-02-04 NOTE — PROGRESS NOTES
TAVR TEAM PROGRESS NOTE      POD #  s/p TAVR with #34-mm Medtronic Evolut PRO+ valve; Destinee Millard/[place]ma    TAVR access: right common femoral artery percutaneous approach  Pigtail access: left common femoral artery    Subjective:   Sitting up in chair; OOB with PT assistance but states he was \"wobbly\"  Denies complaints; afebrile   Currently on 2L NC with sats 95%  Labs and vitals stable  U/S left groin obtained yesterday after TVP pulled but no hematoma or pseudoaneurysm noted      Current Facility-Administered Medications   Medication Dose Route Frequency Provider Last Rate Last Admin    apixaban (ELIQUIS) tablet 2.5 mg  2.5 mg Oral BID EDMUNDO Mosher - CNP        carvedilol (COREG) tablet 6.25 mg  6.25 mg Oral BID WC Mg Marcial MD   6.25 mg at 02/04/22 1002    sodium chloride flush 0.9 % injection 5-40 mL  5-40 mL IntraVENous 2 times per day Mg Marcial MD   10 mL at 02/04/22 1004    sodium chloride flush 0.9 % injection 5-40 mL  5-40 mL IntraVENous PRN Mg Marcial MD   10 mL at 02/04/22 0348    0.9 % sodium chloride infusion  25 mL IntraVENous PRN Mg Marcial MD        acetaminophen (TYLENOL) tablet 650 mg  650 mg Oral Q4H PRN Mg Marcial MD        hydrALAZINE (APRESOLINE) injection 10 mg  10 mg IntraVENous Q6H PRN Hawa English MD   10 mg at 02/04/22 0348    niCARdipine (CARDENE) 50 mg in sodium chloride 0.9 % 250 mL infusion  3-15 mg/hr IntraVENous Continuous Mg Marcial MD   Stopped at 02/03/22 0837    sodium chloride flush 0.9 % injection 5-40 mL  5-40 mL IntraVENous 2 times per day Mg Marcial MD   10 mL at 02/03/22 2105    sodium chloride flush 0.9 % injection 5-40 mL  5-40 mL IntraVENous PRN Mg Marcial MD   10 mL at 02/04/22 0221    0.9 % sodium chloride infusion  25 mL IntraVENous PRN Mg Marcial MD        oxyCODONE-acetaminophen (PERCOCET) 5-325 MG per tablet 1 tablet  1 tablet Oral Q4H PRN Mariselrn Bobbi Block MD   1 tablet at 02/02/22 1607    glucose (GLUTOSE) 40 % oral gel 15 g  15 g Oral PRN Genaro Montalvo MD        dextrose 50 % IV solution  12.5 g IntraVENous PRN Genaro Montalvo MD        glucagon (rDNA) injection 1 mg  1 mg IntraMUSCular PRN Genaro Montalvo MD        dextrose 5 % solution  100 mL/hr IntraVENous PRN Belinda Gaytan MD        perflutren lipid microspheres (DEFINITY) injection 1.65 mg  1.5 mL IntraVENous ONCE PRN Genaro Montalvo MD        pantoprazole (PROTONIX) tablet 20 mg  20 mg Oral QAM AC Genaro Montalvo MD   20 mg at 02/04/22 0657    mupirocin (BACTROBAN) 2 % ointment   Nasal BID Genaro Montalvo MD   Given at 02/04/22 1003    tamsulosin (FLOMAX) capsule 0.4 mg  0.4 mg Oral Daily Genaro Montalvo MD   0.4 mg at 02/04/22 1002    diphenhydrAMINE (BENADRYL) tablet 25 mg  25 mg Oral Q8H PRN Genaro Montalvo MD        pravastatin (PRAVACHOL) tablet 40 mg  40 mg Oral Nightly Genaro Montalvo MD   40 mg at 02/03/22 2228    insulin lispro (HUMALOG) injection vial 0-6 Units  0-6 Units SubCUTAneous TID WC Genaro Montalvo MD   1 Units at 02/01/22 1705    dextrose 5 % solution  100 mL/hr IntraVENous PRN Genaro Montalvo MD        ondansetron (ZOFRAN-ODT) disintegrating tablet 4 mg  4 mg Oral Q8H PRN Genaro Montalvo MD        Or    ondansetron (ZOFRAN) injection 4 mg  4 mg IntraVENous Q6H PRN Genaro Montalvo MD        polyethylene glycol (GLYCOLAX) packet 17 g  17 g Oral Daily PRN Genaro Montalvo MD        acetaminophen (TYLENOL) tablet 650 mg  650 mg Oral Q6H PRN Belinda Gaytan MD        Or    acetaminophen (TYLENOL) suppository 650 mg  650 mg Rectal Q6H PRN Belinda Pridet, MD        aspirin chewable tablet 81 mg  81 mg Oral Daily Genaro Montalvo MD   81 mg at 02/04/22 1003           Intake/Output Summary (Last 24 hours) at 2/4/2022 1238  Last data filed at 2/4/2022 0819  Gross per 24 hour   Intake 405 ml Output 325 ml   Net 80 ml       Patient Vitals for the past 96 hrs (Last 3 readings):   Weight   02/04/22 0331 158 lb 14.4 oz (72.1 kg)   02/03/22 0558 160 lb (72.6 kg)   02/02/22 0655 150 lb 3.2 oz (68.1 kg)          Physical Exam:  /60   Pulse 60   Temp 97.6 °F (36.4 °C) (Temporal)   Resp 16   Ht 5' 11\" (1.803 m)   Wt 158 lb 14.4 oz (72.1 kg)   SpO2 95%   BMI 22.16 kg/m²     General: No acute distress, appears his stated age, nonicteric  Head: Atraumatic, no gross abnormalities or bruises  Neck: Supple and nontender, no carotid bruits, no JVP  Lungs: Clear to auscultation bilaterally, no wheezes, rales, or rhonchi  Heart: Regular rate and rhythm, no murmurs, rubs, or gallops  Abdomen: Soft, nontender, nondistended, normal bowel sounds  Extremities: No obvious deformities, no cyanosis, no edema  Neurological: Alert and oriented x3, EOMI, moving all extremities x4  Psychological: Normal mood and affect, cooperative  Skin: Color, texture, and turgor normal for age     Access sites: soft, non tender with minimal ecchymoses. No hematoma or pulsatile masses. Distal pulses normal b/l. Lab Review     Recent Labs     02/03/22  0530 02/03/22  1445 02/04/22  0706   WBC 8.0 8.7 8.0   HGB 11.0* 10.9* 11.0*   HCT 34.0* 33.8* 33.7*   * 117* 110*       Recent Labs     02/02/22  0526 02/02/22  0526 02/03/22  0530 02/03/22  1935 02/04/22  0706      < > 143 143 138   K 4.6  4.6   < > 4.1 4.8 4.2      < > 108* 107 103   CO2 27   < > 22 26 22   PHOS 3.4  --  3.6  --   --    BUN 33*   < > 30* 31* 33*   CREATININE 1.9*   < > 1.5* 1.9* 1.7*    < > = values in this interval not displayed. Recent Labs     02/04/22  0706   AST 14   ALT <5   ALKPHOS 60       No results for input(s): BNP, CKTOTAL, CKMB in the last 72 hours.     Recent Labs     02/01/22  1437   INR 1.6       EKG pre-TAVR: atrial fibrillation, RBBB/LAFB    EKG today: V paced    POD1 TTE:   Ejection fraction is visually estimated at 50-55%. No regional wall motion abnormalities seen. Severe left ventricular concentric hypertrophy noted. Normal right ventricle structure and function. Hx of TAVR with a 34 mm Evolut Pro+ 2/2/2022. The aortic valve area is 1.9 cm2 with a maximum gradient of 13 mmHg and a mean gradient of 6 mmHg. Mild paravalvular aortic regurgitation is noted. Mild mitral regurgitation is present. Mild tricuspid regurgitation. RVSP is 36 mmHg. Physiologic and/or trace pulmonic regurgitation present. No evidence for hemodynamically significant pericardial effusion. Assessment:  1. Critical aortic stenosis s/p TAVR using 34-mm Medtronic Evolut PRO+ valve  2. CAD status post CABG in 2009 with LIMA to LAD, SVG to OM1, SVG to RPDA, SVG to RPL with modified maze and SALENA exclusion -grafts patent on CTA  3. PAF on warfarin prior to admission  4. Type 2 diabetes on orals  5. CKD 3B-4  6. PAD  7. HFrEF with severely reduced LVEF  8. 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 Presented with syncope  10. Benign liver hemangioma     NYHA class IV      Plan:  S/p leadless pacemaker implantation yesterday   Increase activity as tolerated -- PT on case   Antithrombotic therapy: Aspirin for 3 to 6 months plus low-dose apixaban (in lieu of warfarin)  Access site check in 2 weeks  Valve Clinic follow-up in 4 weeks  Endocarditis prophylaxis indefinitely before high-risk procedures  Inpatient followed by outpatient cardiac rehab  Anticipate discharge tomorrow     Discussed with Dr. Genesis Adams PA-C    I independently performed an evaluation on the patient. I have reviewed the above documentation completed by the advance practitioner. Please see my additional contributions to the HPI, physical exam, assessment and medical decision making. Romana Schwalbe, D.O.   Cardiologist  Cardiology, [address] [facility]

## 2022-02-04 NOTE — CONSULTS
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 He denies any abdominal pain, nausea, vomiting, GERD, fevers, chills, melena, or hematochezia. He does report having about a 40lb unintentional weight loss over the last month. He is currently retired. He has never had a colonoscopy. He has had a previous cholecystectomy. He has a family hx of cancer in his father, but does not know what kind of cancer. Past Medical History:   Diagnosis Date    [facility]     BPH (benign prostatic hyperplasia)     CAD (coronary artery disease)     Diabetic neuropathy (HCC)     DM (diabetes mellitus) ([facility]     HTN (hypertension)     Hyperlipemia     Pancreatitis 9/10    S/P CABG (coronary artery bypass graft)        Past Surgical History:   Procedure Laterality Date    AORTIC VALVE REPLACEMENT N/A 2/2/2022    TRANSCATHETER AORTIC VALVE REPLACEMENT FEMORAL APPROACH performed by Ana Solorio MD at [id number]  09/27/2010    COLONOSCOPY      CORONARY ARTERY BYPASS GRAFT      CABG x4 with left internal mammary artery to the LAD. reverse saphenous vein grafts to the 1st marginal branches ofcircumflex, posterior descending branch of the right coronary artery, and posterolateral branch to the right coronary artery, and modified maze procedure with [employer] bipolar radiofrequency ablation, and excision of left atrial appendage.     TONSILLECTOMY         Current Facility-Administered Medications   Medication Dose Route Frequency Provider Last Rate Last Admin    carvedilol (COREG) tablet 6.25 mg  6.25 mg Oral BID  Karl Fitzpatrick MD        sodium chloride flush 0.9 % injection 5-40 mL  5-40 mL IntraVENous 2 times per day Clifton Guardado MD        sodium chloride flush 0.9 % injection 5-40 mL  5-40 mL IntraVENous PRN Clifton Guardado MD        0.9 % sodium chloride infusion  25 mL IntraVENous PRN Clifton Guardado MD        acetaminophen (TYLENOL) tablet 650 mg  650 mg Oral Q4H PRN Clifton Guardado MD        [START ON 2/4/2022] vancomycin 1000 mg IVPB in 250 mL D5W addavial  1,000 mg IntraVENous Once Clifton Guardado MD        niCARdipine (CARDENE) 50 mg in sodium chloride 0.9 % 250 mL infusion  3-15 mg/hr IntraVENous Continuous Clifton Guardado MD   Stopped at 02/03/22 0837    sodium chloride flush 0.9 % injection 5-40 mL  5-40 mL IntraVENous 2 times per day Clifton Guardado MD   10 mL at 02/03/22 0826    sodium chloride flush 0.9 % injection 5-40 mL  5-40 mL IntraVENous PRN Clifton Guardado MD        0.9 % sodium chloride infusion  25 mL IntraVENous PRN Clifton Guardado MD        oxyCODONE-acetaminophen (PERCOCET) 5-325 MG per tablet 1 tablet  1 tablet Oral Q4H PRN Clifton Guardado MD   1 tablet at 02/02/22 1607    glucose (GLUTOSE) 40 % oral gel 15 g  15 g Oral PRN Belinda Gaytan MD        dextrose 50 % IV solution  12.5 g IntraVENous PRN Clifton Guardado MD        glucagon (rDNA) injection 1 mg  1 mg IntraMUSCular PRN Clifton Guardado MD        dextrose 5 % solution  100 mL/hr IntraVENous PRN Clifton Guardado MD        ceFAZolin (ANCEF) 2000 mg in sterile water 20 mL IV syringe  2,000 mg IntraVENous Q8H Clifton Guardado MD   2,000 mg at 02/03/22 1319    perflutren lipid microspheres (DEFINITY) injection 1.65 mg  1.5 mL IntraVENous ONCE PRN Belinda Gaytan MD        pantoprazole (PROTONIX) tablet 20 mg  20 mg Oral QAM AC Clifton Guardado MD        mupirocin (BACTROBAN) 2 % ointment   Nasal BID Clifton Guardado MD   Given at 02/03/22 0828    tamsulosin (FLOMAX) capsule 0.4 mg  0.4 mg Oral Daily Antwan Reilly MD   0.4 mg at 02/03/22 6406    diphenhydrAMINE (BENADRYL) tablet 25 mg  25 mg Oral Q8H PRN Belinda Gaytan MD        pravastatin (PRAVACHOL) tablet 40 mg  40 mg Oral Nightly Antwan Reilly MD   40 mg at 02/02/22 2028    insulin lispro (HUMALOG) injection vial 0-6 Units  0-6 Units SubCUTAneous TID WC Antwan Reilly MD   1 Units at 02/01/22 1705    dextrose 5 % solution  100 mL/hr IntraVENous PRN Belinda Gaytan MD        ondansetron (ZOFRAN-ODT) disintegrating tablet 4 mg  4 mg Oral Q8H PRN Belinda Gaytan MD        Or    ondansetron (ZOFRAN) injection 4 mg  4 mg IntraVENous Q6H PRN Belinda Gaytan MD        polyethylene glycol (GLYCOLAX) packet 17 g  17 g Oral Daily PRN Belinda Gaytan MD        acetaminophen (TYLENOL) tablet 650 mg  650 mg Oral Q6H PRN Belinda Gaytan MD        Or    acetaminophen (TYLENOL) suppository 650 mg  650 mg Rectal Q6H PRN Antwan Reilly MD        aspirin chewable tablet 81 mg  81 mg Oral Daily Antwan Reilly MD   81 mg at 02/03/22 8961       No Known Allergies    Family History   Problem Relation Age of Onset    High Blood Pressure Mother     Heart Disease Mother     Cancer Father        Social History     Socioeconomic History    Marital status:      Spouse name: Not on file    Number of children: Not on file    Years of education: Not on file    Highest education level: Not on file   Occupational History    Not on file   Tobacco Use    Smoking status: Never Smoker    Smokeless tobacco: Never Used   Vaping Use    Vaping Use: Never used   Substance and Sexual Activity    Alcohol use: Yes     Comment: rarely- 1 beer    Drug use: No    Sexual activity: Not on file   Other Topics Concern    Not on file   Social History Narrative    Not on file     Social Determinants of Health     Financial Resource Strain:     Difficulty of Paying Living Expenses: Not on file   Food Insecurity:     Worried About [address] in the Last Year: Not on file    Alisha of Food in the Last Year: Not on file   Transportation Needs:     Lack of Transportation (Medical): Not on file    Lack of Transportation (Non-Medical): Not on file   Physical Activity:     Days of Exercise per Week: Not on file    Minutes of Exercise per Session: Not on file   Stress:     Feeling of Stress : Not on file   Social Connections:     Frequency of Communication with Friends and Family: Not on file    Frequency of Social Gatherings with Friends and Family: Not on file    Attends Denominational Services: Not on file    Active Member of [address] [place] [address] or Organizations: Not on file    Attends Club or Organization Meetings: Not on file    Marital Status: Not on file   Intimate Partner Violence:     Fear of Current or Ex-Partner: Not on file    Emotionally Abused: Not on file    Physically Abused: Not on file    Sexually Abused: Not on file   Housing Stability:     Unable to Pay for Housing in the Last Year: Not on file    Number of [place] in the Last Year: Not on file    Unstable Housing in the Last Year: Not on file       ROS:   Review of Systems   Constitutional: Negative for appetite change, chills and fever. HENT: Negative for ear pain, postnasal drip and rhinorrhea. Eyes: Negative for photophobia and pain. Respiratory: Negative for chest tightness and shortness of breath. Cardiovascular: Negative for chest pain. Gastrointestinal: Negative for abdominal pain, nausea and vomiting. Endocrine: Negative for polyphagia and polyuria. Genitourinary: Negative for flank pain and urgency. Musculoskeletal: Negative for back pain. Skin: Negative for color change and rash. Allergic/Immunologic: Negative. Neurological: Negative for dizziness and headaches. Hematological: Negative. Psychiatric/Behavioral: Negative.     All other systems reviewed and are negative. Physical Exam:  Vitals:    02/03/22 1541   BP: (!) 156/68   Pulse: 60   Resp:    Temp:    SpO2:        PSYCH: Awake, alert and oriented x 3: No apparent distress, comfortable  EYES: Sclera white, pupils equal round and reactive to light  ENMT:  Hearing normal, trachea midline  LYMPH: no obvious lympadenopathy in neck. 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 Franko Martin' care.     Electronically signed by Lindy Graves MD on 2/4/2022 at 9:46 AM

## 2022-02-04 NOTE — PROGRESS NOTES
Subjective: The patient is awake and alert. No problems overnight. Denies chest pain, angina, and dyspnea. Denies abdominal pain. Tolerating diet. No nausea or vomiting. He states he feels better this am.  Objective:    BP (!) 163/51   Pulse 59   Temp 97.6 °F (36.4 °C) (Temporal)   Resp 16   Ht 5' 11\" (1.803 m)   Wt 158 lb 14.4 oz (72.1 kg)   SpO2 99%   BMI 22.16 kg/m²   HEENT no adenopathy no bruits  Heart:  RRR, no murmurs, gallops, or rubs.   Lungs:  CTA bilaterally, no wheeze, rales or rhonchi  Abd: bowel sounds present, nontender, nondistended, no masses  Extrem:  No clubbing, cyanosis, or edema  WBC/Hgb/Hct/Plts:  8.7/10.9/33.8/117 (02/03 0404) basic metabolic panel     Assessment:    Patient Active Problem List   Diagnosis    Cellulitis    CAD (coronary artery disease)    S/P CABG (coronary artery bypass graft)    DM (diabetes mellitus) ([address]    Hyperlipemia    HTN (hypertension)    A-fib ([address]    BPH (benign prostatic hyperplasia)    Diabetic neuropathy ([address]    Encounter for therapeutic drug monitoring    Long term current use of anticoagulant therapy    Aortic valve stenosis    Chronic kidney disease    Heart valve disease    Gout    Syncope without other cardiovascular symptoms    Renal failure    Mild protein-calorie malnutrition (HCC)    Syncope    Complete AV block ([address]       Plan:  HPB following mass  Increase activity, will ask PT team to see him        Kevin Lainez, DO  6:20 AM  2/4/2022

## 2022-02-04 NOTE — PROGRESS NOTES
Physical Therapy   Initial Assessment     Name: Morales Thurman  : 1931  MRN: [id number]      Date of Service: 2022    Evaluating PT:  Willian Nam. Talia Miller, [id number]    Room #:  4395/8629-T  Diagnosis:  Syncope, unspecified syncope type [R55]  Syncope without other cardiovascular symptoms [R55, R09.89]  PMHx/PSHx:     has a past medical history of A-fib ([facility] BPH (benign prostatic hyperplasia), CAD (coronary artery disease), Diabetic neuropathy ([facility] DM (diabetes mellitus) ([facility] HTN (hypertension), Hyperlipemia, Pancreatitis, and S/P CABG (coronary artery bypass graft). has a past surgical history that includes Cholecystectomy (2010); Cardiac surgery; Colonoscopy; Tonsillectomy; Coronary artery bypass graft; Aortic valve replacement (N/A, 2022); and Pacemaker insertion (N/A, 2022). Precautions:  Fall risk  Equipment Needs:  Front wheeled walker    SUBJECTIVE:    Pt lives with his wife in a 2 story home with 2 stairs to enter and 1 rail, bath/bedroom are upstairs with a flight of stairs and 1 rail to access. Pt ambulated with no AD PTA. OBJECTIVE:   Initial Evaluation  Date: 22 Treatment Short Term/ Long Term   Goals   AM-PAC 6 Clicks      Was pt agreeable to Eval/treatment? Yes     Does pt have pain?  Denied     Bed Mobility  Rolling: Independent  Supine to sit: Modified Independent  Sit to supine: NT  Scooting: Modified Independent     Transfers Sit to stand: SBA  Stand to sit: SBA  Stand pivot: SBA  Independent   Ambulation    20 feet with SPC with James  >50 feet with [employer] with Modified [place]   Stair negotiation: ascended and descended  NT  10 steps with 1 rail with Modified [place]   BLE ROM WNL     BLE Strength Grossly 4-/5     Balance Sitting: Independent  Standing: SBA  Modified [place]     Pt is A & O x 4  Sensation:  Denied numbness/tingling  Edema:  None noted in BLE    Therapeutic Exercises:    Ambulation: 2x20 feet with SPC with James  Sit<>stand x2 with SBA/Supervision  Stand pivot transfer x2 with [employer] with SBA    Patient education  Pt educated on sequencing with cane, safety concerns with ambulation, fall risk, need to progress ambulation with assistance. Patient response to education:   Pt verbalized understanding Pt demonstrated skill Pt requires further education in this area   Yes Yes Reinforce     ASSESSMENT:    Conditions Requiring Skilled Therapeutic Intervention:    [x]Decreased strength     []Decreased ROM  [x]Decreased functional mobility  [x]Decreased balance   [x]Decreased endurance   []Decreased posture  []Decreased sensation  []Decreased coordination   []Decreased vision  [x]Decreased safety awareness   []Increased pain       Comments: The pt was able to ambulate with a cane, wide IDA noted and the pt occasionally reached for objects with no AD, improved when given a cane. Confirmed no pacemaker precautions with nurse, pt may use a WW and recommend the pt use a walker at this time due to his unsteadiness and BLE weakness. The pt was left sitting up in a bedside chair resting comfortably. Treatment:  Patient practiced and was instructed in the following treatment:     Gait training: verbal cues for cane sequencing and safety, verbal cues for appropriate step length and cane placement, physical assist for steadying    Transfer training: verbal cues for hand placement during sit to stand transfer and assistance for anterior weight shift in order to facilitate initiation of the stand. Pt's/ family goals   1. To return home    Prognosis is good for reaching above PT goals. Patient and or family understand(s) diagnosis, prognosis, and plan of care.   Yes    PHYSICAL THERAPY PLAN OF CARE:    PT POC is established based on physician order and patient diagnosis     Referring provider/PT Order:  Dilip Seo DO  Diagnosis:  Syncope, unspecified syncope type [R55]  Syncope without other cardiovascular symptoms [R55, R09.89]  Specific instructions for next treatment:  Progress ambulation    Current Treatment Recommendations:     [x] Strengthening to improve independence with functional mobility   [] ROM to improve independence with functional mobility   [x] Balance Training to improve static/dynamic balance and to reduce fall risk  [x] Endurance Training to improve activity tolerance during functional mobility   [x] Transfer Training to improve safety and independence with all functional transfers   [x] Gait Training to improve gait mechanics, endurance and assess need for appropriate assistive device  [x] Stair Training in preparation for safe discharge home and/or into the community   [] Positioning to prevent skin breakdown and contractures  [x] Safety and Education Training   [] Patient/Caregiver Education   [] HEP  [] Other     PT long term treatment goals are located in above grid    Frequency of treatments: 2-5x/week x 1-2 weeks. Time in  0930  Time out  0953    Total Treatment Time 10 minutes     Evaluation Time includes thorough review of current medical information, gathering information on past medical history/social history and prior level of function, completion of standardized testing/informal observation of tasks, assessment of data and education on plan of care and goals. CPT codes:  [x] Low Complexity PT evaluation 15188  [] Moderate Complexity PT evaluation 18713  [] High Complexity PT evaluation 41523  [] PT Re-evaluation 90533  [] Gait training 99883 0 minutes  [] Manual therapy 31120 0 minutes  [x] Therapeutic activities 12176 10 minutes  [] Therapeutic exercises 01040 0 minutes  [] Neuromuscular reeducation 61324 0 minutes     Robbin LeeDalton, [place]  [id number]

## 2022-02-04 NOTE — PROGRESS NOTES
POST TAVR EDUCATION:     Met with pt to discuss TAVR & Micra Leadless Pacer DC instructions. Pt was confused & therefore I called & spoke to his wife Anthony Artist on Incision Care:  Patient may shower starting tomorrow. Patient is to report any redness or drainage from groin incision. SBE prophylaxis reviewed    Follow up appts in [facility]: 2/15/22 at 11:00 am for an incision check                                                     3/1/22 at 1:30 for an echo & fu appt w/ MELY Meade verbally comprehended DC instructions as evidencedby \"read back\"  All questions answered        Derrick Cruz 112 with pt's wife Yael Hillman on the phone to review information relating to [address] PPM Insertion    Discussed the following topics: Leadless PPM, Post Operative Care of PPM,  & PPM DC Instructions     Handouts given on above topics given & questions answered. Yael Hillman verbalized understanding as evidenced by \"teach back\".

## 2022-02-04 NOTE — ANESTHESIA POSTPROCEDURE EVALUATION
Department of Anesthesiology  Postprocedure Note    Patient: Adelfo Lynch  MRN: [id number]  YOB: 1931  Date of evaluation: 2/4/2022  Time:  7:30 AM     Procedure Summary     Date: 02/03/22 Room / Location: [facility]    Anesthesia Start: 1300 Anesthesia Stop: 9931    Procedure: BI-V ICD TRANS V W/ ANES Diagnosis:     Scheduled Providers: EDMUNDO Shrestha - CRNA; Juventino Martinez DO Responsible Provider: Juventino Martinez DO    Anesthesia Type: MAC ASA Status: 4          Anesthesia Type: MAC    Woody Phase I:      Woody Phase II:      Last vitals: Reviewed and per EMR flowsheets.        Anesthesia Post Evaluation    Patient location during evaluation: bedside  Patient participation: complete - patient cannot participate  Level of consciousness: awake and alert  Airway patency: patent  Nausea & Vomiting: no nausea and no vomiting  Complications: no  Cardiovascular status: blood pressure returned to baseline  Respiratory status: acceptable  Hydration status: euvolemic

## 2022-02-04 NOTE — PATIENT CARE CONFERENCE
P Quality Flow/Interdisciplinary Rounds Progress Note        Quality Flow Rounds held on February 3, 2022    Disciplines Attending:  Bedside Nurse, ,  and Nursing Unit Leadership    Radha Ortiz was admitted on 1/28/2022  2:10 PM    Anticipated Discharge Date:  Expected Discharge Date: 02/17/22    Disposition:    Bhavesh Score:  Bhavesh Scale Score: 18    Readmission Risk              Risk of Unplanned Readmission:  18           Discussed patient goal for the day, patient clinical progression, and barriers to discharge.   The following Goal(s) of the Day/Commitment(s) have been identified:  Labs - Report Results      Jimmy Patrick RN  February 3, 2022

## 2022-02-04 NOTE — PROGRESS NOTES
[facility] Electrophysiology  Inpatient Progress Report  PATIENT: Cristela Jacome  MEDICAL RECORD NUMBER: [id number]  DATE OF SERVICE:  2/4/2022  ATTENDING ELECTROPHYSIOLOGIST: Adelia Green DO   PRIMARY ELECTROPHYSIOLOGIST: Adelia Green DO   REFERRING PHYSICIAN: Mario Carson MD  and Beatriz Ritter MD  CHIEF COMPLAINT: Altered Mental status. HPI: Cristela Jacome  is a 80 y.o. 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 He is managed by Dr. Di Gray with Norvasc 2.5 mg daily, Coumadin, Lopressor, Cardura, and pravastatin. At some point prior to 2009 he was diagnosed with nonvalvular AF and CAD. In 2009, he was treated with CABG, surgical MAZE, and SALENA exclusion. The details of this procedure are not available to me. In 2017, he was diagnosed with moderate AS in the setting of LVEF of 50-55% and SR with 1* AVB and LAFB. In 4/2019, he was noted to have moderate-severe AS, LVEF of 60%. In 2020, he was diagnosed with recurrence of AF/AFL with RBBB and LAFB. Appears no attempt to restore SR was made. In 1/2022, he was diagnosed with severe AS and LVEF of 30% in setting of ongoing AF/AFL. He had a syncopal event at home and was admitted to [facility] (1-). Due to his CKD he saw nephrology who noted he was a moderate to high risk of progression of his CKD. He underwent extraction of multiple teeth on 02/01/22. CTA chest was performed instead of [facility]. Per Interventional Cardiology grafts were felt to be patent on CTA chest. CT also reported a heterogenous liver mass concerning for malignancy. On 2/2/22, he underwent TAVR, which was complicated by 3* AV block.  A temporary transvenous pacing wire via right groin was placed during TAVR. EP service is now consulted by admitting physician for evaluation and management of 3* AV block. A limited echo showed a recovered LVEF of 50-55% on 02/03/22 and he underwent placement of a MICR VR, post procedure a hematoma was noted at the prior TVP site at the L groin. Today bilateral groin sites are soft and compressible with slight eccomosis on the L groin, normal function of the MICRA VR. Prior Cardiac testing:  ECG (02/04/22): V paced at 60 bpm.   TTE (02/03/22): LVEF 50-55%   ECG (02/02/22): AF with VE rhythm rate 29 bpm.  CTA (01/31/22): Prior to TAVR Patent LIMA-LAD, SVG-OM, SVG to RPDA, and SVG RPL    ECG (02/01/22): AF rate 57 bpm, RBBB, LAFB   ECG (01/28/22): AF with LAD rate 61 bpm   TTE (01/14/22): LVEF 30%, severe AS valve area 0.6 mean gradient 69mmHg   ECG (12/09/20): AF rate 45 BPM with LAFB and RBBB   TTE (04/24/19): LVEF 60%, severly dilated LA moderate AS   ECG (04/10/19): SB rate 51 BPM.   TTE (06/22/17):LVEF 50-55%, Moderate AS. Past Medical History:   Diagnosis Date    A-fib (HCC)     BPH (benign prostatic hyperplasia)     CAD (coronary artery disease)     Diabetic neuropathy (HCC)     DM (diabetes mellitus) ([facility] [address]     HTN (hypertension)     Hyperlipemia     Pancreatitis 9/10    S/P CABG (coronary artery bypass graft)      Past Surgical History:   Procedure Laterality Date    AORTIC VALVE REPLACEMENT N/A 02/02/2022    TRANSCATHETER AORTIC VALVE REPLACEMENT FEMORAL APPROACH performed by Jeanie Dubose MD at [address] [place] [address]  09/27/2010    COLONOSCOPY      CORONARY ARTERY BYPASS GRAFT      CABG x4 with left internal mammary artery to the LAD. reverse saphenous vein grafts to the 1st marginal branches ofcircumflex, posterior descending branch of the right coronary artery, and posterolateral branch to the right coronary artery, and modified maze procedure with Medtronic bipolar radiofrequency ablation, and excision of left atrial appendage. PACEMAKER INSERTION N/A 02/03/2022    Medtronic Micra VR Leadless Pacemaker  (Dr. Sandee Ascencio)    TONSILLECTOMY        Family History   Problem Relation Age of Onset    High Blood Pressure Mother     Heart Disease Mother     Cancer Father        Social History     Tobacco Use    Smoking status: Never Smoker    Smokeless tobacco: Never Used   Substance Use Topics    Alcohol use: Yes     Comment: rarely- 1 beer       Current Facility-Administered Medications   Medication Dose Route Frequency Provider Last Rate Last Admin    carvedilol (COREG) tablet 6.25 mg  6.25 mg Oral BID  Allie Prakash MD   6.25 mg at 02/04/22 1002    sodium chloride flush 0.9 % injection 5-40 mL  5-40 mL IntraVENous 2 times per day Allie Prakash MD   10 mL at 02/04/22 1004    sodium chloride flush 0.9 % injection 5-40 mL  5-40 mL IntraVENous PRN Allie Prakash MD   10 mL at 02/04/22 0348    0.9 % sodium chloride infusion  25 mL IntraVENous PRN Allie Prakash MD        acetaminophen (TYLENOL) tablet 650 mg  650 mg Oral Q4H PRN Allie Prakash MD        hydrALAZINE (APRESOLINE) injection 10 mg  10 mg IntraVENous Q6H PRN Richard Hook MD   10 mg at 02/04/22 0348    niCARdipine (CARDENE) 50 mg in sodium chloride 0.9 % 250 mL infusion  3-15 mg/hr IntraVENous Continuous Allie Prakash MD   Stopped at 02/03/22 0837    sodium chloride flush 0.9 % injection 5-40 mL  5-40 mL IntraVENous 2 times per day Allie Prakash MD   10 mL at 02/03/22 2105    sodium chloride flush 0.9 % injection 5-40 mL  5-40 mL IntraVENous PRN Allie Prakash MD   10 mL at 02/04/22 0221    0.9 % sodium chloride infusion  25 mL IntraVENous PRN Allie Prakash MD        oxyCODONE-acetaminophen (PERCOCET) 5-325 MG per tablet 1 tablet  1 tablet Oral Q4H PRN Allie Prakash MD   1 tablet at 02/02/22 1607    glucose (GLUTOSE) 40 % oral gel 15 g  15 g Oral PRN Allie Prakash MD        dextrose 50 % IV solution  12.5 g IntraVENous PRN Neema Villa MD        glucagon (rDNA) injection 1 mg  1 mg IntraMUSCular PRN Neema Villa MD        dextrose 5 % solution  100 mL/hr IntraVENous PRN Neema Villa MD        perflutren lipid microspheres (DEFINITY) injection 1.65 mg  1.5 mL IntraVENous ONCE PRN Neema Villa MD        pantoprazole (PROTONIX) tablet 20 mg  20 mg Oral QAM AC Neema Villa MD   20 mg at 02/04/22 0657    mupirocin (BACTROBAN) 2 % ointment   Nasal BID Neema Villa MD   Given at 02/04/22 1003    tamsulosin (FLOMAX) capsule 0.4 mg  0.4 mg Oral Daily Neema Villa MD   0.4 mg at 02/04/22 1002    diphenhydrAMINE (BENADRYL) tablet 25 mg  25 mg Oral Q8H PRN Neema Villa MD        pravastatin (PRAVACHOL) tablet 40 mg  40 mg Oral Nightly Neema Villa MD   40 mg at 02/03/22 2228    insulin lispro (HUMALOG) injection vial 0-6 Units  0-6 Units SubCUTAneous TID WC Neema Villa MD   1 Units at 02/01/22 1705    dextrose 5 % solution  100 mL/hr IntraVENous PRN Neema Villa MD        ondansetron (ZOFRAN-ODT) disintegrating tablet 4 mg  4 mg Oral Q8H PRN Neema Villa MD        Or    ondansetron (ZOFRAN) injection 4 mg  4 mg IntraVENous Q6H PRN Neema Villa MD        polyethylene glycol (GLYCOLAX) packet 17 g  17 g Oral Daily PRN Neema Villa MD        acetaminophen (TYLENOL) tablet 650 mg  650 mg Oral Q6H PRN Belinda Gaytan MD        Or    acetaminophen (TYLENOL) suppository 650 mg  650 mg Rectal Q6H PRN Neema Villa MD        aspirin chewable tablet 81 mg  81 mg Oral Daily Neema Villa MD   81 mg at 02/04/22 1003        No Known Allergies    PHYSICAL EXAM:   Vitals:    02/04/22 0300 02/04/22 0331 02/04/22 0740 02/04/22 1100   BP: (!) 163/51  (!) 165/73 136/60   Pulse: 59  60 60   Resp: 16  18 16   Temp: 97.6 °F (36.4 °C)  98 °F (36.7 °C) 97.6 °F (36.4 °C)   TempSrc: Temporal  Temporal Temporal   SpO2: 99%  97% 95%   Weight:  158 lb 14.4 oz (72.1 kg)     Height:          Constitutional: In NAD, lethargic   Head: Normocephalic and atraumatic. Neck: No hepatojugular reflux and no JVD present  Cardiovascular: S1 , S2 present bradycardic   Pulmonary/Chest:  No respiratory distress. Laying nearly flat   Abdominal: Soft. Normal appearance   Musculoskeletal: Normal range of motion of all extremities  Neurological: lethargic   Skin: Skin is warm and dry. R groin intact L groin slight hematoma   Extremity:   No edema. Psychiatric: Normal mood and affect. Thought content normal.     I have personally reviewed the laboratory, cardiac diagnostic and radiographic testing as outlined below:    Data:    Recent Labs     02/03/22  0530 02/03/22  1445 02/04/22  0706   WBC 8.0 8.7 8.0   HGB 11.0* 10.9* 11.0*   HCT 34.0* 33.8* 33.7*   * 117* 110*     Recent Labs     02/03/22  0530 02/03/22  1935 02/04/22  0706    143 138   K 4.1 4.8 4.2   * 107 103   CO2 22 26 22   BUN 30* 31* 33*   CREATININE 1.5* 1.9* 1.7*   CALCIUM 8.9 8.7 9.1      Lab Results   Component Value Date    MG 2.1 02/04/2022     No results for input(s): TSH in the last 72 hours.   Recent Labs     02/01/22  1437   INR 1.6     Telemetry:  at 60 bpm  No underlying     Device Interrogation/Reprogramming 02/04/22  Make/Model: Medtronic MICRA VR   DOI: 02/03/22  Battery: 10+ years   Cleophus Lacks therapy: VVIR 60 ppm  Pacing %: RV = 100%  Lead function:  RV lead: sensing = paced, impedance = 670 ohm,  threshold = 0.38 V @ 0.24 msec  Lead programming:  RV lead: sensitivity = 2.0 mV, output = 2.0 V @ 0.24 msec  Arrhythmias: none   Reprogramming included: none   Overall device function is normal  All device programmable settings were evaluated per above and in the scanned document, along with iterative adjustments (capture thresholds) to assess and select the most appropriate final programming to provide for consistent delivery of the appropriate therapy and to verify function of the device. I have independently reviewed all of the ECGs and rhythm strips per above     Assessment/Plan:   1. Complete heart block S/p MICRA VR on 02/03/22   - Pre TAVR noted to have RBBB and LAFB. - 100% RV paced, Normal MICRA function. - EP to sign off, will need follow-up in 2 weeks in the device clinic. 2. Nonvalvular permanent AF sp MAZE and SALENA exlusion (2009)  -Initially diagnosed at some point prior to 2009. -CHADSVASC = (age, HTN, CAD, HF, DM). Recommend [address] in males with score >/= 1.  -HAS-BLED = 2 (age, Concomitant antiplatelet therapy). Moderate bleeding risk. Pantoprazole to reduce risk of GI bleed. -Patient had SALENA exclusion in 2009. Details are not available to me. No SALENA evaluation available since that time. Surgical SALENA ligation is not an alternative to [address], but rather complimentary to [address] for further reduction in thromboembolic events. He is on VKA at home. INR goal 2-3. Change to DOAC (Eliquis 2.5mg BID)   -He had surgical MAZE in 2009. Details of procedure are unavailable to me. Patient appears to have been left in AF/AFL since ~2020. No prior DCCV or AA per available chart review. Due to improved LVEF post TAVR will pursue rate control strategy at this time. -Modifiable risk factors:  --Obesity: BMI goal < 27  --GUILLERMO: consider screening in future  --HTN: BP goal < 130/80  --EtOH: avoid    3. HFpEF-recovered/nonischemic 2/2 AS  - Medical management per Cardiology    4. Severe AS sp 34mm Medtronic Evolut Pro+ TAVR (2/2/22)  -Management per Cardiology/Structural Heart Disease. Aspirin x 3-6 months. 5. CAD sp CABG x 4 (2009)   -Per Cardiology, all graft patient on CTA of the chest prior to TAVR. -Management per Cardiology. 6. Acantholytic dermatosis, or Coggon's disease  - Seen by Demonology who note no contraindication to TAVR and treatment to be Triamcinolone cream.     7. Liver Mass  -Management per Primary Service.   -CT comments: Heterogeneously enhancing at lesion anterior segment right lobe of the liver peripherally. Correlation with any history of malignancy recommended. Recommend further characterization with MRI with contrast or dedicated liver protocol CT abdomen pelvis to re-evaluate this lesion and potentially screen for primary. Thank you for allowing me to participate in your patient's care. Please call me if there are any questions or concerns. Discussed with Dr. Sue Hansen.    Leroy Trevizo, APRN - CNP  Cardiac Electrophysiology  [facility]) Physicians  [facility] [place] [place] [facility]  11:23 AM  2/4/2022    Attending Supervising Physicians Attestation Statement  I was present with the nurse practitioner during the history and exam. I discussed the findings and plans with the nurse practitioner and agree as documented in his note     Electronically signed by Bhavin Jacobson DO on 2/4/22 at 10:47 PM EST

## 2022-02-04 NOTE — PLAN OF CARE
Problem: Skin Integrity:  Goal: Will show no infection signs and symptoms  Description: Will show no infection signs and symptoms  Outcome: Met This Shift  Goal: Absence of new skin breakdown  Description: Absence of new skin breakdown  Outcome: Met This Shift     Problem: Cardiac:  Goal: Ability to maintain an adequate cardiac output will improve  Description: Ability to maintain an adequate cardiac output will improve  Outcome: Met This Shift  Goal: Hemodynamic stability will improve  Description: Hemodynamic stability will improve  Outcome: Met This Shift     Problem: Cardiac Output - Decreased:  Goal: Hemodynamic stability will improve  Description: Hemodynamic stability will improve  Outcome: Met This Shift     Problem: Tissue Perfusion - Cardiopulmonary, Altered:  Goal: Hemodynamic stability will improve  Description: Hemodynamic stability will improve  Outcome: Met This Shift

## 2022-02-04 NOTE — PROGRESS NOTES
[facility] MD Scott Hardy MD Sheran Roads, MD Arn Fresh, DO, ALI PATIENT’S [facility] OF CAMILO HOLT MD .  Progress note  Patient's Name: Rustam Ortiz  3:35 PM  2/4/2022 1/31: Overall doing better, back to his baseline according to his wife was at the bedside. His BUN today is 29 and creatinine down to 1.6  Stable vitals   For cta in am per cardiology (note: Had the CTA on Monday, 1/31)    2/1: Status post dental surgery today without complication. Feeling better. Denies complaint including dyspnea, pain, chest pain or palpitations. No peripheral swelling. 2/2:  Off Unit to surgery for TAVR. 2/3:  Awake and alert. Denies chest pain or SOB. Denies palpitations or pain. 2/4:  Awake and alert. Denies chest pain or SOB. Denies palpitations or left groin pain    History of Present Ilness:      81 y/o M presented to ER with cc of passing out while seated   Has hx of critical aortic Stenosis and TAVR had been planned  Has hx of CKD and baseline cr appear to be around 1.8   He was seen in ED he is on RA , appear euvolemic . I did d/w him and his wife risk of VISHAL including risk of dialysis they verbalzied understanding and informed me they d like to proceed with TAVR as scheduled   No reported n/v/d . Past Medical History:   Diagnosis Date    [facility]fib ([facility]     BPH (benign prostatic hyperplasia)     CAD (coronary artery disease)     Diabetic neuropathy (HCC)     DM (diabetes mellitus) ([facility]     HTN (hypertension)     Hyperlipemia     Pancreatitis 9/10    S/P CABG (coronary artery bypass graft)        Past Surgical History:   Procedure Laterality Date    AORTIC VALVE REPLACEMENT N/A 02/02/2022    TRANSCATHETER AORTIC VALVE REPLACEMENT FEMORAL APPROACH performed by Skye Boss MD at [id number]  09/27/2010    COLONOSCOPY      CORONARY ARTERY BYPASS GRAFT      CABG x4 with left internal mammary artery to the LAD. reverse saphenous vein grafts to the 1st marginal branches ofcircumflex, posterior descending branch of the right coronary artery, and posterolateral branch to the right coronary artery, and modified maze procedure with Medtronic bipolar radiofrequency ablation, and excision of left atrial appendage.  PACEMAKER INSERTION N/A 02/03/2022    Medtronic Micra VR Leadless Pacemaker  (Dr. Marlene Mejia)    TONSILLECTOMY         Family History   Problem Relation Age of Onset    High Blood Pressure Mother     Heart Disease Mother     Cancer Father         reports that he has never smoked. He has never used smokeless tobacco. He reports current alcohol use. He reports that he does not use drugs. Allergies:  Patient has no known allergies.     Current Medications:    apixaban (ELIQUIS) tablet 2.5 mg, BID  carvedilol (COREG) tablet 6.25 mg, BID WC  sodium chloride flush 0.9 % injection 5-40 mL, 2 times per day  sodium chloride flush 0.9 % injection 5-40 mL, PRN  0.9 % sodium chloride infusion, PRN  acetaminophen (TYLENOL) tablet 650 mg, Q4H PRN  hydrALAZINE (APRESOLINE) injection 10 mg, Q6H PRN  niCARdipine (CARDENE) 50 mg in sodium chloride 0.9 % 250 mL infusion, Continuous  sodium chloride flush 0.9 % injection 5-40 mL, 2 times per day  sodium chloride flush 0.9 % injection 5-40 mL, PRN  0.9 % sodium chloride infusion, PRN  oxyCODONE-acetaminophen (PERCOCET) 5-325 MG per tablet 1 tablet, Q4H PRN  glucose (GLUTOSE) 40 % oral gel 15 g, PRN  dextrose 50 % IV solution, PRN  glucagon (rDNA) injection 1 mg, PRN  dextrose 5 % solution, PRN  perflutren lipid microspheres (DEFINITY) injection 1.65 mg, ONCE PRN  pantoprazole (PROTONIX) tablet 20 mg, QAM AC  mupirocin (BACTROBAN) 2 % ointment, BID  tamsulosin (FLOMAX) capsule 0.4 mg, Daily  diphenhydrAMINE (BENADRYL) tablet 25 mg, Q8H PRN  pravastatin (PRAVACHOL) tablet 40 mg, Nightly  insulin lispro (HUMALOG) injection vial 0-6 Units, TID WC  dextrose 5 % solution, PRN  ondansetron (ZOFRAN-ODT) disintegrating tablet 4 mg, Q8H PRN   Or  ondansetron (ZOFRAN) injection 4 mg, Q6H PRN  polyethylene glycol (GLYCOLAX) packet 17 g, Daily PRN  acetaminophen (TYLENOL) tablet 650 mg, Q6H PRN   Or  acetaminophen (TYLENOL) suppository 650 mg, Q6H PRN  aspirin chewable tablet 81 mg, Daily        Review of Systems:   Constitutional: no fevers , no chills , feels ok   Eyes: no eye pain , no itching , no drainage  Ears, nose, mouth, throat, and face: no ear ,nose pain , hearing is ok ,no nasal drainage   Respiratory: no sob ,no cough ,no wheezing . Cardiovascular: no chest pain , no palpitation ,no sob . Gastrointestinal: no nausea, vomiting , constipation , no abdominal pain . Genitourinary:no urinary retention , no burning , dysuria . No polyuria   Hematologic/lymphatic: no bleeding , no cougulation issues . Musculoskeletal:no joint pain , no swelling . Neurological: no headaches ,no weakness , no numbness . Endocrine: no thirst , no weight issues . Physical exam:   Vital signs BP (!) 146/63   Pulse 60   Temp 96.8 °F (36 °C) (Temporal)   Resp 16   Ht 5' 11\" (1.803 m)   Wt 158 lb 14.4 oz (72.1 kg)   SpO2 96%   BMI 22.16 kg/m²   Gen : NAD , appropriate for stated age . Head : at , nc   Neck : supple , no thyromegaly noted . Eyes : EOMI , PERRLA   CV : RRR , No M/R/G . Lungs: CTAB , no wheezing , good flow heard b/l   Abd : soft , NT , BS + , No Organomegaly appreciated . Skin : soft, dry . Neuro : CN  II-XII grossly intact , no focal neurologic deficit . Psych : cooperative .      Data:   Labs:  CBC with Differential:    Lab Results   Component Value Date    WBC 8.0 02/04/2022    RBC 3.58 02/04/2022    HGB 11.0 02/04/2022    HCT 33.7 02/04/2022     02/04/2022    MCV 94.1 02/04/2022    MCH 30.7 02/04/2022    MCHC 32.6 02/04/2022    RDW 13.6 02/04/2022    SEGSPCT 59 12/12/2011    LYMPHOPCT 23.6 02/04/2022    MONOPCT 10.8 02/04/2022    BASOPCT 0.6 02/04/2022    MONOSABS 0.86 02/04/2022 LYMPHSABS 1.88 02/04/2022    EOSABS 0.13 02/04/2022    BASOSABS 0.05 02/04/2022     CMP:    Lab Results   Component Value Date     02/04/2022    K 4.2 02/04/2022    K 4.6 02/02/2022     02/04/2022    CO2 22 02/04/2022    BUN 33 02/04/2022    CREATININE 1.7 02/04/2022    GFRAA 46 02/04/2022    LABGLOM 38 02/04/2022    GLUCOSE 120 02/04/2022    GLUCOSE 101 12/14/2011    PROT 6.0 02/04/2022    LABALBU 3.4 02/04/2022    CALCIUM 9.1 02/04/2022    BILITOT 0.5 02/04/2022    ALKPHOS 60 02/04/2022    AST 14 02/04/2022    ALT <5 02/04/2022     Ionized Calcium:  No results found for: IONCA  Magnesium:    Lab Results   Component Value Date    MG 2.1 02/04/2022     Phosphorus:    Lab Results   Component Value Date    PHOS 3.6 02/03/2022     U/A:    Lab Results   Component Value Date    COLORU Yellow 01/29/2022    PHUR 6.0 01/29/2022    WBCUA 0-1 01/29/2022    RBCUA 0-1 01/29/2022    BACTERIA NONE SEEN 01/29/2022    CLARITYU Clear 01/29/2022    SPECGRAV >=1.030 01/29/2022    LEUKOCYTESUR Negative 01/29/2022    UROBILINOGEN 0.2 01/29/2022    BILIRUBINUR Negative 01/29/2022    BLOODU TRACE-INTACT 01/29/2022    GLUCOSEU Negative 01/29/2022     Microalbumen/Creatinine ratio:  No components found for: RUCREAT  Iron Saturation:  No components found for: PERCENTFE  TIBC:  No results found for: TIBC  FERRITIN:  No results found for: FERRITIN     Imaging:  XR CHEST PORTABLE   Final Result   Prominence of the pulmonary vascularity improved in the interval with mild   increased markings at the lung bases and small left pleural effusion. XR CHEST PORTABLE   Final Result   CHF. Superimposed pneumonia is not excluded. US DUP LOWER EXTREMITY LEFT ARTERIES   Final Result   1. No evidence for pseudoaneurysm            US ABDOMEN LIMITED   Final Result   1. Right hepatic lobe enhancing lesion by recent CT is echogenic by   ultrasound and compatible with a benign 4.8 cm hemangioma. 2.  Cholecystectomy.   No biliary dilatation or acute abdominal disease   identified. 3.  Small right pleural effusion. XR CHEST PORTABLE   Final Result   Development of right basilar atelectasis and small effusion         CTA CHEST W CONTRAST   Final Result   There is small to moderate bilateral pleural effusions right greater than   left with overlying atelectasis. Clinical correlation and follow-up to   resolution recommended. Diffuse coronary artery disease status post CABG. Scattered atherosclerotic   plaque thoracic and visualized abdominal aorta. However diameter of the   subclavian measures down to 5.3 mm on the right and 5.9 mm on the left. Heterogeneously enhancing at lesion anterior segment right lobe of the liver   peripherally. Correlation with any history of malignancy recommended. Recommend further characterization with MRI with contrast or dedicated liver   protocol CT abdomen pelvis to re-evaluate this lesion and potentially screen   for primary. Extensive calcification aortic valve. Correlation with pre TAVR study same   day. There is a lymph node in the subcarinal region which is poorly defined but   does just meet size criteria to be considered adenopathy. Clinical   correlation and follow-up to resolution recommended. CTA TRANSCATHETER AORTIC VALVE REPLACEMENT   Final Result   TAVR measurements as provided above. No significant stenosis tortuosity or kinking of the aorta. There is   extensive atherosclerotic plaque involving the iliac arteries. However, CT   of the abdomen pelvis performed without the use of intravenous contrast.      No left ventricular thrombus. Extensive native coronary disease status post CABG. CT ABDOMEN PELVIS WO CONTRAST Additional Contrast? None   Final Result   1. Moderate bilateral pleural effusions with adjacent compressive atelectasis   2. Advanced coronary artery calcifications   3. Nonspecific intestinal gas pattern   4. No signs of urolithiasis or obstructive uropathy. RECOMMENDATIONS:   Unavailable         CT CHEST WO CONTRAST   Final Result   Moderate bilateral pleural effusions, greater towards the right. Mild compressive atelectasis in the lung bases. Developing infiltrates from   pneumonia thought less likely. Heterogeneous area along the lateral margin of the dome of the liver could   represent volume averaging. A developing neoplastic process is thought   somewhat less likely but not entirely excluded and a short-term follow-up   hepatic mass protocol MRI or CT would be useful on a nonemergent basis when   clinically feasible. Cardiomegaly. RECOMMENDATIONS:   Unavailable         US CAROTID ARTERY BILATERAL   Final Result   The right internal carotid artery demonstrates 0-50% stenosis. The left internal carotid artery demonstrates 0-50% stenosis. Bilateral vertebral arteries are patent with flow in the normal direction. Bilateral ICA atherosclerotic plaque. Small amount of plaque in the right   carotid bulb. CT Head WO Contrast   Final Result   1. There is no acute intracranial abnormality. Specifically, there is no   intracranial hemorrhage.    2. Atrophy and periventricular leukomalacia,         XR CHEST (2 VW)   Final Result   Bilateral lower lobe pneumonia, right greater than left             Assessment  -Chronic kidney disease stage III baseline cr 1.8-1.9 : stable   -critical aortic Stenosis and TAVR had been planned  -Hx of HTN at goal on coreg   -NIDDM     Azotemia improved  Creatinine 1.7 mg/dl  Nonoliguric  euvolemic      Recommendations:  -Monitor labs  -Monitor UO  -OK for anticipated discharge in AM  -Follow-up in Dr. Renay Ford office in 1-2 weeks      Electronically signed by EDMUNDO Moe - SOLITARIO on 2/4/2022

## 2022-02-04 NOTE — PATIENT CARE CONFERENCE
P Quality Flow/Interdisciplinary Rounds Progress Note        Quality Flow Rounds held on February 4, 2022    Disciplines Attending:  Bedside Nurse, ,  and Nursing Unit Leadership    Porsha Waldrop was admitted on 1/28/2022  2:10 PM    Anticipated Discharge Date:  Expected Discharge Date: 02/17/22    Disposition:    Bhavesh Score:  Bhavesh Scale Score: 19    Readmission Risk              Risk of Unplanned Readmission:  18           Discussed patient goal for the day, patient clinical progression, and barriers to discharge.   The following Goal(s) of the Day/Commitment(s) have been identified:  Labs - Report Results      Chandan Reeves RN  February 4, 2022

## 2022-02-05 VITALS
DIASTOLIC BLOOD PRESSURE: 69 MMHG | WEIGHT: 153.2 LBS | RESPIRATION RATE: 15 BRPM | OXYGEN SATURATION: 97 % | BODY MASS INDEX: 21.45 KG/M2 | HEIGHT: 71 IN | HEART RATE: 63 BPM | TEMPERATURE: 97.4 F | SYSTOLIC BLOOD PRESSURE: 158 MMHG

## 2022-02-05 LAB
ALBUMIN SERPL-MCNC: 3 G/DL (ref 3.5–5.2)
ALP BLD-CCNC: 63 U/L (ref 40–129)
ALT SERPL-CCNC: <5 U/L (ref 0–40)
ANION GAP SERPL CALCULATED.3IONS-SCNC: 11 MMOL/L (ref 7–16)
AST SERPL-CCNC: 14 U/L (ref 0–39)
BILIRUB SERPL-MCNC: 0.6 MG/DL (ref 0–1.2)
BLOOD BANK DISPENSE STATUS: NORMAL
BLOOD BANK PRODUCT CODE: NORMAL
BPU ID: NORMAL
BUN BLDV-MCNC: 35 MG/DL (ref 6–23)
CALCIUM SERPL-MCNC: 8.7 MG/DL (ref 8.6–10.2)
CHLORIDE BLD-SCNC: 104 MMOL/L (ref 98–107)
CO2: 23 MMOL/L (ref 22–29)
CREAT SERPL-MCNC: 1.7 MG/DL (ref 0.7–1.2)
DESCRIPTION BLOOD BANK: NORMAL
EKG ATRIAL RATE: 46 BPM
EKG ATRIAL RATE: 60 BPM
EKG ATRIAL RATE: 61 BPM
EKG Q-T INTERVAL: 512 MS
EKG Q-T INTERVAL: 522 MS
EKG Q-T INTERVAL: 524 MS
EKG QRS DURATION: 196 MS
EKG QRS DURATION: 200 MS
EKG QRS DURATION: 202 MS
EKG QTC CALCULATION (BAZETT): 512 MS
EKG QTC CALCULATION (BAZETT): 522 MS
EKG QTC CALCULATION (BAZETT): 524 MS
EKG R AXIS: -45 DEGREES
EKG R AXIS: -80 DEGREES
EKG R AXIS: 84 DEGREES
EKG T AXIS: -174 DEGREES
EKG T AXIS: 127 DEGREES
EKG T AXIS: 96 DEGREES
EKG VENTRICULAR RATE: 60 BPM
GFR AFRICAN AMERICAN: 46
GFR NON-AFRICAN AMERICAN: 38 ML/MIN/1.73
GLUCOSE BLD-MCNC: 104 MG/DL (ref 74–99)
METER GLUCOSE: 113 MG/DL (ref 74–99)
METER GLUCOSE: 141 MG/DL (ref 74–99)
PHOSPHORUS: 2.4 MG/DL (ref 2.5–4.5)
POTASSIUM SERPL-SCNC: 4.4 MMOL/L (ref 3.5–5)
SODIUM BLD-SCNC: 138 MMOL/L (ref 132–146)
TOTAL PROTEIN: 5.9 G/DL (ref 6.4–8.3)

## 2022-02-05 PROCEDURE — 36415 COLL VENOUS BLD VENIPUNCTURE: CPT

## 2022-02-05 PROCEDURE — 2580000003 HC RX 258: Performed by: INTERNAL MEDICINE

## 2022-02-05 PROCEDURE — 99233 SBSQ HOSP IP/OBS HIGH 50: CPT | Performed by: INTERNAL MEDICINE

## 2022-02-05 PROCEDURE — 6370000000 HC RX 637 (ALT 250 FOR IP): Performed by: INTERNAL MEDICINE

## 2022-02-05 PROCEDURE — 93005 ELECTROCARDIOGRAM TRACING: CPT | Performed by: INTERNAL MEDICINE

## 2022-02-05 PROCEDURE — 93010 ELECTROCARDIOGRAM REPORT: CPT | Performed by: INTERNAL MEDICINE

## 2022-02-05 PROCEDURE — 82962 GLUCOSE BLOOD TEST: CPT

## 2022-02-05 PROCEDURE — 80053 COMPREHEN METABOLIC PANEL: CPT

## 2022-02-05 PROCEDURE — 6370000000 HC RX 637 (ALT 250 FOR IP): Performed by: NURSE PRACTITIONER

## 2022-02-05 PROCEDURE — 84100 ASSAY OF PHOSPHORUS: CPT

## 2022-02-05 RX ORDER — ASPIRIN 81 MG/1
81 TABLET, CHEWABLE ORAL DAILY
Qty: 30 TABLET | Refills: 3 | Status: SHIPPED | OUTPATIENT
Start: 2022-02-05 | End: 2022-04-29

## 2022-02-05 RX ORDER — PANTOPRAZOLE SODIUM 20 MG/1
20 TABLET, DELAYED RELEASE ORAL
Qty: 30 TABLET | Refills: 3 | Status: SHIPPED | OUTPATIENT
Start: 2022-02-06

## 2022-02-05 RX ORDER — CARVEDILOL 6.25 MG/1
6.25 TABLET ORAL 2 TIMES DAILY WITH MEALS
Qty: 60 TABLET | Refills: 3 | Status: SHIPPED | OUTPATIENT
Start: 2022-02-05 | End: 2022-02-15

## 2022-02-05 RX ADMIN — PANTOPRAZOLE SODIUM 20 MG: 20 TABLET, DELAYED RELEASE ORAL at 06:17

## 2022-02-05 RX ADMIN — CARVEDILOL 6.25 MG: 6.25 TABLET, FILM COATED ORAL at 08:53

## 2022-02-05 RX ADMIN — APIXABAN 2.5 MG: 2.5 TABLET, FILM COATED ORAL at 08:54

## 2022-02-05 RX ADMIN — SODIUM CHLORIDE, PRESERVATIVE FREE 10 ML: 5 INJECTION INTRAVENOUS at 08:54

## 2022-02-05 RX ADMIN — INSULIN LISPRO 1 UNITS: 100 INJECTION, SOLUTION INTRAVENOUS; SUBCUTANEOUS at 12:37

## 2022-02-05 RX ADMIN — ASPIRIN 81 MG CHEWABLE TABLET 81 MG: 81 TABLET CHEWABLE at 08:54

## 2022-02-05 RX ADMIN — Medication 250 MG: at 12:37

## 2022-02-05 RX ADMIN — TAMSULOSIN HYDROCHLORIDE 0.4 MG: 0.4 CAPSULE ORAL at 08:53

## 2022-02-05 ASSESSMENT — PAIN SCALES - GENERAL
PAINLEVEL_OUTOF10: 0

## 2022-02-05 NOTE — PATIENT CARE CONFERENCE
[facility] Quality Flow/Interdisciplinary Rounds Progress Note        Quality Flow Rounds held on February 5, 2022    Disciplines Attending:  Bedside Nurse, ,  and Nursing Unit Leadership    Hector Cano was admitted on 1/28/2022  2:10 PM    Anticipated Discharge Date:  Expected Discharge Date: 02/17/22    Disposition:    Bhavesh Score:  Bhavesh Scale Score: 21    Readmission Risk              Risk of Unplanned Readmission:  19           Discussed patient goal for the day, patient clinical progression, and barriers to discharge.   The following Goal(s) of the Day/Commitment(s) have been identified:  Discharge - Obtain Order      Abelardo Mukherjee RN  February 5, 2022

## 2022-02-05 NOTE — PROGRESS NOTES
Subjective: The patient is awake and alert. No problems overnight. Denies chest pain, angina, and dyspnea. Denies abdominal pain. Tolerating diet. No nausea or vomiting. Objective:  Pt is aox3 in nad  BP (!) 174/74   Pulse 57   Temp 97.8 °F (36.6 °C) (Temporal)   Resp 16   Ht 5' 11\" (1.803 m)   Wt 153 lb 3.2 oz (69.5 kg)   SpO2 93%   BMI 21.37 kg/m²   HEENT no adenopathy no bruits  Heart:  RRR, no murmurs, gallops, or rubs.   Lungs:  CTA bilaterally, no wheeze, rales or rhonchi  Abd: bowel sounds present, nontender, nondistended, no masses  Extrem:  No clubbing, cyanosis, or edema  WBC/Hgb/Hct/Plts:  8.0/11.0/33.7/110 (02/04 6360) basic metabolic panel     Assessment:    Patient Active Problem List   Diagnosis    Cellulitis    CAD (coronary artery disease)    S/P CABG (coronary artery bypass graft)    DM (diabetes mellitus) ([address]    Hyperlipemia    HTN (hypertension)    A-fib ([address]    BPH (benign prostatic hyperplasia)    Diabetic neuropathy ([address]    Encounter for therapeutic drug monitoring    Long term current use of anticoagulant therapy    Aortic valve stenosis    Chronic kidney disease    Heart valve disease    Gout    Syncope without other cardiovascular symptoms    Renal failure    Mild protein-calorie malnutrition (HCC)    Syncope    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

## 2022-02-05 NOTE — PROGRESS NOTES
INPATIENT CARDIOLOGY FOLLOW-UP    Name: Ellen Kay    Age: 80 y.o. Date of Admission: 1/28/2022  2:10 PM    Date of Service: 2/5/2022    Chief Complaint: Follow-up for syncope, aortic valve disease, persistent atrial fibrillation with associated complete heart block, chronic systolic heart failure, chronic kidney disease    Interim History: The patient presently remains compensated from a cardiovascular standpoint with no clinical volume overload and noted radiographic findings. He tolerated pacemaker insertion without difficulty and the hepatic surgical services assessment has been reviewed with suggestion of a benign hemangioma. He remains hemodynamically stable with stable renal function. Review of Systems: The remainder of a complete multisystem review including consitutional, central nervous, respiratory, circulatory, gastrointestinal, genitourinary, endocrinologic, hematologic, musculoskeletal and psychiatric are negative.     Problem List:  Patient Active Problem List   Diagnosis    Cellulitis    CAD (coronary artery disease)    S/P CABG (coronary artery bypass graft)    [facility]di[facility]    Hyperlipemia    HTN (hypertension)    A-fib ([facility]    BPH (benign prostatic hyperplasia)    Diabetic neuropathy ([facility]    Encounter for therapeutic drug monitoring    Long term current use of anticoagulant therapy    Aortic valve stenosis    Chronic kidney disease    Heart valve disease    Gout    Syncope without other cardiovascular symptoms    Renal failure    Mild protein-calorie malnutrition (HCC)    Syncope    Complete AV block (HCC)       Allergies:  No Known Allergies    Current Medications:  Current Facility-Administered Medications   Medication Dose Route Frequency Provider Last Rate Last Admin    apixaban (ELIQUIS) tablet 2.5 mg  2.5 mg Oral BID EDMUNDO Partida CNP   2.5 mg at 02/05/22 0854    carvedilol (COREG) tablet 6.25 mg  6.25 mg Oral BID  Yarely Romero MD   6.25 mg at 02/05/22 0853    sodium chloride flush 0.9 % injection 5-40 mL  5-40 mL IntraVENous 2 times per day Yaerly Romero MD   10 mL at 02/05/22 0854    sodium chloride flush 0.9 % injection 5-40 mL  5-40 mL IntraVENous PRN Yarely Romero MD   10 mL at 02/04/22 0348    0.9 % sodium chloride infusion  25 mL IntraVENous PRN Belinda Gaytan MD        acetaminophen (TYLENOL) tablet 650 mg  650 mg Oral Q4H PRN Yarely Romero MD        hydrALAZINE (APRESOLINE) injection 10 mg  10 mg IntraVENous Q6H PRN Maria Isabel Bustos MD   10 mg at 02/04/22 0348    niCARdipine (CARDENE) 50 mg in sodium chloride 0.9 % 250 mL infusion  3-15 mg/hr IntraVENous Continuous Yarely Romero MD   Stopped at 02/03/22 0837    sodium chloride flush 0.9 % injection 5-40 mL  5-40 mL IntraVENous 2 times per day Yarely Romero MD   10 mL at 02/04/22 2100    sodium chloride flush 0.9 % injection 5-40 mL  5-40 mL IntraVENous PRN Yarely Romero MD   10 mL at 02/04/22 0221    0.9 % sodium chloride infusion  25 mL IntraVENous PRN Yarely Romero MD        oxyCODONE-acetaminophen (PERCOCET) 5-325 MG per tablet 1 tablet  1 tablet Oral Q4H PRN Yarely Romero MD   1 tablet at 02/02/22 1607    glucose (GLUTOSE) 40 % oral gel 15 g  15 g Oral PRN Belinda Gaytan MD        dextrose 50 % IV solution  12.5 g IntraVENous PRN Yarely Romero MD        glucagon (rDNA) injection 1 mg  1 mg IntraMUSCular PRN Yarely Romero MD        dextrose 5 % solution  100 mL/hr IntraVENous PRN Yarely Romero MD        perflutren lipid microspheres (DEFINITY) injection 1.65 mg  1.5 mL IntraVENous ONCE PRN Belinda Gaytan MD        pantoprazole (PROTONIX) tablet 20 mg  20 mg Oral QAM AC Yarely Romero MD   20 mg at 02/05/22 0617    mupirocin (BACTROBAN) 2 % ointment   Nasal BID Yarely Romero MD   Given at 02/04/22 1003    tamsulosin (FLOMAX) capsule 0.4 mg  0.4 mg Oral Daily Antwan Reilly MD   0.4 mg at 02/05/22 1713    diphenhydrAMINE (BENADRYL) tablet 25 mg  25 mg Oral Q8H PRN Belinda Gaytan MD        pravastatin (PRAVACHOL) tablet 40 mg  40 mg Oral Nightly Antwan Reilly MD   40 mg at 02/04/22 2108    insulin lispro (HUMALOG) injection vial 0-6 Units  0-6 Units SubCUTAneous TID WC Antwan Reilly MD   2 Units at 02/04/22 1923    dextrose 5 % solution  100 mL/hr IntraVENous PRN Belinda Gaytan MD        ondansetron (ZOFRAN-ODT) disintegrating tablet 4 mg  4 mg Oral Q8H PRN Belinda Gaytan MD        Or    ondansetron (ZOFRAN) injection 4 mg  4 mg IntraVENous Q6H PRN Antwan Reilly MD        polyethylene glycol (GLYCOLAX) packet 17 g  17 g Oral Daily PRN Belinda Gaytan MD        acetaminophen (TYLENOL) tablet 650 mg  650 mg Oral Q6H PRN Belinda Gaytan MD        Or    acetaminophen (TYLENOL) suppository 650 mg  650 mg Rectal Q6H PRN Belinda Gaytan MD        aspirin chewable tablet 81 mg  81 mg Oral Daily Antwan Reilly MD   81 mg at 02/05/22 0854      sodium chloride      niCARdipene (CARDENE) 50 mg in 250 mL 0.9 % sodium chloride infusion Stopped (02/03/22 0837)    sodium chloride      dextrose      dextrose         Physical Exam:  BP (!) 165/84   Pulse 60   Temp 97.4 °F (36.3 °C) (Temporal)   Resp 18   Ht 5' 11\" (1.803 m)   Wt 153 lb 3.2 oz (69.5 kg)   SpO2 97%   BMI 21.37 kg/m²   Weight change: -5 lb 11.2 oz (-2.586 kg)  Wt Readings from Last 3 Encounters:   02/05/22 153 lb 3.2 oz (69.5 kg)   01/27/22 169 lb (76.7 kg)   11/18/21 159 lb 11.2 oz (72.4 kg)     The patient is awake, alert and in no discomfort or distress. No gross musculoskeletal deformity is present. No significant skin or nail changes are present. Gross examination of head, eyes, nose and throat are negative. Jugular venous pressure is normal and no carotid bruits are present.  Normal respiratory effort is noted with no accessory muscle usage present. Lung fields are clear to ascultation. Cardiac examination is notable for a regular rate and rhythm with no palpable thrill. No gallop rhythm or cardiac murmur are identified. A benign abdominal examination is present with no masses or organomegaly. Intact pulses are present throughout all extremities and no peripheral edema is present. No focal neurologic deficits are present. Intake/Output:    Intake/Output Summary (Last 24 hours) at 2/5/2022 1029  Last data filed at 2/5/2022 1022  Gross per 24 hour   Intake 960 ml   Output 500 ml   Net 460 ml     I/O this shift: In: 480 [P.O.:480]  Out: 300 [Urine:300]    Laboratory Tests:  Lab Results   Component Value Date    CREATININE 1.7 (H) 02/05/2022    BUN 35 (H) 02/05/2022     02/05/2022    K 4.4 02/05/2022     02/05/2022    CO2 23 02/05/2022     No results for input(s): CKTOTAL, CKMB in the last 72 hours.     Invalid input(s): TROPONONI  No results found for: BNP  Lab Results   Component Value Date    WBC 8.0 02/04/2022    RBC 3.58 02/04/2022    HGB 11.0 02/04/2022    HCT 33.7 02/04/2022    MCV 94.1 02/04/2022    MCH 30.7 02/04/2022    MCHC 32.6 02/04/2022    RDW 13.6 02/04/2022     02/04/2022    MPV 12.0 02/04/2022     Recent Labs     02/03/22  1935 02/04/22  0706 02/05/22  0702   ALKPHOS 60 60 63   ALT 5 <5 <5   AST 16 14 14   PROT 5.8* 6.0* 5.9*   BILITOT 0.4 0.5 0.6   LABALBU 3.2* 3.4* 3.0*     Lab Results   Component Value Date    MG 2.3 02/04/2022     Lab Results   Component Value Date    PROTIME 17.7 02/01/2022    PROTIME 29.8 06/24/2020    INR 1.6 02/01/2022     No results found for: TSH  No components found for: CHLPL  Lab Results   Component Value Date    TRIG 182 06/24/2020    TRIG 218 02/28/2020    TRIG 122 10/30/2019     Lab Results   Component Value Date     (A) 06/24/2020    HDL 40 02/28/2020    HDL 36 10/30/2019     Lab Results   Component Value Date    LDLCALC 96 10/03/2018    [address] 96 03/14/2018    LDLCALC 70 02/08/2017       Cardiac Tests:  ECG: A resting electrocardiogram reviewed at the time of evaluation demonstrates evidence of atrial fibrillation with appropriate ventricular pacing  Telemetry findings reviewed: atrial fibrillation with ventricular pacing, no new tachy/bradyarrhythmias overnight  Chest X-ray: A most recent chest x-ray reviewed time evaluation demonstrates evidence of cardiomegaly with mild interstitial changes and improving infiltrate/atelectasis of the right lower lobe      ASSESSMENT / PLAN: On a clinical basis, the patient presently appears compensated from a cardiovascular standpoint with no evidence of clinical volume overload and stable function and pacing following implantation of his leadless device. He is otherwise compensated from a cardiovascular standpoint and will be capable of discharge if appropriate from electrophysiology and general medical standpoint. Continue careful monitoring of his volume status as well as that of renal function and electrolytes in the face of concomitant chronic kidney disease will be necessary with nutritional support essential to fluid mobilization reducing risk of progressive debilitation. Appropriate modification of dosing of his apixaban based on the combination of age and serum creatinine levels have been performed to assist in reducing risk of embolic events in the face of his atrial arrhythmias. Continued aggressive risk factor modification of blood pressure and serum lipids will remain essential to reducing risk of future atherosclerotic development in addition to that of antibiotic prophylaxis at time of all dental interventions to reduce risk of bacterial endocarditis. He presently appears stabilized from a cardiovascular standpoint for discharge with appropriate arrangements made for follow-up both with the valvular and electrophysiology services.       Note: This report was completed utilizing computer voice recognition software. Every effort has been made to ensure accuracy, however; inadvertent computerized transcription errors may be present. Dane Smiley.  Michael Love, [address]

## 2022-02-05 NOTE — PLAN OF CARE
Problem: Falls - Risk of:  Goal: Will remain free from falls  Description: Will remain free from falls  Outcome: Met This Shift  Goal: Absence of physical injury  Description: Absence of physical injury  Outcome: Met This Shift     Problem: Cardiac:  Goal: Ability to maintain an adequate cardiac output will improve  Description: Ability to maintain an adequate cardiac output will improve  Outcome: Met This Shift  Goal: Hemodynamic stability will improve  Description: Hemodynamic stability will improve  Outcome: Met This Shift     Problem: Cardiac Output - Decreased:  Goal: Hemodynamic stability will improve  Description: Hemodynamic stability will improve  Outcome: Met This Shift     Problem: Tissue Perfusion - Cardiopulmonary, Altered:  Goal: Hemodynamic stability will improve  Description: Hemodynamic stability will improve  Outcome: Met This Shift

## 2022-02-05 NOTE — PROGRESS NOTES
[facility] SURGERY  DAILY PROGRESS NOTE  2/5/2022  Chief Complaint   Patient presents with    Altered Mental Status     IRP 3534 per wife became altered, became oriented for ems       Subjective:  Patient resting comfortably in bed. He denies any pain. He is tolerating a regular diet. Objective:  BP (!) 174/74   Pulse 57   Temp 97.8 °F (36.6 °C) (Temporal)   Resp 16   Ht 5' 11\" (1.803 m)   Wt 153 lb 3.2 oz (69.5 kg)   SpO2 93%   BMI 21.37 kg/m²     General appearance: alert, cooperative and in no acute distress. Eyes: grossly normal  Lungs: nonlabored breathing on 2 L of O2  Heart: regular rate  Abdomen: soft, non-tender, non distended  Skin: No skin abnormalities  Neurologic: Alert and oriented x 3. Grossly normal  Musculoskeletal: No clubbing cyanosis or edema    Assessment/Plan:  80 y.o. male with severe aortic stenosis s/p TAVR 2/2 complicated by AV block s/p pacemaker 2/3 that was found to have a 5.2 cm segment 8 enhancing liver lesion consistent with a benign hemangioma. - Crys Doing for diet from [facility] point of view. - CEA 1  - Chromogranin, CA 19-9, and AFP pending  - No further work-up needed.     Electronically signed by Lanie Mccormick MD on 2/5/2022 at 7:52 AM

## 2022-02-05 NOTE — CARE COORDINATION
Social Work Discharge Planning:  SW spoke with bedside nurse patient has no needs at discharge.    Electronically signed by ALFREDO Nash on 2/5/2022 at 10:31 AM

## 2022-02-06 LAB — AFP-TUMOR MARKER: 2 NG/ML (ref 0–9)

## 2022-02-07 ENCOUNTER — TELEPHONE (OUTPATIENT)
Dept: NON INVASIVE DIAGNOSTICS | Age: 87
End: 2022-02-07

## 2022-02-07 ENCOUNTER — TELEPHONE (OUTPATIENT)
Dept: CARDIAC CATH/INVASIVE PROCEDURES | Age: 87
End: 2022-02-07

## 2022-02-07 ENCOUNTER — TELEPHONE (OUTPATIENT)
Dept: CARDIOLOGY | Age: 87
End: 2022-02-07

## 2022-02-07 LAB — CA 19-9: 20 U/ML (ref 0–37)

## 2022-02-07 NOTE — TELEPHONE ENCOUNTER
I called Mr. Fan Guajardo for a F/U call post Micra VR Insertion. I spoke with his wife Zuri Kan. She had a few monitor questions which I attempted to navigate her through. She denies any swelling, drainage, redness or fever. She says he is doing very well. I reminded her of his follow up appointment at [facility] on 2/18/22 at 10:00 am.  She was very thankful for the call. I also contacted Flavia Childs the nurse from the device clinic to let her know that Zuri Kan had some questions about the monitor and she is going to contact her to review and ensure they understand how to use it.

## 2022-02-07 NOTE — TELEPHONE ENCOUNTER
Spoke to patient and his wife. They state he is doing very will since discharge from admission last week. Denies any complaints or concerns. Access sites stable without issue. Reminded of follow up for incision check on 2/15/22 and advised to call sooner if concerns arise in the meantime. They state understanding.

## 2022-02-07 NOTE — TELEPHONE ENCOUNTER
I called patient's wife, Padilla Carlos. She wanted to make sure the home [employer] monitor was set up correctly. I told her we received a transmission on his device today, so she has the monitor connected correctly. I told her she does not need to bring the monitor to the office next week. Padilla Carlos verbalized understanding.     Brandie Felton RN, BSN  [facility]

## 2022-02-08 LAB — CHROMOGRANIN A: 128 NG/ML (ref 0–103)

## 2022-02-09 NOTE — PROGRESS NOTES
Physician Progress Note      PATIENT:               Suzanne Purdy  CSN #:                  [id number]  :                       1931  ADMIT DATE:       2022 2:10 PM  [address] [place] DATE:        2022 3:06 PM  RESPONDING  PROVIDER #:        Mitzi Agudelo DO          QUERY TEXT:    Pt admitted with Critical aortic stenosis with syncopal episode and has HFrEF   documented. Cardiology and renal note that patient is euvolemic, but also   noted to have moderate pleural effusions and given a 1 x dose of IV Lasix on   22. If possible, please document in progress notes and discharge summary   further specificity regarding the type and acuity of CHF:    The medical record reflects the following:  Risk Factors: HTN  Clinical Indicators: Per notes \"HFrEF with severely reduced LVEF\".    Cardiology \" New HFrEF, euvolemic\". 2/3 renal also notes them to be   \"euvolemic\". 2/3 EP says \"HFpEF-recovered/nonischemic  AS\".  cardiology   \"The patient presently remains compensated from a cardiovascular standpoint   with no clinical volume overload and noted radiographic findings\". CT chest    show moderate bilateral effusions and got one dose of IV lasix on . BNP  38,017 2922. Lungs clear per nursing assessment. +1 to trace BLE   edema. Treatment: Coreg, IV Lasix x 1    Thank you,  Jodie Blue, RN, BSN, CCDS, Clinical Documentation Improvement  [phone]  Options provided:  -- Acute on Chronic Systolic CHF/HFrEF  -- Acute Systolic CHF/HFrEF  -- Chronic Systolic CHF/HFrEF  -- Other - I will add my own diagnosis  -- Disagree - Not applicable / Not valid  -- Disagree - Clinically unable to determine / Unknown  -- Refer to Clinical Documentation Reviewer    PROVIDER RESPONSE TEXT:    This patient is in acute on chronic systolic CHF/HFrEF.     Query created by: Angle Morales on 2022 9:38 AM      Electronically signed by:  Mitzi Agudelo DO 2022 7:35 AM

## 2022-02-10 ENCOUNTER — TELEPHONE (OUTPATIENT)
Dept: CARDIOLOGY CLINIC | Age: 87
End: 2022-02-10

## 2022-02-10 NOTE — DISCHARGE SUMMARY
Physician Discharge Summary     Patient ID:  Leticia Romero  [id number]  14 y.o.  5/26/1931    Admit date: 1/28/2022    Discharge date and time: 2/5/2022  3:06 PM     Admission Diagnoses: Syncope, unspecified syncope type [R55]  Syncope without other cardiovascular symptoms [R55, R09.89]    Discharge Diagnoses:   Patient Active Problem List   Diagnosis    Cellulitis    CAD (coronary artery disease)    S/P CABG (coronary artery bypass graft)    DM [facility]di[facility] mellitus) ([facility]    Hyperlipemia    HTN (hypertension)    A-fib ([facility]    BPH (benign prostatic hyperplasia)    Diabetic neuropathy ([facility]    Encounter for therapeutic drug monitoring    Long term current use of anticoagulant therapy    Aortic valve stenosis    Chronic kidney disease    Heart valve disease    Gout    Syncope without other cardiovascular symptoms    Renal failure    Mild protein-calorie malnutrition (HCC)    Syncope    Complete AV block (HCC)     Current Outpatient Medications   Medication Instructions    amLODIPine (NORVASC) 2.5 mg, Oral, DAILY    apixaban (ELIQUIS) 2.5 mg, Oral, 2 TIMES DAILY    aspirin 81 mg, Oral, DAILY    carvedilol (COREG) 6.25 mg, Oral, 2 TIMES DAILY WITH MEALS    doxazosin (CARDURA) 4 MG tablet TAKE 1 TABLET BY MOUTH EVERY DAY    metoprolol tartrate (LOPRESSOR) 25 MG tablet TAKE 1 TABLET BY MOUTH EVERY 12 HOURS    pantoprazole (PROTONIX) 20 mg, Oral, DAILY BEFORE BREAKFAST    pravastatin (PRAVACHOL) 40 MG tablet Take one daily    sitaGLIPtan-metformin (JANUMET)  MG per tablet TAKE 1 TABLET BY MOUTH TWICE A DAY WITH MEALS             Procedures: TAVR, placement of pacemaker    Hospital Course: Pt presented due to syncopal episode at home. He was seen by Cardiology team and found to have Critical AS. He was seen by CTS who recommended TAVR. He tolerated surgery, but then went into CHB after. He was then taken for pacemaker insertion and was feeling better.  He tolerated procedures and medications. He was working with PT teams and ambulating in room. He had a good diet and slept well. He was discharged to home with Home health. Discharge Exam:  See progress note from today    Disposition: Home in stable condition, long-term prognosis is FAIR. I have advised him to follow up with Dr. Jossue Evans in one week and to follow up with Cardiology and CTS teams as advised.     Fernando Caruso, DO  2/10/2022

## 2022-02-10 NOTE — TELEPHONE ENCOUNTER
Pt's spouse, Judie Messina, called to sched HFU. Pt admitted SEY 1/28-2/5. Please return call to advise further. Thank you.

## 2022-02-15 ENCOUNTER — OFFICE VISIT (OUTPATIENT)
Dept: CARDIOLOGY CLINIC | Age: 87
End: 2022-02-15
Payer: MEDICARE

## 2022-02-15 VITALS
DIASTOLIC BLOOD PRESSURE: 86 MMHG | WEIGHT: 174 LBS | SYSTOLIC BLOOD PRESSURE: 191 MMHG | HEART RATE: 123 BPM | BODY MASS INDEX: 25.77 KG/M2 | HEIGHT: 69 IN | TEMPERATURE: 97.8 F

## 2022-02-15 DIAGNOSIS — Z95.2 S/P TAVR (TRANSCATHETER AORTIC VALVE REPLACEMENT): Primary | ICD-10-CM

## 2022-02-15 DIAGNOSIS — I10 PRIMARY HYPERTENSION: ICD-10-CM

## 2022-02-15 PROCEDURE — 99213 OFFICE O/P EST LOW 20 MIN: CPT | Performed by: PHYSICIAN ASSISTANT

## 2022-02-15 PROCEDURE — G8484 FLU IMMUNIZE NO ADMIN: HCPCS | Performed by: PHYSICIAN ASSISTANT

## 2022-02-15 PROCEDURE — G8417 CALC BMI ABV UP PARAM F/U: HCPCS | Performed by: PHYSICIAN ASSISTANT

## 2022-02-15 PROCEDURE — 4040F PNEUMOC VAC/ADMIN/RCVD: CPT | Performed by: PHYSICIAN ASSISTANT

## 2022-02-15 PROCEDURE — 1111F DSCHRG MED/CURRENT MED MERGE: CPT | Performed by: PHYSICIAN ASSISTANT

## 2022-02-15 PROCEDURE — G8427 DOCREV CUR MEDS BY ELIG CLIN: HCPCS | Performed by: PHYSICIAN ASSISTANT

## 2022-02-15 PROCEDURE — 1123F ACP DISCUSS/DSCN MKR DOCD: CPT | Performed by: PHYSICIAN ASSISTANT

## 2022-02-15 PROCEDURE — 1036F TOBACCO NON-USER: CPT | Performed by: PHYSICIAN ASSISTANT

## 2022-02-15 RX ORDER — CARVEDILOL 6.25 MG/1
12.5 TABLET ORAL 2 TIMES DAILY WITH MEALS
Qty: 60 TABLET | Refills: 3 | Status: SHIPPED
Start: 2022-02-15 | End: 2022-03-11

## 2022-02-15 NOTE — PATIENT INSTRUCTIONS
Follow up for echocardiogram on 3/1/22 at 1:30pm; call sooner if concerns arise in the meantime    When you get home, give another dose of carvedilol (coreg) 6.25 mg now, then increase to 12.5 mg twice a day starting with tonight's dose    Stop taking amlodipine    Keep a log of blood pressure readings and bring to your next appointment     Get blood work drawn when you see Dr. Bryanna Ivey     Call Judie Parker with any questions or concerns [phone]

## 2022-02-15 NOTE — PROGRESS NOTES
Structural Heart Clinic Note      Patient name: Martha Martinez    Reason for visit: TAVR follow up     Primary Care Physician: Jacki Smith MD    Date of service: 2/15/2022    Chief Complaint: TAVR follow up - incision check    HPI: Mr. Ayan Ramírez presents for follow up s/p TAVR on 2/2/22. He is doing well. His wife notes his BP has been elevated (160s). She also notes a mild increase in LE edema compared to preop. Otherwise, he has noted improvement in his dyspnea and mentation as well. Denies chest pain, orthopnea, PND, palpitations or syncope. During hospitalization, he was seen by dermatology for a rash on his back. He states it has since resolved, but his wife notes the same rash on both legs today. It is not painful or pruritic. They were not sent home with any treatment for the rash after hospitalization, but he was on triamcinolone while inpatient. Allergies: No Known Allergies    Home medications:    Current Outpatient Medications   Medication Sig Dispense Refill    apixaban (ELIQUIS) 2.5 MG TABS tablet Take 1 tablet by mouth 2 times daily 60 tablet 1    carvedilol (COREG) 6.25 MG tablet Take 1 tablet by mouth 2 times daily (with meals) 60 tablet 3    pantoprazole (PROTONIX) 20 MG tablet Take 1 tablet by mouth every morning (before breakfast) 30 tablet 3    aspirin 81 MG chewable tablet Take 1 tablet by mouth daily 30 tablet 3    amLODIPine (NORVASC) 2.5 MG tablet Take 1 tablet by mouth daily 30 tablet 3    sitaGLIPtan-metformin (JANUMET)  MG per tablet TAKE 1 TABLET BY MOUTH TWICE A DAY WITH MEALS 180 tablet 0    metoprolol tartrate (LOPRESSOR) 25 MG tablet TAKE 1 TABLET BY MOUTH EVERY 12 HOURS 180 tablet 0    doxazosin (CARDURA) 4 MG tablet TAKE 1 TABLET BY MOUTH EVERY DAY 90 tablet 1    pravastatin (PRAVACHOL) 40 MG tablet Take one daily 90 tablet 0     No current facility-administered medications for this visit.        Past Medical History:  Past Medical History:   Diagnosis rate and normal effort. He is not in respiratory distress. Breath sounds clear to auscultation. No wheezes. Heart: Normal rate. Regular rhythm. S1 normal and S2 normal. No murmur. Chest: Symmetric chest wall expansion. Extremities: Normal range of motion. Mild-moderate edema bilaterally. Neurological: Patient is alert and oriented to person, place and time. Skin: Warm and dry. Abdomen: Abdomen is soft and non-distended. Bowel sounds are normal.   Pulses: Distal pulses are intact. Skin: Warm and dry without lesions. Groins: bilateral access sites soft, non tender without erythema, ecchymosis, hematoma or drainage      Assessment:   Patient Active Problem List   Diagnosis    Cellulitis    CAD (coronary artery disease)    S/P CABG (coronary artery bypass graft)    DM [facility]) [facility]    Hyperlipemia    HTN (hypertension)    A-fib ([facility]    BPH (benign prostatic hyperplasia)    Diabetic neuropathy ([facility]    Encounter for therapeutic drug monitoring    Long term current use of anticoagulant therapy    Aortic valve stenosis    Chronic kidney disease    Heart valve disease    Gout    Syncope without other cardiovascular symptoms    Renal failure    Mild protein-calorie malnutrition (HCC)    Syncope    Complete AV block ([facility]       1. Critical aortic stenosis s/p TAVR using 34-mm Medtronic Evolut PRO+ valve  2. CAD status post CABG in 2009 with LIMA to LAD, SVG to OM1, SVG to RPDA, SVG to RPL with modified maze and SALENA exclusion -grafts patent on CTA  3. PAF on warfarin prior to admission  4. Type 2 diabetes on orals  5. CKD 3B-4  6. PAD  7. HFrEF with severely reduced LVEF  8. 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 Benign liver hemangioma   10. Rash      NYHA class II        Plan:   1. Follow up for 30 day echo; sooner PRN  2. Follow up with EP as scheduled for pacemaker check  3. Follow up with dermatology  4. Check BMP  5.  Give coreg 6.25 mg at home now; then increase coreg to 12.5 mg BID starting this evening  6. Discontinue amlodipine  7.  Check BP at home    Electronically signed by Jennifer Owens PA-C on 2/15/2022 at 10:48 AM

## 2022-02-18 ENCOUNTER — NURSE ONLY (OUTPATIENT)
Dept: NON INVASIVE DIAGNOSTICS | Age: 87
End: 2022-02-18

## 2022-02-18 NOTE — PATIENT INSTRUCTIONS
You may shower starting now  Call if any signs or symptoms of infection [phone] ext: [phone]  Fevers, chills, redness, swelling or drainage.        Hook up home  Monitor  [employer] connected: [phone]

## 2022-02-28 RX ORDER — AMLODIPINE BESYLATE 2.5 MG/1
TABLET ORAL
Qty: 30 TABLET | Refills: 3 | OUTPATIENT
Start: 2022-02-28

## 2022-03-01 ENCOUNTER — OFFICE VISIT (OUTPATIENT)
Dept: CARDIOLOGY CLINIC | Age: 87
End: 2022-03-01
Payer: MEDICARE

## 2022-03-01 ENCOUNTER — HOSPITAL ENCOUNTER (OUTPATIENT)
Dept: CARDIOLOGY | Age: 87
Discharge: HOME | End: 2022-03-01
Payer: MEDICARE

## 2022-03-01 VITALS
DIASTOLIC BLOOD PRESSURE: 91 MMHG | HEIGHT: 68 IN | WEIGHT: 176 LBS | OXYGEN SATURATION: 96 % | BODY MASS INDEX: 26.67 KG/M2 | SYSTOLIC BLOOD PRESSURE: 198 MMHG | TEMPERATURE: 97.2 F | HEART RATE: 86 BPM

## 2022-03-01 DIAGNOSIS — Z95.2 S/P TAVR (TRANSCATHETER AORTIC VALVE REPLACEMENT): Primary | ICD-10-CM

## 2022-03-01 LAB
LV EF: 33 %
LVEF MODALITY: NORMAL

## 2022-03-01 PROCEDURE — 1111F DSCHRG MED/CURRENT MED MERGE: CPT | Performed by: PHYSICIAN ASSISTANT

## 2022-03-01 PROCEDURE — G8484 FLU IMMUNIZE NO ADMIN: HCPCS | Performed by: PHYSICIAN ASSISTANT

## 2022-03-01 PROCEDURE — 1123F ACP DISCUSS/DSCN MKR DOCD: CPT | Performed by: PHYSICIAN ASSISTANT

## 2022-03-01 PROCEDURE — 1036F TOBACCO NON-USER: CPT | Performed by: PHYSICIAN ASSISTANT

## 2022-03-01 PROCEDURE — 99214 OFFICE O/P EST MOD 30 MIN: CPT | Performed by: PHYSICIAN ASSISTANT

## 2022-03-01 PROCEDURE — G8428 CUR MEDS NOT DOCUMENT: HCPCS | Performed by: PHYSICIAN ASSISTANT

## 2022-03-01 PROCEDURE — G8417 CALC BMI ABV UP PARAM F/U: HCPCS | Performed by: PHYSICIAN ASSISTANT

## 2022-03-01 PROCEDURE — 93306 TTE W/DOPPLER COMPLETE: CPT

## 2022-03-01 PROCEDURE — 4040F PNEUMOC VAC/ADMIN/RCVD: CPT | Performed by: PHYSICIAN ASSISTANT

## 2022-03-01 RX ORDER — ISOSORBIDE DINITRATE 20 MG/1
20 TABLET ORAL 3 TIMES DAILY
Qty: 90 TABLET | Refills: 3 | Status: SHIPPED
Start: 2022-03-01 | End: 2022-03-23

## 2022-03-01 RX ORDER — HYDRALAZINE HYDROCHLORIDE 25 MG/1
25 TABLET, FILM COATED ORAL 3 TIMES DAILY
Qty: 90 TABLET | Refills: 3 | Status: SHIPPED
Start: 2022-03-01 | End: 2022-03-16 | Stop reason: DRUGHIGH

## 2022-03-01 RX ORDER — TORSEMIDE 20 MG/1
20 TABLET ORAL DAILY
Qty: 30 TABLET | Refills: 0 | Status: SHIPPED
Start: 2022-03-01 | End: 2022-03-16 | Stop reason: SDUPTHER

## 2022-03-01 NOTE — PATIENT INSTRUCTIONS
Call Dr. Alexander Burnette office for follow up [phone]   Address: [address] [facility]    Increase coreg to 25 mg twice a day (4 tablets)    Will discuss with Dr. Rylie Pearce and call with further recommendations

## 2022-03-01 NOTE — PROGRESS NOTES
[facility] Note      Patient name: Adelfo Lynch    Reason for visit: TAVR follow up     Primary Care Physician: Andreia Tiwari MD    Date of service: 3/1/2022    Chief Complaint: TAVR follow up - 30 day    HPI: Mr. Ruben Cordero presents for follow up s/p TAVR on 2/2/22. He is doing well. His wife notes his BP has remained elevated (160s-190s) despite increase in coreg at last visit. She also notes continued increase in LE edema compared to preop. For the last 3-4 nights, he has experienced orthopnea requiring him to sit up, after which his breathing improves. Otherwise, he has noted no dyspnea on exertion and denies shortness of breath at rest. Denies chest pain, PND, palpitations or syncope. He had labs drawn since last visit (done at PCP office). He was seen in hospital by Dr. Floy Severe, but has not had office follow up since discharge. Allergies: No Known Allergies    Home medications:    Current Outpatient Medications   Medication Sig Dispense Refill    carvedilol (COREG) 6.25 MG tablet Take 2 tablets by mouth 2 times daily (with meals) 60 tablet 3    apixaban (ELIQUIS) 2.5 MG TABS tablet Take 1 tablet by mouth 2 times daily 60 tablet 1    pantoprazole (PROTONIX) 20 MG tablet Take 1 tablet by mouth every morning (before breakfast) 30 tablet 3    aspirin 81 MG chewable tablet Take 1 tablet by mouth daily 30 tablet 3    sitaGLIPtan-metformin (JANUMET)  MG per tablet TAKE 1 TABLET BY MOUTH TWICE A DAY WITH MEALS 180 tablet 0    doxazosin (CARDURA) 4 MG tablet TAKE 1 TABLET BY MOUTH EVERY DAY 90 tablet 1    pravastatin (PRAVACHOL) 40 MG tablet Take one daily 90 tablet 0     No current facility-administered medications for this visit.      Facility-Administered Medications Ordered in Other Visits   Medication Dose Route Frequency Provider Last Rate Last Admin    perflutren lipid microspheres (DEFINITY) injection 1.65 mg  1.5 mL IntraVENous ONCE PRN Harry Meigs, PA Past Medical History:  Past Medical History:   Diagnosis Date    [facility]     BPH (benign prostatic hyperplasia)     CAD (coronary artery disease)     Diabetic neuropathy ([facility]     [facility]     HTN (hypertension)     Hyperlipemia     Pancreatitis 9/10    S/P CABG (coronary artery bypass graft)        Past Surgical History:  Past Surgical History:   Procedure Laterality Date    AORTIC VALVE REPLACEMENT N/A 2/2/2022    TRANSCATHETER AORTIC VALVE REPLACEMENT FEMORAL APPROACH performed by Carson Bauer MD at [id number]  09/27/2010    COLONOSCOPY      CORONARY ARTERY BYPASS GRAFT  12/01/2009    CABG x4 with left internal mammary artery to the LAD. reverse saphenous vein grafts to the 1st marginal branches ofcircumflex, posterior descending branch of the right coronary artery, and posterolateral branch to the right coronary artery, and modified maze procedure with Medtronic bipolar radiofrequency ablation, and excision of left atrial appendage.     PACEMAKER INSERTION N/A 02/03/2022    Medtronic Micra VR Leadless Pacemaker  (Dr. Rommel Hess)    TONSILLECTOMY         Social History:  Social History     Socioeconomic History    Marital status:      Spouse name: Not on file    Number of children: Not on file    Years of education: Not on file    Highest education level: Not on file   Occupational History    Not on file   Tobacco Use    Smoking status: Never Smoker    Smokeless tobacco: Never Used   Vaping Use    Vaping Use: Never used   Substance and Sexual Activity    Alcohol use: Yes     Comment: rarely- 1 beer    Drug use: No    Sexual activity: Not on file   Other Topics Concern    Not on file   Social History Narrative    Not on file     Social Determinants of Health     Financial Resource Strain:     Difficulty of Paying Living Expenses: Not on file   Food Insecurity:     Worried About [facility] in the Last Year: Not on file    Ran Out of Food in the Last Year: Not on file   Transportation Needs:     Lack of Transportation (Medical): Not on file    Lack of Transportation (Non-Medical): Not on file   Physical Activity:     Days of Exercise per Week: Not on file    Minutes of Exercise per Session: Not on file   Stress:     Feeling of Stress : Not on file   Social Connections:     Frequency of Communication with Friends and Family: Not on file    Frequency of Social Gatherings with Friends and Family: Not on file    Attends Jain Services: Not on file    Active Member of [address] [place] [address] [employer] or Organizations: Not on file    Attends Club or Organization Meetings: Not on file    Marital Status: Not on file   Intimate Partner Violence:     Fear of Current or Ex-Partner: Not on file    Emotionally Abused: Not on file    Physically Abused: Not on file    Sexually Abused: Not on file   Housing Stability:     Unable to Pay for Housing in the Last Year: Not on file    Number of [place] in the Last Year: Not on file    Unstable Housing in the Last Year: Not on file       Family History:  Family History   Problem Relation Age of Onset    High Blood Pressure Mother     Heart Disease Mother     Cancer Father        Review of Systems:  Constitutional: Denies fevers, chills, or weight loss. HEENT: Denies visual changes or hearing loss. Heart: As per HPI. Lungs: Denies shortness of breath, cough, or wheezing. Gastrointestinal: Denies nausea, vomiting, constipation, or diarrhea. Genitourinary: dysuria or hematuria. Psychiatric: Patient denies anxiety or depression. Neurologic: Patient denies weakness of the extremities, dizziness, or headaches. All other ROS checked and found to be negative. Objective:  Vitals: /97, 170/58 on repeat, P 61, R 16, T 97.8  Pulse ox 96% on room air  General Appearance: Pleasant 80y.o. year old male who appears stated age. Communicates well, no acute distress.     HEENT: Head is normocephalic, atraumatic. EOMs intact, PERRL. Trachea midline. Lungs: Normal respiratory rate and normal effort. He is not in respiratory distress. Breath sounds diminished at bilateral bases, otherwise clear to auscultation. No wheezes. Heart: Normal rate. Regular rhythm. S1 normal and S2 normal. No murmur. Chest: Symmetric chest wall expansion. Extremities: Normal range of motion. Moderate edema bilaterally. Neurological: Patient is alert and oriented to person, place and time. Skin: Warm and dry. Abdomen: Abdomen is soft and non-distended. Bowel sounds are normal.   Pulses: Distal pulses are intact. Skin: Warm and dry without lesions. Assessment:   Patient Active Problem List   Diagnosis    Cellulitis    CAD (coronary artery disease)    S/P CABG (coronary artery bypass graft)    DM (diabetes mellitus) ([address]    Hyperlipemia    HTN (hypertension)    A-fib ([address]    BPH (benign prostatic hyperplasia)    Diabetic neuropathy ([address]    Encounter for therapeutic drug monitoring    Long term current use of anticoagulant therapy    Aortic valve stenosis    Chronic kidney disease    Heart valve disease    Gout    Syncope without other cardiovascular symptoms    Renal failure    Mild protein-calorie malnutrition (HCC)    Syncope    Complete AV block ([address]       1. Critical aortic stenosis s/p TAVR using 34-mm Medtronic Evolut PRO+ valve. Mean gradient 6 mmHg by echo today; left pleural effusion noted as well (also noted as small on CTA prior to TAVR)  2. CAD status post CABG in 2009 with LIMA to LAD, SVG to OM1, SVG to RPDA, SVG to RPL with modified maze and SALENA exclusion -grafts patent on CTA  3. PAF on warfarin prior to admission  4. Type 2 diabetes on orals  5. CKD 3B-4  6. PAD  7. HFrEF with severely reduced LVEF  8. 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 Benign liver hemangioma   10.  Rash - improving with recent f/u and

## 2022-03-07 ENCOUNTER — TELEPHONE (OUTPATIENT)
Dept: CARDIOLOGY CLINIC | Age: 87
End: 2022-03-07

## 2022-03-09 ENCOUNTER — HOSPITAL ENCOUNTER (OUTPATIENT)
Age: 87
Discharge: HOME | End: 2022-03-09
Payer: MEDICARE

## 2022-03-09 DIAGNOSIS — Z95.2 S/P TAVR (TRANSCATHETER AORTIC VALVE REPLACEMENT): ICD-10-CM

## 2022-03-09 DIAGNOSIS — I10 PRIMARY HYPERTENSION: ICD-10-CM

## 2022-03-09 LAB
ANION GAP SERPL CALCULATED.3IONS-SCNC: 11 MMOL/L (ref 7–16)
BUN BLDV-MCNC: 39 MG/DL (ref 6–23)
CALCIUM SERPL-MCNC: 9.6 MG/DL (ref 8.6–10.2)
CHLORIDE BLD-SCNC: 103 MMOL/L (ref 98–107)
CO2: 26 MMOL/L (ref 22–29)
CREAT SERPL-MCNC: 1.8 MG/DL (ref 0.7–1.2)
GFR AFRICAN AMERICAN: 43
GFR NON-AFRICAN AMERICAN: 36 ML/MIN/1.73
GLUCOSE BLD-MCNC: 155 MG/DL (ref 74–99)
POTASSIUM SERPL-SCNC: 4.8 MMOL/L (ref 3.5–5)
SODIUM BLD-SCNC: 140 MMOL/L (ref 132–146)

## 2022-03-09 PROCEDURE — 80048 BASIC METABOLIC PNL TOTAL CA: CPT

## 2022-03-09 PROCEDURE — 36415 COLL VENOUS BLD VENIPUNCTURE: CPT

## 2022-03-11 RX ORDER — CARVEDILOL 6.25 MG/1
TABLET ORAL
Qty: 120 TABLET | Refills: 1 | Status: SHIPPED
Start: 2022-03-11 | End: 2022-03-31 | Stop reason: DRUGHIGH

## 2022-03-16 ENCOUNTER — OFFICE VISIT (OUTPATIENT)
Dept: CARDIOLOGY CLINIC | Age: 87
End: 2022-03-16
Payer: MEDICARE

## 2022-03-16 VITALS
RESPIRATION RATE: 16 BRPM | HEART RATE: 62 BPM | HEIGHT: 71 IN | DIASTOLIC BLOOD PRESSURE: 60 MMHG | BODY MASS INDEX: 22.2 KG/M2 | SYSTOLIC BLOOD PRESSURE: 150 MMHG | WEIGHT: 158.6 LBS

## 2022-03-16 DIAGNOSIS — I25.10 CORONARY ARTERY DISEASE INVOLVING NATIVE CORONARY ARTERY OF NATIVE HEART WITHOUT ANGINA PECTORIS: Primary | ICD-10-CM

## 2022-03-16 PROCEDURE — 93000 ELECTROCARDIOGRAM COMPLETE: CPT | Performed by: INTERNAL MEDICINE

## 2022-03-16 PROCEDURE — G8484 FLU IMMUNIZE NO ADMIN: HCPCS | Performed by: INTERNAL MEDICINE

## 2022-03-16 PROCEDURE — 99214 OFFICE O/P EST MOD 30 MIN: CPT | Performed by: INTERNAL MEDICINE

## 2022-03-16 PROCEDURE — G8420 CALC BMI NORM PARAMETERS: HCPCS | Performed by: INTERNAL MEDICINE

## 2022-03-16 PROCEDURE — 4040F PNEUMOC VAC/ADMIN/RCVD: CPT | Performed by: INTERNAL MEDICINE

## 2022-03-16 PROCEDURE — 1036F TOBACCO NON-USER: CPT | Performed by: INTERNAL MEDICINE

## 2022-03-16 PROCEDURE — G8427 DOCREV CUR MEDS BY ELIG CLIN: HCPCS | Performed by: INTERNAL MEDICINE

## 2022-03-16 PROCEDURE — 1123F ACP DISCUSS/DSCN MKR DOCD: CPT | Performed by: INTERNAL MEDICINE

## 2022-03-16 RX ORDER — TORSEMIDE 20 MG/1
20 TABLET ORAL DAILY
Qty: 90 TABLET | Refills: 3 | Status: SHIPPED
Start: 2022-03-16 | End: 2022-04-29

## 2022-03-16 RX ORDER — HYDRALAZINE HYDROCHLORIDE 50 MG/1
50 TABLET, FILM COATED ORAL 3 TIMES DAILY
Qty: 270 TABLET | Refills: 3 | Status: SHIPPED
Start: 2022-03-16 | End: 2022-03-16

## 2022-03-16 RX ORDER — HYDRALAZINE HYDROCHLORIDE 50 MG/1
50 TABLET, FILM COATED ORAL 3 TIMES DAILY
Qty: 270 TABLET | Refills: 3 | Status: SHIPPED | OUTPATIENT
Start: 2022-03-16

## 2022-03-16 NOTE — PROGRESS NOTES
CHIEF COMPLAINT: CAD/CABG/DM/HTN    HISTORY OF PRESENT ILLNESS: Patient is a 80 y.o. male seen at the request of Rufino Latham MD.      The patient presents today for follow-up. Recent TAVR. No CP or SOB.      Past Medical History:   Diagnosis Date    [facility]fib ([facility]     BPH (benign prostatic hyperplasia)     CAD (coronary artery disease)     Diabetic neuropathy (HCC)     [facility]     HTN (hypertension)     Hyperlipemia     Pancreatitis 09/01/2010    Pleural effusion on left 03/01/2022    seen on echo    S/P CABG (coronary artery bypass graft)        Patient Active Problem List   Diagnosis    Cellulitis    CAD (coronary artery disease)    S/P CABG (coronary artery bypass graft)    DM [facility]    Hyperlipemia    HTN (hypertension)    A-fib ([facility]    BPH (benign prostatic hyperplasia)    Diabetic neuropathy ([facility]    Encounter for therapeutic drug monitoring    Long term current use of anticoagulant therapy    Aortic valve stenosis    Chronic kidney disease    Heart valve disease    Gout    Syncope without other cardiovascular symptoms    Renal failure    Mild protein-calorie malnutrition (HCC)    Syncope    Complete AV block (HCC)       No Known Allergies    Current Outpatient Medications   Medication Sig Dispense Refill    torsemide (DEMADEX) 20 MG tablet Take 1 tablet by mouth daily 90 tablet 3    hydrALAZINE (APRESOLINE) 50 MG tablet Take 1 tablet by mouth 3 times daily 270 tablet 3    carvedilol (COREG) 6.25 MG tablet TAKE 2 TABLETS BY MOUTH TWICE A DAY WITH MEALS 120 tablet 1    isosorbide dinitrate (ISORDIL) 20 MG tablet Take 1 tablet by mouth 3 times daily 90 tablet 3    apixaban (ELIQUIS) 2.5 MG TABS tablet Take 1 tablet by mouth 2 times daily 60 tablet 1    pantoprazole (PROTONIX) 20 MG tablet Take 1 tablet by mouth every morning (before breakfast) 30 tablet 3    aspirin 81 MG chewable tablet Take 1 tablet by mouth daily 30 tablet 3    sitaGLIPtan-metformin (JANUMET)  MG per tablet TAKE 1 TABLET BY MOUTH TWICE A DAY WITH MEALS 180 tablet 0    doxazosin (CARDURA) 4 MG tablet TAKE 1 TABLET BY MOUTH EVERY DAY 90 tablet 1    pravastatin (PRAVACHOL) 40 MG tablet Take one daily 90 tablet 0     No current facility-administered medications for this visit. Social History     Socioeconomic History    Marital status:      Spouse name: Not on file    Number of children: Not on file    Years of education: Not on file    Highest education level: Not on file   Occupational History    Not on file   Tobacco Use    Smoking status: Never Smoker    Smokeless tobacco: Never Used   Vaping Use    Vaping Use: Never used   Substance and Sexual Activity    Alcohol use: Yes     Comment: rarely- 1 beer    Drug use: No    Sexual activity: Not on file   Other Topics Concern    Not on file   Social History Narrative    Not on file     Social Determinants of Health     Financial Resource Strain:     Difficulty of Paying Living Expenses: Not on file   Food Insecurity:     Worried About Running Out of Food in the Last Year: Not on file    Alisha of Food in the Last Year: Not on file   Transportation Needs:     Lack of Transportation (Medical): Not on file    Lack of Transportation (Non-Medical):  Not on file   Physical Activity:     Days of Exercise per Week: Not on file    Minutes of Exercise per Session: Not on file   Stress:     Feeling of Stress : Not on file   Social Connections:     Frequency of Communication with Friends and Family: Not on file    Frequency of Social Gatherings with Friends and Family: Not on file    Attends Gnosticist Services: Not on file    Active Member of Clubs or Organizations: Not on file    Attends Club or Organization Meetings: Not on file    Marital Status: Not on file   Intimate Partner Violence:     Fear of Current or Ex-Partner: Not on file    Emotionally Abused: Not on file   [facility] Physically Abused: Not on file    Sexually Abused: Not on file   Housing Stability:     Unable to Pay for Housing in the Last Year: Not on file    Number of Places Lived in the Last Year: Not on file    Unstable Housing in the Last Year: Not on file       Family History   Problem Relation Age of Onset    High Blood Pressure Mother     Heart Disease Mother     Cancer Father        Review of Systems:  Heart: as above   Lungs: as above   Eyes: denies changes in vision or discharge. Ears: denies changes in hearing or pain. Nose: denies epistaxis or masses   Throat: denies sore throat or trouble swallowing. Neuro: denies numbness, tingling, tremors. Skin: denies rashes or itching. : denies hematuria, dysuria   GI: denies vomiting, diarrhea   Psych: denies mood changed, anxiety, depression. All other systems negative. Physical Exam   BP (!) 150/60   Pulse 62   Resp 16   Ht 5' 11\" (1.803 m)   Wt 158 lb 9.6 oz (71.9 kg)   BMI 22.12 kg/m²   Constitutional: Oriented to person, place, and time. Well-developed and well-nourished. No distress. Head: Normocephalic and atraumatic. Eyes: EOM are normal. Pupils are equal, round, and reactive to light. Neck: Normal range of motion. Neck supple. No hepatojugular reflux and no JVD present. Carotid bruit is not present. No tracheal deviation present. No thyromegaly present. Cardiovascular: Normal rate, regular rhythm, normal heart sounds and intact distal pulses. Exam reveals no gallop and no friction rub. No murmur heard. Pulmonary/Chest: Effort normal and breath sounds normal. No respiratory distress. No wheezes. No rales. No tenderness. Abdominal: Soft. Bowel sounds are normal. No distension and no mass. No tenderness. No rebound and no guarding. Musculoskeletal: Normal range of motion. No edema and no tenderness. Lymphadenopathy:   No cervical adenopathy. No groin adenopathy. Neurological: Alert and oriented to person, place, and time. Skin: Skin is warm and dry. No rash noted. Not diaphoretic. No erythema. Psychiatric: Normal mood and affect. Behavior is normal.     EKG personally reviewed 03/17/22:  Afib/V paced. ASSESSMENT AND PLAN:  Patient Active Problem List   Diagnosis    Cellulitis    CAD (coronary artery disease)    S/P CABG (coronary artery bypass graft)    [facility]    Hyperlipemia    HTN (hypertension)    A-fib ([facility]    BPH (benign prostatic hyperplasia)    Diabetic neuropathy ([facility]    Encounter for therapeutic drug monitoring    Long term current use of anticoagulant therapy    Aortic valve stenosis    Chronic kidney disease    Heart valve disease    Gout    Syncope without other cardiovascular symptoms    Renal failure    Mild protein-calorie malnutrition (HCC)    Syncope    Complete AV block ([facility]     1. CAD/CABG: CABG in 2009 with LIMA to LAD, SVG to OM1, SVG to RPDA, SVG to RPL with modified maze and SALENA exclusion. Grafts patent on CTA. Continue ASA/BB/statin. 2. VHD: Post TAVR with 34-mm Medtronic Evolut PRO+ valve. 3. Chronic Systolic CHF:     BB/hydralazine/nitrate/demadex. 4. Afib: In afib. Stable symptoms. Eliquis. 5. Pacer: Per EP. 6. Lipids: Statin. 7. HTN: Observe. 8. Diabetes:  As per PCP. 9. CKD: Follow labs. 10. PAD: ASA/statin. Natalie Esqueda D.O.   Cardiologist  Cardiology, [facility]

## 2022-03-23 RX ORDER — ISOSORBIDE DINITRATE 20 MG/1
TABLET ORAL
Qty: 90 TABLET | Refills: 3 | Status: SHIPPED
Start: 2022-03-23 | End: 2022-06-20

## 2022-03-28 ENCOUNTER — TELEPHONE (OUTPATIENT)
Dept: CARDIOLOGY CLINIC | Age: 87
End: 2022-03-28

## 2022-03-28 NOTE — TELEPHONE ENCOUNTER
Spouse called stating pt needed refill on Carvedilol 6.25 mg 2 pills bid (90 day supply) and Eliquis 2.5 mg bid. He uses [facility] on [address]. She also wanted you to be aware that he is doing a lot better.

## 2022-03-31 RX ORDER — CARVEDILOL 12.5 MG/1
12.5 TABLET ORAL 2 TIMES DAILY
Qty: 180 TABLET | Refills: 1 | Status: SHIPPED | OUTPATIENT
Start: 2022-03-31 | End: 2022-09-26

## 2022-04-04 ENCOUNTER — HOSPITAL ENCOUNTER (OUTPATIENT)
Age: 87
Discharge: HOME | End: 2022-04-04
Payer: MEDICARE

## 2022-04-04 LAB
ANION GAP SERPL CALCULATED.3IONS-SCNC: 9 MMOL/L (ref 7–16)
BUN BLDV-MCNC: 46 MG/DL (ref 6–23)
CALCIUM SERPL-MCNC: 9.3 MG/DL (ref 8.6–10.2)
CHLORIDE BLD-SCNC: 104 MMOL/L (ref 98–107)
CO2: 27 MMOL/L (ref 22–29)
CREAT SERPL-MCNC: 1.9 MG/DL (ref 0.7–1.2)
GFR AFRICAN AMERICAN: 40
GFR NON-AFRICAN AMERICAN: 33 ML/MIN/1.73
GLUCOSE BLD-MCNC: 124 MG/DL (ref 74–99)
MAGNESIUM: 1.8 MG/DL (ref 1.6–2.6)
POTASSIUM SERPL-SCNC: 4.8 MMOL/L (ref 3.5–5)
SODIUM BLD-SCNC: 140 MMOL/L (ref 132–146)

## 2022-04-04 PROCEDURE — 83735 ASSAY OF MAGNESIUM: CPT

## 2022-04-04 PROCEDURE — 80048 BASIC METABOLIC PNL TOTAL CA: CPT

## 2022-04-04 PROCEDURE — 36415 COLL VENOUS BLD VENIPUNCTURE: CPT

## 2022-04-29 ENCOUNTER — OFFICE VISIT (OUTPATIENT)
Dept: CARDIOLOGY CLINIC | Age: 87
End: 2022-04-29
Payer: MEDICARE

## 2022-04-29 VITALS
BODY MASS INDEX: 22.27 KG/M2 | HEIGHT: 71 IN | DIASTOLIC BLOOD PRESSURE: 60 MMHG | SYSTOLIC BLOOD PRESSURE: 154 MMHG | HEART RATE: 61 BPM | WEIGHT: 159.1 LBS | RESPIRATION RATE: 16 BRPM

## 2022-04-29 DIAGNOSIS — I25.10 CORONARY ARTERY DISEASE INVOLVING NATIVE CORONARY ARTERY OF NATIVE HEART WITHOUT ANGINA PECTORIS: Primary | ICD-10-CM

## 2022-04-29 PROCEDURE — G8420 CALC BMI NORM PARAMETERS: HCPCS | Performed by: INTERNAL MEDICINE

## 2022-04-29 PROCEDURE — 4040F PNEUMOC VAC/ADMIN/RCVD: CPT | Performed by: INTERNAL MEDICINE

## 2022-04-29 PROCEDURE — 1123F ACP DISCUSS/DSCN MKR DOCD: CPT | Performed by: INTERNAL MEDICINE

## 2022-04-29 PROCEDURE — G8427 DOCREV CUR MEDS BY ELIG CLIN: HCPCS | Performed by: INTERNAL MEDICINE

## 2022-04-29 PROCEDURE — 93000 ELECTROCARDIOGRAM COMPLETE: CPT | Performed by: INTERNAL MEDICINE

## 2022-04-29 PROCEDURE — 99214 OFFICE O/P EST MOD 30 MIN: CPT | Performed by: INTERNAL MEDICINE

## 2022-04-29 PROCEDURE — 1036F TOBACCO NON-USER: CPT | Performed by: INTERNAL MEDICINE

## 2022-04-29 RX ORDER — TORSEMIDE 20 MG/1
10 TABLET ORAL DAILY
Qty: 90 TABLET | Refills: 3 | Status: SHIPPED
Start: 2022-04-29 | End: 2022-04-29

## 2022-04-29 RX ORDER — TORSEMIDE 10 MG/1
10 TABLET ORAL DAILY
Qty: 90 TABLET | Refills: 3 | Status: SHIPPED | OUTPATIENT
Start: 2022-04-29 | End: 2022-05-29

## 2022-04-29 NOTE — PROGRESS NOTES
CHIEF COMPLAINT: CAD/CABG/DM/HTN    HISTORY OF PRESENT ILLNESS: Patient is a 80 y.o. male seen at the request of Celine Irene MD.      The patient presents today for follow-up. Recent TAVR. No CP or SOB.      Past Medical History:   Diagnosis Date    [facility] ([facility]     BPH (benign prostatic hyperplasia)     CAD (coronary artery disease)     Diabetic neuropathy (HCC)     [facility]     HTN (hypertension)     Hyperlipemia     Pancreatitis 09/01/2010    Pleural effusion on left 03/01/2022    seen on echo    S/P CABG (coronary artery bypass graft)        Patient Active Problem List   Diagnosis    Cellulitis    CAD (coronary artery disease)    S/P CABG (coronary artery bypass graft)    DM [facility]di[facility]) [facility]    Hyperlipemia    HTN (hypertension)    A-fib ([facility]    BPH (benign prostatic hyperplasia)    Diabetic neuropathy ([facility]    Encounter for therapeutic drug monitoring    Long term current use of anticoagulant therapy    Aortic valve stenosis    Chronic kidney disease    Heart valve disease    Gout    Syncope without other cardiovascular symptoms    Renal failure    Mild protein-calorie malnutrition (HCC)    Syncope    Complete AV block (HCC)       No Known Allergies    Current Outpatient Medications   Medication Sig Dispense Refill    apixaban (ELIQUIS) 2.5 MG TABS tablet Take 1 tablet by mouth 2 times daily 180 tablet 1    carvedilol (COREG) 12.5 MG tablet Take 1 tablet by mouth 2 times daily 180 tablet 1    isosorbide dinitrate (ISORDIL) 20 MG tablet TAKE 1 TABLET BY MOUTH THREE TIMES A DAY 90 tablet 3    hydrALAZINE (APRESOLINE) 50 MG tablet Take 1 tablet by mouth 3 times daily 270 tablet 3    pantoprazole (PROTONIX) 20 MG tablet Take 1 tablet by mouth every morning (before breakfast) 30 tablet 3    doxazosin (CARDURA) 4 MG tablet TAKE 1 TABLET BY MOUTH EVERY DAY 90 tablet 1    pravastatin (PRAVACHOL) 40 MG tablet Take one daily 90 tablet 0  torsemide (DEMADEX) 10 MG tablet Take 1 tablet by mouth daily 90 tablet 3     No current facility-administered medications for this visit. Social History     Socioeconomic History    Marital status:      Spouse name: Not on file    Number of children: Not on file    Years of education: Not on file    Highest education level: Not on file   Occupational History    Not on file   Tobacco Use    Smoking status: Never Smoker    Smokeless tobacco: Never Used   Vaping Use    Vaping Use: Never used   Substance and Sexual Activity    Alcohol use: Yes     Comment: rarely- 1 beer    Drug use: No    Sexual activity: Not on file   Other Topics Concern    Not on file   Social History Narrative    Not on file     Social Determinants of Health     Financial Resource Strain:     Difficulty of Paying Living Expenses: Not on file   Food Insecurity:     Worried About Running Out of Food in the Last Year: Not on file    Alisha of Food in the Last Year: Not on file   Transportation Needs:     Lack of Transportation (Medical): Not on file    Lack of Transportation (Non-Medical):  Not on file   Physical Activity:     Days of Exercise per Week: Not on file    Minutes of Exercise per Session: Not on file   Stress:     Feeling of Stress : Not on file   Social Connections:     Frequency of Communication with Friends and Family: Not on file    Frequency of Social Gatherings with Friends and Family: Not on file    Attends Synagogue Services: Not on file    Active Member of Clubs or Organizations: Not on file    Attends Club or Organization Meetings: Not on file    Marital Status: Not on file   Intimate Partner Violence:     Fear of Current or Ex-Partner: Not on file    Emotionally Abused: Not on file    Physically Abused: Not on file    Sexually Abused: Not on file   Housing Stability:     Unable to Pay for Housing in the Last Year: Not on file    Number of [place] in the Last Year: Not on file    Unstable Housing in the Last Year: Not on file       Family History   Problem Relation Age of Onset    High Blood Pressure Mother     Heart Disease Mother     Cancer Father        Review of Systems:  Heart: as above   Lungs: as above   Eyes: denies changes in vision or discharge. Ears: denies changes in hearing or pain. Nose: denies epistaxis or masses   Throat: denies sore throat or trouble swallowing. Neuro: denies numbness, tingling, tremors. Skin: denies rashes or itching. : denies hematuria, dysuria   GI: denies vomiting, diarrhea   Psych: denies mood changed, anxiety, depression. All other systems negative. Physical Exam   BP (!) 154/60   Pulse 61   Resp 16   Ht 5' 11\" (1.803 m)   Wt 159 lb 1.6 oz (72.2 kg)   BMI 22.19 kg/m²   Constitutional: Oriented to person, place, and time. Well-developed and well-nourished. No distress. Head: Normocephalic and atraumatic. Eyes: EOM are normal. Pupils are equal, round, and reactive to light. Neck: Normal range of motion. Neck supple. No hepatojugular reflux and no JVD present. Carotid bruit is not present. No tracheal deviation present. No thyromegaly present. Cardiovascular: Normal rate, regular rhythm, normal heart sounds and intact distal pulses. Exam reveals no gallop and no friction rub. No murmur heard. Pulmonary/Chest: Effort normal and breath sounds normal. No respiratory distress. No wheezes. No rales. No tenderness. Abdominal: Soft. Bowel sounds are normal. No distension and no mass. No tenderness. No rebound and no guarding. Musculoskeletal: Normal range of motion. No edema and no tenderness. Lymphadenopathy:   No cervical adenopathy. No groin adenopathy. Neurological: Alert and oriented to person, place, and time. Skin: Skin is warm and dry. No rash noted. Not diaphoretic. No erythema. Psychiatric: Normal mood and affect.  Behavior is normal.     EKG personally reviewed 04/29/22:  Afib/V paced.    ASSESSMENT AND PLAN:  Patient Active Problem List   Diagnosis    Cellulitis    CAD (coronary artery disease)    S/P CABG (coronary artery bypass graft)    [facility]    Hyperlipemia    HTN (hypertension)    [facility]    BPH (benign prostatic hyperplasia)    [facility]    Encounter for therapeutic drug monitoring    Long term current use of anticoagulant therapy    Aortic valve stenosis    Chronic kidney disease    Heart valve disease    Gout    Syncope without other cardiovascular symptoms    Renal failure    Mild protein-calorie malnutrition (HCC)    Syncope    Complete AV block ([facility]     1. CAD/CABG: CABG in 2009 with LIMA to LAD, SVG to OM1, SVG to RPDA, SVG to RPL with modified maze and SALENA exclusion. Grafts patent on CTA. Continue BB/statin. 2. VHD: Post TAVR with 34-mm Medtronic Evolut PRO+ valve. 3. Chronic Systolic CHF:     BB/hydralazine/nitrate/demadex. 4. Afib: In afib. Stable symptoms. Eliquis. 5. Pacer: Per EP. 6. Lipids: Statin. 7. HTN: Observe. 8. Diabetes:  As per PCP. 9. CKD: Follow labs. 10. PAD: ASA/statin. Rafael Rosales, D.O.   Cardiologist  Cardiology, [facility]

## 2022-05-02 ENCOUNTER — TELEPHONE (OUTPATIENT)
Dept: NON INVASIVE DIAGNOSTICS | Age: 87
End: 2022-05-02

## 2022-05-02 NOTE — TELEPHONE ENCOUNTER
Wife called asking if his device transmission was received. Informed her that the transmission was received on [employer].

## 2022-05-18 ENCOUNTER — NURSE ONLY (OUTPATIENT)
Dept: NON INVASIVE DIAGNOSTICS | Age: 87
End: 2022-05-18

## 2022-05-18 ENCOUNTER — HOSPITAL ENCOUNTER (OUTPATIENT)
Age: 87
Discharge: HOME | End: 2022-05-18
Payer: MEDICARE

## 2022-05-18 LAB
ALBUMIN SERPL-MCNC: 4 G/DL (ref 3.5–5.2)
ALP BLD-CCNC: 83 U/L (ref 40–129)
ALT SERPL-CCNC: 13 U/L (ref 0–40)
ANION GAP SERPL CALCULATED.3IONS-SCNC: 10 MMOL/L (ref 7–16)
AST SERPL-CCNC: 14 U/L (ref 0–39)
BILIRUB SERPL-MCNC: 0.5 MG/DL (ref 0–1.2)
BUN BLDV-MCNC: 42 MG/DL (ref 6–23)
CALCIUM SERPL-MCNC: 9.7 MG/DL (ref 8.6–10.2)
CHLORIDE BLD-SCNC: 104 MMOL/L (ref 98–107)
CO2: 26 MMOL/L (ref 22–29)
CREAT SERPL-MCNC: 2 MG/DL (ref 0.7–1.2)
CREATININE URINE: 68 MG/DL (ref 40–278)
CREATININE URINE: <1 MG/DL (ref 40–278)
GFR AFRICAN AMERICAN: 38
GFR NON-AFRICAN AMERICAN: 31 ML/MIN/1.73
GLUCOSE BLD-MCNC: 128 MG/DL (ref 74–99)
HCT VFR BLD CALC: 31.5 % (ref 37–54)
HEMOGLOBIN: 10.3 G/DL (ref 12.5–16.5)
MCH RBC QN AUTO: 31.2 PG (ref 26–35)
MCHC RBC AUTO-ENTMCNC: 32.7 % (ref 32–34.5)
MCV RBC AUTO: 95.5 FL (ref 80–99.9)
MICROALBUMIN UR-MCNC: <12 MG/L
MICROALBUMIN/CREAT UR-RTO: ABNORMAL (ref 0–30)
PARATHYROID HORMONE INTACT: 115 PG/ML (ref 15–65)
PDW BLD-RTO: 13.1 FL (ref 11.5–15)
PHOSPHORUS: 3.8 MG/DL (ref 2.5–4.5)
PLATELET # BLD: 148 E9/L (ref 130–450)
PMV BLD AUTO: 10.3 FL (ref 7–12)
POTASSIUM SERPL-SCNC: 5 MMOL/L (ref 3.5–5)
PROTEIN PROTEIN: 140 MG/DL (ref 0–12)
PROTEIN/CREAT RATIO: 2.1
PROTEIN/CREAT RATIO: 2.1 (ref 0–0.2)
RBC # BLD: 3.3 E12/L (ref 3.8–5.8)
SODIUM BLD-SCNC: 140 MMOL/L (ref 132–146)
TOTAL PROTEIN: 7.1 G/DL (ref 6.4–8.3)
VITAMIN D 25-HYDROXY: 25 NG/ML (ref 30–100)
WBC # BLD: 7.5 E9/L (ref 4.5–11.5)

## 2022-05-18 PROCEDURE — 82306 VITAMIN D 25 HYDROXY: CPT

## 2022-05-18 PROCEDURE — 82044 UR ALBUMIN SEMIQUANTITATIVE: CPT

## 2022-05-18 PROCEDURE — 83970 ASSAY OF PARATHORMONE: CPT

## 2022-05-18 PROCEDURE — 84156 ASSAY OF PROTEIN URINE: CPT

## 2022-05-18 PROCEDURE — 36415 COLL VENOUS BLD VENIPUNCTURE: CPT

## 2022-05-18 PROCEDURE — 82570 ASSAY OF URINE CREATININE: CPT

## 2022-05-18 PROCEDURE — 85027 COMPLETE CBC AUTOMATED: CPT

## 2022-05-18 PROCEDURE — 84100 ASSAY OF PHOSPHORUS: CPT

## 2022-05-18 PROCEDURE — 80053 COMPREHEN METABOLIC PANEL: CPT

## 2022-06-20 RX ORDER — ISOSORBIDE DINITRATE 20 MG/1
TABLET ORAL
Qty: 270 TABLET | Refills: 1 | Status: SHIPPED | OUTPATIENT
Start: 2022-06-20

## 2022-07-11 ENCOUNTER — HOSPITAL ENCOUNTER (OUTPATIENT)
Age: 87
Discharge: HOME | End: 2022-07-11
Payer: MEDICARE

## 2022-07-11 LAB
BACTERIA: ABNORMAL /HPF
BILIRUBIN URINE: NEGATIVE
BLOOD, URINE: NEGATIVE
CLARITY: CLEAR
COLOR: YELLOW
CREATININE URINE: 75 MG/DL (ref 40–278)
GLUCOSE URINE: NEGATIVE MG/DL
KETONES, URINE: NEGATIVE MG/DL
LEUKOCYTE ESTERASE, URINE: NEGATIVE
NITRITE, URINE: NEGATIVE
PH UA: 6 (ref 5–9)
PROTEIN PROTEIN: 99 MG/DL (ref 0–12)
PROTEIN UA: 100 MG/DL
PROTEIN/CREAT RATIO: 1.3
PROTEIN/CREAT RATIO: 1.3 (ref 0–0.2)
RBC UA: ABNORMAL /HPF (ref 0–2)
SPECIFIC GRAVITY UA: 1.01 (ref 1–1.03)
UROBILINOGEN, URINE: 0.2 E.U./DL
WBC UA: ABNORMAL /HPF (ref 0–5)

## 2022-07-11 PROCEDURE — 82570 ASSAY OF URINE CREATININE: CPT

## 2022-07-11 PROCEDURE — 84156 ASSAY OF PROTEIN URINE: CPT

## 2022-07-11 PROCEDURE — 81001 URINALYSIS AUTO W/SCOPE: CPT

## 2022-07-25 ENCOUNTER — HOSPITAL ENCOUNTER (OUTPATIENT)
Age: 87
Discharge: HOME | End: 2022-07-25
Payer: MEDICARE

## 2022-07-25 LAB
ALBUMIN SERPL-MCNC: 4.2 G/DL (ref 3.5–5.2)
ALP BLD-CCNC: 81 U/L (ref 40–129)
ALT SERPL-CCNC: 10 U/L (ref 0–40)
ANION GAP SERPL CALCULATED.3IONS-SCNC: 12 MMOL/L (ref 7–16)
AST SERPL-CCNC: 18 U/L (ref 0–39)
BILIRUB SERPL-MCNC: 0.7 MG/DL (ref 0–1.2)
BUN BLDV-MCNC: 41 MG/DL (ref 6–23)
CALCIUM SERPL-MCNC: 9.6 MG/DL (ref 8.6–10.2)
CHLORIDE BLD-SCNC: 106 MMOL/L (ref 98–107)
CO2: 24 MMOL/L (ref 22–29)
CREAT SERPL-MCNC: 2.1 MG/DL (ref 0.7–1.2)
GFR AFRICAN AMERICAN: 36
GFR NON-AFRICAN AMERICAN: 30 ML/MIN/1.73
GLUCOSE BLD-MCNC: 140 MG/DL (ref 74–99)
HCT VFR BLD CALC: 30.7 % (ref 37–54)
HEMOGLOBIN: 9.7 G/DL (ref 12.5–16.5)
MCH RBC QN AUTO: 30.9 PG (ref 26–35)
MCHC RBC AUTO-ENTMCNC: 31.6 % (ref 32–34.5)
MCV RBC AUTO: 97.8 FL (ref 80–99.9)
PARATHYROID HORMONE INTACT: 139 PG/ML (ref 15–65)
PDW BLD-RTO: 13.6 FL (ref 11.5–15)
PHOSPHORUS: 3.8 MG/DL (ref 2.5–4.5)
PLATELET # BLD: 164 E9/L (ref 130–450)
PMV BLD AUTO: 10.8 FL (ref 7–12)
POTASSIUM SERPL-SCNC: 4.5 MMOL/L (ref 3.5–5)
RBC # BLD: 3.14 E12/L (ref 3.8–5.8)
SODIUM BLD-SCNC: 142 MMOL/L (ref 132–146)
TOTAL PROTEIN: 7.5 G/DL (ref 6.4–8.3)
VITAMIN D 25-HYDROXY: 31 NG/ML (ref 30–100)
WBC # BLD: 7 E9/L (ref 4.5–11.5)

## 2022-07-25 PROCEDURE — 80053 COMPREHEN METABOLIC PANEL: CPT

## 2022-07-25 PROCEDURE — 83970 ASSAY OF PARATHORMONE: CPT

## 2022-07-25 PROCEDURE — 36415 COLL VENOUS BLD VENIPUNCTURE: CPT

## 2022-07-25 PROCEDURE — 85027 COMPLETE CBC AUTOMATED: CPT

## 2022-07-25 PROCEDURE — 84100 ASSAY OF PHOSPHORUS: CPT

## 2022-07-25 PROCEDURE — 82306 VITAMIN D 25 HYDROXY: CPT

## 2022-09-14 ENCOUNTER — HOSPITAL ENCOUNTER (OUTPATIENT)
Age: 87
Discharge: HOME | End: 2022-09-14
Payer: MEDICARE

## 2022-09-14 LAB
24HR URINE VOLUME (ML): 1550 ML
ALBUMIN SERPL-MCNC: 4.3 G/DL (ref 3.5–5.2)
ALP BLD-CCNC: 76 U/L (ref 40–129)
ALT SERPL-CCNC: 11 U/L (ref 0–40)
ANION GAP SERPL CALCULATED.3IONS-SCNC: 9 MMOL/L (ref 7–16)
AST SERPL-CCNC: 18 U/L (ref 0–39)
BILIRUB SERPL-MCNC: 0.6 MG/DL (ref 0–1.2)
BUN BLDV-MCNC: 40 MG/DL (ref 6–23)
CALCIUM SERPL-MCNC: 9.7 MG/DL (ref 8.6–10.2)
CHLORIDE BLD-SCNC: 104 MMOL/L (ref 98–107)
CO2: 27 MMOL/L (ref 22–29)
CREAT SERPL-MCNC: 2 MG/DL (ref 0.7–1.2)
CREATININE 24 HOUR URINE: 982 MG/24H (ref 980–2200)
GFR AFRICAN AMERICAN: 38
GFR NON-AFRICAN AMERICAN: 31 ML/MIN/1.73
GLUCOSE BLD-MCNC: 127 MG/DL (ref 74–99)
HCT VFR BLD CALC: 32.5 % (ref 37–54)
HEMOGLOBIN: 10.1 G/DL (ref 12.5–16.5)
Lab: 25 HOURS
MCH RBC QN AUTO: 29.4 PG (ref 26–35)
MCHC RBC AUTO-ENTMCNC: 31.1 % (ref 32–34.5)
MCV RBC AUTO: 94.8 FL (ref 80–99.9)
PARATHYROID HORMONE INTACT: 101 PG/ML (ref 15–65)
PDW BLD-RTO: 13.9 FL (ref 11.5–15)
PHOSPHORUS: 3.7 MG/DL (ref 2.5–4.5)
PLATELET # BLD: 126 E9/L (ref 130–450)
PMV BLD AUTO: 11.4 FL (ref 7–12)
POTASSIUM SERPL-SCNC: 4.6 MMOL/L (ref 3.5–5)
PROTEIN 24 HOUR URINE: 1.43 G/24HR (ref 0–0.14)
RBC # BLD: 3.43 E12/L (ref 3.8–5.8)
SODIUM BLD-SCNC: 140 MMOL/L (ref 132–146)
TOTAL PROTEIN: 7.4 G/DL (ref 6.4–8.3)
VITAMIN D 25-HYDROXY: 27 NG/ML (ref 30–100)
WBC # BLD: 6 E9/L (ref 4.5–11.5)

## 2022-09-14 PROCEDURE — 84165 PROTEIN E-PHORESIS SERUM: CPT

## 2022-09-14 PROCEDURE — 84156 ASSAY OF PROTEIN URINE: CPT

## 2022-09-14 PROCEDURE — 83970 ASSAY OF PARATHORMONE: CPT

## 2022-09-14 PROCEDURE — 80053 COMPREHEN METABOLIC PANEL: CPT

## 2022-09-14 PROCEDURE — 84100 ASSAY OF PHOSPHORUS: CPT

## 2022-09-14 PROCEDURE — 85027 COMPLETE CBC AUTOMATED: CPT

## 2022-09-14 PROCEDURE — 84166 PROTEIN E-PHORESIS/URINE/CSF: CPT

## 2022-09-14 PROCEDURE — 36415 COLL VENOUS BLD VENIPUNCTURE: CPT

## 2022-09-14 PROCEDURE — 82306 VITAMIN D 25 HYDROXY: CPT

## 2022-09-16 LAB — ADDENDUM ELECTROPHORESIS URINE RANDOM: NORMAL

## 2022-09-19 LAB
ALBUMIN SERPL-MCNC: 3.3 G/DL (ref 3.5–4.7)
ALPHA-1-GLOBULIN: 0.4 G/DL (ref 0.2–0.4)
ALPHA-2-GLOBULIN: 0.9 G/DL (ref 0.5–1)
BETA GLOBULIN: 1.1 G/DL (ref 0.8–1.3)
ELECTROPHORESIS: ABNORMAL
GAMMA GLOBULIN: 1.5 G/DL (ref 0.7–1.6)

## 2022-09-26 RX ORDER — APIXABAN 2.5 MG/1
TABLET, FILM COATED ORAL
Qty: 180 TABLET | Refills: 1 | Status: SHIPPED | OUTPATIENT
Start: 2022-09-26

## 2022-09-26 RX ORDER — CARVEDILOL 12.5 MG/1
TABLET ORAL
Qty: 180 TABLET | Refills: 1 | Status: SHIPPED | OUTPATIENT
Start: 2022-09-26

## 2022-11-11 ENCOUNTER — TELEPHONE (OUTPATIENT)
Dept: ADMINISTRATIVE | Age: 87
End: 2022-11-11

## 2022-11-11 NOTE — TELEPHONE ENCOUNTER
Pt's wife calling to schedule pts 6 mth OV with [facility] - was due in Oct 2022. Please call and schedule him if any cancellations or when Jan schedule is available. Thank you.

## 2022-11-28 ENCOUNTER — TELEPHONE (OUTPATIENT)
Dept: NON INVASIVE DIAGNOSTICS | Age: 87
End: 2022-11-28

## 2022-11-28 NOTE — TELEPHONE ENCOUNTER
Patient wife called device clinic to cancel  appointment for 11/29/2022.  has been having bowel issues.

## 2022-12-27 ENCOUNTER — TELEPHONE (OUTPATIENT)
Dept: CARDIOLOGY CLINIC | Age: 87
End: 2022-12-27

## 2022-12-27 NOTE — TELEPHONE ENCOUNTER
Called and spoke with patient he confirmed his upcoming appointment scheduled for January 31st echo at 9:30 and OV at 10:30.  I will call again with a reminder closer to the scheduled appointment

## 2023-01-09 RX ORDER — HYDRALAZINE HYDROCHLORIDE 50 MG/1
TABLET, FILM COATED ORAL
Qty: 270 TABLET | Refills: 3 | Status: SHIPPED | OUTPATIENT
Start: 2023-01-09

## 2023-01-13 ENCOUNTER — OFFICE VISIT (OUTPATIENT)
Dept: CARDIOLOGY CLINIC | Age: 88
End: 2023-01-13
Payer: MEDICARE

## 2023-01-13 VITALS
SYSTOLIC BLOOD PRESSURE: 136 MMHG | HEART RATE: 63 BPM | DIASTOLIC BLOOD PRESSURE: 60 MMHG | WEIGHT: 169 LBS | BODY MASS INDEX: 24.2 KG/M2 | HEIGHT: 70 IN

## 2023-01-13 DIAGNOSIS — I25.10 CORONARY ARTERY DISEASE INVOLVING NATIVE CORONARY ARTERY OF NATIVE HEART WITHOUT ANGINA PECTORIS: Primary | ICD-10-CM

## 2023-01-13 PROCEDURE — G8420 CALC BMI NORM PARAMETERS: HCPCS | Performed by: INTERNAL MEDICINE

## 2023-01-13 PROCEDURE — G8484 FLU IMMUNIZE NO ADMIN: HCPCS | Performed by: INTERNAL MEDICINE

## 2023-01-13 PROCEDURE — 93000 ELECTROCARDIOGRAM COMPLETE: CPT | Performed by: INTERNAL MEDICINE

## 2023-01-13 PROCEDURE — G8427 DOCREV CUR MEDS BY ELIG CLIN: HCPCS | Performed by: INTERNAL MEDICINE

## 2023-01-13 PROCEDURE — 1036F TOBACCO NON-USER: CPT | Performed by: INTERNAL MEDICINE

## 2023-01-13 PROCEDURE — 1123F ACP DISCUSS/DSCN MKR DOCD: CPT | Performed by: INTERNAL MEDICINE

## 2023-01-13 PROCEDURE — 99214 OFFICE O/P EST MOD 30 MIN: CPT | Performed by: INTERNAL MEDICINE

## 2023-01-13 RX ORDER — TORSEMIDE 20 MG/1
20 TABLET ORAL DAILY
Qty: 90 TABLET | Refills: 3 | Status: SHIPPED | OUTPATIENT
Start: 2023-01-13 | End: 2023-02-12

## 2023-01-13 NOTE — PROGRESS NOTES
CHIEF COMPLAINT: CAD/-CABG/Edema/VHD    HISTORY OF PRESENT ILLNESS: Patient is a 80 y.o. male seen at the request of Segun Ralph MD.      The patient presents today for follow-up. Prior TAVR. No CP or SOB. Increased edema.      Past Medical History:   Diagnosis Date    A-fib ([facility] [address]     BPH (benign prostatic hyperplasia)     CAD (coronary artery disease)     Diabetic neuropathy (HCC)     DM (diabetes mellitus) ([facility] [address]     HTN (hypertension)     Hyperlipemia     Pancreatitis 09/01/2010    Pleural effusion on left 03/01/2022    seen on echo    S/P CABG (coronary artery bypass graft)        Patient Active Problem List   Diagnosis    Cellulitis    CAD (coronary artery disease)    S/P CABG (coronary artery bypass graft)    DM (diabetes mellitus) ([facility] [address]    Hyperlipemia    HTN (hypertension)    A-fib (HCC)    BPH (benign prostatic hyperplasia)    Diabetic neuropathy (HCC)    Encounter for therapeutic drug monitoring    Long term current use of anticoagulant therapy    Aortic valve stenosis    Chronic kidney disease    Heart valve disease    Gout    Syncope without other cardiovascular symptoms    Renal failure    Mild protein-calorie malnutrition (HCC)    Syncope    Complete AV block (HCC)       No Known Allergies    Current Outpatient Medications   Medication Sig Dispense Refill    hydrALAZINE (APRESOLINE) 50 MG tablet TAKE 1 TABLET BY MOUTH THREE TIMES A  tablet 3    carvedilol (COREG) 12.5 MG tablet TAKE 1 TABLET BY MOUTH TWICE A  tablet 1    ELIQUIS 2.5 MG TABS tablet TAKE 1 TABLET BY MOUTH TWICE A  tablet 1    isosorbide dinitrate (ISORDIL) 20 MG tablet TAKE 1 TABLET BY MOUTH THREE TIMES A  tablet 1    torsemide (DEMADEX) 10 MG tablet Take 1 tablet by mouth daily 90 tablet 3    pantoprazole (PROTONIX) 20 MG tablet Take 1 tablet by mouth every morning (before breakfast) 30 tablet 3    doxazosin (CARDURA) 4 MG tablet TAKE 1 TABLET BY MOUTH EVERY DAY 90 tablet 1    pravastatin (PRAVACHOL) 40 MG tablet Take one daily 90 tablet 0     No current facility-administered medications for this visit. Social History     Socioeconomic History    Marital status:      Spouse name: Not on file    Number of children: Not on file    Years of education: Not on file    Highest education level: Not on file   Occupational History    Not on file   Tobacco Use    Smoking status: Never    Smokeless tobacco: Never   Vaping Use    Vaping Use: Never used   Substance and Sexual Activity    Alcohol use: Yes     Comment: rarely- 1 beer    Drug use: No    Sexual activity: Not on file   Other Topics Concern    Not on file   Social History Narrative    Not on file     Social Determinants of Health     Financial Resource Strain: Not on file   Food Insecurity: Not on file   Transportation Needs: Not on file   Physical Activity: Not on file   Stress: Not on file   Social Connections: Not on file   Intimate Partner Violence: Not on file   Housing Stability: Not on file       Family History   Problem Relation Age of Onset    High Blood Pressure Mother     Heart Disease Mother     Cancer Father        Review of Systems:  Heart: as above   Lungs: as above   Eyes: denies changes in vision or discharge. Ears: denies changes in hearing or pain. Nose: denies epistaxis or masses   Throat: denies sore throat or trouble swallowing. Neuro: denies numbness, tingling, tremors. Skin: denies rashes or itching. : denies hematuria, dysuria   GI: denies vomiting, diarrhea   Psych: denies mood changed, anxiety, depression. All other systems negative. Physical Exam   /60   Pulse 63   Ht 5' 10\" (1.778 m)   Wt 169 lb (76.7 kg)   BMI 24.25 kg/m²   Constitutional: Oriented to person, place, and time. Well-developed and well-nourished. No distress. Head: Normocephalic and atraumatic. Eyes: EOM are normal. Pupils are equal, round, and reactive to light. Neck: Normal range of motion. Neck supple.  No hepatojugular reflux and no JVD present. Carotid bruit is not present. No tracheal deviation present. No thyromegaly present. Cardiovascular: Normal rate, regular rhythm, normal heart sounds and intact distal pulses. Exam reveals no gallop and no friction rub. No murmur heard. Pulmonary/Chest: Effort normal and breath sounds normal. No respiratory distress. No wheezes. No rales. No tenderness. Abdominal: Soft. Bowel sounds are normal. No distension and no mass. No tenderness. No rebound and no guarding. Musculoskeletal: Normal range of motion. No edema and no tenderness. Lymphadenopathy:   No cervical adenopathy. No groin adenopathy. Neurological: Alert and oriented to person, place, and time. Skin: Skin is warm and dry. No rash noted. Not diaphoretic. No erythema. Psychiatric: Normal mood and affect. Behavior is normal.     EKG personally reviewed 01/13/23:  Afib/V paced. ASSESSMENT AND PLAN:  Patient Active Problem List   Diagnosis    Cellulitis    CAD (coronary artery disease)    S/P CABG (coronary artery bypass graft)    DM (diabetes mellitus) ([address]    Hyperlipemia    HTN (hypertension)    A-fib (HCC)    BPH (benign prostatic hyperplasia)    Diabetic neuropathy ([address]    Encounter for therapeutic drug monitoring    Long term current use of anticoagulant therapy    Aortic valve stenosis    Chronic kidney disease    Heart valve disease    Gout    Syncope without other cardiovascular symptoms    Renal failure    Mild protein-calorie malnutrition (HCC)    Syncope    Complete AV block ([address]     1. CAD/CABG: CABG in 2009 with LIMA to LAD, SVG to OM1, SVG to RPDA, SVG to RPL with modified maze and SALENA exclusion. Grafts patent on CTA. Continue BB/statin. 2. VHD: Post TAVR with 34-mm Medtronic Evolut PRO+ valve. 3. Chronic Systolic CHF:     BB/hydralazine/nitrate/demadex(increased). 4. Afib: In afib. Stable symptoms. Eliquis. 5. Pacer: Per EP. 6. Lipids: Statin.       7. HTN: Observe. 8. Diabetes:  As per PCP. 9. CKD: Follow labs. 10. PAD: ASA/statin. Simi Holland D.O.   Cardiologist  Cardiology, [facility]

## 2023-02-05 ENCOUNTER — HOSPITAL ENCOUNTER (INPATIENT)
Age: 88
LOS: 8 days | Discharge: NURSING, REHAB OR LONG-TERM CARE | DRG: 377 | End: 2023-02-13
Attending: STUDENT IN AN ORGANIZED HEALTH CARE EDUCATION/TRAINING PROGRAM | Admitting: STUDENT IN AN ORGANIZED HEALTH CARE EDUCATION/TRAINING PROGRAM
Payer: MEDICARE

## 2023-02-05 ENCOUNTER — APPOINTMENT (OUTPATIENT)
Dept: GENERAL RADIOLOGY | Age: 88
DRG: 377 | End: 2023-02-05
Payer: MEDICARE

## 2023-02-05 ENCOUNTER — APPOINTMENT (OUTPATIENT)
Dept: CT IMAGING | Age: 88
DRG: 377 | End: 2023-02-05
Payer: MEDICARE

## 2023-02-05 DIAGNOSIS — K92.2 GASTROINTESTINAL HEMORRHAGE, UNSPECIFIED GASTROINTESTINAL HEMORRHAGE TYPE: Primary | ICD-10-CM

## 2023-02-05 DIAGNOSIS — I50.9 CHRONIC CONGESTIVE HEART FAILURE, UNSPECIFIED HEART FAILURE TYPE (HCC): ICD-10-CM

## 2023-02-05 DIAGNOSIS — R55 SYNCOPE, UNSPECIFIED SYNCOPE TYPE: ICD-10-CM

## 2023-02-05 LAB
ABO/RH: NORMAL
ANION GAP SERPL CALCULATED.3IONS-SCNC: 13 MMOL/L (ref 7–16)
ANTIBODY SCREEN: NORMAL
BACTERIA: ABNORMAL /HPF
BASOPHILS ABSOLUTE: 0.02 E9/L (ref 0–0.2)
BASOPHILS RELATIVE PERCENT: 0.3 % (ref 0–2)
BILIRUBIN URINE: NEGATIVE
BLOOD, URINE: ABNORMAL
BUN BLDV-MCNC: 38 MG/DL (ref 6–23)
CALCIUM SERPL-MCNC: 9.2 MG/DL (ref 8.6–10.2)
CHLORIDE BLD-SCNC: 100 MMOL/L (ref 98–107)
CLARITY: CLEAR
CO2: 26 MMOL/L (ref 22–29)
COLOR: YELLOW
CREAT SERPL-MCNC: 2.3 MG/DL (ref 0.7–1.2)
EKG ATRIAL RATE: 67 BPM
EKG Q-T INTERVAL: 510 MS
EKG QRS DURATION: 182 MS
EKG QTC CALCULATION (BAZETT): 542 MS
EKG R AXIS: -56 DEGREES
EKG T AXIS: 160 DEGREES
EKG VENTRICULAR RATE: 68 BPM
EOSINOPHILS ABSOLUTE: 0.01 E9/L (ref 0.05–0.5)
EOSINOPHILS RELATIVE PERCENT: 0.1 % (ref 0–6)
GFR SERPL CREATININE-BSD FRML MDRD: 26 ML/MIN/1.73
GLUCOSE BLD-MCNC: 243 MG/DL (ref 74–99)
GLUCOSE URINE: NEGATIVE MG/DL
HCT VFR BLD CALC: 19.6 % (ref 37–54)
HCT VFR BLD CALC: 23.6 % (ref 37–54)
HEMOGLOBIN: 7.3 G/DL (ref 12.5–16.5)
IMMATURE GRANULOCYTES #: 0.05 E9/L
IMMATURE GRANULOCYTES %: 0.6 % (ref 0–5)
IMMATURE RETIC FRACT: 25.3 % (ref 2.3–13.4)
INFLUENZA A BY PCR: NOT DETECTED
INFLUENZA B BY PCR: NOT DETECTED
KETONES, URINE: NEGATIVE MG/DL
LACTATE DEHYDROGENASE: 271 U/L (ref 135–225)
LEUKOCYTE ESTERASE, URINE: NEGATIVE
LYMPHOCYTES ABSOLUTE: 2.07 E9/L (ref 1.5–4)
LYMPHOCYTES RELATIVE PERCENT: 26.9 % (ref 20–42)
MAGNESIUM: 1.6 MG/DL (ref 1.6–2.6)
MCH RBC QN AUTO: 28.5 PG (ref 26–35)
MCHC RBC AUTO-ENTMCNC: 30.9 % (ref 32–34.5)
MCV RBC AUTO: 92.2 FL (ref 80–99.9)
MONOCYTES ABSOLUTE: 0.47 E9/L (ref 0.1–0.95)
MONOCYTES RELATIVE PERCENT: 6.1 % (ref 2–12)
NEUTROPHILS ABSOLUTE: 5.08 E9/L (ref 1.8–7.3)
NEUTROPHILS RELATIVE PERCENT: 66 % (ref 43–80)
NITRITE, URINE: NEGATIVE
PDW BLD-RTO: 15.9 FL (ref 11.5–15)
PH UA: 5.5 (ref 5–9)
PLATELET # BLD: 131 E9/L (ref 130–450)
PMV BLD AUTO: 11.9 FL (ref 7–12)
POTASSIUM SERPL-SCNC: 4.8 MMOL/L (ref 3.5–5)
PRO-BNP: ABNORMAL PG/ML (ref 0–450)
PROTEIN UA: 100 MG/DL
RBC # BLD: 2.56 E12/L (ref 3.8–5.8)
RBC UA: ABNORMAL /HPF (ref 0–2)
RETIC HGB EQUIVALENT: 31.4 PG (ref 28.2–36.6)
RETICULOCYTE ABSOLUTE COUNT: 0.04 E12/L
RETICULOCYTE COUNT PCT: 1.9 % (ref 0.4–1.9)
SARS-COV-2, NAAT: NOT DETECTED
SODIUM BLD-SCNC: 139 MMOL/L (ref 132–146)
SPECIFIC GRAVITY UA: 1.02 (ref 1–1.03)
TROPONIN, HIGH SENSITIVITY: 43 NG/L (ref 0–11)
TROPONIN, HIGH SENSITIVITY: 46 NG/L (ref 0–11)
UROBILINOGEN, URINE: 0.2 E.U./DL
WBC # BLD: 7.7 E9/L (ref 4.5–11.5)
WBC UA: ABNORMAL /HPF (ref 0–5)

## 2023-02-05 PROCEDURE — 83735 ASSAY OF MAGNESIUM: CPT

## 2023-02-05 PROCEDURE — 85025 COMPLETE CBC W/AUTO DIFF WBC: CPT

## 2023-02-05 PROCEDURE — 2580000003 HC RX 258: Performed by: STUDENT IN AN ORGANIZED HEALTH CARE EDUCATION/TRAINING PROGRAM

## 2023-02-05 PROCEDURE — 87635 SARS-COV-2 COVID-19 AMP PRB: CPT

## 2023-02-05 PROCEDURE — 84466 ASSAY OF TRANSFERRIN: CPT

## 2023-02-05 PROCEDURE — C9113 INJ PANTOPRAZOLE SODIUM, VIA: HCPCS | Performed by: STUDENT IN AN ORGANIZED HEALTH CARE EDUCATION/TRAINING PROGRAM

## 2023-02-05 PROCEDURE — 86850 RBC ANTIBODY SCREEN: CPT

## 2023-02-05 PROCEDURE — 83615 LACTATE (LD) (LDH) ENZYME: CPT

## 2023-02-05 PROCEDURE — 85045 AUTOMATED RETICULOCYTE COUNT: CPT

## 2023-02-05 PROCEDURE — 86901 BLOOD TYPING SEROLOGIC RH(D): CPT

## 2023-02-05 PROCEDURE — 6360000002 HC RX W HCPCS: Performed by: STUDENT IN AN ORGANIZED HEALTH CARE EDUCATION/TRAINING PROGRAM

## 2023-02-05 PROCEDURE — 81001 URINALYSIS AUTO W/SCOPE: CPT

## 2023-02-05 PROCEDURE — 83550 IRON BINDING TEST: CPT

## 2023-02-05 PROCEDURE — 71045 X-RAY EXAM CHEST 1 VIEW: CPT

## 2023-02-05 PROCEDURE — 70450 CT HEAD/BRAIN W/O DYE: CPT

## 2023-02-05 PROCEDURE — 2580000003 HC RX 258

## 2023-02-05 PROCEDURE — P9016 RBC LEUKOCYTES REDUCED: HCPCS

## 2023-02-05 PROCEDURE — 83540 ASSAY OF IRON: CPT

## 2023-02-05 PROCEDURE — 83036 HEMOGLOBIN GLYCOSYLATED A1C: CPT

## 2023-02-05 PROCEDURE — 36415 COLL VENOUS BLD VENIPUNCTURE: CPT

## 2023-02-05 PROCEDURE — 86900 BLOOD TYPING SEROLOGIC ABO: CPT

## 2023-02-05 PROCEDURE — 93005 ELECTROCARDIOGRAM TRACING: CPT

## 2023-02-05 PROCEDURE — C9113 INJ PANTOPRAZOLE SODIUM, VIA: HCPCS

## 2023-02-05 PROCEDURE — 93010 ELECTROCARDIOGRAM REPORT: CPT | Performed by: INTERNAL MEDICINE

## 2023-02-05 PROCEDURE — 87502 INFLUENZA DNA AMP PROBE: CPT

## 2023-02-05 PROCEDURE — 82728 ASSAY OF FERRITIN: CPT

## 2023-02-05 PROCEDURE — 6360000002 HC RX W HCPCS

## 2023-02-05 PROCEDURE — 6370000000 HC RX 637 (ALT 250 FOR IP): Performed by: STUDENT IN AN ORGANIZED HEALTH CARE EDUCATION/TRAINING PROGRAM

## 2023-02-05 PROCEDURE — 84484 ASSAY OF TROPONIN QUANT: CPT

## 2023-02-05 PROCEDURE — 96374 THER/PROPH/DIAG INJ IV PUSH: CPT

## 2023-02-05 PROCEDURE — 80048 BASIC METABOLIC PNL TOTAL CA: CPT

## 2023-02-05 PROCEDURE — 83880 ASSAY OF NATRIURETIC PEPTIDE: CPT

## 2023-02-05 PROCEDURE — 99285 EMERGENCY DEPT VISIT HI MDM: CPT

## 2023-02-05 PROCEDURE — 1200000000 HC SEMI PRIVATE

## 2023-02-05 PROCEDURE — 86923 COMPATIBILITY TEST ELECTRIC: CPT

## 2023-02-05 RX ORDER — PANTOPRAZOLE SODIUM 40 MG/10ML
40 INJECTION, POWDER, LYOPHILIZED, FOR SOLUTION INTRAVENOUS 2 TIMES DAILY
Status: DISCONTINUED | OUTPATIENT
Start: 2023-02-05 | End: 2023-02-06

## 2023-02-05 RX ORDER — ACETAMINOPHEN 650 MG/1
650 SUPPOSITORY RECTAL EVERY 6 HOURS PRN
Status: DISCONTINUED | OUTPATIENT
Start: 2023-02-05 | End: 2023-02-14 | Stop reason: HOSPADM

## 2023-02-05 RX ORDER — TORSEMIDE 20 MG/1
20 TABLET ORAL DAILY
Status: DISCONTINUED | OUTPATIENT
Start: 2023-02-06 | End: 2023-02-14 | Stop reason: HOSPADM

## 2023-02-05 RX ORDER — SODIUM CHLORIDE, SODIUM LACTATE, POTASSIUM CHLORIDE, CALCIUM CHLORIDE 600; 310; 30; 20 MG/100ML; MG/100ML; MG/100ML; MG/100ML
INJECTION, SOLUTION INTRAVENOUS CONTINUOUS
Status: DISCONTINUED | OUTPATIENT
Start: 2023-02-05 | End: 2023-02-06

## 2023-02-05 RX ORDER — 0.9 % SODIUM CHLORIDE 0.9 %
500 INTRAVENOUS SOLUTION INTRAVENOUS ONCE
Status: COMPLETED | OUTPATIENT
Start: 2023-02-05 | End: 2023-02-05

## 2023-02-05 RX ORDER — PRAVASTATIN SODIUM 20 MG
40 TABLET ORAL NIGHTLY
Status: DISCONTINUED | OUTPATIENT
Start: 2023-02-05 | End: 2023-02-14 | Stop reason: HOSPADM

## 2023-02-05 RX ORDER — ONDANSETRON 2 MG/ML
4 INJECTION INTRAMUSCULAR; INTRAVENOUS EVERY 6 HOURS PRN
Status: DISCONTINUED | OUTPATIENT
Start: 2023-02-05 | End: 2023-02-05

## 2023-02-05 RX ORDER — CARVEDILOL 6.25 MG/1
12.5 TABLET ORAL 2 TIMES DAILY
Status: DISCONTINUED | OUTPATIENT
Start: 2023-02-06 | End: 2023-02-08

## 2023-02-05 RX ORDER — 0.9 % SODIUM CHLORIDE 0.9 %
1000 INTRAVENOUS SOLUTION INTRAVENOUS ONCE
Status: DISCONTINUED | OUTPATIENT
Start: 2023-02-05 | End: 2023-02-05

## 2023-02-05 RX ORDER — ACETAMINOPHEN 325 MG/1
650 TABLET ORAL EVERY 6 HOURS PRN
Status: DISCONTINUED | OUTPATIENT
Start: 2023-02-05 | End: 2023-02-14 | Stop reason: HOSPADM

## 2023-02-05 RX ORDER — SODIUM CHLORIDE 0.9 % (FLUSH) 0.9 %
10 SYRINGE (ML) INJECTION PRN
Status: DISCONTINUED | OUTPATIENT
Start: 2023-02-05 | End: 2023-02-14 | Stop reason: HOSPADM

## 2023-02-05 RX ORDER — MAGNESIUM SULFATE IN WATER 40 MG/ML
4000 INJECTION, SOLUTION INTRAVENOUS ONCE
Status: COMPLETED | OUTPATIENT
Start: 2023-02-05 | End: 2023-02-06

## 2023-02-05 RX ORDER — DOXAZOSIN MESYLATE 4 MG/1
4 TABLET ORAL DAILY
Status: DISCONTINUED | OUTPATIENT
Start: 2023-02-05 | End: 2023-02-08

## 2023-02-05 RX ORDER — PANTOPRAZOLE SODIUM 40 MG/10ML
40 INJECTION, POWDER, LYOPHILIZED, FOR SOLUTION INTRAVENOUS ONCE
Status: COMPLETED | OUTPATIENT
Start: 2023-02-05 | End: 2023-02-05

## 2023-02-05 RX ORDER — SODIUM CHLORIDE 9 MG/ML
INJECTION, SOLUTION INTRAVENOUS PRN
Status: DISCONTINUED | OUTPATIENT
Start: 2023-02-05 | End: 2023-02-14 | Stop reason: HOSPADM

## 2023-02-05 RX ORDER — TRAZODONE HYDROCHLORIDE 50 MG/1
50 TABLET ORAL NIGHTLY
COMMUNITY

## 2023-02-05 RX ORDER — SENNA PLUS 8.6 MG/1
1 TABLET ORAL DAILY PRN
Status: DISCONTINUED | OUTPATIENT
Start: 2023-02-05 | End: 2023-02-14 | Stop reason: HOSPADM

## 2023-02-05 RX ORDER — SODIUM CHLORIDE 0.9 % (FLUSH) 0.9 %
10 SYRINGE (ML) INJECTION EVERY 12 HOURS SCHEDULED
Status: DISCONTINUED | OUTPATIENT
Start: 2023-02-05 | End: 2023-02-14 | Stop reason: HOSPADM

## 2023-02-05 RX ORDER — ONDANSETRON 4 MG/1
4 TABLET, ORALLY DISINTEGRATING ORAL EVERY 8 HOURS PRN
Status: DISCONTINUED | OUTPATIENT
Start: 2023-02-05 | End: 2023-02-05

## 2023-02-05 RX ADMIN — PRAVASTATIN SODIUM 40 MG: 20 TABLET ORAL at 20:09

## 2023-02-05 RX ADMIN — SODIUM CHLORIDE 500 ML: 9 INJECTION, SOLUTION INTRAVENOUS at 11:37

## 2023-02-05 RX ADMIN — MAGNESIUM SULFATE HEPTAHYDRATE 4000 MG: 40 INJECTION, SOLUTION INTRAVENOUS at 20:04

## 2023-02-05 RX ADMIN — Medication 10 ML: at 20:09

## 2023-02-05 RX ADMIN — PANTOPRAZOLE SODIUM 40 MG: 40 INJECTION, POWDER, FOR SOLUTION INTRAVENOUS at 12:31

## 2023-02-05 RX ADMIN — SODIUM CHLORIDE, POTASSIUM CHLORIDE, SODIUM LACTATE AND CALCIUM CHLORIDE: 600; 310; 30; 20 INJECTION, SOLUTION INTRAVENOUS at 19:12

## 2023-02-05 RX ADMIN — PANTOPRAZOLE SODIUM 40 MG: 40 INJECTION, POWDER, FOR SOLUTION INTRAVENOUS at 20:09

## 2023-02-05 RX ADMIN — DOXAZOSIN MESYLATE 4 MG: 4 TABLET ORAL at 18:41

## 2023-02-05 ASSESSMENT — ENCOUNTER SYMPTOMS
VOMITING: 0
CHOKING: 0
COUGH: 0
SORE THROAT: 0
SHORTNESS OF BREATH: 0
ABDOMINAL PAIN: 0
PHOTOPHOBIA: 0
CONSTIPATION: 0
FACIAL SWELLING: 0
BACK PAIN: 0
DIARRHEA: 0
NAUSEA: 0
EYE PAIN: 0

## 2023-02-05 ASSESSMENT — PAIN - FUNCTIONAL ASSESSMENT: PAIN_FUNCTIONAL_ASSESSMENT: NONE - DENIES PAIN

## 2023-02-05 NOTE — CARE COORDINATION
GI bleeding anemia, fobt + hold eliquis ppi IV BID general surgery consult iron panel   CKD baseline around 1.8-2  Hypotensive very gentle ivfs over night while npo given recent echo findings   BNP significantly elevated, not much in terms of effusion on cxr and he is on room air   Monitor the fluid status     Recent ECHO   Ejection fraction is visually estimated at 30-35%. Dilated right ventricle with normal right ventricular function (TAPSE 1.9   cm). Indeterminate diastolic function. Severely dilated left atrium by volume index. Dilated right atrium. Moderate mitral regurgitation. Hx of TAVR with a 34 mm Evolut Pro+ bioprosthetic valve on 2/2/2022. Mild paravalvular aortic regurgitation. AV peak velocity 1.8 m/s. AV mean gradient 6 mmHg. Aortic valve area 2.0 cm2. Mild tricuspid regurgitation. PASP is estimated at 67 mmHg. Left-sided pleural effusion.

## 2023-02-05 NOTE — ED NOTES
Bladder scanned patient for >854ml. Order for sotomayor per .      Felipe Moncada, TOVA  02/05/23 Ryoal Mena RN  02/05/23 1982

## 2023-02-05 NOTE — ED NOTES
Pt agreeable to blood transfusion, wife at bedside and signed consent form as witnessed x2 nurses. Consent placed in chart.      Patricio Mancia RN  02/05/23 1388

## 2023-02-05 NOTE — ED PROVIDER NOTES
[facility]  Department of Emergency Medicine     Written by: Felicity Wiggins MD  Patient Name: Jessie Philippe  Attending Provider: Rosey Patricia DO  Admit Date: 2023 11:05 AM  MRN: [id number]                   : 1931        Chief Complaint   Patient presents with    Loss of Consciousness     Lost consciousness x 5 mins while on BSC at home, did not fall.    - Chief complaint    This 28-year-old male with history of coronary disease, diabetes, hyperlipidemia, hypertension, A. fib on Eliquis and carvedilol, CHF on Demadex the presents the ED with concerns of syncope. History taken from patient and wife. They state that the patient was sitting on the toilet this morning, and passed out for about 5 minutes. This time, the wife tried to talk to him, and he was no responding however he was breathing. She states he did not fall, did not hit his head. He has never syncopized in the past.  Wife was concerned, difficult EMS and brought to be evaluated. The patient said he has no chest pain shortness of breath. He does not member passing out. He states he does remember coming to the hospital.  He denies any recent fevers, chills, diaphoresis, nausea or vomiting. Review of Systems   Constitutional:  Negative for appetite change, chills, diaphoresis and fever. HENT:  Negative for ear discharge, ear pain, facial swelling, sneezing and sore throat. Eyes:  Negative for photophobia and pain. Respiratory:  Negative for cough, choking and shortness of breath. Cardiovascular:  Negative for chest pain and palpitations. Gastrointestinal:  Negative for abdominal pain, constipation, diarrhea, nausea and vomiting. Genitourinary:  Negative for dysuria, enuresis, flank pain, frequency and hematuria. Musculoskeletal:  Negative for arthralgias, back pain, joint swelling, myalgias and neck pain. Skin:  Negative for pallor and rash.    Neurological:  Positive for syncope and light-headedness. Negative for weakness and headaches. Physical Exam  Constitutional:       General: He is not in acute distress. Appearance: Normal appearance. He is not ill-appearing. HENT:      Head: Normocephalic and atraumatic. Nose: Nose normal. No congestion. Mouth/Throat:      Mouth: Mucous membranes are moist.      Pharynx: No posterior oropharyngeal erythema. Eyes:      General: No scleral icterus. Extraocular Movements: Extraocular movements intact. Pupils: Pupils are equal, round, and reactive to light. Cardiovascular:      Rate and Rhythm: Normal rate and regular rhythm. Pulses: Normal pulses. Heart sounds: Normal heart sounds. Pulmonary:      Effort: Pulmonary effort is normal. No respiratory distress. Breath sounds: Normal breath sounds. No stridor. No wheezing or rhonchi. Chest:      Chest wall: No tenderness. Abdominal:      General: Bowel sounds are normal. There is no distension. Palpations: Abdomen is soft. There is no mass. Tenderness: There is no abdominal tenderness. Musculoskeletal:         General: No swelling, tenderness or deformity. Normal range of motion. Cervical back: Normal range of motion. No rigidity or tenderness. Right lower leg: Edema present. Left lower leg: Edema present. Skin:     Capillary Refill: Capillary refill takes less than 2 seconds. Coloration: Skin is not jaundiced or pale. Findings: No erythema. Neurological:      General: No focal deficit present. Mental Status: He is alert and oriented to person, place, and time. Mental status is at baseline. Procedures       MDM  Number of Diagnoses or Management Options  Chronic congestive heart failure, unspecified heart failure type ([address]  Gastrointestinal hemorrhage, unspecified gastrointestinal hemorrhage type  Syncope, unspecified syncope type  Diagnosis management comments:  This 70-year-old male with history of coronary disease, diabetes, hyperlipidemia, hypertension, A. fib on Eliquis and carvedilol, CHF on Demadex the presents the ED with concerns of acute moderate syncope. The patient here in the ED is hypertensive with blood pressure 77/45 but otherwise stable, and does not appear to be in acute distress. They are calm, alert, oriented, and pleasant to speak with. On auscultation, the patient's lungs sound clear, no abnormal heart murmurs are heard. The patient has no abdominal pain or tenderness. The patient has good pulses and capillary refill bilaterally in both upper and lower extremities. No evidence of extremity or perioral cyanosis. No signs of peripheral clubbing. No signs of extremity swellings. No evidence of neck crepitus. The patient has no significant chest wall tenderness. Chest x-ray was ordered to evaluate for any signs of pneumonia, pneumothorax, pulmonary edema, rib fracture or other pathological process. Remainder of MDM will be in the ED course below. Amount and/or Complexity of Data Reviewed  Decide to obtain previous medical records or to obtain history from someone other than the patient: yes       ED Course as of 02/05/23 8953   Sun Feb 05, 2023   1121 ATTENDING PROVIDER ATTESTATION:     I have personally performed and/or participated in the history, exam, medical decision making, and procedures and agree with all pertinent clinical information unless otherwise noted. I have also reviewed and agree with the past medical, family and social history unless otherwise noted. I have discussed this patient in detail with the resident, and provided the instruction and education regarding the patient. My findings/plan: This is a 80year old male with history of CAD, CABG, DM, HTN, HLD, A. fib currently on anticoagulation presents for evaluation of syncopal episode. Per EMS the patient's wife witnessed a syncopal episode while he was sitting on the toilet.   Notes he was unresponsive but breathing for about 5 minutes. The patient is currently awake, alert, oriented to person, place, time. He is in no acute distress. Denies any chest pain or shortness of breath nausea vomiting or diarrhea. On exam he is lying bed no acute distress. He does have bilateral lower extremities. Heart irregularly irregular, no rales noted at the bilateral bases. Plan for labs, imaging, supportive care. [BB]   1209 Hemoccult is guaiac positive but grossly negative. [AH]   1209 Patient blood pressure low 74/57, given 1 L fluids, hemoglobin 7.5, down from 10 measured 5 months ago. [AH]   1235 EKG: This EKG is signed and interpreted by me. Rate: 68  Rhythm: Ventricular paced rhythm  Interpretation: Ventricular paced rhythm  Comparison: Maintaining ventricular paced rhythm, however there are QRS inversions in V1, V5 and V6 which are downward now [AH]   1313 Urinalysis reveals no UTI. Troponin slightly elevated at 46 however repeat is 43 making delta negative. Patient's BNP is 31,000, however this is baseline. Last baseline was 38,000. The patient has a baseline creatinine levels and kidney function which is 2.3 with a GFR of 26. Due to low GFR, CT abdomen with IV contrast is deferred for GI bleed. Hemoglobin is 7.3. Viral swabs are negative. [AH]   1445 I spoke to Dr. Leeann Avendano and general surgery, Dr. Bianca Millan they will admit the patient and provide consultation. Patient remains hemodynamically stable, and responded to fluids after hypertension. [AH]      ED Course User Index  [AH] Tayo Reynoso MD  [BB] Pranav Cota DO       Chart review: According to chart review, patient had an echocardiogram ordered by Dr. Justin Collet on March 1, 2022 which revealed an ejection fraction of 30 to 35%. Social determinants: Patient is at home with his wife, does not need placement this time. Wife at bedside, social support available.     Chronic conditions limiting care: Patient came in hypotensive, however has history of CHF with bilateral swollen legs. Monitoring with delicate saline infusions to not exacerbate CHF and cause worsening symptoms. --------------------------------------------- PAST HISTORY ---------------------------------------------  Past Medical History:  has a past medical history of [facility]fib ([facility] BPH (benign prostatic hyperplasia), CAD (coronary artery disease), Diabetic neuropathy ([facility] DM (diabetes mellitus) ([facility] HTN (hypertension), Hyperlipemia, Pancreatitis, Pleural effusion on left, and S/P CABG (coronary artery bypass graft). Past Surgical History:  has a past surgical history that includes Cholecystectomy (09/27/2010); Cardiac surgery; Colonoscopy; Tonsillectomy; Coronary artery bypass graft (12/01/2009); Pacemaker insertion (N/A, 02/03/2022); and Aortic valve replacement (N/A, 2/2/2022). Social History:  reports that he has never smoked. He has never used smokeless tobacco. He reports current alcohol use. He reports that he does not use drugs. Family History: family history includes Cancer in his father; Heart Disease in his mother; High Blood Pressure in his mother. The patients home medications have been reviewed. Allergies: Patient has no known allergies.     -------------------------------------------------- RESULTS -------------------------------------------------    LABS reviewed and interpreted by me:  Results for orders placed or performed during the hospital encounter of 02/05/23   COVID-19, Rapid    Specimen: Nasopharyngeal Swab   Result Value Ref Range    SARS-CoV-2, NAAT Not Detected Not Detected   RAPID INFLUENZA A/B ANTIGENS    Specimen: Nasopharyngeal   Result Value Ref Range    Influenza A by PCR Not Detected Not Detected    Influenza B by PCR Not Detected Not Detected   CBC with Auto Differential   Result Value Ref Range    WBC 7.7 4.5 - 11.5 E9/L    RBC 2.56 (L) 3.80 - 5.80 E12/L    Hemoglobin 7.3 (L) 12.5 - 16.5 g/dL    Hematocrit 23.6 (L) 37.0 - 54.0 %    MCV 92.2 80.0 - 99.9 fL    MCH 28.5 26.0 - 35.0 pg    MCHC 30.9 (L) 32.0 - 34.5 %    RDW 15.9 (H) 11.5 - 15.0 fL    Platelets 147 483 - 016 E9/L    MPV 11.9 7.0 - 12.0 fL    Neutrophils % 66.0 43.0 - 80.0 %    Immature Granulocytes % 0.6 0.0 - 5.0 %    Lymphocytes % 26.9 20.0 - 42.0 %    Monocytes % 6.1 2.0 - 12.0 %    Eosinophils % 0.1 0.0 - 6.0 %    Basophils % 0.3 0.0 - 2.0 %    Neutrophils Absolute 5.08 1.80 - 7.30 E9/L    Immature Granulocytes # 0.05 E9/L    Lymphocytes Absolute 2.07 1.50 - 4.00 E9/L    Monocytes Absolute 0.47 0.10 - 0.95 E9/L    Eosinophils Absolute 0.01 (L) 0.05 - 0.50 E9/L    Basophils Absolute 0.02 0.00 - 0.20 E9/L   BMP   Result Value Ref Range    Sodium 139 132 - 146 mmol/L    Potassium 4.8 3.5 - 5.0 mmol/L    Chloride 100 98 - 107 mmol/L    CO2 26 22 - 29 mmol/L    Anion Gap 13 7 - 16 mmol/L    Glucose 243 (H) 74 - 99 mg/dL    BUN 38 (H) 6 - 23 mg/dL    Creatinine 2.3 (H) 0.7 - 1.2 mg/dL    Est, Glom Filt Rate 26 >=60 mL/min/1.73    Calcium 9.2 8.6 - 10.2 mg/dL   Troponin   Result Value Ref Range    Troponin, High Sensitivity 46 (H) 0 - 11 ng/L   Urinalysis   Result Value Ref Range    Color, UA Yellow Straw/Yellow    Clarity, UA Clear Clear    Glucose, Ur Negative Negative mg/dL    Bilirubin Urine Negative Negative    Ketones, Urine Negative Negative mg/dL    Specific Gravity, UA 1.020 1.005 - 1.030    Blood, Urine TRACE-LYSED Negative    pH, UA 5.5 5.0 - 9.0    Protein,  (A) Negative mg/dL    Urobilinogen, Urine 0.2 <2.0 E.U./dL    Nitrite, Urine Negative Negative    Leukocyte Esterase, Urine Negative Negative   Magnesium   Result Value Ref Range    Magnesium 1.6 1.6 - 2.6 mg/dL   Brain Natriuretic Peptide   Result Value Ref Range    Pro-BNP 31,022 (H) 0 - 450 pg/mL   Troponin   Result Value Ref Range    Troponin, High Sensitivity 43 (H) 0 - 11 ng/L   Microscopic Urinalysis   Result Value Ref Range    WBC, UA 2-5 0 - 5 /HPF    RBC, UA 10-20 (A) 0 - 2 /HPF    Bacteria, UA FEW (A) None Seen /HPF   Lactate Dehydrogenase   Result Value Ref Range     (H) 135 - 225 U/L   Reticulocytes   Result Value Ref Range    Retic Ct Pct 1.9 0.4 - 1.9 %    Retic Ct Abs 0.041 E12/L    Immature Retic Fract 25.3 (H) 2.3 - 13.4 %    Hematocrit 19.6 (L) 37.0 - 54.0 %    Retic HGB Equivalent 31.4 28.2 - 36.6 pg   EKG 12 Lead   Result Value Ref Range    Ventricular Rate 68 BPM    Atrial Rate 67 BPM    QRS Duration 182 ms    Q-T Interval 510 ms    QTc Calculation (Bazett) 542 ms    R Axis -56 degrees    T Axis 160 degrees   TYPE AND SCREEN   Result Value Ref Range    ABO/Rh O POS     Antibody Screen NEG        RADIOLOGY reviewed and interpreted by me:  CT Head W/O Contrast   Final Result   1. There is no acute intracranial abnormality. Specifically, there is no   intracranial hemorrhage. 2. Atrophy and periventricular leukomalacia,         XR CHEST PORTABLE   Final Result   1. Airspace disease within the retrocardiac region   2. Small left pleural effusion   3.  Trace right pleural effusion             MEDICATIONS:  Medications   pantoprazole (PROTONIX) injection 40 mg (40 mg IntraVENous Given 2/5/23 2009)   magnesium sulfate 4000 mg in 100 mL IVPB premix (4,000 mg IntraVENous New Bag 2/5/23 2004)   lactated ringers IV soln infusion ( IntraVENous New Bag 2/5/23 1912)   sodium chloride flush 0.9 % injection 10 mL (10 mLs IntraVENous Given 2/5/23 2009)   sodium chloride flush 0.9 % injection 10 mL (has no administration in time range)   0.9 % sodium chloride infusion (has no administration in time range)   senna (SENOKOT) tablet 8.6 mg (has no administration in time range)   acetaminophen (TYLENOL) tablet 650 mg (has no administration in time range)     Or   acetaminophen (TYLENOL) suppository 650 mg (has no administration in time range)   carvedilol (COREG) tablet 12.5 mg (has no administration in time range)   doxazosin (CARDURA) tablet 4 mg (4 mg Oral Given by Other 2/5/23 1841)   pravastatin (PRAVACHOL) tablet 40 mg (40 mg Oral Given 2/5/23 2009)   torsemide (DEMADEX) tablet 20 mg (has no administration in time range)   0.9 % sodium chloride bolus (0 mLs IntraVENous Stopped 2/5/23 1312)   pantoprazole (PROTONIX) injection 40 mg (40 mg IntraVENous Given 2/5/23 1231)       ------------------------- NURSING NOTES AND VITALS REVIEWED ---------------------------  Date / Time Roomed:  2/5/2023 11:05 AM  ED Bed Assignment:  [id number]/[id number]    The nursing notes within the ED encounter and vital signs as below have been reviewed. Patient Vitals for the past 24 hrs:   BP Temp Temp src Pulse Resp SpO2 Height Weight   02/05/23 1930 (!) 96/49 97.7 °F (36.5 °C) Oral 60 16 93 % -- --   02/05/23 1800 (!) 130/56 97.5 °F (36.4 °C) Axillary 80 16 94 % -- --   02/05/23 1653 120/60 -- -- 74 18 93 % -- --   02/05/23 1442 (!) 107/49 97.6 °F (36.4 °C) Axillary 63 18 93 % -- --   02/05/23 1313 (!) 102/51 -- -- 63 17 100 % -- --   02/05/23 1211 (!) 96/52 (!) 96.5 °F (35.8 °C) Rectal -- -- -- -- --   02/05/23 1112 (!) 77/45 -- Oral 60 20 96 % 5' 11\" (1.803 m) 165 lb (74.8 kg)       ------------------------------------------ PROGRESS NOTES ------------------------------------------  Re-evaluation(s):  Time: Every 30 minutes. Patients symptoms show no change  Repeat physical examination is not changed    Counseling:  I have spoken with the patient and discussed todays results, in addition to providing specific details for the plan of care and counseling regarding the diagnosis and prognosis.   Their questions are answered at this time and they are agreeable with the plan of admission.    --------------------------------- ADDITIONAL PROVIDER NOTES ---------------------------------  This patient's ED course included: a personal history and physicial examination, re-evaluation prior to disposition, multiple bedside re-evaluations, IV medications, cardiac monitoring, and continuous pulse oximetry    This patient has remained hemodynamically stable during their ED course. Diagnosis:  1. Gastrointestinal hemorrhage, unspecified gastrointestinal hemorrhage type    2. Syncope, unspecified syncope type    3. Chronic congestive heart failure, unspecified heart failure type ([facility] [address] [address]        Disposition:  Patient's disposition: Admit to telemetry  Patient's condition is stable. Patient was seen and evaluated by myself and my attending Heladio Garay DO. Assessment and Plan discussed with attending provider, please see attestation for final plan of care.      MD Latonia Marie MD  Resident  02/05/23 8043

## 2023-02-06 ENCOUNTER — ANESTHESIA (OUTPATIENT)
Dept: ENDOSCOPY | Age: 88
End: 2023-02-06
Payer: MEDICARE

## 2023-02-06 ENCOUNTER — ANESTHESIA EVENT (OUTPATIENT)
Dept: ENDOSCOPY | Age: 88
End: 2023-02-06
Payer: MEDICARE

## 2023-02-06 PROBLEM — R09.89 SYNCOPE WITHOUT OTHER CARDIOVASCULAR SYMPTOMS: Status: RESOLVED | Noted: 2022-01-28 | Resolved: 2023-02-06

## 2023-02-06 PROBLEM — N19 RENAL FAILURE: Status: RESOLVED | Noted: 2022-01-31 | Resolved: 2023-02-06

## 2023-02-06 PROBLEM — R55 SYNCOPE WITHOUT OTHER CARDIOVASCULAR SYMPTOMS: Status: RESOLVED | Noted: 2022-01-28 | Resolved: 2023-02-06

## 2023-02-06 LAB
ALBUMIN SERPL-MCNC: 3.1 G/DL (ref 3.5–5.2)
ALP BLD-CCNC: 58 U/L (ref 40–129)
ALT SERPL-CCNC: 36 U/L (ref 0–40)
ANION GAP SERPL CALCULATED.3IONS-SCNC: 17 MMOL/L (ref 7–16)
AST SERPL-CCNC: 49 U/L (ref 0–39)
BASOPHILS ABSOLUTE: 0.01 E9/L (ref 0–0.2)
BASOPHILS RELATIVE PERCENT: 0.1 % (ref 0–2)
BILIRUB SERPL-MCNC: 0.5 MG/DL (ref 0–1.2)
BUN BLDV-MCNC: 46 MG/DL (ref 6–23)
CALCIUM SERPL-MCNC: 9.1 MG/DL (ref 8.6–10.2)
CHLORIDE BLD-SCNC: 101 MMOL/L (ref 98–107)
CO2: 22 MMOL/L (ref 22–29)
CREAT SERPL-MCNC: 2.8 MG/DL (ref 0.7–1.2)
EOSINOPHILS ABSOLUTE: 0 E9/L (ref 0.05–0.5)
EOSINOPHILS RELATIVE PERCENT: 0 % (ref 0–6)
FERRITIN: 91 NG/ML
GFR SERPL CREATININE-BSD FRML MDRD: 21 ML/MIN/1.73
GLUCOSE BLD-MCNC: 158 MG/DL (ref 74–99)
HBA1C MFR BLD: 6.1 % (ref 4–5.6)
HCT VFR BLD CALC: 18.4 % (ref 37–54)
HCT VFR BLD CALC: 23.3 % (ref 37–54)
HCT VFR BLD CALC: 24.5 % (ref 37–54)
HEMOGLOBIN: 5.8 G/DL (ref 12.5–16.5)
HEMOGLOBIN: 7.6 G/DL (ref 12.5–16.5)
HEMOGLOBIN: 8 G/DL (ref 12.5–16.5)
IMMATURE GRANULOCYTES #: 0.07 E9/L
IMMATURE GRANULOCYTES %: 0.7 % (ref 0–5)
IRON SATURATION: 11 % (ref 20–55)
IRON: 28 MCG/DL (ref 59–158)
LYMPHOCYTES ABSOLUTE: 2.49 E9/L (ref 1.5–4)
LYMPHOCYTES RELATIVE PERCENT: 26.5 % (ref 20–42)
MCH RBC QN AUTO: 28.9 PG (ref 26–35)
MCHC RBC AUTO-ENTMCNC: 31.5 % (ref 32–34.5)
MCV RBC AUTO: 91.5 FL (ref 80–99.9)
MONOCYTES ABSOLUTE: 0.75 E9/L (ref 0.1–0.95)
MONOCYTES RELATIVE PERCENT: 8 % (ref 2–12)
NEUTROPHILS ABSOLUTE: 6.09 E9/L (ref 1.8–7.3)
NEUTROPHILS RELATIVE PERCENT: 64.7 % (ref 43–80)
PDW BLD-RTO: 16.1 FL (ref 11.5–15)
PLATELET # BLD: 126 E9/L (ref 130–450)
PMV BLD AUTO: 10.9 FL (ref 7–12)
POTASSIUM REFLEX MAGNESIUM: 5 MMOL/L (ref 3.5–5)
RBC # BLD: 2.01 E12/L (ref 3.8–5.8)
REASON FOR REJECTION: NORMAL
REJECTED TEST: NORMAL
SODIUM BLD-SCNC: 140 MMOL/L (ref 132–146)
TOTAL IRON BINDING CAPACITY: 262 MCG/DL (ref 250–450)
TOTAL PROTEIN: 5.5 G/DL (ref 6.4–8.3)
TRANSFERRIN: 225 MG/DL (ref 200–360)
WBC # BLD: 9.4 E9/L (ref 4.5–11.5)

## 2023-02-06 PROCEDURE — 3700000000 HC ANESTHESIA ATTENDED CARE: Performed by: SURGERY

## 2023-02-06 PROCEDURE — 2580000003 HC RX 258: Performed by: INTERNAL MEDICINE

## 2023-02-06 PROCEDURE — 36415 COLL VENOUS BLD VENIPUNCTURE: CPT

## 2023-02-06 PROCEDURE — 6360000002 HC RX W HCPCS: Performed by: STUDENT IN AN ORGANIZED HEALTH CARE EDUCATION/TRAINING PROGRAM

## 2023-02-06 PROCEDURE — C9113 INJ PANTOPRAZOLE SODIUM, VIA: HCPCS | Performed by: STUDENT IN AN ORGANIZED HEALTH CARE EDUCATION/TRAINING PROGRAM

## 2023-02-06 PROCEDURE — 6360000002 HC RX W HCPCS: Performed by: NURSE ANESTHETIST, CERTIFIED REGISTERED

## 2023-02-06 PROCEDURE — 88342 IMHCHEM/IMCYTCHM 1ST ANTB: CPT

## 2023-02-06 PROCEDURE — 0DB68ZX EXCISION OF STOMACH, VIA NATURAL OR ARTIFICIAL OPENING ENDOSCOPIC, DIAGNOSTIC: ICD-10-PCS | Performed by: SURGERY

## 2023-02-06 PROCEDURE — 2500000003 HC RX 250 WO HCPCS: Performed by: NURSE ANESTHETIST, CERTIFIED REGISTERED

## 2023-02-06 PROCEDURE — 6370000000 HC RX 637 (ALT 250 FOR IP): Performed by: STUDENT IN AN ORGANIZED HEALTH CARE EDUCATION/TRAINING PROGRAM

## 2023-02-06 PROCEDURE — 99222 1ST HOSP IP/OBS MODERATE 55: CPT | Performed by: INTERNAL MEDICINE

## 2023-02-06 PROCEDURE — 88305 TISSUE EXAM BY PATHOLOGIST: CPT

## 2023-02-06 PROCEDURE — 7100000010 HC PHASE II RECOVERY - FIRST 15 MIN: Performed by: SURGERY

## 2023-02-06 PROCEDURE — 1200000000 HC SEMI PRIVATE

## 2023-02-06 PROCEDURE — 85025 COMPLETE CBC W/AUTO DIFF WBC: CPT

## 2023-02-06 PROCEDURE — 80053 COMPREHEN METABOLIC PANEL: CPT

## 2023-02-06 PROCEDURE — 2709999900 HC NON-CHARGEABLE SUPPLY: Performed by: SURGERY

## 2023-02-06 PROCEDURE — 2580000003 HC RX 258: Performed by: STUDENT IN AN ORGANIZED HEALTH CARE EDUCATION/TRAINING PROGRAM

## 2023-02-06 PROCEDURE — 85018 HEMOGLOBIN: CPT

## 2023-02-06 PROCEDURE — 36430 TRANSFUSION BLD/BLD COMPNT: CPT

## 2023-02-06 PROCEDURE — 7100000011 HC PHASE II RECOVERY - ADDTL 15 MIN: Performed by: SURGERY

## 2023-02-06 PROCEDURE — 85014 HEMATOCRIT: CPT

## 2023-02-06 PROCEDURE — 3609012400 HC EGD TRANSORAL BIOPSY SINGLE/MULTIPLE: Performed by: SURGERY

## 2023-02-06 RX ORDER — PANTOPRAZOLE SODIUM 40 MG/1
40 TABLET, DELAYED RELEASE ORAL
Status: DISCONTINUED | OUTPATIENT
Start: 2023-02-07 | End: 2023-02-06

## 2023-02-06 RX ORDER — SODIUM CHLORIDE 9 MG/ML
INJECTION, SOLUTION INTRAVENOUS PRN
Status: COMPLETED | OUTPATIENT
Start: 2023-02-06 | End: 2023-02-06

## 2023-02-06 RX ORDER — PANTOPRAZOLE SODIUM 40 MG/1
40 TABLET, DELAYED RELEASE ORAL
Status: DISCONTINUED | OUTPATIENT
Start: 2023-02-06 | End: 2023-02-14 | Stop reason: HOSPADM

## 2023-02-06 RX ORDER — PROPOFOL 10 MG/ML
INJECTION, EMULSION INTRAVENOUS PRN
Status: DISCONTINUED | OUTPATIENT
Start: 2023-02-06 | End: 2023-02-06 | Stop reason: SDUPTHER

## 2023-02-06 RX ORDER — SODIUM CHLORIDE 9 MG/ML
INJECTION, SOLUTION INTRAVENOUS CONTINUOUS
Status: DISCONTINUED | OUTPATIENT
Start: 2023-02-06 | End: 2023-02-09

## 2023-02-06 RX ORDER — LIDOCAINE HYDROCHLORIDE 10 MG/ML
INJECTION, SOLUTION EPIDURAL; INFILTRATION; INTRACAUDAL; PERINEURAL PRN
Status: DISCONTINUED | OUTPATIENT
Start: 2023-02-06 | End: 2023-02-06 | Stop reason: SDUPTHER

## 2023-02-06 RX ADMIN — PANTOPRAZOLE SODIUM 40 MG: 40 TABLET, DELAYED RELEASE ORAL at 17:38

## 2023-02-06 RX ADMIN — Medication 10 ML: at 09:17

## 2023-02-06 RX ADMIN — PROPOFOL 60 MG: 10 INJECTION, EMULSION INTRAVENOUS at 14:17

## 2023-02-06 RX ADMIN — Medication 10 ML: at 21:07

## 2023-02-06 RX ADMIN — PANTOPRAZOLE SODIUM 40 MG: 40 INJECTION, POWDER, FOR SOLUTION INTRAVENOUS at 09:17

## 2023-02-06 RX ADMIN — PRAVASTATIN SODIUM 40 MG: 20 TABLET ORAL at 21:06

## 2023-02-06 RX ADMIN — LIDOCAINE HYDROCHLORIDE 20 MG: 10 INJECTION, SOLUTION EPIDURAL; INFILTRATION; INTRACAUDAL; PERINEURAL at 14:17

## 2023-02-06 RX ADMIN — SODIUM CHLORIDE: 9 INJECTION, SOLUTION INTRAVENOUS at 12:14

## 2023-02-06 RX ADMIN — SODIUM CHLORIDE: 9 INJECTION, SOLUTION INTRAVENOUS at 13:40

## 2023-02-06 ASSESSMENT — ENCOUNTER SYMPTOMS
VOMITING: 0
NAUSEA: 1
ANAL BLEEDING: 0
ABDOMINAL PAIN: 0
BLOOD IN STOOL: 0
SHORTNESS OF BREATH: 0

## 2023-02-06 ASSESSMENT — LIFESTYLE VARIABLES: SMOKING_STATUS: 0

## 2023-02-06 NOTE — H&P
Internal Medicine History & Physical     Name: Guerline Frazier  : 1931  Chief Complaint: Loss of Consciousness (Lost consciousness x 5 mins while on BSC at home, did not fall.)  Primary Care Physician: Zonia Royal MD  Admission date: 2023  Date of service: 2023     History of Present Illness  Marshall Alvarez is a 80y.o. year old male. He presented to the hospital with a chief complaint of weakness. He states that the weakness started a few days ago. He then developed some right shoulder pain. He then nearly passed out. EMS was called and he was brought to the emergency department. Nothing in particular seem to make his symptoms better or worse. Symptoms seem to be moderate in severity. He did receive 2 units of packed red blood cells in the ED and he did feel better after that. He has never had a colonoscopy according to his wife because he \"refused in the past\". The patient's wife was present at bedside. History is provided by the patient and his wife. His wife is felt to be an excellent historian. She is a former nurse. ED course:   Initial blood work and imaging studies performed. Admission recommended by ED physician. My coverage discussed with ED provider.  Meds in ED consisted of the following: IVF, PPI    Past Medical History:   Diagnosis Date    A-fib ([facility] [address]     BPH (benign prostatic hyperplasia)     CAD (coronary artery disease)     Diabetic neuropathy (HCC)     DM (diabetes mellitus) ([facility] [address]     HTN (hypertension)     Hyperlipemia     Pancreatitis 2010    Pleural effusion on left 2022    seen on echo    S/P CABG (coronary artery bypass graft)        Past Surgical History:   Procedure Laterality Date    AORTIC VALVE REPLACEMENT N/A 2022    TRANSCATHETER AORTIC VALVE REPLACEMENT FEMORAL APPROACH [facility]  2010    COLONOSCOPY      CORONARY ARTERY BYPASS GRAFT  2009    CABG x4 with left internal mammary artery to the LAD. reverse saphenous vein grafts to the 1st marginal branches ofcircumflex, posterior descending branch of the right coronary artery, and posterolateral branch to the right coronary artery, and modified maze procedure with Medtronic bipolar radiofrequency ablation, and excision of left atrial appendage. PACEMAKER INSERTION N/A 02/03/2022    Medtronic Micra VR Leadless Pacemaker  (Dr. Carol Haley)    TONSILLECTOMY         Family Medical History:  Family History   Problem Relation Age of Onset    High Blood Pressure Mother     Heart Disease Mother     Cancer Father        Social History  Patient lives at home with his wife  Employment: Retired  Illicit drug use- denies  TOBACCO:   reports that he has never smoked. He has never used smokeless tobacco.  ETOH:   reports current alcohol use. Home Medications  Prior to Admission medications    Medication Sig Start Date End Date Taking?  Authorizing Provider   traZODone (DESYREL) 50 MG tablet Take 50 mg by mouth nightly   Yes Historical Provider, MD   torsemide (DEMADEX) 20 MG tablet Take 1 tablet by mouth daily 1/13/23 2/12/23  Dulce Torres DO   hydrALAZINE (APRESOLINE) 50 MG tablet TAKE 1 TABLET BY MOUTH THREE TIMES A DAY 1/9/23   Dulce Torres DO   carvedilol (COREG) 12.5 MG tablet TAKE 1 TABLET BY MOUTH TWICE A DAY 9/26/22   MELY Ryan   ELIQUIS 2.5 MG TABS tablet TAKE 1 TABLET BY MOUTH TWICE A DAY 9/26/22   MELY Mcmahan   isosorbide dinitrate (ISORDIL) 20 MG tablet TAKE 1 TABLET BY MOUTH THREE TIMES A DAY 6/20/22   Dulce Torres DO   pantoprazole (PROTONIX) 20 MG tablet Take 1 tablet by mouth every morning (before breakfast) 2/6/22   Edi Bass DO   doxazosin (CARDURA) 4 MG tablet TAKE 1 TABLET BY MOUTH EVERY DAY 2/5/20   Umesh Alexandre MD   pravastatin (PRAVACHOL) 40 MG tablet Take one daily 1/30/20   Regan Parker MD       Allergies  No Known Allergies    Review of Systems:   Please see HPI above. All bolded are positive. All un-bolded are negative. Constitutional Symptoms: fever, chills, fatigue, generalized weakness, diaphoresis, increase in thirst, loss of appetite  Eyes: vision change   Ears, Nose, Mouth, Throat: hearing loss, nasal congestion, sores in the mouth  Cardiovascular: chest pain, chest heaviness, palpitations  Respiratory: shortness of breath, wheezing, coughing  Gastrointestinal: abdominal pain, nausea, vomiting, diarrhea, constipation, melena, hematochezia, hematemesis  Genitourinary: dysuria, hematuria, increased frequency  Musculoskeletal: lower extremity edema, myalgias, arthralgias, back pain  Integumentary: rashes, itching   Neurological: headache, lightheadedness, dizziness, confusion, syncope, numbness, tingling, focal weakness  Psychiatric: depression, suicidal ideation, anxiety  Endocrine: unintentional weight change  Hematologic/Lymphatic: lymphadenopathy, easy bruising, easy bleeding   Allergic/Immunologic: recurrent infections      Objective  VITALS:  /60   Pulse 60   Temp 97.6 °F (36.4 °C) (Axillary)   Resp 18   Ht 5' 11\" (1.803 m)   Wt 170 lb 4.8 oz (77.2 kg)   SpO2 96%   BMI 23.75 kg/m²     Physical Exam:   General: awake, alert, oriented to person, place, time, and purpose, appears stated age, cooperative, no acute distress, pleasant, appropriate mood, appears frail  Eyes: conjunctivae/corneas clear, sclera non icteric, EOMI  Ears: no obvious scars, no lesions, no masses, hearing intact  Mouth: mucous membranes moist, no obvious oral sores  Head: normocephalic, atraumatic  Neck: no JVD, no adenopathy, no thyromegaly, neck is supple, trachea is midline  Back: ROM normal, no CVA tenderness.   Chest: no pain on palpation  Lungs: clear to auscultation bilaterally, without rhonchi, crackle, wheezing, or rale, no retractions or use of accessory muscles  Heart: regular rate and regular rhythm, systolic murmur, normal S1, S2  Abdomen: soft, non-tender; bowel sounds normal; no masses, no organomegaly  : Deferred   Extremities: no lower extremity edema, extremities atraumatic, no cyanosis, no clubbing, 2+ pedal pulses palpated  Skin: normal color, normal texture, normal turgor, no rashes, no lesions  Neurologic:5/5 muscle strength throughout, normal muscle tone throughout, face symmetric, hearing intact, tongue midline, speech appropriate without slurring, sensation to fine touch intact in upper and lower extremities    Labs-   Lab Results   Component Value Date    WBC 9.4 02/06/2023    HGB 5.8 (LL) 02/06/2023    HCT 18.4 (L) 02/06/2023     (L) 02/06/2023     02/06/2023    K 5.0 02/06/2023     02/06/2023    CREATININE 2.8 (H) 02/06/2023    BUN 46 (H) 02/06/2023    CO2 22 02/06/2023    GLUCOSE 158 (H) 02/06/2023    ALT 36 02/06/2023    AST 49 (H) 02/06/2023    INR 1.6 02/01/2022       Recent ECHO   Ejection fraction is visually estimated at 30-35%. Dilated right ventricle with normal right ventricular function (TAPSE 1.9   cm). Indeterminate diastolic function. Severely dilated left atrium by volume index. Dilated right atrium. Moderate mitral regurgitation. Hx of TAVR with a 34 mm Evolut Pro+ bioprosthetic valve on 2/2/2022. Mild paravalvular aortic regurgitation. AV peak velocity 1.8 m/s. AV mean gradient 6 mmHg. Aortic valve area 2.0 cm2. Mild tricuspid regurgitation. PASP is estimated at 67 mmHg. Left-sided pleural effusion. Recent Radiological Studies:  CT Head W/O Contrast   Final Result   1. There is no acute intracranial abnormality. Specifically, there is no   intracranial hemorrhage. 2. Atrophy and periventricular leukomalacia,         XR CHEST PORTABLE   Final Result   1. Airspace disease within the retrocardiac region   2. Small left pleural effusion   3.  Trace right pleural effusion             Assessment  Active Hospital Problems    Diagnosis     GI bleed [K92.2]      Priority: High    Mild protein-calorie malnutrition ([facility] [address] [E44.1]     Gout [M10.9]     Chronic kidney disease [N18.9]     Aortic valve stenosis [I35.0]     Heart valve disease [I38]     Long term current use of anticoagulant therapy [Z79.01]     CAD (coronary artery disease) [I25.10]     S/P CABG (coronary artery bypass graft) [Z95.1]     DM (diabetes mellitus) (HCC) [E11.9]     HTN (hypertension) [I10]     Hyperlipemia [E78.5]     A-fib (HCC) [I48.91]     Diabetic neuropathy (HCC) [E11.40]     BPH (benign prostatic hyperplasia) [N40.0]        Plan  GI bleed with acute blood loss anemia:   General surgery following-- plans for EGD 2/6  PPI  Follow H&H--pRBC if Hb < 7  NPO  Check Fe/Ferritin/TIBC/Vitamin B12/Folate  Hold anticoagulation (h/o afib)    CKD3: Follow BMP  Baseline sCr ~ 1.8 to 2.0    Hypotension and syncope related to GIB:  Gentle IVF  Hold Coreg, Cardura, Demadex  Noted BNP elevation   Cardio consult    Continue home medications other than as noted above. PT/OT  Follow labs  DVT prophylaxis w/ SCD's  Please see orders for further management and care.  for discharge planning  Discharge plan: TBD pending clinical improvement     Miguel Eckert DO  2/6/2023  11:29 AM    I can be reached through [email or web address]. NOTE:  This report was transcribed using voice recognition software. Every effort was made to ensure accuracy; however, inadvertent computerized transcription errors may be present.

## 2023-02-06 NOTE — PROGRESS NOTES
Physical Therapy  Facility/Department: [facility] ENDOSCOPY    Name: Ruthy Clifton  : 1931  MRN: [id number]  Date of Service: 2023    PT eval was attempted this afternoon and pt is at ENDO. Will re-attempt PT eval another time. Rena Reese Axon., P.T.   License Number: [id number]

## 2023-02-06 NOTE — CONSULTS
GENERAL SURGERY  CONSULT NOTE  2/6/2023    Physician Consulted: Dr. Dana Vivas  Reason for Consult: GIB  Referring Physician: Dr. Saima FOWLER  Rianna Smith is a 80 y.o. male with a past medical hx of A fib on eliquis, CAD, DM II, HTN, HLD, CAD s/p CABG, TAVR, Cholecystectomy who presents for evaluation of syncope. Patient passed out on the toilet yesterday for approximately 5 minutes. He does not remember the episode and denies any complaints including chest pain, shortness of breath, nausea, vomiting, hematochezia, melena. His hemoglobin was found to be 7.5 in the ED and was found to be hemooccult positive for which general surgery was consulted. He is feeling well this morning and states he has never had an endoscopy before. Past Medical History:   Diagnosis Date    A-fib [facility])     BPH (benign prostatic hyperplasia)     CAD (coronary artery disease)     Diabetic neuropathy (HCC)     DM (diabetes mellitus) ([address]     HTN (hypertension)     Hyperlipemia     Pancreatitis 09/01/2010    Pleural effusion on left 03/01/2022    seen on echo    S/P CABG (coronary artery bypass graft)        Past Surgical History:   Procedure Laterality Date    AORTIC VALVE REPLACEMENT N/A 2/2/2022    TRANSCATHETER AORTIC VALVE REPLACEMENT FEMORAL APPROACH performed by Julio Cesar Apodaca MD at [address] [place] [address]  09/27/2010    COLONOSCOPY      CORONARY ARTERY BYPASS GRAFT  12/01/2009    CABG x4 with left internal mammary artery to the LAD. reverse saphenous vein grafts to the 1st marginal branches ofcircumflex, posterior descending branch of the right coronary artery, and posterolateral branch to the right coronary artery, and modified maze procedure with Medtronic bipolar radiofrequency ablation, and excision of left atrial appendage.     PACEMAKER INSERTION N/A 02/03/2022    Medtronic Micra VR Leadless Pacemaker  (Dr. Shannon Harrington)    TONSILLECTOMY         Medications Prior to Admission    Prior to Admission medications    Medication Sig Start Date End Date Taking? Authorizing Provider   traZODone (DESYREL) 50 MG tablet Take 50 mg by mouth nightly   Yes Historical Provider, MD   torsemide (DEMADEX) 20 MG tablet Take 1 tablet by mouth daily 1/13/23 2/12/23  Divina Summers DO   hydrALAZINE (APRESOLINE) 50 MG tablet TAKE 1 TABLET BY MOUTH THREE TIMES A DAY 1/9/23   Divina Summers, DO   carvedilol (COREG) 12.5 MG tablet TAKE 1 TABLET BY MOUTH TWICE A DAY 9/26/22   MELY Ryan   ELIQUIS 2.5 MG TABS tablet TAKE 1 TABLET BY MOUTH TWICE A DAY 9/26/22   MELY Salmeron   isosorbide dinitrate (ISORDIL) 20 MG tablet TAKE 1 TABLET BY MOUTH THREE TIMES A DAY 6/20/22   Divinajosé miguel Annenorman, DO   pantoprazole (PROTONIX) 20 MG tablet Take 1 tablet by mouth every morning (before breakfast) 2/6/22   Maddisonkamilah Suarez DO   doxazosin (CARDURA) 4 MG tablet TAKE 1 TABLET BY MOUTH EVERY DAY 2/5/20   Umesh Alexandre MD   pravastatin (PRAVACHOL) 40 MG tablet Take one daily 1/30/20   Payton Schmid MD       No Known Allergies    Family History   Problem Relation Age of Onset    High Blood Pressure Mother     Heart Disease Mother     Cancer Father        Social History     Tobacco Use    Smoking status: Never    Smokeless tobacco: Never   Vaping Use    Vaping Use: Never used   Substance Use Topics    Alcohol use: Yes     Comment: rarely- 1 beer    Drug use: No         Review of Systems:   Review of Systems   Constitutional:  Negative for chills, fatigue and fever. Respiratory:  Negative for shortness of breath. Cardiovascular:  Negative for chest pain. Gastrointestinal:  Positive for nausea. Negative for abdominal pain, anal bleeding, blood in stool and vomiting. Neurological:  Positive for syncope. Negative for dizziness. PHYSICAL EXAM:    Vitals:    02/06/23 0437   BP: (!) 99/50   Pulse: 60   Resp: 16   Temp: 97.4 °F (36.3 °C)   SpO2: 94%       GENERAL:  NAD. A&Ox3. HEAD:  Normocephalic. Atraumatic.    EYES:   No scleral icterus. PERRL. Conjunctiva pale  LUNGS: no acute respiratory distress. CARDIOVASCULAR: Hemodynamically stable   ABDOMEN:  Soft, non-distended, non-tender. No guarding, rigidity, rebound. EXTREMITIES:   MAEx4. Atraumatic. No LE edema. SKIN:  Warm and dry  NEUROLOGIC:  No focal neurologic deficits  RECTAL: FOBT +      ASSESSMENT/PLAN:  80 y.o. male with syncope and anemia    Plan  -maintain two large bore IV   -protonix BID   -hold AC   -transfuse for hgb <7   -plan for EGD today    Plan will be discussed with Dr. Otoniel Ga. Abdiaziz Lucia MD  Surgery Resident PGY-2  2/6/2023  5:33 AM    The patient was seen and examined and the chart was reviewed. I agree with the assessment and plan. The patient and hemoglobin of 7.6 and 23.3. The patient will undergo an EGD.

## 2023-02-06 NOTE — OP NOTE
Operative Note      Patient: Mony Schwartz  YOB: 1931  MRN: [id number]    Date of Procedure: 2/6/2023    Pre-Op Diagnosis: GIB    Post-Op Diagnosis: The GE junction is marked at 40 cm from the incisors with a small type I hiatal hernia with reflux changes and possible Bhat's esophagus  Mild gastritis  Normal duodenum       Procedure(s):  EGD BIOPSY    Surgeon(s):  Becca Brannon MD    Assistant:   * No surgical staff found *    Anesthesia: Monitor Anesthesia Care    Estimated Blood Loss (mL): Minimal    Complications: None    Specimens:   ID Type Source Tests Collected by Time Destination   A : antral bx Tissue Stomach SURGICAL PATHOLOGY Becca Brannon MD 2/6/2023 1420        Implants:  * No implants in log *      Drains:   Urinary Catheter 02/05/23 [place] (Active)   Catheter Indications Urinary retention (acute or chronic), continuous bladder irrigation or bladder outlet obstruction 02/06/23 0740   Site Assessment No urethral drainage 02/06/23 0740   Urine Color Ashlee 02/06/23 0740   Urine Appearance Cloudy 02/06/23 0740   Urine Odor Malodorous 02/06/23 0740   Collection Container Standard 02/06/23 0740   Securement Method Securing device (Describe) 02/06/23 0740   Catheter Care  Perineal wipes 02/06/23 0740   Catheter Best Practices  Drainage tube clipped to bed;Catheter secured to thigh; Tamper seal intact; Bag below bladder;Bag not on floor; Lack of dependent loop in tubing;Drainage bag less than half full 02/06/23 0740   Status Draining;Patent 02/06/23 0740   Output (mL) 150 mL 02/06/23 0431       Findings: As above    Detailed Description of Procedure: The patient was brought to the endoscopy suite and placed on the table in the left lateral decubitus position. The patient received anesthesia per the department of anesthesiology. A bite-block was placed. Then scope was passed through the lumen of the esophagus, stomach and duodenum. The endoscope was retrieved.   The duodenum was normal.  Upon reentry into the stomach, the patient had a mild gastritis. Biopsies were obtained. The retroflexed view did reveal a small type I hiatal hernia. The diaphragm was at 40 cm from the incisors. The remainder of the examination was uneventful. The patient did have changes consistent with reflux disease and possible Bhat's esophagus. No active GI bleeding was noted.     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

## 2023-02-06 NOTE — CARE COORDINATION
Social Work / Discharge Planning : Patient admitted with Syncope. Needed two units due to low hemoglobin. Scheduled for testing. SW met with patient and spouse  who was a nurse and explained role as discharge planner/ transition of care. Goal at discharge is HOME . Patient uses a walker and has a wheelchair if needed. Spouse helps take care of patient. No HHC/SNF hx. Patient PCP is DR Rigoberto Robin. Spouse provide  transport at discharge. Therapy ordered and await therapy input to better assist with discharge planner/ Patient/ spouse currently denies any home needs at this time. Spouse states she has been managing patient at home.  SW to follow.; Electronically signed by ALFREDO Esquivel on 2/6/23 at 12:14 PM EST

## 2023-02-06 NOTE — CONSULTS
Inpatient Cardiology Consultation      Reason for Consult: Syncope and atrial fibrillation in the setting of a GI bleed    Consulting Physician: Dr. Danielle Jay    Requesting Physician: Dr. Macrina Jones    Date of Consultation: 2/6/2023    HISTORY OF PRESENT ILLNESS:     This 59-year-old male is known to [id number] [place] [address] cardiology and is followed by Dr. Ivette Oakley. He was last evaluated in our office on January 16 of this year. He has a history of a TAVR. He also has a history of a CABG, atrial fibrillation, chronic systolic heart failure HFrEF and a permanent pacemaker. His EKG at that time was A-fib and V paced. He is also followed by EP. He presented to the emergency room on February 5 with loss of consciousness but no apparent fall. The following information is taken from a review of electronic medical records. He cannot give me a lot of details about what happened prior to admission. He denies syncope and his wife told him he was \"acting weird \". Reportedly he was sitting and lost consciousness for a few minutes. He denies any bleeding    Blood pressure on admission was 77/45 and his temperature was 96.5 °F and he had no hypoxia on room air. Chest x-ray showed small left pleural effusion and airspace disease within the retrocardiac region. CT of the head showed no acute abnormality    Potassium was 4.8 with a BUN and creatinine of 38 and 2.3 (last creatinine 2 from September 2022), BNP 31,022, (a proBNP on January 29, 2022 was 38,000) high-sensitivity troponin 46-43, WBC 7.7 with an H&H of 7.3 and 23.6 (last H&H is from September 2022 and was 10.1 and 32.5), -, platelets are now 795, negative influenza, H&H dropped to 5.8 and 18.4 and he was transfused as he was Hemoccult positive    H&H is now 7.6 and 23.3    He was evaluated by Dr. Devante Rojas and an EGD was done. His Eliquis is on hold.     Past medical history  Non-smoker  Diabetes with neuropathy, non-insulin-requiring  Hypertension  Hyperlipidemia  CABG x4 in 2009 with a LIMA to the LAD and a saphenous vein graft to the OM1 and a saphenous vein graft to the RPDA and a saphenous vein graft to the RPL and he is status post maze and SALENA exclusion in 2009  Atrial fibrillation in 2009  HFrEF  January 2022 severe aortic stenosis with an EF of 30%  Syncope admission  Anemia  TTE 1/13/2022    Summary  Ejection fraction is visually estimated at 30%. Overall ejection fraction severely decreased. Severe left ventricular concentric hypertrophy noted. Dilated right ventricle with reduced function. Left atrial volume index of 79 ml per meters squared BSA. Severe aortic stenosis is present. The aortic valve area is 0.6 cm2 with a maximum gradient of 113 mmHg and a mean gradient of 69  mmHg. Physiologic and/or trace aortic regurgitation is noted. Moderate mitral regurgitation is present. Mild tricuspid regurgitation. Pulmonary hypertensiOn is moderate to severe. RVSP is 66 mmH      ALL GRAFTS patent on CTA of the chest prior to TAVR  TAVR February 2, 2022 Medtronic evolute pro plus  2/3/2022 TTE    Ejection fraction is visually estimated at 50-55%. No regional wall motion abnormalities seen. Severe left ventricular concentric hypertrophy noted. Normal right ventricle structure and function. Hx of TAVR with a 34 mm Evolut Pro+ 2/2/2022. The aortic valve area is 1.9 cm2 with a maximum gradient of 13 mmHg and a mean gradient of 6 mmHg. Mild paravalvular aortic regurgitation is noted. Mild mitral regurgitation is present. Mild tricuspid regurgitation. RVSP is 36 mmHg. Physiologic and/or trace pulmonic regurgitation present. No evidence for hemodynamically significant pericardial effusion. Third-degree AV block post TAVR and temporary pacemaker and status post Medtronic Micra VR leadless February 3, 2022  2D echocardiogram March 1, 2022   Summary   Ejection fraction is visually estimated at 30-35%.    Dilated right ventricle with normal right ventricular function (TAPSE 1.9   cm). Indeterminate diastolic function. Severely dilated left atrium by volume index. Dilated right atrium. Moderate mitral regurgitation. Hx of TAVR with a 34 mm Evolut Pro+ bioprosthetic valve on 2/2/2022. Mild paravalvular aortic regurgitation. AV peak velocity 1.8 m/s. AV mean gradient 6 mmHg. Aortic valve area 2.0 cm2. Mild tricuspid regurgitation. PASP is estimated at 67 mmHg. Left-sided pleural effusion. Liver mass  Grovers disease  Chronic kidney disease        Medications Prior to admit:  Prior to Admission medications    Medication Sig Start Date End Date Taking?  Authorizing Provider   traZODone (DESYREL) 50 MG tablet Take 50 mg by mouth nightly   Yes Historical Provider, MD   torsemide (DEMADEX) 20 MG tablet Take 1 tablet by mouth daily 1/13/23 2/12/23  Lanie Juarez DO   hydrALAZINE (APRESOLINE) 50 MG tablet TAKE 1 TABLET BY MOUTH THREE TIMES A DAY 1/9/23   Lanie Juarez,    carvedilol (COREG) 12.5 MG tablet TAKE 1 TABLET BY MOUTH TWICE A DAY 9/26/22   MELY Yousif   ELIQUIS 2.5 MG TABS tablet TAKE 1 TABLET BY MOUTH TWICE A DAY 9/26/22   MELY Catherine   isosorbide dinitrate (ISORDIL) 20 MG tablet TAKE 1 TABLET BY MOUTH THREE TIMES A DAY 6/20/22   Lanie Juarez DO   pantoprazole (PROTONIX) 20 MG tablet Take 1 tablet by mouth every morning (before breakfast) 2/6/22   Jodi De La Fuente DO   doxazosin (CARDURA) 4 MG tablet TAKE 1 TABLET BY MOUTH EVERY DAY 2/5/20   Umesh Alexandre MD   pravastatin (PRAVACHOL) 40 MG tablet Take one daily 1/30/20   Umesh Alexandre MD       Current Medications:    Current Facility-Administered Medications: 0.9 % sodium chloride infusion, , IntraVENous, PRN  0.9 % sodium chloride infusion, , IntraVENous, Continuous  pantoprazole (PROTONIX) injection 40 mg, 40 mg, IntraVENous, BID  sodium chloride flush 0.9 % injection 10 mL, 10 mL, IntraVENous, 2 times per day  sodium chloride flush 0.9 % injection 10 mL, 10 mL, IntraVENous, PRN  0.9 % sodium chloride infusion, , IntraVENous, PRN  senna (SENOKOT) tablet 8.6 mg, 1 tablet, Oral, Daily PRN  acetaminophen (TYLENOL) tablet 650 mg, 650 mg, Oral, Q6H PRN **OR** acetaminophen (TYLENOL) suppository 650 mg, 650 mg, Rectal, Q6H PRN  [Held by provider] carvedilol (COREG) tablet 12.5 mg, 12.5 mg, Oral, BID  [Held by provider] doxazosin (CARDURA) tablet 4 mg, 4 mg, Oral, Daily  pravastatin (PRAVACHOL) tablet 40 mg, 40 mg, Oral, Nightly  [Held by provider] torsemide (DEMADEX) tablet 20 mg, 20 mg, Oral, Daily    Allergies:  Patient has no known allergies. Social History: Non-smoker nondrinker        Family History: Noncontributory  Family History   Problem Relation Age of Onset    High Blood Pressure Mother     Heart Disease Mother     Cancer Father        REVIEW OF SYSTEMS:     Constitutional: Denies fatigue, fevers, chills or night sweats  Eyes: Denies visual changes or drainage  ENT: Denies headaches or hearing loss. No mouth sores or sore throat. No epistaxis   Cardiovascular: Denies chest pain, pressure or palpitations. No lower extremity swelling. Respiratory: Denies DESAI, cough, orthopnea or PND. No hemoptysis   Gastrointestinal: Denies hematemesis or anorexia. No hematochezia or melena    Genitourinary: Denies urgency, dysuria or hematuria. Musculoskeletal: Denies gait disturbance, weakness or joint complaints  Integumentary: Denies rash, hives or pruritis   Neurological: Denies dizziness, headaches or seizures. No numbness or tingling  Psychiatric: Denies anxiety or depression. Endocrine: Denies temperature intolerance. No recent weight change. .  Hematologic/Lymphatic: Denies abnormal bruising or bleeding. No swollen lymph nodes    PHYSICAL EXAM:   /60   Pulse 60   Temp 97.6 °F (36.4 °C) (Axillary)   Resp 18   Ht 5' 11\" (1.803 m)   Wt 170 lb 4.8 oz (77.2 kg)   SpO2 96%   BMI 23.75 kg/m²   CONST:  Well developed, well nourished who appears of stated age.  Awake, alert and cooperative. No apparent distress. HEENT:   Head- Normocephalic, atraumatic   Eyes- Conjunctivae pink, anicteric  Throat- Oral mucosa pink and moist  Neck-  No stridor, trachea midline, no jugular venous distention. No carotid bruit. CHEST: Chest symmetrical and non-tender to palpation. No accessory muscle use or intercostal retractions  RESPIRATORY: Lung sounds - clear throughout fields   CARDIOVASCULAR:     Heart Inspection- shows no noted pulsations  Heart Palpation- no heaves or thrills; PMI is non-displaced   Heart Ausculation- Regular rate and rhythm, no murmur. No s3, s4 or rub   PV: Trace to 1+ lower extremity edema. No varicosities. Pedal pulses palpable, no clubbing or cyanosis   ABDOMEN: Soft, non-tender to light palpation. Bowel sounds present. No palpable masses no organomegaly; no abdominal bruit  MS: Good muscle strength and tone. No atrophy or abnormal movements. : Deferred  SKIN: Warm and dry no statis dermatitis or ulcers   NEURO / PSYCH: Oriented to person, place and time. Speech clear and appropriate. Follows all commands. Pleasant affect     DATA:    ECG / Tele strips: Ventricular pacemaker capture  Diagnostic:      Intake/Output Summary (Last 24 hours) at 2/6/2023 1203  Last data filed at 2/6/2023 1052  Gross per 24 hour   Intake 2021.22 ml   Output 175 ml   Net 1846.22 ml       Labs:   CBC:   Recent Labs     02/05/23  1131 02/05/23  2145 02/06/23  0300   WBC 7.7  --  9.4   HGB 7.3*  --  5.8*   HCT 23.6* 19.6* 18.4*     --  126*     BMP:   Recent Labs     02/05/23  1131 02/06/23  0212    140   K 4.8 5.0   CO2 26 22   BUN 38* 46*   CREATININE 2.3* 2.8*   LABGLOM 26 21   CALCIUM 9.2 9.1     Mag:   Recent Labs     02/05/23  1131   MG 1.6     Phos: No results for input(s): PHOS in the last 72 hours.   TFT: No results found for: TSH, K5EKWBZ, U3EUAUL, THYROIDAB, FT3, T4FREE   HgA1c:   Lab Results   Component Value Date/Time    LABA1C 6.1 02/05/2023 09:45 PM    LABA1C 5.9 02/01/2022 02:37 PM    LABA1C 5.9 06/24/2020 10:08 AM     No results found for: EAG  proBNP:   Lab Results   Component Value Date/Time    PROBNP 31,022 02/05/2023 11:31 AM    PROBNP 38,017 01/29/2022 04:57 AM     PT/INR: No results for input(s): PROTIME, INR in the last 72 hours. APTT:No results for input(s): APTT in the last 72 hours.   TROPONIN:  Lab Results   Component Value Date/Time    TROPHS 43 02/05/2023 01:13 PM    TROPHS 46 02/05/2023 11:31 AM    TROPHS 69 01/28/2022 04:06 PM     CK:No results found for: CKTOTAL  FASTING LIPID PANEL:  Lab Results   Component Value Date/Time    CHOL 156 10/03/2018 12:00 AM     06/24/2020 11:29 AM    LDLCALC 96 10/03/2018 12:00 AM    TRIG 182 06/24/2020 11:29 AM     LIVER PROFILE:  Recent Labs     02/06/23  0212   AST 49*   ALT 36   LABALBU 3.1*     COVID19:   Recent Labs     02/05/23  1131   COVID19 Not Detected     Impressions discussed with   Syncope in the setting of a GI bleed  Anemia with endoscopy showing no source of bleeding, status posttransfusion  Mild thrombocytopenia  Hypotension on admission, now resolved, with a history of hypertension  Permanent atrial fibrillation and Eliquis has been stopped due to significant anemia and GI bleed  Chronic kidney disease  Coronary artery disease with a CABG in 2009 with a LIMA to the LAD and a saphenous vein graft to the OM1 and a saphenous vein graft to the RPDA and a saphenous vein graft to the RPL and all grafts appeared patent on CTA of the chest prior to TAVR in February 2022  TAVR February 2022 with an Darian loop Pro bioprosthetic ,and 2D echocardiogram in March 2022 showed mild paravalvular aortic regurgitation and a mean gradient across the aortic valve of 6 mmHg  History of HFrEF cardiomyopathy with a 2D echocardiogram in March 2022 showing an EF of 30 to 35% and moderate mitral regurgitation (prior echo in February 2022 showed an EF of 50 to 55%), he appears compensated  History of third-degree AV block post TAVR and status post Medtronic Micra VR leadless pacemaker with last interrogation in October 2022 and will was functioning well  Diabetes with neuropathy  Hyperlipidemia and on a statin  Chronic kidney disease      Plans  2D echo  Interrogate pacemaker  Agree with holding Eliquis  Electronically signed by EDMUNDO Dumont CNP on 2/6/2023 at 12:03 PM    ______________________________________________________________________  Cardiology attending attestation:  I have independently interviewed and examined the patient. I personally reviewed pertinent laboratory values and diagnostic testing (including, if applicable, chest xray, electrocardiogram, telemetry, echocardiogram, stress testing, and coronary angiography). I have reviewed the above documentation completed by CORKY Dumont including past medical, social, and family history and agree with the findings, assessment and plan except where noted. Plan formulated by me. I participated in all aspects of the medical decision making and performed the substantive portion of the visit by contributing >50% of the total visit time. Please see my additional contributions to the HPI, physical exam, and assessment / medical decision making:  _______________________________________________________________________    80-year-old male followed by Dr. Sean Roberts, AF which appears permanent, TAVR, CABG, leadless pacemaker apparently slumped over on the bedside commode at home, had been very weak and short of breath recently. Found to have hemoglobin that went as low as 5.8, got transfused, had an EGD showing gastritis. He is on apixaban at baseline. Denies chest pain or shortness of breath at rest at this time.     Physical Exam:  /62   Pulse 61   Temp 97.6 °F (36.4 °C) (Axillary)   Resp 20   Ht 5' 11\" (1.803 m)   Wt 170 lb 4.8 oz (77.2 kg)   SpO2 94%   BMI 23.75 kg/m²   General appearance: Frail appearing elderly gentleman, awake, alert, no acute respiratory distress  Skin: Intact, no rash  ENMT: Moist mucous membranes  Neck: Supple, no carotid bruits. Normal jugular venous pressure  Lungs: Clear to auscultation bilaterally. No wheezes, rales, or rhonchi  Cardiac: Irregular rhythm with a normal rate, normal J2&X8, systolic murmurs. Abdomen: Soft, positive bowel sounds, nontender  Extremities: Moves all extremities x 4, no lower extremity edema  Neurologic: No focal motor deficits apparent, normal mood and affect    EK2023: V-paced 68 bpm    Impression:   Syncope likely due to severe anemia  CAD/CABG  Ischemic cardiomyopathy/HFrEF  TAVR  Permanent AF/apixaban  Leadless pacemaker    Recommendations:    Hold apixaban  Interrogate pacemaker  Echo  Optimize GDMT  Transfusions per primary    Thank you for the consultation. Please do not hesitate to call with questions.     Elmer Jenkins MD, [facility] [place] [facility]

## 2023-02-06 NOTE — ANESTHESIA PRE PROCEDURE
Department of Anesthesiology  Preprocedure Note       Name:  Rekha Marin   Age:  80 y.o.  :  1931                                          MRN:  [id number]         Date:  2023      Surgeon: Antonia Georges    Procedure: EGD ONLY    Medications prior to admission:   Prior to Admission medications    Medication Sig Start Date End Date Taking?  Authorizing Provider   traZODone (DESYREL) 50 MG tablet Take 50 mg by mouth nightly   Yes Historical Provider, MD   torsemide (DEMADEX) 20 MG tablet Take 1 tablet by mouth daily 23  Gladys Radon, DO   hydrALAZINE (APRESOLINE) 50 MG tablet TAKE 1 TABLET BY MOUTH THREE TIMES A DAY 23   Gladys Radon, DO   carvedilol (COREG) 12.5 MG tablet TAKE 1 TABLET BY MOUTH TWICE A DAY 22   MELY Ryan   ELIQUIS 2.5 MG TABS tablet TAKE 1 TABLET BY MOUTH TWICE A DAY 22   MELY Angelo   isosorbide dinitrate (ISORDIL) 20 MG tablet TAKE 1 TABLET BY MOUTH THREE TIMES A DAY 22   Gladys Radon, DO   pantoprazole (PROTONIX) 20 MG tablet Take 1 tablet by mouth every morning (before breakfast) 22   Jaime Ray DO   doxazosin (CARDURA) 4 MG tablet TAKE 1 TABLET BY MOUTH EVERY DAY 20   Umesh Alexandre MD   pravastatin (PRAVACHOL) 40 MG tablet Take one daily 20   Keila Wilks MD       Current medications:    Current Facility-Administered Medications   Medication Dose Route Frequency Provider Last Rate Last Admin    0.9 % sodium chloride infusion   IntraVENous Continuous Miguel Eckert DO 50 mL/hr at 23 1214 New Bag at 23 1214    pantoprazole (PROTONIX) injection 40 mg  40 mg IntraVENous BID John Jacome MD   40 mg at 23 5146    sodium chloride flush 0.9 % injection 10 mL  10 mL IntraVENous 2 times per day John Jacome MD   10 mL at 23 0917    sodium chloride flush 0.9 % injection 10 mL  10 mL IntraVENous PRN John Jacome MD        0.9 % sodium chloride infusion   IntraVENous PRN John Jacome MD        senna (SENOKOT) tablet 8.6 mg  1 tablet Oral Daily PRN Arun Camejo MD        acetaminophen (TYLENOL) tablet 650 mg  650 mg Oral Q6H PRN Arun Camejo MD        Or   Leyda Felton acetaminophen (TYLENOL) suppository 650 mg  650 mg Rectal Q6H PRN Arun Camejo MD        [Held by provider] carvedilol (COREG) tablet 12.5 mg  12.5 mg Oral BID MD Leyda Watson [facility] [place] by provider] doxazosin (CARDURA) tablet 4 mg  4 mg Oral Daily Arun Camejo MD   4 mg at 02/05/23 1841    pravastatin (PRAVACHOL) tablet 40 mg  40 mg Oral Nightly Arun Camejo MD   40 mg at 02/05/23 2009    [Held by provider] torsemide (DEMADEX) tablet 20 mg  20 mg Oral Daily Arun Camejo MD           Allergies:  No Known Allergies    Problem List:    Patient Active Problem List   Diagnosis Code    CAD (coronary artery disease) I25.10    S/P CABG (coronary artery bypass graft) Z95.1    DM (diabetes mellitus) ([address] E11.9    Hyperlipemia E78.5    HTN (hypertension) I10    A-fib ([address] I48.91    BPH (benign prostatic hyperplasia) N40.0    Diabetic neuropathy ([address] E11.40    Long term current use of anticoagulant therapy Z79.01    Aortic valve stenosis I35.0    Chronic kidney disease N18.9    Heart valve disease I38    Gout M10.9    Mild protein-calorie malnutrition (HCC) E44.1    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    GI bleed K92.2       Past Medical History:        Diagnosis Date    A-fib ([address]     BPH (benign prostatic hyperplasia)     CAD (coronary artery disease)     Diabetic neuropathy ([address]     DM (diabetes mellitus) ([address]     HTN (hypertension)     Hyperlipemia     Pancreatitis 09/01/2010    Pleural effusion on left 03/01/2022    seen on echo    S/P CABG (coronary artery bypass graft)        Past Surgical History:        Procedure Laterality Date    AORTIC VALVE REPLACEMENT N/A 2/2/2022    TRANSCATHETER AORTIC VALVE REPLACEMENT FEMORAL APPROACH performed by Terri Rosales MD at [address] [place] [address] CHOLECYSTECTOMY  09/27/2010    COLONOSCOPY      CORONARY ARTERY BYPASS GRAFT  12/01/2009    CABG x4 with left internal mammary artery to the LAD. reverse saphenous vein grafts to the 1st marginal branches ofcircumflex, posterior descending branch of the right coronary artery, and posterolateral branch to the right coronary artery, and modified maze procedure with Medtronic bipolar radiofrequency ablation, and excision of left atrial appendage.  PACEMAKER INSERTION N/A 02/03/2022    Medtronic Micra VR Leadless Pacemaker  (Dr. Marisol Rascon)    TONSILLECTOMY         Social History:    Social History     Tobacco Use    Smoking status: Never    Smokeless tobacco: Never   Substance Use Topics    Alcohol use: Yes     Comment: rarely- 1 beer                                Counseling given: Not Answered      Vital Signs (Current):   Vitals:    02/06/23 0645 02/06/23 0807 02/06/23 0845 02/06/23 1052   BP:  (!) 114/55 (!) 104/51 133/60   Pulse:  60 60 60   Resp:  18 18 18   Temp:  97.3 °F (36.3 °C) 97.3 °F (36.3 °C) 97.6 °F (36.4 °C)   TempSrc:  Axillary Axillary Axillary   SpO2: 93% 94% 94% 96%   Weight:       Height:                                                  BP Readings from Last 3 Encounters:   02/06/23 133/60   01/13/23 136/60   04/29/22 (!) 154/60       NPO Status: Time of last liquid consumption: 0800                        Time of last solid consumption: 1800                        Date of last liquid consumption: 02/05/23                        Date of last solid food consumption: 02/04/23    BMI:   Wt Readings from Last 3 Encounters:   02/06/23 170 lb 4.8 oz (77.2 kg)   01/13/23 169 lb (76.7 kg)   04/29/22 159 lb 1.6 oz (72.2 kg)     Body mass index is 23.75 kg/m².     CBC:   Lab Results   Component Value Date/Time    WBC 9.4 02/06/2023 03:00 AM    RBC 2.01 02/06/2023 03:00 AM    HGB 7.6 02/06/2023 12:07 PM    HCT 23.3 02/06/2023 12:07 PM    MCV 91.5 02/06/2023 03:00 AM    RDW 16.1 02/06/2023 03:00 AM  02/06/2023 03:00 AM       CMP:   Lab Results   Component Value Date/Time     02/06/2023 02:12 AM    K 5.0 02/06/2023 02:12 AM     02/06/2023 02:12 AM    CO2 22 02/06/2023 02:12 AM    BUN 46 02/06/2023 02:12 AM    CREATININE 2.8 02/06/2023 02:12 AM    GFRAA 38 09/14/2022 09:18 AM    LABGLOM 21 02/06/2023 02:12 AM    GLUCOSE 158 02/06/2023 02:12 AM    GLUCOSE 101 12/14/2011 04:20 AM    PROT 5.5 02/06/2023 02:12 AM    CALCIUM 9.1 02/06/2023 02:12 AM    BILITOT 0.5 02/06/2023 02:12 AM    ALKPHOS 58 02/06/2023 02:12 AM    AST 49 02/06/2023 02:12 AM    ALT 36 02/06/2023 02:12 AM       POC Tests: No results for input(s): POCGLU, POCNA, POCK, POCCL, POCBUN, POCHEMO, POCHCT in the last 72 hours.     Coags:   Lab Results   Component Value Date/Time    PROTIME 17.7 02/01/2022 02:37 PM    PROTIME 29.8 06/24/2020 10:08 AM    INR 1.6 02/01/2022 02:37 PM    APTT 31.7 02/01/2022 02:37 PM       HCG (If Applicable): No results found for: PREGTESTUR, PREGSERUM, HCG, HCGQUANT     ABGs: No results found for: PHART, PO2ART, VUV7URB, DFF8HZH, BEART, D3IAYAAY     Type & Screen (If Applicable):  No results found for: LABABO, LABRH    Drug/Infectious Status (If Applicable):  No results found for: HIV, HEPCAB    COVID-19 Screening (If Applicable):   Lab Results   Component Value Date/Time    COVID19 Not Detected 02/05/2023 11:31 AM           Anesthesia Evaluation  Patient summary reviewed and Nursing notes reviewed no history of anesthetic complications:   Airway: Mallampati: III  TM distance: >3 FB   Neck ROM: full  Comment: O2 3L nc  Mouth opening: > = 3 FB   Dental:          Pulmonary: breath sounds clear to auscultation      (-) not a current smoker                          ROS comment: On 3L O2 nc   Cardiovascular:  Exercise tolerance: poor (<4 METS),   (+) hypertension:, valvular problems/murmurs (s/p TAVR 2/2/22, mild TR): AS and MR, pacemaker (TVP L jo ann): pacemaker, CAD:, CABG/stent (4 vessel CABG):, dysrhythmias (CHB): atrial fibrillation, DESAI:, pulmonary hypertension: severe and moderate, hyperlipidemia      NYHA Classification: IV  ECG reviewed  Rhythm: regular  Rate: normal  Echocardiogram reviewed    Cleared by cardiology           ROS comment: EF 50-55%    PE comment: TN via TVP L groin   Neuro/Psych:   Negative Neuro/Psych ROS              GI/Hepatic/Renal:   (+) renal disease: CRI,          ROS comment: GI BLEED  BPH  S/p cholecystectomy  . Endo/Other:    (+) Diabetes, blood dyscrasia: anticoagulation therapy:., .          Pt had no PAT visit        ROS comment: gout Abdominal:       Abdomen: soft. Vascular: negative vascular ROS. Other Findings:      Order: [id number]  · Status: Final result   · Visible to patient: No (not released)    · Next appt: Today at 02:00 PM in IP Unit [facility] EP LAB 1)   · 0 Result Notes    Details    Reading Physician Reading Date Result Priority   Bautistaphilly CharmaineDO  [phone] 2/3/2022      Narrative & Impression  Transthoracic Echocardiography Report (TTE)      Demographics      Patient Name    Filippo Sidhu       Gender            Male                   [address]  [id number]      Room Number       7373   Number      Account #       [de-identified]     Procedure Date    02/03/2022      Corporate ID                  Ordering          Miguelangel Arriaga DO                                 Physician      Accession       [id number]    Referring   Number                        Physician      Date of Birth   05/26/1931    Sonographer       Pablo Sprague RDCS      Age             80 year(s)    Interpreting      [address]                                 Physician         Physician Cardiology                                                   Sandy Bishop DO                                    Any Other     Procedure     Type of Study      TTE procedure:Echo Limited Study.      Procedure Date  Date: 02/03/2022 Start: 07:56 AM     Study Location: Portable  Technical Quality: Adequate visualization     Indications:S/P TAVR. Patient Status: STAT     Height: 70.87 inches Weight: 150 pounds BSA: 1.86 m^2 BMI: 21 kg/m^2     HR: 58 bpm     Allergies    - No known allergies. Findings      Left Ventricle   Ejection fraction is visually estimated at 50-55%. No regional wall motion   abnormalities seen. Severe left ventricular concentric hypertrophy noted. Left ventricle size is normal. Indeterminate diastolic function. Right Ventricle   Normal right ventricle structure and function. Left Atrium   Severely dilated left atrium. Right Atrium   Normal right atrium. Mitral Valve   Mild thickening of the mitral valve leaflets. No evidence of mitral valve   stenosis. Mild mitral regurgitation is present. Tricuspid Valve   The tricuspid valve appears structurally normal.   Mild tricuspid regurgitation. RVSP is 36 mmHg. Aortic Valve   Hx of TAVR with a 34 mm Evolut Pro+ 2/2/2022. The aortic valve area is 1.9   cm2 with a maximum gradient of 13 mmHg and a mean gradient of 6 mmHg. Mild   paravalvular aortic regurgitation is noted. Pulmonic Valve   Pulmonic valve is structurally normal. Physiologic and/or trace pulmonic   regurgitation present. Pericardial Effusion   No evidence for hemodynamically significant pericardial effusion. Aorta   Normal aortic root and ascending aorta. Miscellaneous   Normal Inferior Vena Cava diameter and respiratory variation. Conclusions      Summary   Ejection fraction is visually estimated at 50-55%. No regional wall motion abnormalities seen. Severe left ventricular concentric hypertrophy noted. Normal right ventricle structure and function. Hx of TAVR with a 34 mm Evolut Pro+ 2/2/2022. The aortic valve area is 1.9 cm2 with a maximum gradient of 13 mmHg and a   mean gradient of 6 mmHg. Mild paravalvular aortic regurgitation is noted. Mild mitral regurgitation is present.    Mild tricuspid regurgitation. RVSP is 36 mmHg. Physiologic and/or trace pulmonic regurgitation present. No evidence for hemodynamically significant pericardial effusion. Signature      ----------------------------------------------------------------   Electronically signed by Valeri Lora DO(Interpreting   physician) on 02/03/2022 09:11 AM            Anesthesia Plan      MAC     ASA 4       Induction: intravenous. MIPS: Prophylactic antiemetics administered. Anesthetic plan and risks discussed with patient and child/children. Use of blood products discussed with patient and spouse whom. Plan discussed with CRNA.               [address] Masoud Soliman DO   2/6/2023

## 2023-02-06 NOTE — PROGRESS NOTES
Spoke with [employer], interrogation successful and unremarkable, no arrhythmias noted.  Report to follow

## 2023-02-06 NOTE — PLAN OF CARE
Problem: Chronic Conditions and Co-morbidities  Goal: Patient's chronic conditions and co-morbidity symptoms are monitored and maintained or improved  Flowsheets (Taken 2/5/2023 2223)  Care Plan - Patient's Chronic Conditions and Co-Morbidity Symptoms are Monitored and Maintained or Improved:   Monitor and assess patient's chronic conditions and comorbid symptoms for stability, deterioration, or improvement   Collaborate with multidisciplinary team to address chronic and comorbid conditions and prevent exacerbation or deterioration   Update acute care plan with appropriate goals if chronic or comorbid symptoms are exacerbated and prevent overall improvement and discharge

## 2023-02-06 NOTE — ANESTHESIA POSTPROCEDURE EVALUATION
Department of Anesthesiology  Postprocedure Note    Patient: Ghazal Lara  MRN: [id number]  YOB: 1931  Date of evaluation: 2/6/2023      Procedure Summary     Date: 02/06/23 Room / Location: [facility] / [facility]    Anesthesia Start: 1415 Anesthesia Stop:     Procedure: EGD BIOPSY Diagnosis:       Gastrointestinal hemorrhage, unspecified gastrointestinal hemorrhage type      (GIB)    Surgeons: Abdias Estes MD Responsible Provider: Donal Arias DO    Anesthesia Type: MAC ASA Status: 4          Anesthesia Type: No value filed.     Woody Phase I:      Woody Phase II:        Anesthesia Post Evaluation    Patient location during evaluation: PACU  Patient participation: complete - patient participated  Level of consciousness: awake  Airway patency: patent  Nausea & Vomiting: no nausea and no vomiting  Complications: no  Cardiovascular status: hemodynamically stable  Respiratory status: acceptable  Hydration status: euvolemic

## 2023-02-07 LAB
ALBUMIN SERPL-MCNC: 3.5 G/DL (ref 3.5–5.2)
ALP BLD-CCNC: 63 U/L (ref 40–129)
ALT SERPL-CCNC: 60 U/L (ref 0–40)
ANION GAP SERPL CALCULATED.3IONS-SCNC: 16 MMOL/L (ref 7–16)
AST SERPL-CCNC: 94 U/L (ref 0–39)
BASOPHILS ABSOLUTE: 0.04 E9/L (ref 0–0.2)
BASOPHILS RELATIVE PERCENT: 0.4 % (ref 0–2)
BILIRUB SERPL-MCNC: 0.7 MG/DL (ref 0–1.2)
BLOOD BANK DISPENSE STATUS: NORMAL
BLOOD BANK PRODUCT CODE: NORMAL
BPU ID: NORMAL
BUN BLDV-MCNC: 62 MG/DL (ref 6–23)
CALCIUM SERPL-MCNC: 8.9 MG/DL (ref 8.6–10.2)
CHLORIDE BLD-SCNC: 100 MMOL/L (ref 98–107)
CO2: 22 MMOL/L (ref 22–29)
CREAT SERPL-MCNC: 3.8 MG/DL (ref 0.7–1.2)
DESCRIPTION BLOOD BANK: NORMAL
EOSINOPHILS ABSOLUTE: 0.02 E9/L (ref 0.05–0.5)
EOSINOPHILS RELATIVE PERCENT: 0.2 % (ref 0–6)
GFR SERPL CREATININE-BSD FRML MDRD: 14 ML/MIN/1.73
GLUCOSE BLD-MCNC: 136 MG/DL (ref 74–99)
HCT VFR BLD CALC: 21.5 % (ref 37–54)
HCT VFR BLD CALC: 21.8 % (ref 37–54)
HCT VFR BLD CALC: 21.9 % (ref 37–54)
HCT VFR BLD CALC: 24.1 % (ref 37–54)
HEMOGLOBIN: 6.9 G/DL (ref 12.5–16.5)
HEMOGLOBIN: 7 G/DL (ref 12.5–16.5)
HEMOGLOBIN: 7 G/DL (ref 12.5–16.5)
HEMOGLOBIN: 8 G/DL (ref 12.5–16.5)
IMMATURE GRANULOCYTES #: 0.07 E9/L
IMMATURE GRANULOCYTES %: 0.6 % (ref 0–5)
LV EF: 33 %
LVEF MODALITY: NORMAL
LYMPHOCYTES ABSOLUTE: 3.65 E9/L (ref 1.5–4)
LYMPHOCYTES RELATIVE PERCENT: 32.2 % (ref 20–42)
MCH RBC QN AUTO: 28.2 PG (ref 26–35)
MCHC RBC AUTO-ENTMCNC: 32.6 % (ref 32–34.5)
MCV RBC AUTO: 86.7 FL (ref 80–99.9)
METER GLUCOSE: 154 MG/DL (ref 74–99)
MONOCYTES ABSOLUTE: 1.27 E9/L (ref 0.1–0.95)
MONOCYTES RELATIVE PERCENT: 11.2 % (ref 2–12)
NEUTROPHILS ABSOLUTE: 6.27 E9/L (ref 1.8–7.3)
NEUTROPHILS RELATIVE PERCENT: 55.4 % (ref 43–80)
PDW BLD-RTO: 16.8 FL (ref 11.5–15)
PLATELET # BLD: 122 E9/L (ref 130–450)
PMV BLD AUTO: 11 FL (ref 7–12)
POTASSIUM REFLEX MAGNESIUM: 5.2 MMOL/L (ref 3.5–5)
RBC # BLD: 2.48 E12/L (ref 3.8–5.8)
SODIUM BLD-SCNC: 138 MMOL/L (ref 132–146)
TOTAL PROTEIN: 6.1 G/DL (ref 6.4–8.3)
WBC # BLD: 11.3 E9/L (ref 4.5–11.5)

## 2023-02-07 PROCEDURE — 1200000000 HC SEMI PRIVATE

## 2023-02-07 PROCEDURE — 6370000000 HC RX 637 (ALT 250 FOR IP): Performed by: STUDENT IN AN ORGANIZED HEALTH CARE EDUCATION/TRAINING PROGRAM

## 2023-02-07 PROCEDURE — 36415 COLL VENOUS BLD VENIPUNCTURE: CPT

## 2023-02-07 PROCEDURE — 2580000003 HC RX 258: Performed by: STUDENT IN AN ORGANIZED HEALTH CARE EDUCATION/TRAINING PROGRAM

## 2023-02-07 PROCEDURE — 6360000004 HC RX CONTRAST MEDICATION: Performed by: NURSE PRACTITIONER

## 2023-02-07 PROCEDURE — 51702 INSERT TEMP BLADDER CATH: CPT

## 2023-02-07 PROCEDURE — 82962 GLUCOSE BLOOD TEST: CPT

## 2023-02-07 PROCEDURE — 2580000003 HC RX 258: Performed by: INTERNAL MEDICINE

## 2023-02-07 PROCEDURE — 36430 TRANSFUSION BLD/BLD COMPNT: CPT

## 2023-02-07 PROCEDURE — 85025 COMPLETE CBC W/AUTO DIFF WBC: CPT

## 2023-02-07 PROCEDURE — 85018 HEMOGLOBIN: CPT

## 2023-02-07 PROCEDURE — 6370000000 HC RX 637 (ALT 250 FOR IP): Performed by: NURSE PRACTITIONER

## 2023-02-07 PROCEDURE — 99233 SBSQ HOSP IP/OBS HIGH 50: CPT | Performed by: INTERNAL MEDICINE

## 2023-02-07 PROCEDURE — 93306 TTE W/DOPPLER COMPLETE: CPT

## 2023-02-07 PROCEDURE — 80053 COMPREHEN METABOLIC PANEL: CPT

## 2023-02-07 PROCEDURE — 85014 HEMATOCRIT: CPT

## 2023-02-07 RX ORDER — SODIUM CHLORIDE 9 MG/ML
INJECTION, SOLUTION INTRAVENOUS PRN
Status: DISCONTINUED | OUTPATIENT
Start: 2023-02-07 | End: 2023-02-13

## 2023-02-07 RX ORDER — LANOLIN ALCOHOL/MO/W.PET/CERES
6 CREAM (GRAM) TOPICAL NIGHTLY PRN
Status: DISCONTINUED | OUTPATIENT
Start: 2023-02-07 | End: 2023-02-14 | Stop reason: HOSPADM

## 2023-02-07 RX ORDER — PANTOPRAZOLE SODIUM 20 MG/1
20 TABLET, DELAYED RELEASE ORAL
Qty: 60 TABLET | Refills: 1 | Status: SHIPPED | OUTPATIENT
Start: 2023-02-07

## 2023-02-07 RX ORDER — SODIUM CHLORIDE 9 MG/ML
INJECTION, SOLUTION INTRAVENOUS PRN
Status: DISCONTINUED | OUTPATIENT
Start: 2023-02-07 | End: 2023-02-14 | Stop reason: HOSPADM

## 2023-02-07 RX ADMIN — PANTOPRAZOLE SODIUM 40 MG: 40 TABLET, DELAYED RELEASE ORAL at 06:36

## 2023-02-07 RX ADMIN — Medication 10 ML: at 08:47

## 2023-02-07 RX ADMIN — SODIUM CHLORIDE: 9 INJECTION, SOLUTION INTRAVENOUS at 09:56

## 2023-02-07 RX ADMIN — PANTOPRAZOLE SODIUM 40 MG: 40 TABLET, DELAYED RELEASE ORAL at 17:55

## 2023-02-07 RX ADMIN — PRAVASTATIN SODIUM 40 MG: 20 TABLET ORAL at 21:24

## 2023-02-07 RX ADMIN — PERFLUTREN 1.5 ML: 6.52 INJECTION, SUSPENSION INTRAVENOUS at 09:25

## 2023-02-07 RX ADMIN — Medication 6 MG: at 21:24

## 2023-02-07 NOTE — PLAN OF CARE
Problem: Discharge Planning  Goal: Discharge to home or other facility with appropriate resources  Outcome: Progressing     Problem: ABCDS Injury Assessment  Goal: Absence of physical injury  Outcome: Progressing     Problem: Safety - Adult  Goal: Free from fall injury  Outcome: Progressing     Problem: Chronic Conditions and Co-morbidities  Goal: Patient's chronic conditions and co-morbidity symptoms are monitored and maintained or improved  Outcome: Progressing     Problem: Skin/Tissue Integrity  Goal: Absence of new skin breakdown  Description: 1. Monitor for areas of redness and/or skin breakdown  2. Assess vascular access sites hourly  3. Every 4-6 hours minimum:  Change oxygen saturation probe site  4. Every 4-6 hours:  If on nasal continuous positive airway pressure, respiratory therapy assess nares and determine need for appliance change or resting period.   Outcome: Progressing

## 2023-02-07 NOTE — PROGRESS NOTES
GENERAL SURGERY  DAILY PROGRESS NOTE  2/7/2023    CHIEF COMPLAINT:  Chief Complaint   Patient presents with    Loss of Consciousness     Lost consciousness x 5 mins while on BSC at home, did not fall.       SUBJECTIVE:  No further episodes of bleeding from above or below. Tolerated diet well.     OBJECTIVE:  BP (!) 133/52   Pulse 60   Temp 97.8 °F (36.6 °C) (Oral)   Resp 16   Ht 5' 11\" (1.803 m)   Wt 170 lb 4.8 oz (77.2 kg)   SpO2 93%   BMI 23.75 kg/m²     GENERAL:  NAD. A&Ox3.  HEENT: pupils equal, conjunctiva pale  LUNGS:  on room air. No acute respiratory distress  CARDIOVASCULAR: hemodynamically stable  SKIN: warm and dry  ABDOMEN:  Soft, non-distended, nontender. No guarding, rigidity, rebound.  EXT: ROM intact all 4 extremities, no obvious deformities    ASSESSMENT/PLAN:  89 y.o. male with anemia and syncope     Plan  -continue PPI and monitor H&H  -peptic ulcer diet   -prn pain and nausea control  -no further plans for scopes this admission   -biopsies pending     Will DW Dr. Polo Cuello MD  Surgery Resident PGY-2  2/7/2023  7:59 AM     The patient was seen and examined and the chart was reviewed.  I agree with the assessment and plan.

## 2023-02-07 NOTE — PROGRESS NOTES
Physical Therapy  Facility/Department: [facility] MED SURG/TELE    Name: Isaak Azul  : 1931  MRN: [id number]  Date of Service: 2023    PT eval was attempted this am and pt was out of the room at [facility]. Will re-attempt PT eval another time. Nirali Bowling., P.T.   License Number: [id number]

## 2023-02-07 NOTE — PROGRESS NOTES
INPATIENT CARDIOLOGY FOLLOW-UP    Name: Neyda Mckoy    Age: 80 y.o. Date of Admission: 2/5/2023 11:05 AM    Date of Service: 2/7/2023    Primary Cardiologist: Dr. Troy Marquez    Chief Complaint: Follow-up for syncope, GI bleed    Interim History:  Feels well. Denies chest pain, shortness of breath, palpitations, recurrent syncope. Hemoglobin down to 7.   Creatinine up to 3    Review of Systems:   Negative except as described above    Problem List:  Patient Active Problem List   Diagnosis    CAD (coronary artery disease)    S/P CABG (coronary artery bypass graft)    DM (diabetes mellitus) ([facility] [address]    Hyperlipemia    HTN (hypertension)    A-fib (HCC)    BPH (benign prostatic hyperplasia)    Diabetic neuropathy ([facility] [address]    Long term current use of anticoagulant therapy    Aortic valve stenosis    Chronic kidney disease    Heart valve disease    Gout    Mild protein-calorie malnutrition (HCC)    Complete AV block ([facility] [address]    GI bleed       Current Medications:    Current Facility-Administered Medications:     0.9 % sodium chloride infusion, , IntraVENous, Continuous, Miguel Eckert DO, Last Rate: 50 mL/hr at 02/06/23 1214, New Bag at 02/06/23 1214    pantoprazole (PROTONIX) tablet 40 mg, 40 mg, Oral, BID AC, Christian Prasad DO, 40 mg at 02/07/23 0636    sodium chloride flush 0.9 % injection 10 mL, 10 mL, IntraVENous, 2 times per day, Az Henson MD, 10 mL at 02/07/23 0847    sodium chloride flush 0.9 % injection 10 mL, 10 mL, IntraVENous, PRN, Az Henson MD    0.9 % sodium chloride infusion, , IntraVENous, PRN, Az Henson MD    senna (SENOKOT) tablet 8.6 mg, 1 tablet, Oral, Daily PRN, Az Henson MD    acetaminophen (TYLENOL) tablet 650 mg, 650 mg, Oral, Q6H PRN **OR** acetaminophen (TYLENOL) suppository 650 mg, 650 mg, Rectal, Q6H PRN, Az Henson MD    [facility] AT [place] by provider] carvedilol (COREG) tablet 12.5 mg, 12.5 mg, Oral, BID, Az Henson MD    [facility] [place] by provider] doxazosin (CARDURA) tablet 4 mg, 4 mg, Oral, Daily, Shahida Aly MD, 4 mg at 02/05/23 1841    pravastatin (PRAVACHOL) tablet 40 mg, 40 mg, Oral, Nightly, Shahida Aly MD, 40 mg at 02/06/23 2106    [Held by provider] torsemide (DEMADEX) tablet 20 mg, 20 mg, Oral, Daily, Shahida Aly MD    Physical Exam:  BP (!) 164/71   Pulse 60   Temp 97.7 °F (36.5 °C) (Oral)   Resp 18   Ht 5' 11\" (1.803 m)   Wt 170 lb 4.8 oz (77.2 kg)   SpO2 93%   BMI 23.75 kg/m²   Wt Readings from Last 3 Encounters:   02/06/23 170 lb 4.8 oz (77.2 kg)   01/13/23 169 lb (76.7 kg)   04/29/22 159 lb 1.6 oz (72.2 kg)     Appearance: Somewhat frail-appearing elderly gentleman, awake, alert, no acute respiratory distress  Skin: Intact, no rash  Head: Normocephalic, atraumatic  Eyes: EOMI, no conjunctival erythema  ENMT: No pharyngeal erythema, MMM, no rhinorrhea  Neck: Supple, no elevated JVP, no carotid bruits  Lungs: Clear to auscultation bilaterally. No wheezes, rales, or rhonchi. Cardiac: PMI nondisplaced, irregular rhythm with a normal rate, S1 & S2 normal, systolic  Abdomen: Soft, nontender, +bowel sounds  Extremities: Moves all extremities x 4, no lower extremity edema  Neurologic: No focal motor deficits apparent, normal mood and affect  Peripheral Pulses: Intact posterior tibial pulses bilaterally    Intake/Output:    Intake/Output Summary (Last 24 hours) at 2/7/2023 0956  Last data filed at 2/6/2023 1424  Gross per 24 hour   Intake 477.5 ml   Output --   Net 477.5 ml     No intake/output data recorded.     Laboratory Tests:  Recent Labs     02/05/23  1131 02/06/23  0212 02/07/23  0639    140 138   K 4.8 5.0 5.2*    101 100   CO2 26 22 22   BUN 38* 46* 62*   CREATININE 2.3* 2.8* 3.8*   GLUCOSE 243* 158* 136*   CALCIUM 9.2 9.1 8.9     Lab Results   Component Value Date/Time    MG 1.6 02/05/2023 11:31 AM     Recent Labs     02/06/23  0212 02/07/23  0639   ALKPHOS 58 63   ALT 36 60*   AST 49* 94*   PROT 5.5* 6.1*   BILITOT 0.5 0.7   LABALBU 3.1* 3.5     Recent Labs 23  1131 23  2145 23  0300 23  1207 23  1750 23  0027 23  0639   WBC 7.7  --  9.4  --   --   --  11.3   RBC 2.56*  --  2.01*  --   --   --  2.48*   HGB 7.3*  --  5.8*   < > 8.0* 7.0* 7.0*   HCT 23.6*   < > 18.4*   < > 24.5* 21.8* 21.5*   MCV 92.2  --  91.5  --   --   --  86.7   MCH 28.5  --  28.9  --   --   --  28.2   MCHC 30.9*  --  31.5*  --   --   --  32.6   RDW 15.9*  --  16.1*  --   --   --  16.8*     --  126*  --   --   --  122*   MPV 11.9  --  10.9  --   --   --  11.0    < > = values in this interval not displayed.      No results found for: CKTOTAL, CKMB, CKMBINDEX, TROPONINI  Lab Results   Component Value Date    INR 1.6 2022    INR 2.4 2022    INR 2.5 2020    PROTIME 17.7 (H) 2022    PROTIME 26.4 (H) 2022    PROTIME 29.8 2020     No results found for: TSHFT4, TSH  Lab Results   Component Value Date    LABA1C 6.1 (H) 2023     No results found for: EAG  Lab Results   Component Value Date    CHOL 156 10/03/2018    CHOL 171 2018    CHOL 150 2017     Lab Results   Component Value Date    TRIG 182 2020    TRIG 218 2020    TRIG 122 10/30/2019     Lab Results   Component Value Date     (A) 2020    HDL 40 2020    HDL 36 10/30/2019     Lab Results   Component Value Date    LDLCALC 96 10/03/2018    LDLCALC 96 2018    LDLCALC 70 2017    LDLCHOLESTEROL 122 2020    LDLCHOLESTEROL 142 2020    LDLCHOLESTEROL 108 10/30/2019     Lab Results   Component Value Date    VLDL 145 2020    VLDL 185 2020    VLDL 132 10/30/2019     Lab Results   Component Value Date    CHOLHDLRATIO 3.3 2020    CHOLHDLRATIO 3.5 2020    CHOLHDLRATIO 3.0 10/30/2019     Recent Labs     23  1131   PROBNP 31,022*       Cardiac Tests:    EK2023: V-paced 68 bpm    Telemetry: Atrial fibrillation, ventricular paced 60 bpm    Chest X-ray:   23    Impression 1. Airspace disease within the retrocardiac region   2. Small left pleural effusion   3. Trace right pleural effusion     Echocardiogram:   TTE 3/1/22 [place]   Summary   Ejection fraction is visually estimated at 30-35%. Dilated right ventricle with normal right ventricular function (TAPSE 1.9   cm). Indeterminate diastolic function. Severely dilated left atrium by volume index. Dilated right atrium. Moderate mitral regurgitation. Hx of TAVR with a 34 mm Evolut Pro+ bioprosthetic valve on 2/2/2022. Mild paravalvular aortic regurgitation. AV peak velocity 1.8 m/s. AV mean gradient 6 mmHg. Aortic valve area 2.0 cm2. Mild tricuspid regurgitation. PASP is estimated at 67 mmHg. Left-sided pleural effusion. Stress test:      Cardiac catheterization:     Device interrogation 2/6/2023  Greater than 8 years battery life remaining  VVIR  99.1% ventricular pacing  0.9% ventricular sensing  Normal device function    ----------------------------------------------------------------------------------------------------------------------------------------------------------------  IMPRESSION:  Syncope likely due to severe anemia  CAD/CABG 2009 LIMA-LAD, V-OM1, V-RPDA, V-RPL.   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  proBNP 31,000  Ischemic cardiomyopathy EF 30-35%  Acute GI bleed, EGD with gastritis no active bleeding  Acute blood loss anemia Hgb 5.8 -> 7.0 posttransfusion  ROSEANNE/CKD creatinine 2.3 -> 3.8 baseline 1.5-1.8  Mild thrombocytopenia 120s  Hypertension  Type 2 diabetes    RECOMMENDATIONS:    Remain off apixaban  Device interrogation reviewed, normal device function no arrhythmias  Check orthostatics  Echo pending  Optimize GDMT  Resume carvedilol if not orthostatic  No ACE/ARB/ARNI/MRA/SGLT2i due to renal failure  Resume hydralazine/nitrate combination if blood pressure tolerates  Hold diuretics; cautious use of fluids to avoid precipitating decompensated heart failure  Consider nephrology evaluation  Recommend discontinuation of doxazosin given HFrEF; if used for BPH consider tamsulosin as alternative  Further care per primary service and consultants    Erich Huynh MD, [address] [place] [address] Cardiology    NOTE: This report was transcribed using voice recognition software. Every effort was made to ensure accuracy; however, inadvertent computerized transcription errors may be present.

## 2023-02-07 NOTE — PROGRESS NOTES
Internal Medicine Progress Note    Patient's name: Lillian Najera  : 1931  Chief complaints (on day of admission): Loss of Consciousness (Lost consciousness x 5 mins while on BSC at home, did not fall.)  Admission date: 2023  Date of service: 2023   Room: [address] MED SURG/TELE  Primary care physician: Claude Irizarry MD  Reason for visit: Follow-up for LOC    Subjective  Jose Felton was seen and examined at bedside     Doing well today   Kidney function worsening   Will request renal consultation   No other issues at this time     Review of Systems  There are no new complaints of chest pain, shortness of breath, abdominal pain, nausea, vomiting, diarrhea, constipation unless otherwise mentioned above.      Hospital Medications  Current Facility-Administered Medications   Medication Dose Route Frequency Provider Last Rate Last Admin    0.9 % sodium chloride infusion   IntraVENous Continuous Miguel Eckert, DO 50 mL/hr at 23 1214 New Bag at 23 1214    pantoprazole (PROTONIX) tablet 40 mg  40 mg Oral BID AC Christian Prasad, DO   40 mg at 23 0004    perflutren lipid microspheres (DEFINITY) injection 1.5 mL  1.5 mL IntraVENous ONCE PRN EDMUNDO Patel - CNP        sodium chloride flush 0.9 % injection 10 mL  10 mL IntraVENous 2 times per day Laury Homans, MD   10 mL at 23 0847    sodium chloride flush 0.9 % injection 10 mL  10 mL IntraVENous PRN Laury Homans, MD        0.9 % sodium chloride infusion   IntraVENous PRN Laury Homans, MD        senna (SENOKOT) tablet 8.6 mg  1 tablet Oral Daily PRN Laury Homans, MD        acetaminophen (TYLENOL) tablet 650 mg  650 mg Oral Q6H PRN Laury Homans, MD        Or    acetaminophen (TYLENOL) suppository 650 mg  650 mg Rectal Q6H PRN Laury Homans, MD        [facility] AT [place] by provider] carvedilol (COREG) tablet 12.5 mg  12.5 mg Oral BID Laury Homans, MD        [facility] by provider] doxazosin (CARDURA) tablet 4 mg  4 mg Oral Daily Laury Homans, MD   4 mg at 02/05/23 1841    pravastatin (PRAVACHOL) tablet 40 mg  40 mg Oral Nightly Nohelia Thacker MD   40 mg at 02/06/23 2106    [Held by provider] torsemide (DEMADEX) tablet 20 mg  20 mg Oral Daily Nohelia Thacker MD           PRN Medications  perflutren lipid microspheres, sodium chloride flush, sodium chloride, senna, acetaminophen **OR** acetaminophen    Objective  Most Recent Recorded Vitals  BP (!) 133/52   Pulse 60   Temp 97.8 °F (36.6 °C) (Oral)   Resp 16   Ht 5' 11\" (1.803 m)   Wt 170 lb 4.8 oz (77.2 kg)   SpO2 93%   BMI 23.75 kg/m²   I/O last 3 completed shifts: In: 1621.2 [I.V.:822.4; Blood:698.8; IV Piggyback:100]  Out: 150 [Urine:150]  No intake/output data recorded. Physical Exam:  General: AAO to person/place/time/purpose, NAD, no labored breathing  Eyes: conjunctivae/corneas clear, sclera non icteric  Skin: color/texture/turgor normal, no rashes or lesions  Lungs: CTAB, no retractions/use of accessory muscles, no vocal fremitus, no rhonchi, no crackle, no rales  Heart: regular rate, regular rhythm, no murmur  Abdomen: soft, NT, bowel sounds normal  Extremities: atraumatic, no edema  Neurologic: cranial nerves 2-12 grossly intact, no slurred speech    Most Recent Labs  Lab Results   Component Value Date    WBC 11.3 02/07/2023    HGB 7.0 (L) 02/07/2023    HCT 21.5 (L) 02/07/2023     (L) 02/07/2023     02/07/2023    K 5.2 (H) 02/07/2023     02/07/2023    CREATININE 3.8 (H) 02/07/2023    BUN 62 (H) 02/07/2023    CO2 22 02/07/2023    GLUCOSE 136 (H) 02/07/2023    ALT 60 (H) 02/07/2023    AST 94 (H) 02/07/2023    INR 1.6 02/01/2022    LABA1C 6.1 (H) 02/05/2023    LABMICR <12.0 05/18/2022       CT Head W/O Contrast   Final Result   1. There is no acute intracranial abnormality. Specifically, there is no   intracranial hemorrhage. 2. Atrophy and periventricular leukomalacia,         XR CHEST PORTABLE   Final Result   1. Airspace disease within the retrocardiac region   2.  Small left pleural effusion   3. Trace right pleural effusion             Echocardiogram       Assessment   Active Hospital Problems    Diagnosis     GI bleed [K92.2]      Priority: Medium    Mild protein-calorie malnutrition ([facility] [address] [E44.1]     Gout [M10.9]     Chronic kidney disease [N18.9]     Aortic valve stenosis [I35.0]     Heart valve disease [I38]     Long term current use of anticoagulant therapy [Z79.01]     CAD (coronary artery disease) [I25.10]     S/P CABG (coronary artery bypass graft) [Z95.1]     DM (diabetes mellitus) ([facility]) [E11.9]     HTN (hypertension) [I10]     Hyperlipemia [E78.5]     A-fib (HCC) [I48.91]     Diabetic neuropathy ([facility]) [E11.40]     BPH (benign prostatic hyperplasia) [N40.0]          Plan  GI bleed with acute blood loss anemia:   General surgery following-- plans for EGD 2/6 hiatal hernia, relux changes possible barrets esophagus and mild gastritis with a normal duodenum   PPI  Follow H&H--pRBC if Hb < 7  Check Fe/Ferritin/TIBC/Vitamin B12/Folate  Hold anticoagulation (h/o afib)    CKD3: Follow BMP  Baseline sCr ~ 1.8 to 2.0  Monitor Cr   Avoid nephrotoxins   Worsening   Nephrology consultation     Hypotension and syncope related to GIB:  Gentle IVF  Hold Coreg, Cardura, Demadex  Noted BNP elevation   Cardio on board     DVT prophylaxis   Code status Full  Medications, labs and imaging reviewed   Discharge plan: TBD pending clinical improvement     Electronically signed by Bobo Vo MD on 2/7/2023 at 8:49 AM    I can be reached through [facility].

## 2023-02-08 ENCOUNTER — APPOINTMENT (OUTPATIENT)
Dept: ULTRASOUND IMAGING | Age: 88
DRG: 377 | End: 2023-02-08
Payer: MEDICARE

## 2023-02-08 LAB
ALBUMIN SERPL-MCNC: 3.6 G/DL (ref 3.5–5.2)
ALP BLD-CCNC: 77 U/L (ref 40–129)
ALT SERPL-CCNC: 61 U/L (ref 0–40)
ANION GAP SERPL CALCULATED.3IONS-SCNC: 15 MMOL/L (ref 7–16)
AST SERPL-CCNC: 86 U/L (ref 0–39)
BASOPHILS ABSOLUTE: 0.04 E9/L (ref 0–0.2)
BASOPHILS RELATIVE PERCENT: 0.4 % (ref 0–2)
BILIRUB SERPL-MCNC: 0.9 MG/DL (ref 0–1.2)
BUN BLDV-MCNC: 66 MG/DL (ref 6–23)
CALCIUM SERPL-MCNC: 8.9 MG/DL (ref 8.6–10.2)
CHLORIDE BLD-SCNC: 102 MMOL/L (ref 98–107)
CHLORIDE URINE RANDOM: <20 MMOL/L
CO2: 21 MMOL/L (ref 22–29)
CREAT SERPL-MCNC: 3.9 MG/DL (ref 0.7–1.2)
CREATININE URINE: 116 MG/DL (ref 40–278)
EOSINOPHILS ABSOLUTE: 0.07 E9/L (ref 0.05–0.5)
EOSINOPHILS RELATIVE PERCENT: 0.6 % (ref 0–6)
FERRITIN: 97 NG/ML
GFR SERPL CREATININE-BSD FRML MDRD: 14 ML/MIN/1.73
GLUCOSE BLD-MCNC: 141 MG/DL (ref 74–99)
HCT VFR BLD CALC: 23.9 % (ref 37–54)
HCT VFR BLD CALC: 24.8 % (ref 37–54)
HCT VFR BLD CALC: 25.9 % (ref 37–54)
HEMOGLOBIN: 7.9 G/DL (ref 12.5–16.5)
HEMOGLOBIN: 8 G/DL (ref 12.5–16.5)
HEMOGLOBIN: 8.2 G/DL (ref 12.5–16.5)
IMMATURE GRANULOCYTES #: 0.06 E9/L
IMMATURE GRANULOCYTES %: 0.6 % (ref 0–5)
IRON SATURATION: 11 % (ref 20–55)
IRON: 32 MCG/DL (ref 59–158)
LYMPHOCYTES ABSOLUTE: 2.49 E9/L (ref 1.5–4)
LYMPHOCYTES RELATIVE PERCENT: 22.9 % (ref 20–42)
MAGNESIUM: 2.4 MG/DL (ref 1.6–2.6)
MCH RBC QN AUTO: 28.8 PG (ref 26–35)
MCHC RBC AUTO-ENTMCNC: 33.1 % (ref 32–34.5)
MCV RBC AUTO: 87.2 FL (ref 80–99.9)
MONOCYTES ABSOLUTE: 1.34 E9/L (ref 0.1–0.95)
MONOCYTES RELATIVE PERCENT: 12.4 % (ref 2–12)
NEUTROPHILS ABSOLUTE: 6.85 E9/L (ref 1.8–7.3)
NEUTROPHILS RELATIVE PERCENT: 63.1 % (ref 43–80)
OSMOLALITY URINE: 394 MOSM/KG (ref 300–900)
PDW BLD-RTO: 16.3 FL (ref 11.5–15)
PHOSPHORUS: 4.7 MG/DL (ref 2.5–4.5)
PLATELET # BLD: 128 E9/L (ref 130–450)
PMV BLD AUTO: 11.2 FL (ref 7–12)
POTASSIUM REFLEX MAGNESIUM: 4.9 MMOL/L (ref 3.5–5)
PROTEIN PROTEIN: 166 MG/DL (ref 0–12)
PROTEIN/CREAT RATIO: 1.4
PROTEIN/CREAT RATIO: 1.4 (ref 0–0.2)
RBC # BLD: 2.74 E12/L (ref 3.8–5.8)
SODIUM BLD-SCNC: 138 MMOL/L (ref 132–146)
SODIUM URINE: 33 MMOL/L
TOTAL IRON BINDING CAPACITY: 297 MCG/DL (ref 250–450)
TOTAL PROTEIN: 6.6 G/DL (ref 6.4–8.3)
URIC ACID, SERUM: 11.4 MG/DL (ref 3.4–7)
WBC # BLD: 10.9 E9/L (ref 4.5–11.5)

## 2023-02-08 PROCEDURE — 84156 ASSAY OF PROTEIN URINE: CPT

## 2023-02-08 PROCEDURE — 36415 COLL VENOUS BLD VENIPUNCTURE: CPT

## 2023-02-08 PROCEDURE — 6370000000 HC RX 637 (ALT 250 FOR IP): Performed by: STUDENT IN AN ORGANIZED HEALTH CARE EDUCATION/TRAINING PROGRAM

## 2023-02-08 PROCEDURE — 84300 ASSAY OF URINE SODIUM: CPT

## 2023-02-08 PROCEDURE — 82728 ASSAY OF FERRITIN: CPT

## 2023-02-08 PROCEDURE — 6370000000 HC RX 637 (ALT 250 FOR IP): Performed by: NURSE PRACTITIONER

## 2023-02-08 PROCEDURE — 97530 THERAPEUTIC ACTIVITIES: CPT

## 2023-02-08 PROCEDURE — 84100 ASSAY OF PHOSPHORUS: CPT

## 2023-02-08 PROCEDURE — 85014 HEMATOCRIT: CPT

## 2023-02-08 PROCEDURE — 82570 ASSAY OF URINE CREATININE: CPT

## 2023-02-08 PROCEDURE — 76770 US EXAM ABDO BACK WALL COMP: CPT

## 2023-02-08 PROCEDURE — 85018 HEMOGLOBIN: CPT

## 2023-02-08 PROCEDURE — 6370000000 HC RX 637 (ALT 250 FOR IP): Performed by: INTERNAL MEDICINE

## 2023-02-08 PROCEDURE — 87077 CULTURE AEROBIC IDENTIFY: CPT

## 2023-02-08 PROCEDURE — 80053 COMPREHEN METABOLIC PANEL: CPT

## 2023-02-08 PROCEDURE — 84550 ASSAY OF BLOOD/URIC ACID: CPT

## 2023-02-08 PROCEDURE — 97161 PT EVAL LOW COMPLEX 20 MIN: CPT

## 2023-02-08 PROCEDURE — 83935 ASSAY OF URINE OSMOLALITY: CPT

## 2023-02-08 PROCEDURE — 2580000003 HC RX 258: Performed by: INTERNAL MEDICINE

## 2023-02-08 PROCEDURE — 83735 ASSAY OF MAGNESIUM: CPT

## 2023-02-08 PROCEDURE — 2580000003 HC RX 258: Performed by: STUDENT IN AN ORGANIZED HEALTH CARE EDUCATION/TRAINING PROGRAM

## 2023-02-08 PROCEDURE — 82436 ASSAY OF URINE CHLORIDE: CPT

## 2023-02-08 PROCEDURE — 87088 URINE BACTERIA CULTURE: CPT

## 2023-02-08 PROCEDURE — 97165 OT EVAL LOW COMPLEX 30 MIN: CPT

## 2023-02-08 PROCEDURE — 83550 IRON BINDING TEST: CPT

## 2023-02-08 PROCEDURE — 1200000000 HC SEMI PRIVATE

## 2023-02-08 PROCEDURE — 87186 SC STD MICRODIL/AGAR DIL: CPT

## 2023-02-08 PROCEDURE — 83540 ASSAY OF IRON: CPT

## 2023-02-08 PROCEDURE — 85025 COMPLETE CBC W/AUTO DIFF WBC: CPT

## 2023-02-08 PROCEDURE — 99233 SBSQ HOSP IP/OBS HIGH 50: CPT | Performed by: INTERNAL MEDICINE

## 2023-02-08 RX ORDER — ISOSORBIDE DINITRATE 10 MG/1
10 TABLET ORAL 3 TIMES DAILY
Status: DISCONTINUED | OUTPATIENT
Start: 2023-02-08 | End: 2023-02-14 | Stop reason: HOSPADM

## 2023-02-08 RX ORDER — CARVEDILOL 25 MG/1
25 TABLET ORAL 2 TIMES DAILY
Status: DISCONTINUED | OUTPATIENT
Start: 2023-02-08 | End: 2023-02-14 | Stop reason: HOSPADM

## 2023-02-08 RX ORDER — HYDRALAZINE HYDROCHLORIDE 25 MG/1
25 TABLET, FILM COATED ORAL EVERY 8 HOURS SCHEDULED
Status: DISCONTINUED | OUTPATIENT
Start: 2023-02-08 | End: 2023-02-08

## 2023-02-08 RX ORDER — SODIUM CHLORIDE 9 MG/ML
INJECTION, SOLUTION INTRAVENOUS CONTINUOUS
Status: DISCONTINUED | OUTPATIENT
Start: 2023-02-08 | End: 2023-02-10

## 2023-02-08 RX ORDER — HYDRALAZINE HYDROCHLORIDE 20 MG/ML
10 INJECTION INTRAMUSCULAR; INTRAVENOUS EVERY 4 HOURS PRN
Status: DISCONTINUED | OUTPATIENT
Start: 2023-02-08 | End: 2023-02-14 | Stop reason: HOSPADM

## 2023-02-08 RX ORDER — HYDRALAZINE HYDROCHLORIDE 50 MG/1
50 TABLET, FILM COATED ORAL EVERY 8 HOURS SCHEDULED
Status: DISCONTINUED | OUTPATIENT
Start: 2023-02-08 | End: 2023-02-14 | Stop reason: HOSPADM

## 2023-02-08 RX ADMIN — SENNOSIDES 8.6 MG: 8.6 TABLET, COATED ORAL at 06:21

## 2023-02-08 RX ADMIN — PANTOPRAZOLE SODIUM 40 MG: 40 TABLET, DELAYED RELEASE ORAL at 06:19

## 2023-02-08 RX ADMIN — Medication 10 ML: at 08:17

## 2023-02-08 RX ADMIN — ISOSORBIDE DINITRATE 10 MG: 10 TABLET ORAL at 21:10

## 2023-02-08 RX ADMIN — ISOSORBIDE DINITRATE 10 MG: 10 TABLET ORAL at 10:43

## 2023-02-08 RX ADMIN — CARVEDILOL 25 MG: 25 TABLET, FILM COATED ORAL at 17:44

## 2023-02-08 RX ADMIN — HYDRALAZINE HYDROCHLORIDE 50 MG: 50 TABLET, FILM COATED ORAL at 21:10

## 2023-02-08 RX ADMIN — SODIUM CHLORIDE: 9 INJECTION, SOLUTION INTRAVENOUS at 21:14

## 2023-02-08 RX ADMIN — HYDRALAZINE HYDROCHLORIDE 25 MG: 25 TABLET, FILM COATED ORAL at 15:04

## 2023-02-08 RX ADMIN — ISOSORBIDE DINITRATE 10 MG: 10 TABLET ORAL at 15:04

## 2023-02-08 RX ADMIN — Medication 6 MG: at 21:10

## 2023-02-08 RX ADMIN — SODIUM CHLORIDE: 9 INJECTION, SOLUTION INTRAVENOUS at 10:44

## 2023-02-08 RX ADMIN — PANTOPRAZOLE SODIUM 40 MG: 40 TABLET, DELAYED RELEASE ORAL at 17:44

## 2023-02-08 NOTE — PROGRESS NOTES
Internal Medicine Progress Note    Patient's name: Carol Vann  : 1931  Chief complaints (on day of admission): Loss of Consciousness (Lost consciousness x 5 mins while on BSC at home, did not fall.)  Admission date: 2023  Date of service: 2023   Room: [address] MED SURG/TELE  Primary care physician: Yahir Santa MD  Reason for visit: Follow-up for LOC    Subjective  Hari Drake was seen and examined at bedside     Doing well today   Has no new issues other than his IV keeps beeping   Advised him to keep his arm as straight as much as possible   Waiting for nephro opinion today  DW nursing staff   He is anxious to leave the hospital     Review of Systems  There are no new complaints of chest pain, shortness of breath, abdominal pain, nausea, vomiting, diarrhea, constipation unless otherwise mentioned above.      Hospital Medications  Current Facility-Administered Medications   Medication Dose Route Frequency Provider Last Rate Last Admin    0.9 % sodium chloride infusion   IntraVENous PRN Joselin Gold MD        0.9 % sodium chloride infusion   IntraVENous PRN Joselin Gold MD        melatonin tablet 6 mg  6 mg Oral Nightly PRN Caesar Fruit, APRN - CNP   6 mg at 23 2124    0.9 % sodium chloride infusion   IntraVENous Continuous Miguel Eckert, DO   Stopped at 23 0216    pantoprazole (PROTONIX) tablet 40 mg  40 mg Oral BID FELIZ Prasad DO   40 mg at 23 205    sodium chloride flush 0.9 % injection 10 mL  10 mL IntraVENous 2 times per day Joselin Gold MD   10 mL at 23 0817    sodium chloride flush 0.9 % injection 10 mL  10 mL IntraVENous PRN Joselin Gold MD        0.9 % sodium chloride infusion   IntraVENous PRN Joselin Gold MD        [facility]) tablet 8.6 mg  1 tablet Oral Daily PRN Joselin Gold MD   8.6 mg at 23 3809    acetaminophen (TYLENOL) tablet 650 mg  650 mg Oral Q6H PRN Joselin Gold MD        Or    acetaminophen (TYLENOL) suppository 650 mg  650 mg Rectal Q6H PRN Franklyn Jalloh MD        [facility] AT [place] by provider] carvedilol (COREG) tablet 12.5 mg  12.5 mg Oral BID Franklyn Jalloh MD        [facility] AT [place] by provider] doxazosin (CARDURA) tablet 4 mg  4 mg Oral Daily Franklyn Jalloh MD   4 mg at 02/05/23 1841    pravastatin (PRAVACHOL) tablet 40 mg  40 mg Oral Nightly Franklyn Jalloh MD   40 mg at 02/07/23 2124    [Held by provider] torsemide (DEMADEX) tablet 20 mg  20 mg Oral Daily Franklyn Jalloh MD           PRN Medications  sodium chloride, sodium chloride, melatonin, sodium chloride flush, sodium chloride, senna, acetaminophen **OR** acetaminophen    Objective  Most Recent Recorded Vitals  BP (!) 198/84   Pulse 59   Temp 98.3 °F (36.8 °C) (Oral)   Resp 20   Ht 5' 11\" (1.803 m)   Wt 169 lb 1.6 oz (76.7 kg)   SpO2 92%   BMI 23.58 kg/m²   I/O last 3 completed shifts: In: 807.5 [P.O.:420; Blood:387.5]  Out: 300 [Urine:300]  No intake/output data recorded. Physical Exam:  General: AAO to person/place/time/purpose, NAD, no labored breathing  Eyes: conjunctivae/corneas clear, sclera non icteric  Skin: color/texture/turgor normal, no rashes or lesions  Lungs: CTAB, no retractions/use of accessory muscles, no vocal fremitus, no rhonchi, no crackle, no rales  Heart: regular rate, regular rhythm, no murmur  Abdomen: soft, NT, bowel sounds normal  Extremities: atraumatic, no edema  Neurologic: cranial nerves 2-12 grossly intact, no slurred speech    Most Recent Labs  Lab Results   Component Value Date    WBC 10.9 02/08/2023    HGB 7.9 (L) 02/08/2023    HCT 23.9 (L) 02/08/2023     (L) 02/08/2023     02/08/2023    K 4.9 02/08/2023     02/08/2023    CREATININE 3.9 (H) 02/08/2023    BUN 66 (H) 02/08/2023    CO2 21 (L) 02/08/2023    GLUCOSE 141 (H) 02/08/2023    ALT 61 (H) 02/08/2023    AST 86 (H) 02/08/2023    INR 1.6 02/01/2022    LABA1C 6.1 (H) 02/05/2023    LABMICR <12.0 05/18/2022       CT Head W/O Contrast   Final Result   1.  There is no acute intracranial abnormality. Specifically, there is no   intracranial hemorrhage. 2. Atrophy and periventricular leukomalacia,         XR CHEST PORTABLE   Final Result   1. Airspace disease within the retrocardiac region   2. Small left pleural effusion   3. Trace right pleural effusion             Echocardiogram       Assessment   Active Hospital Problems    Diagnosis     GI bleed [K92.2]      Priority: Medium    Mild protein-calorie malnutrition ([facility] [address] [E44.1]     Gout [M10.9]     Chronic kidney disease [N18.9]     Aortic valve stenosis [I35.0]     Heart valve disease [I38]     Long term current use of anticoagulant therapy [Z79.01]     CAD (coronary artery disease) [I25.10]     S/P CABG (coronary artery bypass graft) [Z95.1]     DM (diabetes mellitus) ([facility]) [E11.9]     HTN (hypertension) [I10]     Hyperlipemia [E78.5]     A-fib (HCC) [I48.91]     Diabetic neuropathy ([facility]) [E11.40]     BPH (benign prostatic hyperplasia) [N40.0]          Plan  GI bleed with acute blood loss anemia:   General surgery following-- plans for EGD 2/6 hiatal hernia, relux changes possible barrets esophagus and mild gastritis with a normal duodenum   PPI  Follow H&H--pRBC if Hb < 7  Check Fe/Ferritin/TIBC/Vitamin B12/Folate  Hold anticoagulation (h/o afib)    ROSEANNE on CKD3: Follow BMP  Baseline sCr ~ 1.8 to 2.0  Monitor Cr   Avoid nephrotoxins   Worsening to 3.8  Renal US  Nephrology consultation     Hypotension and syncope related to GIB:  Gentle IVF  Hold Coreg, Cardura, Demadex  Noted BNP elevation   Cardio on board   Resume anti htn rx today his blood pressure is now vastly un controlled     Transaminitis   Hold statin   Monitor lfts     DVT prophylaxis   Code status Full  Medications, labs and imaging reviewed   Discharge plan: TBD pending clinical improvement     Electronically signed by Patito Arcos MD on 2/8/2023 at 8:25 AM    I can be reached through [facility].

## 2023-02-08 NOTE — PLAN OF CARE
Problem: ABCDS Injury Assessment  Goal: Absence of physical injury  Outcome: Progressing     Problem: Safety - Adult  Goal: Free from fall injury  Outcome: Progressing     Problem: Chronic Conditions and Co-morbidities  Goal: Patient's chronic conditions and co-morbidity symptoms are monitored and maintained or improved  Outcome: Progressing  Flowsheets (Taken 2/7/2023 2000)  Care Plan - Patient's Chronic Conditions and Co-Morbidity Symptoms are Monitored and Maintained or Improved: Monitor and assess patient's chronic conditions and comorbid symptoms for stability, deterioration, or improvement     Problem: Safety - Adult  Goal: Free from fall injury  Outcome: Progressing     Problem: Skin/Tissue Integrity  Goal: Absence of new skin breakdown  Description: 1. Monitor for areas of redness and/or skin breakdown  2. Assess vascular access sites hourly  3. Every 4-6 hours minimum:  Change oxygen saturation probe site  4. Every 4-6 hours:  If on nasal continuous positive airway pressure, respiratory therapy assess nares and determine need for appliance change or resting period.   Outcome: Progressing

## 2023-02-08 NOTE — PROGRESS NOTES
Occupational Therapy    OCCUPATIONAL THERAPY INITIAL EVALUATION     [address] [place], [place]         [id number]                                                  Patient Name: Loni Peacock    MRN: [id number]    : 1931    Room: [address] [place] [address]      Evaluating OT: Rose Marie Bond OTR/L   [id number]      Referring Mamie De La Cruz MD    Specific Provider Orders/Date:OT eval and treat 2023      Diagnosis:  GI bleed [K92.2]     Pertinent Medical History: AVR 2022, pacemaker, neuropathy     Precautions:  Fall Risk, alarm      Assessment of current deficits    [x] Functional mobility  [x]ADLs  [x] Strength               []Cognition    [x] Functional transfers   [x] IADLs         [x] Safety Awareness   [x]Endurance    [] Fine Coordination              [x] Balance      [] Vision/perception   []Sensation     []Gross Motor Coordination  [] ROM  [] Delirium                   [] Motor Control     OT PLAN OF CARE   OT POC based on physician orders, patient diagnosis and results of clinical assessment    Frequency/Duration  2-4 days/wk for 2 weeks PRN   Specific OT Treatment Interventions to include:   ADL retraining/adapted techniques and AE recommendations to increase functional independence within precautions                    Energy conservation techniques to improve tolerance for selfcare routine   Functional transfer/mobility training/DME recommendations for increased independence, safety and fall prevention         Patient/family education to increase safety and functional independence             Environmental modifications for safe mobility and completion of ADLs                             Therapeutic activity to improve functional performance during ADLs.                                          Therapeutic exercise to improve tolerance and functional strength for ADLs    Balance retraining/tolerance tasks for facilitation of postural control with dynamic challenges during ADLs . Positioning to improve functional independence    Recommended Adaptive Equipment: TBD     Home Living: Pt lives with wife, 2 story with steps to enter. Bed/bath on 2nd. Patient has been staying on 1st floor, sleeping in lift chair, BSC/urinal, sponge bathing in kitchen     Equipment owned: walker     Prior Level of Function: recently wife assisting more  with ADLs , assist  with IADLs; ambulated with walker     Pain Level: no pain this session ;   Cognition: A&O:  pleasant, following commands, conversing    Memory:  fair    Sequencing:  fair   Problem solving:  fair    Judgement/safety:  fair     Functional Assessment:  AM-PAC Daily Activity Raw Score: 14/24   Initial Eval Status  Date: 2/8/2023 Treatment Status  Date: STGs = LTGs  Time frame: 10-14 days   Feeding Independent     Grooming Mod  A,seated   B shoulder ROM limited   Min A    UB Dressing Mod  A  Min A    LB Dressing Max A   Min A    Bathing Mod A   Min A    Toileting Mod A   SBA    Bed Mobility  Up in recliner upon entry  SBA    Functional Transfers CGA  Sit- stand from recliner, bed   SBA/supervison    Functional Mobility CGA, w/walker  Steps next to bed   Cueing for hand placement   SBA/supervision  with good tolerance    Balance Sitting:     Static:  no SOB during session     Dynamic:Mod A   Standing:CGA   Independent/supervision    Activity Tolerance No SOB   Good  with ADL activity    Visual/  Perceptual Glasses: reading                 Hand Dominance right   AROM (PROM)   R/L UE  B shoulder ROM limited, less than 90 degreed  Distally WFLS     Hearing: WFL   Sensation:  No c/o numbness or tingling   Tone: WFL   Edema: none observed     Comments: Upon arrival patient sitting in recliner . At end of session, patient returned to recliner  with call light and phone within reach, all lines and tubes intact.   *ALARM ON, wife present in room     Overall patient demonstrated  decreased independence and safety during completion of ADL/functional transfer/mobility tasks. Pt would benefit from continued skilled OT to increase safety and independence with completion of ADL/IADL tasks for functional independence and quality of life. Rehab Potential: good for established goals     Patient / Family Goal: get stronger       Patient and/or family were instructed on functional diagnosis, prognosis/goals and OT plan of care. Demonstrated good  understanding. Eval Complexity: Low    Time In: 1420  Time Out: 1437      Min Units   OT Eval Low 97165 x  1   OT Eval Medium 50196      OT Eval High 67472      OT Re-Eval [id number]       Therapeutic Ex 95274      Therapeutic Activities 89853       ADL/Self Care 92420       Orthotic Management 16597       Manual 15228     Neuro Re-Ed 78012       Non-Billable Time         Evaluation Time additionally includes thorough review of current medical information, gathering information on past medical history/social history and prior level of function, interpretation of standardized testing/informal observation of tasks, assessment of data and development of plan of care and goals.             Andreea Finger  OTR/L  OT [id number]

## 2023-02-08 NOTE — DISCHARGE INSTR - COC
Continuity of Care Form    Patient Name: Moise Rivera   :  1931  MRN:  [id number]    Admit date:  2023  Discharge date:  23    Code Status Order: Full Code   Advance Directives:   Advance Care Flowsheet Documentation       Date/Time Healthcare Directive Type of Healthcare Directive Copy in Chart Healthcare Agent Appointed Healthcare Agent's Name Healthcare Agent's Phone Number    23 1305 No, patient does not have an advance directive for healthcare treatment -- -- -- -- --            Admitting Physician:  Marino Webb MD  PCP: Umesh Alexandre MD    Discharging Nurse:  Discharging Hospital Unit/Room#: 0543/0543-A  Discharging Unit Phone Number: [phone]    Emergency Contact:   Extended Emergency Contact Information  Primary Emergency Contact: Natasha Rivera  Address: [address] [place] [address] [id number] [place] [address]  Home Phone: [phone]  Relation: Spouse  Secondary Emergency Contact: Ancelmo Rivera  Address: [address] [place] [address]  Home Phone: [phone]  Relation: Child    Past Surgical History:  Past Surgical History:   Procedure Laterality Date    AORTIC VALVE REPLACEMENT N/A 2022    TRANSCATHETER AORTIC VALVE REPLACEMENT FEMORAL APPROACH performed by Ronel Marquez MD at [facility]    CARDIAC SURGERY      CHOLECYSTECTOMY  2010    COLONOSCOPY      CORONARY ARTERY BYPASS GRAFT  2009    CABG x4 with left internal mammary artery to the LAD.reverse saphenous vein grafts to the 1st marginal branches ofcircumflex, posterior descending branch of the right coronary artery, and posterolateral branch to the right coronary artery, and modified maze procedure with Medtronic bipolar radiofrequency ablation, and excision of left atrial appendage.    PACEMAKER INSERTION N/A 2022    Medtronic Micra VR Leadless Pacemaker  (Dr. Gaytan)    TONSILLECTOMY      UPPER GASTROINTESTINAL ENDOSCOPY N/A 2023    EGD  BIOPSY performed by Selma Davila MD at [facility] ENDOSCOPY       Immunization History:   Immunization History   Administered Date(s) Administered    COVID-19, PFIZER Bivalent BOOSTER, DO NOT Dilute, (age 12y+), IM, 27 mcg/0.3 mL 09/24/2022    COVID-19, PFIZER PURPLE top, DILUTE for use, (age 15 y+), 30mcg/0.3mL 01/22/2021, 02/24/2021, 11/05/2021    Influenza Vaccine, unspecified formulation 10/08/2014    Influenza Virus Vaccine 10/08/2014, 09/23/2020    Influenza, FLUARIX, FLULAVAL, Budd Reggie (age 10 mo+) AND AFLURIA, (age 1 y+), PF, 0.5mL 10/05/2016    Influenza, High Dose (Fluzone 65 yrs and older) 10/19/2015, 10/05/2017, 09/07/2018, 10/09/2019    Pneumococcal Conjugate 13-valent ([id number]) 10/19/2015    Pneumococcal Polysaccharide ([id number]) 01/14/2015    Td, unspecified formulation 12/12/2011    Tdap (Boostrix, Adacel) 11/15/2015       Active Problems:  Patient Active Problem List   Diagnosis Code    CAD (coronary artery disease) I25.10    S/P CABG (coronary artery bypass graft) Z95.1    DM (diabetes mellitus) ([facility] [address] E11.9    Hyperlipemia E78.5    HTN (hypertension) I10    A-fib (HCC) I48.91    BPH (benign prostatic hyperplasia) N40.0    Diabetic neuropathy (HCC) E11.40    Long term current use of anticoagulant therapy Z79.01    Aortic valve stenosis I35.0    Chronic kidney disease N18.9    Heart valve disease I38    Gout M10.9    Mild protein-calorie malnutrition (HCC) E44.1    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    GI bleed K92.2       Isolation/Infection:   Isolation            No Isolation          Patient Infection Status       Infection Onset Added Last Indicated Last Indicated By Review Planned Expiration Resolved Resolved By    None active    Resolved    COVID-19 (Rule Out) 02/05/23 02/05/23 02/05/23 COVID-19, Rapid (Ordered)   02/05/23 Rule-Out Test Resulted    COVID-19 (Rule Out) 01/28/22 01/28/22 01/28/22 COVID-19, Rapid (Ordered)   01/28/22 Rule-Out Test Resulted            Nurse Assessment:  Last Vital Signs: BP (!) 198/84   Pulse 59   Temp 98.3 °F (36.8 °C) (Oral)   Resp 20   Ht 5' 11\" (1.803 m)   Wt 169 lb 1.6 oz (76.7 kg)   SpO2 92%   BMI 23.58 kg/m²     Last documented pain score (0-10 scale):    Last Weight:   Wt Readings from Last 1 Encounters:   02/08/23 169 lb 1.6 oz (76.7 kg)     Mental Status:  oriented, alert, coherent, logical, thought processes intact, able to concentrate and follow conversation, and forgetful at times    IV Access:  - None    Nursing Mobility/ADLs:  Walking   Assisted  Transfer  Assisted  Bathing  Assisted  Dressing  Assisted  Toileting  Assisted  Feeding  Assisted  Med Admin  Assisted  Med Delivery   whole    Wound Care Documentation and Therapy:  Puncture 02/02/22 Femoral Anterior;Proximal;Right (Active)   Number of days: 371       Puncture 02/02/22 Femoral Anterior;Left;Proximal (Active)   Number of days: 371       Wound 02/01/22 Buttocks Inner (Active)   Number of days: 372        Elimination:  Continence: Bowel: Yes  Bladder: Yes  Urinary Catheter: Insertion Date: 2/12/23    Colostomy/Ileostomy/Ileal Conduit: No       Date of Last BM: 2/13/23    Intake/Output Summary (Last 24 hours) at 2/8/2023 1326  Last data filed at 2/8/2023 0215  Gross per 24 hour   Intake 387.5 ml   Output 300 ml   Net 87.5 ml     I/O last 3 completed shifts: In: 807.5 [P.O.:420; Blood:387.5]  Out: 300 [Urine:300]    Safety Concerns: At Risk for Falls    Impairments/Disabilities:      None    Nutrition Therapy:  Current Nutrition Therapy:   - Oral Diet:  General and GI bland adult oral nutrition supplement breakfast and dinner standard 4oz; frozen oral supplement with lunch    Routes of Feeding: Oral  Liquids: Thin Liquids  Daily Fluid Restriction: no  Last Modified Barium Swallow with Video (Video Swallowing Test): not done    Treatments at the Time of Hospital Discharge:   Respiratory Treatments: n/a  Oxygen Therapy:  is not on home oxygen therapy.   Ventilator:    - No ventilator support    Rehab Therapies: Physical Therapy, Occupational Therapy, and Speech/Language Therapy  Weight Bearing Status/Restrictions: No weight bearing restrictions  Other Medical Equipment (for information only, NOT a DME order):  wheelchair, walker, bedside commode, and hospital bed  Other Treatments: none    Patient's personal belongings (please select all that are sent with patient):  None, wife took belongings except for pt's coat    RN SIGNATURE:  Electronically signed by Elena Reynolds RN on 2/13/23 at 6:32 PM EST    CASE MANAGEMENT/SOCIAL WORK SECTION    Inpatient Status Date: ***    Readmission Risk Assessment Score:  Readmission Risk              Risk of Unplanned Readmission:  17           Discharging to Facility/ Agency   Name: [address] [place] [address], [address] [place] [address]  Phone:[phone]  SANGEETHA:[phone]    Dialysis Facility (if applicable)   Name:  Address:  Dialysis Schedule:  Phone:  Fax:    / signature: {Esignature:[id number]}Electronically signed by ALFREDO Barnard on 2/8/23 at 1:27 PM EST     PHYSICIAN SECTION    Prognosis: Fair    Condition at Discharge: Stable    Rehab Potential (if transferring to Rehab): Fair    Recommended Labs or Other Treatments After Discharge: urology and GI f/u's    Physician Certification: I certify the above information and transfer of Suri Mcqueen  is necessary for the continuing treatment of the diagnosis listed and that he requires Elder Ricky for less 30 days.      Update Admission H&P: Changes in H&P as follows - see DC summary    PHYSICIAN SIGNATURE:  Electronically signed by Familia Negrete DO on 2/13/23 at 10:51 AM EST

## 2023-02-08 NOTE — CONSULTS
[facility] MD Jillian Webber MD Terisa Hercules, MD Storm Lav, SOLITARIO Rivera, ANP  Consultation  Patient's Name: Raymundo Causey  3:06 PM  2/8/2023        Reason for Consult: Acute Kidney injury  Requesting Physician:  Violet Elder MD    Chief Complaint:  Mr Christiano Hernández is a 80year old gentleman that was sent to the ED on 2/5/23 as his wife reported that he passed out for 5 minutes while sitting on the commode. She reported that he was breathing but he did not respond to her. She states that he did not hit his head. She also reported that he had not been eating or drinking well over the past week. He was awake and responsive once in the ED. His creatinine level was 2.3 mg/dl on admission labs in the ED. His hgb was 7.3 on admission and dropped to 5.8. Stool for occult blood was positive. He was hypotensive. He received 2 Units of Packed RBCs  and was admitted for fChronic Kidney Disease urther treatment and evaluation. Surgery was consulted. Nephrology was consulted on 2/7/23 for worsening kidney function. His baseline creatinine was noted to be 2.0 mg/dl on 9/4/22- reflective of CKD Stage IIIB. He is known to Dr. Milton Griffin and follows with him longitudinally for treament and management of his CKD. He has a PMH that includes:  Atrial fib, diabetes mellitus, CAD, HTN, HLD and S/P CABG.      History Obtained From: Patient, spouse & chart        Past Medical History:   Diagnosis Date    A-fib [facility])     BPH (benign prostatic hyperplasia)     CAD (coronary artery disease)     Diabetic neuropathy (HCC)     DM (diabetes mellitus) ([facility] [address]     HTN (hypertension)     Hyperlipemia     Pancreatitis 09/01/2010    Pleural effusion on left 03/01/2022    seen on echo    S/P CABG (coronary artery bypass graft)        Past Surgical History:   Procedure Laterality Date    AORTIC VALVE REPLACEMENT N/A 2/2/2022    TRANSCATHETER AORTIC VALVE REPLACEMENT FEMORAL APPROACH performed by Nenita Vigil MD at [address] [place] [address]  09/27/2010    COLONOSCOPY      CORONARY ARTERY BYPASS GRAFT  12/01/2009    CABG x4 with left internal mammary artery to the LAD. reverse saphenous vein grafts to the 1st marginal branches ofcircumflex, posterior descending branch of the right coronary artery, and posterolateral branch to the right coronary artery, and modified maze procedure with Medtronic bipolar radiofrequency ablation, and excision of left atrial appendage. PACEMAKER INSERTION N/A 02/03/2022    Medtronic Micra VR Leadless Pacemaker  (Dr. Minerva Kunz)    TONSILLECTOMY      UPPER GASTROINTESTINAL ENDOSCOPY N/A 2/6/2023    EGD BIOPSY performed by Hermes Randhawa MD at [facility] ENDOSCOPY       Family History   Problem Relation Age of Onset    High Blood Pressure Mother     Heart Disease Mother     Cancer Father         reports that he has never smoked. He has never used smokeless tobacco. He reports current alcohol use. He reports that he does not use drugs. Allergies:  Patient has no known allergies.     Current Medications:    hydrALAZINE (APRESOLINE) injection 10 mg, Q4H PRN  carvedilol (COREG) tablet 25 mg, BID  hydrALAZINE (APRESOLINE) tablet 25 mg, 3 times per day  isosorbide dinitrate (ISORDIL) tablet 10 mg, TID  0.9 % sodium chloride infusion, Continuous  0.9 % sodium chloride infusion, PRN  0.9 % sodium chloride infusion, PRN  melatonin tablet 6 mg, Nightly PRN  0.9 % sodium chloride infusion, Continuous  pantoprazole (PROTONIX) tablet 40 mg, BID AC  sodium chloride flush 0.9 % injection 10 mL, 2 times per day  sodium chloride flush 0.9 % injection 10 mL, PRN  0.9 % sodium chloride infusion, PRN  senna (SENOKOT) tablet 8.6 mg, Daily PRN  acetaminophen (TYLENOL) tablet 650 mg, Q6H PRN   Or  acetaminophen (TYLENOL) suppository 650 mg, Q6H PRN  [Held by provider] pravastatin (PRAVACHOL) tablet 40 mg, Nightly  [Held by provider] torsemide (DEMADEX) tablet 20 mg, Daily        Review of Systems:   Constitutional:  Negative for chills, fatigue and fever. Respiratory:  Negative for shortness of breath. Cardiovascular:  Negative for chest pain. Gastrointestinal:  Positive for nausea. Negative for abdominal pain, anal bleeding, blood in stool and vomiting. Neurological:  Positive for syncope. Negative for dizziness. Physical Exam:  General: Alert and oriented. NAD  Eyes: conjunctivae/corneas clear, sclera non icteric  Skin:  Warm & dry, no rashes or lesions  Lungs: CTAB, no rhonchi, no crackle, no rales  Heart: regular rate, regular rhythm, No murmur. No gallops  Abdomen: soft, non-distended, bowel sounds normal  Extremities: ,trace -1+ edema BLE.   Pulses present  Neurologic: cranial nerves 2-12 grossly intact, no slurred speech     Most Recent Labs  Data:   Labs:  CBC with Differential:    Lab Results   Component Value Date/Time    WBC 10.9 02/08/2023 04:32 AM    RBC 2.74 02/08/2023 04:32 AM    HGB 8.2 02/08/2023 12:55 PM    HCT 25.9 02/08/2023 12:55 PM     02/08/2023 04:32 AM    MCV 87.2 02/08/2023 04:32 AM    MCH 28.8 02/08/2023 04:32 AM    MCHC 33.1 02/08/2023 04:32 AM    RDW 16.3 02/08/2023 04:32 AM    SEGSPCT 59 12/12/2011 02:10 PM    LYMPHOPCT 22.9 02/08/2023 04:32 AM    MONOPCT 12.4 02/08/2023 04:32 AM    BASOPCT 0.4 02/08/2023 04:32 AM    MONOSABS 1.34 02/08/2023 04:32 AM    LYMPHSABS 2.49 02/08/2023 04:32 AM    EOSABS 0.07 02/08/2023 04:32 AM    BASOSABS 0.04 02/08/2023 04:32 AM     CMP:    Lab Results   Component Value Date/Time     02/08/2023 04:32 AM    K 4.9 02/08/2023 04:32 AM     02/08/2023 04:32 AM    CO2 21 02/08/2023 04:32 AM    BUN 66 02/08/2023 04:32 AM    CREATININE 3.9 02/08/2023 04:32 AM    GFRAA 38 09/14/2022 09:18 AM    LABGLOM 14 02/08/2023 04:32 AM    GLUCOSE 141 02/08/2023 04:32 AM    GLUCOSE 101 12/14/2011 04:20 AM    PROT 6.6 02/08/2023 04:32 AM    LABALBU 3.6 02/08/2023 04:32 AM    CALCIUM 8.9 02/08/2023 04:32 AM BILITOT 0.9 02/08/2023 04:32 AM    ALKPHOS 77 02/08/2023 04:32 AM    AST 86 02/08/2023 04:32 AM    ALT 61 02/08/2023 04:32 AM     Ionized Calcium:  No results found for: IONCA  Magnesium:    Lab Results   Component Value Date/Time    MG 2.4 02/08/2023 04:32 AM     Phosphorus:    Lab Results   Component Value Date/Time    PHOS 4.7 02/08/2023 04:32 AM     U/A:    Lab Results   Component Value Date/Time    COLORU Yellow 02/05/2023 11:31 AM    PHUR 5.5 02/05/2023 11:31 AM    WBCUA 2-5 02/05/2023 11:31 AM    RBCUA 10-20 02/05/2023 11:31 AM    BACTERIA FEW 02/05/2023 11:31 AM    CLARITYU Clear 02/05/2023 11:31 AM    SPECGRAV 1.020 02/05/2023 11:31 AM    LEUKOCYTESUR Negative 02/05/2023 11:31 AM    UROBILINOGEN 0.2 02/05/2023 11:31 AM    BILIRUBINUR Negative 02/05/2023 11:31 AM    BLOODU TRACE-LYSED 02/05/2023 11:31 AM    GLUCOSEU Negative 02/05/2023 11:31 AM     Microalbumen/Creatinine ratio:  No components found for: RUCREAT  Iron Saturation:  No components found for: PERCENTFE  TIBC:    Lab Results   Component Value Date/Time    TIBC 297 02/08/2023 04:32 AM     FERRITIN:    Lab Results   Component Value Date/Time    FERRITIN 97 02/08/2023 04:32 AM        Imaging:  US RETROPERITONEAL COMPLETE   Final Result   1. Normal size kidneys. Increased echotexture of the renal parenchymal   relative sonolucent appearance of the renal pyramids can chronic renal   parenchymal process. Please correlate clinically. 2.  No signs for obstructive uropathy. 3.  Moderate volume ascites in the abdomen. 4.  Mild to moderate left-sided pleural effusion         CT Head W/O Contrast   Final Result   1. There is no acute intracranial abnormality. Specifically, there is no   intracranial hemorrhage. 2. Atrophy and periventricular leukomalacia,         XR CHEST PORTABLE   Final Result   1. Airspace disease within the retrocardiac region   2. Small left pleural effusion   3.  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 We look forward to following along with you. Patient seen and examined. Findings and physical exam confirmed independently by me. Case discussed with Hang Lopez CNP. As below, except as annotated.

## 2023-02-08 NOTE — PLAN OF CARE
Problem: ABCDS Injury Assessment  Goal: Absence of physical injury  2/8/2023 1230 by Kiran Cole RN  Outcome: Progressing  2/8/2023 0129 by Teodora Keller RN  Outcome: Progressing     Problem: Safety - Adult  Goal: Free from fall injury  2/8/2023 1230 by Kiran Cole RN  Outcome: Progressing  2/8/2023 0129 by Teodora Keller RN  Outcome: Progressing     Problem: Chronic Conditions and Co-morbidities  Goal: Patient's chronic conditions and co-morbidity symptoms are monitored and maintained or improved  2/8/2023 0129 by Teodora Keller RN  Outcome: Progressing  Flowsheets (Taken 2/7/2023 2000)  Care Plan - Patient's Chronic Conditions and Co-Morbidity Symptoms are Monitored and Maintained or Improved: Monitor and assess patient's chronic conditions and comorbid symptoms for stability, deterioration, or improvement     Problem: Skin/Tissue Integrity  Goal: Absence of new skin breakdown  Description: 1. Monitor for areas of redness and/or skin breakdown  2. Assess vascular access sites hourly  3. Every 4-6 hours minimum:  Change oxygen saturation probe site  4. Every 4-6 hours:  If on nasal continuous positive airway pressure, respiratory therapy assess nares and determine need for appliance change or resting period.   2/8/2023 0129 by Teodora Keller RN  Outcome: Progressing

## 2023-02-08 NOTE — CARE COORDINATION
Social Work / Discharge Planning :PT updated SW that now patient and spouse would like SNF . SW followed up with patient and spouse. Patient now agreeable to [facility] rehab. Choices discussed and they would like [facility] due to patient sister in law is there as well. Referral made to [facility]. Await acceptance / denial. No pre-cert needed. Will need COVID. SW to follow. Electronically signed by ALFREDO Odell on 2/8/23 at 1:23 PM EST     Addendum: [facility] accepted. N [facility],HENS and transport forms completed. Do NOT need pre-cert. Need COVID. SW to follow.  Electronically signed by ALFREDO Odell on 2/8/23 at 1:43 PM EST

## 2023-02-08 NOTE — PROGRESS NOTES
INPATIENT CARDIOLOGY FOLLOW-UP    Name: Suri Mcqueen    Age: 80 y.o. Date of Admission: 2/5/2023 11:05 AM    Date of Service: 2/8/2023    Primary Cardiologist: Dr. Lencho Aguilera    Chief Complaint: Follow-up for syncope, GI bleed    Interim History:  Feels well. Denies chest pain, shortness of breath, palpitations, or recurrent syncope. Creatinine up to 3.9. Hemoglobin is 7.9 this morning. Asking to go home.     Review of Systems:   Negative except as described above    Problem List:  Patient Active Problem List   Diagnosis    CAD (coronary artery disease)    S/P CABG (coronary artery bypass graft)    DM [facility]diabetes mellitus) [facility]    Hyperlipemia    HTN (hypertension)    A-fib (HCC)    BPH (benign prostatic hyperplasia)    Diabetic neuropathy ([facility]    Long term current use of anticoagulant therapy    Aortic valve stenosis    Chronic kidney disease    Heart valve disease    Gout    Mild protein-calorie malnutrition (HCC)    Complete AV block (HCC)    GI bleed       Current Medications:    Current Facility-Administered Medications:     hydrALAZINE (APRESOLINE) injection 10 mg, 10 mg, IntraVENous, Q4H PRN, Andie Hooper MD    carvedilol (COREG) tablet 25 mg, 25 mg, Oral, BID, Gera Tracy MD    hydrALAZINE (APRESOLINE) tablet 25 mg, 25 mg, Oral, 3 times per day, Gera Tracy MD    isosorbide dinitrate (ISORDIL) tablet 10 mg, 10 mg, Oral, TID, Gera Tracy MD, 10 mg at 02/08/23 1043    0.9 % sodium chloride infusion, , IntraVENous, Continuous, Denise Amor MD, Last Rate: 75 mL/hr at 02/08/23 1044, New Bag at 02/08/23 1044    0.9 % sodium chloride infusion, , IntraVENous, PRN, Andie Hooper MD    0.9 % sodium chloride infusion, , IntraVENous, PRN, Andie Hooper MD    melatonin tablet 6 mg, 6 mg, Oral, Nightly PRN, Patrizia Epstein, APRN - CNP, 6 mg at 02/07/23 2124    0.9 % sodium chloride infusion, , IntraVENous, Continuous, Miguel Eckert DO, Stopped at 02/08/23 0216    pantoprazole (PROTONIX) tablet 40 mg, 40 mg, Oral, BID AC, Ada , DO, 40 mg at 02/08/23 6303    sodium chloride flush 0.9 % injection 10 mL, 10 mL, IntraVENous, 2 times per day, Hardy Perez MD, 10 mL at 02/08/23 0817    sodium chloride flush 0.9 % injection 10 mL, 10 mL, IntraVENous, PRN, Hardy Perez MD    0.9 % sodium chloride infusion, , IntraVENous, PRN, MD miguelito Cordoba) tablet 8.6 mg, 1 tablet, Oral, Daily PRN, Hardy Perez MD, 8.6 mg at 02/08/23 8176    acetaminophen (TYLENOL) tablet 650 mg, 650 mg, Oral, Q6H PRN **OR** acetaminophen (TYLENOL) suppository 650 mg, 650 mg, Rectal, Q6H PRN, Hardy Perez MD    [facility] AT [place] by provider] pravastatin (PRAVACHOL) tablet 40 mg, 40 mg, Oral, Nightly, Hardy Perez MD, 40 mg at 02/07/23 2124    [Held by provider] torsemide (DEMADEX) tablet 20 mg, 20 mg, Oral, Daily, Hardy Perez MD    Physical Exam:  BP (!) 198/84   Pulse 59   Temp 98.3 °F (36.8 °C) (Oral)   Resp 20   Ht 5' 11\" (1.803 m)   Wt 169 lb 1.6 oz (76.7 kg)   SpO2 92%   BMI 23.58 kg/m²   Wt Readings from Last 3 Encounters:   02/08/23 169 lb 1.6 oz (76.7 kg)   01/13/23 169 lb (76.7 kg)   04/29/22 159 lb 1.6 oz (72.2 kg)     Appearance: Somewhat frail-appearing elderly gentleman, awake, alert, no acute respiratory distress  Skin: Intact, no rash  Head: Normocephalic, atraumatic  Eyes: EOMI, no conjunctival erythema  ENMT: No pharyngeal erythema, MMM, no rhinorrhea  Neck: Supple, no elevated JVP, no carotid bruits  Lungs: Clear to auscultation bilaterally. No wheezes, rales, or rhonchi.   Cardiac: PMI nondisplaced, irregular rhythm with a normal rate, S1 & S2 normal, systolic  Abdomen: Soft, nontender, +bowel sounds  Extremities: Moves all extremities x 4, trace to 1+ lower extremity edema  Neurologic: No focal motor deficits apparent, normal mood and affect  Peripheral Pulses: Intact posterior tibial pulses bilaterally    Intake/Output:    Intake/Output Summary (Last 24 hours) at 2/8/2023 1308  Last data filed at 2/8/2023 0215  Gross per 24 hour   Intake 387.5 ml   Output 300 ml   Net 87.5 ml     No intake/output data recorded. Laboratory Tests:  Recent Labs     02/06/23 0212 02/07/23 0639 02/08/23 0432    138 138   K 5.0 5.2* 4.9    100 102   CO2 22 22 21*   BUN 46* 62* 66*   CREATININE 2.8* 3.8* 3.9*   GLUCOSE 158* 136* 141*   CALCIUM 9.1 8.9 8.9     Lab Results   Component Value Date/Time    MG 2.4 02/08/2023 04:32 AM     Recent Labs     02/06/23 0212 02/07/23 0639 02/08/23 0432   ALKPHOS 58 63 77   ALT 36 60* 61*   AST 49* 94* 86*   PROT 5.5* 6.1* 6.6   BILITOT 0.5 0.7 0.9   LABALBU 3.1* 3.5 3.6     Recent Labs     02/06/23  0300 02/06/23  1207 02/07/23 0639 02/07/23 1215 02/07/23 2003 02/08/23 0432   WBC 9.4  --  11.3  --   --  10.9   RBC 2.01*  --  2.48*  --   --  2.74*   HGB 5.8*   < > 7.0* 6.9* 8.0* 7.9*   HCT 18.4*   < > 21.5* 21.9* 24.1* 23.9*   MCV 91.5  --  86.7  --   --  87.2   MCH 28.9  --  28.2  --   --  28.8   MCHC 31.5*  --  32.6  --   --  33.1   RDW 16.1*  --  16.8*  --   --  16.3*   *  --  122*  --   --  128*   MPV 10.9  --  11.0  --   --  11.2    < > = values in this interval not displayed.      No results found for: CKTOTAL, CKMB, CKMBINDEX, TROPONINI  Lab Results   Component Value Date    INR 1.6 02/01/2022    INR 2.4 01/29/2022    INR 2.5 06/24/2020    PROTIME 17.7 (H) 02/01/2022    PROTIME 26.4 (H) 01/29/2022    PROTIME 29.8 06/24/2020     No results found for: TSHFT4, TSH  Lab Results   Component Value Date    LABA1C 6.1 (H) 02/05/2023     No results found for: EAG  Lab Results   Component Value Date    CHOL 156 10/03/2018    CHOL 171 03/14/2018    CHOL 150 02/08/2017     Lab Results   Component Value Date    TRIG 182 06/24/2020    TRIG 218 02/28/2020    TRIG 122 10/30/2019     Lab Results   Component Value Date     (A) 06/24/2020    HDL 40 02/28/2020    HDL 36 10/30/2019     Lab Results   Component Value Date    LDLCALC 96 10/03/2018 LDLCALC 96 2018    LDLCALC 70 2017    LDLCHOLESTEROL 122 2020    LDLCHOLESTEROL 142 2020    LDLCHOLESTEROL 108 10/30/2019     Lab Results   Component Value Date    VLDL 145 2020    VLDL 185 2020    VLDL 132 10/30/2019     Lab Results   Component Value Date    CHOLHDLRATIO 3.3 2020    CHOLHDLRATIO 3.5 2020    CHOLHDLRATIO 3.0 10/30/2019     No results for input(s): PROBNP in the last 72 hours. Cardiac Tests:    EK2023: V-paced 68 bpm    Telemetry: Atrial fibrillation, ventricular paced 60 bpm    Chest X-ray:   23    Impression   1. Airspace disease within the retrocardiac region   2. Small left pleural effusion   3. Trace right pleural effusion     Echocardiogram:   TTE 23   Summary   Micro-bubble contrast injected to enhance left ventricular visualization. Normal left ventricular size. LV systolic function is moderately-severely reduced. Ejection fraction is visually estimated at 30-35%. Abnormal diastolic function. Severe hypokinesis basal-mid anteroseptal, basal-mid inferoseptal, and   apical septal walls. Moderate left ventricular concentric hypertrophy noted. Abnormal LV septal motion consistent with post-operative state, paced   rhythm. Moderately dilated right ventricle with severely reduced function. Biatrial dilation. Trace-mild mitral regurgitation. Mild tricuspid regurgitation. 34 mm Evolut Pro+ TAVR valve implanted 2022. Mean gradient 7 mmHg; Mild paravalvular leak. TTE 3/1/22 [place]   Summary   Ejection fraction is visually estimated at 30-35%. Dilated right ventricle with normal right ventricular function (TAPSE 1.9   cm). Indeterminate diastolic function. Severely dilated left atrium by volume index. Dilated right atrium. Moderate mitral regurgitation. Hx of TAVR with a 34 mm Evolut Pro+ bioprosthetic valve on 2022. Mild paravalvular aortic regurgitation.    AV peak velocity 1.8 m/s.   AV mean gradient 6 mmHg. Aortic valve area 2.0 cm2. Mild tricuspid regurgitation. PASP is estimated at 67 mmHg. Left-sided pleural effusion. Stress test:      Cardiac catheterization:     Device interrogation 2/6/2023  Greater than 8 years battery life remaining  VVIR  99.1% ventricular pacing  0.9% ventricular sensing  Normal device function    ----------------------------------------------------------------------------------------------------------------------------------------------------------------  IMPRESSION:  Syncope likely due to severe anemia  CAD/CABG 2009 LIMA-LAD, V-OM1, V-RPDA, V-RPL. 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  proBNP 31,000  Ischemic cardiomyopathy EF 30-35%  Pleural effusion, ascites  Acute GI bleed, EGD with gastritis no active bleeding  Acute blood loss anemia Hgb 5.8 -> 7.9 posttransfusion  ROSEANNE/CKD creatinine 2.3 -> 3.9 baseline 1.5-1.8  Mild thrombocytopenia 120s  Hypertension, now running high  Type 2 diabetes    RECOMMENDATIONS:    Remain off apixaban  Device interrogation reviewed, normal device function no arrhythmias  Check orthostatics  Echo reviewed no significant change, LV dysfunction still present, TAVR valve functioning reasonably well  Optimize GDMT  Resume carvedilol, increase to 25 mg twice daily  No ACE/ARB/ARNI/MRA/SGLT2i due to renal failure  Resume hydralazine/nitrate combination  Hold diuretics; limit use of fluids to avoid precipitating further decompensated heart failure  Nephrology evaluation pending  Discontinue doxazosin given HFrEF; if used for BPH consider tamsulosin or other alternative  Further care per primary service and consultants    Deyanira Dowd MD, [facility]    NOTE: This report was transcribed using voice recognition software.  Every effort was made to ensure accuracy; however, inadvertent computerized transcription errors may be present.

## 2023-02-09 PROBLEM — Z51.5 PALLIATIVE CARE ENCOUNTER: Status: ACTIVE | Noted: 2023-02-09

## 2023-02-09 LAB
ALBUMIN SERPL-MCNC: 3.3 G/DL (ref 3.5–5.2)
ALP BLD-CCNC: 74 U/L (ref 40–129)
ALT SERPL-CCNC: 52 U/L (ref 0–40)
ANION GAP SERPL CALCULATED.3IONS-SCNC: 12 MMOL/L (ref 7–16)
AST SERPL-CCNC: 58 U/L (ref 0–39)
BASOPHILS ABSOLUTE: 0.03 E9/L (ref 0–0.2)
BASOPHILS RELATIVE PERCENT: 0.3 % (ref 0–2)
BILIRUB SERPL-MCNC: 1 MG/DL (ref 0–1.2)
BUN BLDV-MCNC: 67 MG/DL (ref 6–23)
CALCIUM SERPL-MCNC: 9 MG/DL (ref 8.6–10.2)
CHLORIDE BLD-SCNC: 104 MMOL/L (ref 98–107)
CO2: 23 MMOL/L (ref 22–29)
CREAT SERPL-MCNC: 3.6 MG/DL (ref 0.7–1.2)
EOSINOPHILS ABSOLUTE: 0.13 E9/L (ref 0.05–0.5)
EOSINOPHILS RELATIVE PERCENT: 1.2 % (ref 0–6)
GFR SERPL CREATININE-BSD FRML MDRD: 15 ML/MIN/1.73
GLUCOSE BLD-MCNC: 146 MG/DL (ref 74–99)
HCT VFR BLD CALC: 24 % (ref 37–54)
HEMOGLOBIN: 7.7 G/DL (ref 12.5–16.5)
IMMATURE GRANULOCYTES #: 0.06 E9/L
IMMATURE GRANULOCYTES %: 0.6 % (ref 0–5)
LYMPHOCYTES ABSOLUTE: 1.83 E9/L (ref 1.5–4)
LYMPHOCYTES RELATIVE PERCENT: 17.4 % (ref 20–42)
MCH RBC QN AUTO: 29.1 PG (ref 26–35)
MCHC RBC AUTO-ENTMCNC: 32.1 % (ref 32–34.5)
MCV RBC AUTO: 90.6 FL (ref 80–99.9)
MONOCYTES ABSOLUTE: 1.28 E9/L (ref 0.1–0.95)
MONOCYTES RELATIVE PERCENT: 12.2 % (ref 2–12)
NEUTROPHILS ABSOLUTE: 7.19 E9/L (ref 1.8–7.3)
NEUTROPHILS RELATIVE PERCENT: 68.3 % (ref 43–80)
PDW BLD-RTO: 16.2 FL (ref 11.5–15)
PLATELET # BLD: 125 E9/L (ref 130–450)
PMV BLD AUTO: 11.1 FL (ref 7–12)
POTASSIUM REFLEX MAGNESIUM: 4.4 MMOL/L (ref 3.5–5)
PRO-BNP: ABNORMAL PG/ML (ref 0–450)
RBC # BLD: 2.65 E12/L (ref 3.8–5.8)
SODIUM BLD-SCNC: 139 MMOL/L (ref 132–146)
TOTAL PROTEIN: 6.2 G/DL (ref 6.4–8.3)
WBC # BLD: 10.5 E9/L (ref 4.5–11.5)

## 2023-02-09 PROCEDURE — 6370000000 HC RX 637 (ALT 250 FOR IP): Performed by: STUDENT IN AN ORGANIZED HEALTH CARE EDUCATION/TRAINING PROGRAM

## 2023-02-09 PROCEDURE — 2580000003 HC RX 258: Performed by: INTERNAL MEDICINE

## 2023-02-09 PROCEDURE — 1200000000 HC SEMI PRIVATE

## 2023-02-09 PROCEDURE — 85025 COMPLETE CBC W/AUTO DIFF WBC: CPT

## 2023-02-09 PROCEDURE — 99232 SBSQ HOSP IP/OBS MODERATE 35: CPT | Performed by: INTERNAL MEDICINE

## 2023-02-09 PROCEDURE — 51702 INSERT TEMP BLADDER CATH: CPT

## 2023-02-09 PROCEDURE — 2580000003 HC RX 258: Performed by: STUDENT IN AN ORGANIZED HEALTH CARE EDUCATION/TRAINING PROGRAM

## 2023-02-09 PROCEDURE — 99222 1ST HOSP IP/OBS MODERATE 55: CPT | Performed by: NURSE PRACTITIONER

## 2023-02-09 PROCEDURE — 6370000000 HC RX 637 (ALT 250 FOR IP): Performed by: INTERNAL MEDICINE

## 2023-02-09 PROCEDURE — 80053 COMPREHEN METABOLIC PANEL: CPT

## 2023-02-09 PROCEDURE — 36415 COLL VENOUS BLD VENIPUNCTURE: CPT

## 2023-02-09 PROCEDURE — 83880 ASSAY OF NATRIURETIC PEPTIDE: CPT

## 2023-02-09 RX ADMIN — HYDRALAZINE HYDROCHLORIDE 50 MG: 50 TABLET, FILM COATED ORAL at 13:30

## 2023-02-09 RX ADMIN — SODIUM CHLORIDE: 9 INJECTION, SOLUTION INTRAVENOUS at 12:38

## 2023-02-09 RX ADMIN — Medication 10 ML: at 20:58

## 2023-02-09 RX ADMIN — ISOSORBIDE DINITRATE 10 MG: 10 TABLET ORAL at 20:58

## 2023-02-09 RX ADMIN — PANTOPRAZOLE SODIUM 40 MG: 40 TABLET, DELAYED RELEASE ORAL at 17:14

## 2023-02-09 RX ADMIN — CARVEDILOL 25 MG: 25 TABLET, FILM COATED ORAL at 08:39

## 2023-02-09 RX ADMIN — ISOSORBIDE DINITRATE 10 MG: 10 TABLET ORAL at 13:30

## 2023-02-09 RX ADMIN — HYDRALAZINE HYDROCHLORIDE 50 MG: 50 TABLET, FILM COATED ORAL at 20:58

## 2023-02-09 RX ADMIN — HYDRALAZINE HYDROCHLORIDE 50 MG: 50 TABLET, FILM COATED ORAL at 05:45

## 2023-02-09 RX ADMIN — ISOSORBIDE DINITRATE 10 MG: 10 TABLET ORAL at 08:39

## 2023-02-09 RX ADMIN — PANTOPRAZOLE SODIUM 40 MG: 40 TABLET, DELAYED RELEASE ORAL at 05:45

## 2023-02-09 RX ADMIN — CARVEDILOL 25 MG: 25 TABLET, FILM COATED ORAL at 17:14

## 2023-02-09 NOTE — PROGRESS NOTES
INPATIENT CARDIOLOGY FOLLOW-UP    Name: Shawna Louis    Age: 80 y.o. Date of Admission: 2/5/2023 11:05 AM    Date of Service: 2/9/2023    Primary Cardiologist: Dr. Dacia York    Chief Complaint: Follow-up for syncope, GI bleed    Interim History:  Feels well. Denies chest pain, shortness of breath, palpitations, or recurrent syncope. Creatinine seems to have plateaued at 3.9 and is downtrending. Hemoglobin is 7.7 this morning.     Review of Systems:   Negative except as described above    Problem List:  Patient Active Problem List   Diagnosis    CAD (coronary artery disease)    S/P CABG (coronary artery bypass graft)    DM (diabetes mellitus) ([facility]    Hyperlipemia    HTN (hypertension)    A-fib (HCC)    BPH (benign prostatic hyperplasia)    Di[facility] ([facility]    Long term current use of anticoagulant therapy    Aortic valve stenosis    Chronic kidney disease    Heart valve disease    Gout    Mild protein-calorie malnutrition (HCC)    Complete [facility] block ([facility]    GI bleed       Current Medications:    Current Facility-Administered Medications:     hydrALAZINE (APRESOLINE) injection 10 mg, 10 mg, IntraVENous, Q4H PRN, Roxi Carias MD    carvedilol (COREG) tablet 25 mg, 25 mg, Oral, BID, Elisabeth Hylton MD, 25 mg at 02/09/23 8150    isosorbide dinitrate (ISORDIL) tablet 10 mg, 10 mg, Oral, TID, Elisabeth Hylton MD, 10 mg at 02/09/23 0839    0.9 % sodium chloride infusion, , IntraVENous, Continuous, Stefan Villa MD, Last Rate: 75 mL/hr at 02/08/23 2114, New Bag at 02/08/23 2114    hydrALAZINE (APRESOLINE) tablet 50 mg, 50 mg, Oral, 3 times per day, Stefan Villa MD, 50 mg at 02/09/23 0545    0.9 % sodium chloride infusion, , IntraVENous, PRN, Roxi Carias MD    0.9 % sodium chloride infusion, , IntraVENous, PRN, Roxi Carias MD    melatonin tablet 6 mg, 6 mg, Oral, Nightly PRN, EDMUNDO Boo - CNP, 6 mg at 02/08/23 2110    pantoprazole (PROTONIX) tablet 40 mg, 40 mg, Oral, BID Elzie Homans Sauder, DO, 40 mg at 02/09/23 0545    sodium chloride flush 0.9 % injection 10 mL, 10 mL, IntraVENous, 2 times per day, Luisa Frias MD, 10 mL at 02/08/23 0817    sodium chloride flush 0.9 % injection 10 mL, 10 mL, IntraVENous, PRN, Luisa Frias MD    0.9 % sodium chloride infusion, , IntraVENous, PRN, MD dominik Wynneshelby [place]) tablet 8.6 mg, 1 tablet, Oral, Daily PRN, Luisa Frias MD, 8.6 mg at 02/08/23 3273    acetaminophen (TYLENOL) tablet 650 mg, 650 mg, Oral, Q6H PRN **OR** acetaminophen (TYLENOL) suppository 650 mg, 650 mg, Rectal, Q6H PRN, Luisa Frias MD    [facility] AT [place] by provider] pravastatin (PRAVACHOL) tablet 40 mg, 40 mg, Oral, Nightly, Luisa Frias MD, 40 mg at 02/07/23 2124    [Held by provider] torsemide (DEMADEX) tablet 20 mg, 20 mg, Oral, Daily, Luisa Frias MD    Physical Exam:  BP (!) 144/60   Pulse 65   Temp 98 °F (36.7 °C) (Oral)   Resp 18   Ht 5' 11\" (1.803 m)   Wt 169 lb 2 oz (76.7 kg)   SpO2 92%   BMI 23.59 kg/m²   Wt Readings from Last 3 Encounters:   02/09/23 169 lb 2 oz (76.7 kg)   01/13/23 169 lb (76.7 kg)   04/29/22 159 lb 1.6 oz (72.2 kg)     Appearance: Somewhat frail-appearing elderly gentleman, awake, alert, no acute respiratory distress  Skin: Intact, no rash  Head: Normocephalic, atraumatic  Eyes: EOMI, no conjunctival erythema  ENMT: No pharyngeal erythema, MMM, no rhinorrhea  Neck: Supple, no elevated JVP, no carotid bruits  Lungs: Clear to auscultation bilaterally. No wheezes, rales, or rhonchi.   Cardiac: PMI nondisplaced, irregular rhythm with a normal rate, S1 & S2 normal, systolic  Abdomen: Soft, nontender, +bowel sounds  Extremities: Moves all extremities x 4, 1+ lower extremity edema L>R  Neurologic: No focal motor deficits apparent, normal mood and affect  Peripheral Pulses: Intact posterior tibial pulses bilaterally    Intake/Output:    Intake/Output Summary (Last 24 hours) at 2/9/2023 1049  Last data filed at 2/9/2023 0215  Gross per 24 hour   Intake -- Output 700 ml   Net -700 ml     No intake/output data recorded. Laboratory Tests:  Recent Labs     02/07/23 0639 02/08/23 0432 02/09/23 0202    138 139   K 5.2* 4.9 4.4    102 104   CO2 22 21* 23   BUN 62* 66* 67*   CREATININE 3.8* 3.9* 3.6*   GLUCOSE 136* 141* 146*   CALCIUM 8.9 8.9 9.0     Lab Results   Component Value Date/Time    MG 2.4 02/08/2023 04:32 AM     Recent Labs     02/07/23 0639 02/08/23 0432 02/09/23 0202   ALKPHOS 63 77 74   ALT 60* 61* 52*   AST 94* 86* 58*   PROT 6.1* 6.6 6.2*   BILITOT 0.7 0.9 1.0   LABALBU 3.5 3.6 3.3*     Recent Labs     02/07/23 0639 02/07/23  1215 02/08/23 0432 02/08/23  1255 02/08/23 1922 02/09/23 0202   WBC 11.3  --  10.9  --   --  10.5   RBC 2.48*  --  2.74*  --   --  2.65*   HGB 7.0*   < > 7.9* 8.2* 8.0* 7.7*   HCT 21.5*   < > 23.9* 25.9* 24.8* 24.0*   MCV 86.7  --  87.2  --   --  90.6   MCH 28.2  --  28.8  --   --  29.1   MCHC 32.6  --  33.1  --   --  32.1   RDW 16.8*  --  16.3*  --   --  16.2*   *  --  128*  --   --  125*   MPV 11.0  --  11.2  --   --  11.1    < > = values in this interval not displayed.      No results found for: CKTOTAL, CKMB, CKMBINDEX, TROPONINI  Lab Results   Component Value Date    INR 1.6 02/01/2022    INR 2.4 01/29/2022    INR 2.5 06/24/2020    PROTIME 17.7 (H) 02/01/2022    PROTIME 26.4 (H) 01/29/2022    PROTIME 29.8 06/24/2020     No results found for: TSHFT4, TSH  Lab Results   Component Value Date    LABA1C 6.1 (H) 02/05/2023     No results found for: EAG  Lab Results   Component Value Date    CHOL 156 10/03/2018    CHOL 171 03/14/2018    CHOL 150 02/08/2017     Lab Results   Component Value Date    TRIG 182 06/24/2020    TRIG 218 02/28/2020    TRIG 122 10/30/2019     Lab Results   Component Value Date     (A) 06/24/2020    HDL 40 02/28/2020    HDL 36 10/30/2019     Lab Results   Component Value Date    LDLCALC 96 10/03/2018    LDLCALC 96 03/14/2018    LDLCALC 70 02/08/2017    LDLCHOLESTEROL 122 2020    LDLCHOLESTEROL 142 2020    LDLCHOLESTEROL 108 10/30/2019     Lab Results   Component Value Date    VLDL 145 2020    VLDL 185 2020    VLDL 132 10/30/2019     Lab Results   Component Value Date    CHOLHDLRATIO 3.3 2020    CHOLHDLRATIO 3.5 2020    CHOLHDLRATIO 3.0 10/30/2019     No results for input(s): PROBNP in the last 72 hours. Cardiac Tests:    EK2023: V-paced 68 bpm    Telemetry: Atrial fibrillation, ventricular paced 60 bpm    Chest X-ray:   23    Impression   1. Airspace disease within the retrocardiac region   2. Small left pleural effusion   3. Trace right pleural effusion     Echocardiogram:   TTE 23   Summary   Micro-bubble contrast injected to enhance left ventricular visualization. Normal left ventricular size. LV systolic function is moderately-severely reduced. Ejection fraction is visually estimated at 30-35%. Abnormal diastolic function. Severe hypokinesis basal-mid anteroseptal, basal-mid inferoseptal, and   apical septal walls. Moderate left ventricular concentric hypertrophy noted. Abnormal LV septal motion consistent with post-operative state, paced   rhythm. Moderately dilated right ventricle with severely reduced function. Biatrial dilation. Trace-mild mitral regurgitation. Mild tricuspid regurgitation. 34 mm Evolut Pro+ TAVR valve implanted 2022. Mean gradient 7 mmHg; Mild paravalvular leak. TTE 3/1/22 [place]   Summary   Ejection fraction is visually estimated at 30-35%. Dilated right ventricle with normal right ventricular function (TAPSE 1.9   cm). Indeterminate diastolic function. Severely dilated left atrium by volume index. Dilated right atrium. Moderate mitral regurgitation. Hx of TAVR with a 34 mm Evolut Pro+ bioprosthetic valve on 2022. Mild paravalvular aortic regurgitation. AV peak velocity 1.8 m/s. AV mean gradient 6 mmHg. Aortic valve area 2.0 cm2.    Mild tricuspid regurgitation. PASP is estimated at 67 mmHg. Left-sided pleural effusion. Stress test:      Cardiac catheterization:     Device interrogation 2/6/2023  Greater than 8 years battery life remaining  VVIR  99.1% ventricular pacing  0.9% ventricular sensing  Normal device function    ----------------------------------------------------------------------------------------------------------------------------------------------------------------  IMPRESSION:  Syncope likely due to severe anemia. No recurrence  CAD/CABG 2009 LIMA-LAD, V-OM1, V-RPDA, V-RPL. 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  proBNP 31,000  Ischemic cardiomyopathy EF 30-35%  Pleural effusion, ascites  Acute GI bleed, EGD with gastritis no active bleeding  Acute blood loss anemia Hgb 5.8 -> 7.7 posttransfusion  ROSEANNE/CKD creatinine 2.3 -> 3.9 baseline 1.5-1.8.   Downtrending 3.6  Mild thrombocytopenia 120s  Hypertension, now running high better controlled  Type 2 diabetes    RECOMMENDATIONS:    Remain off apixaban  Device interrogation reviewed, normal device function no arrhythmias  Check orthostatics, never done  Echo reviewed no significant change, LV dysfunction still present, TAVR valve functioning reasonably well  Optimize GDMT  Continue carvedilol 25 mg twice daily  No ACE/ARB/ARNI/MRA/SGLT2i due to renal failure  Continue hydralazine/nitrate combination, uptitrate as needed  Repeat proBNP  Hold diuretics; caution with fluids to avoid precipitating further decompensated heart failure  Defer further fluid management to nephrology  Discontinue doxazosin given HFrEF; if used for BPH consider tamsulosin or other alternative  Further care per primary service and consultants    Arjun Canales MD, [facility]    NOTE: This report was transcribed using voice recognition software. Every effort was made to ensure accuracy; however, inadvertent computerized transcription errors may be present.

## 2023-02-09 NOTE — PROGRESS NOTES
Internal Medicine Progress Note    Patient's name: Moise Rivera  : 1931  Chief complaints (on day of admission): Loss of Consciousness (Lost consciousness x 5 mins while on BSC at home, did not fall.)  Admission date: 2023  Date of service: 2023   Room: [address] MED SURG/TELE  Primary care physician: Umesh Alexandre MD  Reason for visit: Follow-up for LOC    Subjective  Moise was seen and examined at bedside     He is tired in bed but awakens easily to talk to me   He is moving all extremities when I ask him   His hand  strength is good BL UE  No new complaints at this time   Told him we are waiting for his kidney function to improve   Discussed with nursing staff  Will ask palliative to see today    Review of Systems  There are no new complaints of chest pain, shortness of breath, abdominal pain, nausea, vomiting, diarrhea, constipation unless otherwise mentioned above.     Hospital Medications  Current Facility-Administered Medications   Medication Dose Route Frequency Provider Last Rate Last Admin    hydrALAZINE (APRESOLINE) injection 10 mg  10 mg IntraVENous Q4H PRN Marino Webb MD        carvedilol (COREG) tablet 25 mg  25 mg Oral BID Franklin Borja MD   25 mg at 23 0839    isosorbide dinitrate (ISORDIL) tablet 10 mg  10 mg Oral TID Franklin Borja MD   10 mg at 23 0839    0.9 % sodium chloride infusion   IntraVENous Continuous Omid Wlels MD 75 mL/hr at 23 New Bag at 23    hydrALAZINE (APRESOLINE) tablet 50 mg  50 mg Oral 3 times per day Omid Wells MD   50 mg at 23 0545    0.9 % sodium chloride infusion   IntraVENous PRN Marino Webb MD        0.9 % sodium chloride infusion   IntraVENous PRN Marino Webb MD        melatonin tablet 6 mg  6 mg Oral Nightly PRN EDMUNDO Stephens - CNP   6 mg at 23    0.9 % sodium chloride infusion   IntraVENous Continuous Miguel Eckert DO   Stopped at 23 0216    pantoprazole  (PROTONIX) tablet 40 mg  40 mg Oral BID AC Christian Saalex, DO   40 mg at 02/09/23 0545    sodium chloride flush 0.9 % injection 10 mL  10 mL IntraVENous 2 times per day Roxi Carias MD   10 mL at 02/08/23 0817    sodium chloride flush 0.9 % injection 10 mL  10 mL IntraVENous PRN Roxi Carias MD        0.9 % sodium chloride infusion   IntraVENous PRN Roxi Carias MD        [facility]) tablet 8.6 mg  1 tablet Oral Daily PRN Roxi Carias MD   8.6 mg at 02/08/23 3464    acetaminophen (TYLENOL) tablet 650 mg  650 mg Oral Q6H PRN Roxi Carias MD        Or    acetaminophen (TYLENOL) suppository 650 mg  650 mg Rectal Q6H PRN Roxi Carias MD        [facility]] pravastatin (PRAVACHOL) tablet 40 mg  40 mg Oral Nightly Roxi Carias MD   40 mg at 02/07/23 2124    [Held by provider] torsemide (DEMADEX) tablet 20 mg  20 mg Oral Daily Roxi Carias MD           PRN Medications  hydrALAZINE, sodium chloride, sodium chloride, melatonin, sodium chloride flush, sodium chloride, senna, acetaminophen **OR** acetaminophen    Objective  Most Recent Recorded Vitals  BP (!) 144/60   Pulse 65   Temp 98 °F (36.7 °C) (Oral)   Resp 18   Ht 5' 11\" (1.803 m)   Wt 169 lb 2 oz (76.7 kg)   SpO2 92%   BMI 23.59 kg/m²   I/O last 3 completed shifts:  In: -   Out: 1000 [Urine:1000]  No intake/output data recorded.     Physical Exam:  General: AAO to person/place/time/purpose, NAD, no labored breathing  Eyes: conjunctivae/corneas clear, sclera non icteric  Skin: color/texture/turgor normal, no rashes or lesions  Lungs: CTAB, no retractions/use of accessory muscles, no vocal fremitus, no rhonchi, no crackle, no rales  Heart: regular rate, regular rhythm, no murmur  Abdomen: soft, NT, bowel sounds normal  Extremities: atraumatic, no edema  Neurologic: cranial nerves 2-12 grossly intact, no slurred speech    Most Recent Labs  Lab Results   Component Value Date    WBC 10.5 02/09/2023    HGB 7.7 (L) 02/09/2023    HCT 24.0 (L) 02/09/2023  (L) 02/09/2023     02/09/2023    K 4.4 02/09/2023     02/09/2023    CREATININE 3.6 (H) 02/09/2023    BUN 67 (H) 02/09/2023    CO2 23 02/09/2023    GLUCOSE 146 (H) 02/09/2023    ALT 52 (H) 02/09/2023    AST 58 (H) 02/09/2023    INR 1.6 02/01/2022    LABA1C 6.1 (H) 02/05/2023    LABMICR <12.0 05/18/2022       US RETROPERITONEAL COMPLETE   Final Result   1. Normal size kidneys. Increased echotexture of the renal parenchymal   relative sonolucent appearance of the renal pyramids can chronic renal   parenchymal process. Please correlate clinically. 2.  No signs for obstructive uropathy. 3.  Moderate volume ascites in the abdomen. 4.  Mild to moderate left-sided pleural effusion         CT Head W/O Contrast   Final Result   1. There is no acute intracranial abnormality. Specifically, there is no   intracranial hemorrhage. 2. Atrophy and periventricular leukomalacia,         XR CHEST PORTABLE   Final Result   1. Airspace disease within the retrocardiac region   2. Small left pleural effusion   3.  Trace right pleural effusion             Echocardiogram       Assessment   Active Hospital Problems    Diagnosis     GI bleed [K92.2]      Priority: Medium    Mild protein-calorie malnutrition ([facility] [E44.1]     Gout [M10.9]     Chronic kidney disease [N18.9]     Aortic valve stenosis [I35.0]     Heart valve disease [I38]     Long term current use of anticoagulant therapy [Z79.01]     CAD (coronary artery disease) [I25.10]     S/P CABG (coronary artery bypass graft) [Z95.1]     DM (diabetes mellitus) (HCC) [E11.9]     HTN (hypertension) [I10]     Hyperlipemia [E78.5]     A-fib (HCC) [I48.91]     Diabetic neuropathy (HCC) [E11.40]     BPH (benign prostatic hyperplasia) [N40.0]          Plan  GI bleed with acute blood loss anemia:   General surgery following-- plans for EGD 2/6 hiatal hernia, relux changes possible barrets esophagus and mild gastritis with a normal duodenum   PPI  Follow H&H--pRBC if Hb < 7  Check Fe/Ferritin/TIBC/Vitamin B12/Folate  Hold anticoagulation (h/o afib)    ROSEANNE on CKD3: Follow BMP  Baseline sCr ~ 1.8 to 2.0  Monitor Cr   Avoid nephrotoxins   Worsening to 3.8 --> 3.6  Renal US  Nephrology consultation noted   On IVFs now     Hypotension and syncope related to GIB:  Gentle IVF  Hold Coreg, Cardura, Demadex  Noted BNP elevation   Cardio on board   Resume anti htn rx 2/8 his blood pressure is now un controlled     Transaminitis   Hold statin   Monitor lfts     [facility] 16/24  DVT prophylaxis   Code status Full  Medications, labs and imaging reviewed   Discharge plan: DC to [facility] soon once kidney function improves     Electronically signed by Nickie Qureshi MD on 2/9/2023 at 8:51 AM    I can be reached through [facility].

## 2023-02-09 NOTE — PLAN OF CARE
Problem: ABCDS Injury Assessment  Goal: Absence of physical injury  Outcome: Progressing     Problem: Safety - Adult  Goal: Free from fall injury  Outcome: Progressing     Problem: Chronic Conditions and Co-morbidities  Goal: Patient's chronic conditions and co-morbidity symptoms are monitored and maintained or improved  Outcome: Progressing     Problem: Skin/Tissue Integrity  Goal: Absence of new skin breakdown  Description: 1. Monitor for areas of redness and/or skin breakdown  2. Assess vascular access sites hourly  3. Every 4-6 hours minimum:  Change oxygen saturation probe site  4. Every 4-6 hours:  If on nasal continuous positive airway pressure, respiratory therapy assess nares and determine need for appliance change or resting period.   Outcome: Progressing

## 2023-02-09 NOTE — CONSULTS
Palliative Care Department  [phone]  Palliative Care Initial Consult  Provider Erica Smith, APRN - [address]  [id number]  Hospital Day: 5  Date of Initial Consult: 2/9/2023  Referring Provider: Az Hneson MD  Palliative Medicine was consulted for assistance with: Goals of care, CODE STATUS discussion    HPI:   Neyda Mckoy is a 80 y.o. with a medical history of A-fib, BPH, CAD, diabetic neuropathy, diabetes, hypertension, hyperlipidemia, pancreatitis, s/p CABG, TAVR, cholecystectomy left pleural effusion who was admitted on 2/5/2023 from home with a CHIEF COMPLAINT of loss of consciousness. Patient was sitting on toilet and passed out for about 5 minutes. Patient did not fall and he did not hit his head. His hemoglobin was found to be 7.5 in the ED and was found to be Hemoccult positive. Two units of blood given and general surgery consulted. Cardiology consulted for syncope. Nephrology consulted for ROSEANNE. Palliative medicine consulted for further assistance. ASSESSMENT/PLAN:     Pertinent Hospital Diagnoses     GI bleed with acute blood loss anemia  CKD stage III  Hypotension and syncope      Palliative Care Encounter / Counseling Regarding Goals of Care  Please see detailed goals of care discussion as below  At this time, Neyda Mckoy, Does Not have capacity for medical decision-making.   Capacity is time limited and situation/question specific  During encounter Lindsey Smith was surrogate medical decision-maker  Outcome of goals of care meeting:   Continue current medical management  Change CODE STATUS to limited/DNI  Code status Limited DNI  Advanced Directives: no POA or living will in [facility]  Surrogate/Legal NOK:  Jajaenrico Reaves (spouse) 2014 [facility] (child) [phone]      Spiritual assessment: no spiritual distress identified  Bereavement and grief: to be determined  Referrals to: none today  SUBJECTIVE:     Current medical issues leading to Palliative Medicine involvement include   Active Hospital Problems    Diagnosis Date Noted    GI bleed [K92.2] 02/05/2023     Priority: Medium    [facility] [E44.1] 02/01/2022    Gout [M10.9] 11/18/2021    Chronic kidney disease [N18.9] 11/18/2021    Aortic valve stenosis [I35.0] 11/18/2021    Heart valve disease [I38] 11/18/2021    Long term current use of anticoagulant therapy [Z79.01] 04/08/2016    CAD (coronary artery disease) [I25.10]     S/P CABG (coronary artery bypass graft) [Z95.1]     DM (diabetes mellitus) ([facility] [E11.9]     HTN (hypertension) [I10]     Hyperlipemia [E78.5]     A-fib (HCC) [I48.91]     Diabetic neuropathy (HCC) [E11.40]     BPH (benign prostatic hyperplasia) [N40.0]        Details of Conversation:    Chart reviewed. Patient seen at the bedside, alert and oriented x2. Patient sleeping in chair in his room. Patient unable to partake in meaningful conversation and unable to make decisions for himself. Patient's wife, Blanche Ortiz, present at the bedside. Introduced self and role of palliative medicine. Blanche Ortiz states that the patient has a living will. Per Natasha, patient was living in the home setting with her and she was patient's primary caregiver. She states that he was alert and oriented in the home setting but has been altered and weak since hospitalization. Blanche Ortiz is patient's primary decision maker and they have 3 children together. Discussed patient's current condition and comorbidities. Patient was admitted for GI bleed and has received multiple units of blood. Patient had an EGD on 2/6/2023 which showed hiatal hernia and possible Bhat's esophagus. Patient also has a extensive cardiac history. Discussed goals of care and CODE STATUS options. Blanche Ortiz states that in the event of cardio pulmonary arrest the patient would not want to be resuscitated.   She states that when patient was in the home setting and had syncopal episode, he did not want to come to the hospital and stated he lived long enough. Based on CODE STATUS discussion, Yunior Dasilva wishes to change patient's CODE STATUS to limited/DNI. She would like to monitor clinical progression at this time. If patient continues to decline, she is open to discussing comfort measures/hospice services at that time. She is concerned as patient is very weak, frail, and has not been eating the past couple days. Palliative medicine provided emotional support. All questions and concerns addressed. Palliative medicine to follow. OBJECTIVE:   Prognosis: Guarded    Physical Exam:  BP (!) 144/60   Pulse 65   Temp 98 °F (36.7 °C) (Oral)   Resp 18   Ht 5' 11\" (1.803 m)   Wt 169 lb 2 oz (76.7 kg)   SpO2 92%   BMI 23.59 kg/m²   Constitutional:  Elderly, thin, resting  Eyes: no scleral icterus, normal lids, no discharge  ENMT:  Normocephalic, atraumatic, mucosa moist, EOMI  Neck:  trachea midline, no JVD  Lungs:  CTA bilaterally  Heart[de-identified] Paced  Abd:  Soft, non tender, non distended, bowel sounds present  Ext:  Moving all extremities, +edema, pulses present  Skin:  Warm and dry  Psych: non-anxious affect  Neuro:  A&Ox2, follows commands    Objective data reviewed: labs, images, records, medication use, vitals, and chart    Discussed patient and the plan of care with the other IDT members: Palliative Medicine IDT Team, Floor Nurse, Patient, and Family    Time/Communication  Greater than 50% of time spent, total 55 minutes in counseling and coordination of care at the bedside regarding goals of care, diagnosis and prognosis, and see above. Thank you for allowing Palliative Medicine to participate in the care of Carmen Bradford.

## 2023-02-09 NOTE — PROGRESS NOTES
[facility] MD Aníbal Nair, MD Tucker Davila MD Aldona Gibbon, SOLITARIO Otero, DEMARCO  Progress Note    2/9/2023    SUBJECTIVE:   2/9:  Seen while sitting up in chair. NAD. Denies chest pain or SOB. Denies nausea, vomiting or abd pain. Spouse in room and she reports that his appetite and oral intake is not good. PROBLEM LIST:    Principal Problem:    GI bleed  Active Problems:    Palliative care encounter    CAD (coronary artery disease)    S/P CABG (coronary artery bypass graft)    DM (diabetes mellitus) (HCC)    Hyperlipemia    HTN (hypertension)    A-fib (HCC)    BPH (benign prostatic hyperplasia)    Diabetic neuropathy ([facility]    Long term current use of anticoagulant therapy    Aortic valve stenosis    Chronic kidney disease    Heart valve disease    Gout    Mild protein-calorie malnutrition ([facility]  Resolved Problems:    Cellulitis    Syncope without other cardiovascular symptoms         DIET:    ADULT DIET;  Regular; GI [place] (GERD/Peptic Ulcer)     MEDS (scheduled):    carvedilol  25 mg Oral BID    isosorbide dinitrate  10 mg Oral TID    hydrALAZINE  50 mg Oral 3 times per day    pantoprazole  40 mg Oral BID AC    sodium chloride flush  10 mL IntraVENous 2 times per day    [Held by provider] pravastatin  40 mg Oral Nightly    [Held by provider] torsemide  20 mg Oral Daily       MEDS (infusions):   sodium chloride 75 mL/hr at 02/09/23 1238    sodium chloride      sodium chloride      sodium chloride         MEDS (prn):  hydrALAZINE, sodium chloride, sodium chloride, melatonin, sodium chloride flush, sodium chloride, senna, acetaminophen **OR** acetaminophen    PHYSICAL EXAM:     Patient Vitals for the past 24 hrs:   BP Temp Temp src Pulse Resp SpO2 Weight   02/09/23 0712 (!) 144/60 98 °F (36.7 °C) Oral 65 18 92 % --   02/09/23 0053 -- -- -- -- -- -- 169 lb 2 oz (76.7 kg)   02/08/23 1845 (!) 173/74 97.8 °F (36.6 °C) Oral 65 20 94 % --   @      Intake/Output Summary (Last 24 hours) at 2/9/2023 1424  Last data filed at 2/9/2023 0215  Gross per 24 hour   Intake --   Output 700 ml   Net -700 ml         Wt Readings from Last 3 Encounters:   02/09/23 169 lb 2 oz (76.7 kg)   01/13/23 169 lb (76.7 kg)   04/29/22 159 lb 1.6 oz (72.2 kg)       Constitutional:  in no acute distress. Awake and alert  HEENT: NC/AT, EOMI, sclera and conjunctiva are clear and anicteric, mucus membranes moist  Neck: Trachea midline, no JVD  Cardiovascular: S1, S2 irregular rhythm, no murmur,or rub  Respiratory:  CTAB. No crackles, no wheeze  Gastrointestinal:  Soft, nontender, nondistended, NABS  :  sotomayor draining yellow urine  Ext: 1+BLE edema, feet warm  Skin: dry, no rash  Neuro: awake, alert, interactive      DATA:    Recent Labs     02/07/23  0639 02/07/23  1215 02/08/23  0432 02/08/23  1255 02/08/23  1922 02/09/23  0202   WBC 11.3  --  10.9  --   --  10.5   HGB 7.0*   < > 7.9* 8.2* 8.0* 7.7*   HCT 21.5*   < > 23.9* 25.9* 24.8* 24.0*   MCV 86.7  --  87.2  --   --  90.6   *  --  128*  --   --  125*    < > = values in this interval not displayed. Recent Labs     02/07/23  0639 02/08/23  0432 02/09/23  0202    138 139   K 5.2* 4.9 4.4    102 104   CO2 22 21* 23   MG  --  2.4  --    PHOS  --  4.7*  --    BUN 62* 66* 67*   CREATININE 3.8* 3.9* 3.6*   ALT 60* 61* 52*   AST 94* 86* 58*   BILITOT 0.7 0.9 1.0   ALKPHOS 63 77 74       Lab Results   Component Value Date    LABPROT 1.4 (H) 02/08/2023    LABPROT 1.4 02/08/2023    LABPROT 1.3 (H) 07/11/2022    LABPROT 1.3 07/11/2022       ASSESSMENT:  Acute Kidney Injury on chronic kidney disease in the setting of hypotension, intravascular volume depletion poor oral intake and GI bleed  Chronic Kidney 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

## 2023-02-10 LAB
ALBUMIN SERPL-MCNC: 3 G/DL (ref 3.5–5.2)
ALP BLD-CCNC: 66 U/L (ref 40–129)
ALT SERPL-CCNC: 39 U/L (ref 0–40)
ANION GAP SERPL CALCULATED.3IONS-SCNC: 12 MMOL/L (ref 7–16)
AST SERPL-CCNC: 39 U/L (ref 0–39)
BASOPHILS ABSOLUTE: 0.02 E9/L (ref 0–0.2)
BASOPHILS RELATIVE PERCENT: 0.3 % (ref 0–2)
BILIRUB SERPL-MCNC: 1.1 MG/DL (ref 0–1.2)
BUN BLDV-MCNC: 64 MG/DL (ref 6–23)
CALCIUM SERPL-MCNC: 8.4 MG/DL (ref 8.6–10.2)
CHLORIDE BLD-SCNC: 104 MMOL/L (ref 98–107)
CO2: 22 MMOL/L (ref 22–29)
CREAT SERPL-MCNC: 3.3 MG/DL (ref 0.7–1.2)
EOSINOPHILS ABSOLUTE: 0.14 E9/L (ref 0.05–0.5)
EOSINOPHILS RELATIVE PERCENT: 1.8 % (ref 0–6)
GFR SERPL CREATININE-BSD FRML MDRD: 17 ML/MIN/1.73
GLUCOSE BLD-MCNC: 124 MG/DL (ref 74–99)
HCT VFR BLD CALC: 22.3 % (ref 37–54)
HEMOGLOBIN: 7 G/DL (ref 12.5–16.5)
IMMATURE GRANULOCYTES #: 0.04 E9/L
IMMATURE GRANULOCYTES %: 0.5 % (ref 0–5)
LYMPHOCYTES ABSOLUTE: 1.45 E9/L (ref 1.5–4)
LYMPHOCYTES RELATIVE PERCENT: 18.4 % (ref 20–42)
MCH RBC QN AUTO: 28.8 PG (ref 26–35)
MCHC RBC AUTO-ENTMCNC: 31.4 % (ref 32–34.5)
MCV RBC AUTO: 91.8 FL (ref 80–99.9)
MONOCYTES ABSOLUTE: 0.98 E9/L (ref 0.1–0.95)
MONOCYTES RELATIVE PERCENT: 12.4 % (ref 2–12)
NEUTROPHILS ABSOLUTE: 5.25 E9/L (ref 1.8–7.3)
NEUTROPHILS RELATIVE PERCENT: 66.6 % (ref 43–80)
PDW BLD-RTO: 16.2 FL (ref 11.5–15)
PLATELET # BLD: 118 E9/L (ref 130–450)
PMV BLD AUTO: 10.8 FL (ref 7–12)
POTASSIUM REFLEX MAGNESIUM: 3.9 MMOL/L (ref 3.5–5)
RBC # BLD: 2.43 E12/L (ref 3.8–5.8)
SODIUM BLD-SCNC: 138 MMOL/L (ref 132–146)
TOTAL PROTEIN: 5.8 G/DL (ref 6.4–8.3)
WBC # BLD: 7.9 E9/L (ref 4.5–11.5)

## 2023-02-10 PROCEDURE — 6370000000 HC RX 637 (ALT 250 FOR IP): Performed by: INTERNAL MEDICINE

## 2023-02-10 PROCEDURE — 36415 COLL VENOUS BLD VENIPUNCTURE: CPT

## 2023-02-10 PROCEDURE — 99231 SBSQ HOSP IP/OBS SF/LOW 25: CPT | Performed by: NURSE PRACTITIONER

## 2023-02-10 PROCEDURE — 2580000003 HC RX 258: Performed by: INTERNAL MEDICINE

## 2023-02-10 PROCEDURE — 80053 COMPREHEN METABOLIC PANEL: CPT

## 2023-02-10 PROCEDURE — 6370000000 HC RX 637 (ALT 250 FOR IP): Performed by: NURSE PRACTITIONER

## 2023-02-10 PROCEDURE — 6370000000 HC RX 637 (ALT 250 FOR IP): Performed by: STUDENT IN AN ORGANIZED HEALTH CARE EDUCATION/TRAINING PROGRAM

## 2023-02-10 PROCEDURE — 85025 COMPLETE CBC W/AUTO DIFF WBC: CPT

## 2023-02-10 PROCEDURE — 97530 THERAPEUTIC ACTIVITIES: CPT

## 2023-02-10 PROCEDURE — 1200000000 HC SEMI PRIVATE

## 2023-02-10 PROCEDURE — 2580000003 HC RX 258: Performed by: STUDENT IN AN ORGANIZED HEALTH CARE EDUCATION/TRAINING PROGRAM

## 2023-02-10 PROCEDURE — 99232 SBSQ HOSP IP/OBS MODERATE 35: CPT | Performed by: INTERNAL MEDICINE

## 2023-02-10 RX ADMIN — HYDRALAZINE HYDROCHLORIDE 50 MG: 50 TABLET, FILM COATED ORAL at 06:13

## 2023-02-10 RX ADMIN — SODIUM CHLORIDE: 9 INJECTION, SOLUTION INTRAVENOUS at 02:06

## 2023-02-10 RX ADMIN — ISOSORBIDE DINITRATE 10 MG: 10 TABLET ORAL at 22:54

## 2023-02-10 RX ADMIN — CARVEDILOL 25 MG: 25 TABLET, FILM COATED ORAL at 16:39

## 2023-02-10 RX ADMIN — ISOSORBIDE DINITRATE 10 MG: 10 TABLET ORAL at 09:43

## 2023-02-10 RX ADMIN — CARVEDILOL 25 MG: 25 TABLET, FILM COATED ORAL at 09:43

## 2023-02-10 RX ADMIN — Medication 10 ML: at 09:44

## 2023-02-10 RX ADMIN — HYDRALAZINE HYDROCHLORIDE 50 MG: 50 TABLET, FILM COATED ORAL at 22:54

## 2023-02-10 RX ADMIN — PANTOPRAZOLE SODIUM 40 MG: 40 TABLET, DELAYED RELEASE ORAL at 16:39

## 2023-02-10 RX ADMIN — HYDRALAZINE HYDROCHLORIDE 50 MG: 50 TABLET, FILM COATED ORAL at 13:56

## 2023-02-10 RX ADMIN — Medication 6 MG: at 22:54

## 2023-02-10 RX ADMIN — PANTOPRAZOLE SODIUM 40 MG: 40 TABLET, DELAYED RELEASE ORAL at 06:13

## 2023-02-10 RX ADMIN — Medication 10 ML: at 20:59

## 2023-02-10 RX ADMIN — ISOSORBIDE DINITRATE 10 MG: 10 TABLET ORAL at 13:56

## 2023-02-10 NOTE — PROGRESS NOTES
Comprehensive Nutrition Assessment    Type and Reason for Visit:  Initial, RD Nutrition Re-Screen/LOS    Nutrition Recommendations/Plan:   Continue current diet as tolerated & monitor  May consider appetite stimulant  Will add Ensure Compact BID & Magic Cup daily (No Chocolate)  If PO intake remains poor may consider alternative means of nutrition as appropriate with goal of care     Malnutrition Assessment:  Malnutrition Status: At risk for malnutrition (Comment) (02/10/23 1500)    Context:  Acute Illness     Findings of the 6 clinical characteristics of malnutrition:  Energy Intake:  50% or less of estimated energy requirements for 5 or more days  Weight Loss:  Unable to assess (d/t CHF/CKD)     Body Fat Loss:  Unable to assess (d/t advanced age)     Muscle Mass Loss:  Unable to assess (d/t advanced age)    Fluid Accumulation:  Unable to assess (d/t multifactorial)     Strength:  Not Performed    Nutrition Assessment:    Pt w/ syncope r/t GIB w/ anemia s/p EGD 2/6 showing hiatal hernia & possible Bhat's esophagus. Note ROSEANNE on CKD. Hx DM, CHF. PO meal intake appears poor, palliative care following. Will add ONS w/ all meals & monitor. Nutrition Related Findings:    A&O, +4.6 L, trace edema, abd soft, hyper BS, A1c 6.1, BUN 64/Cr 3.3/GFR 17 Wound Type: None       Current Nutrition Intake & Therapies:    Average Meal Intake: 0%  Average Supplements Intake: None Ordered  ADULT DIET; Regular; GI [place] (GERD/Peptic Ulcer)    Anthropometric Measures:  Height: 5' 11\" (180.3 cm)  Ideal Body Weight (IBW): 172 lbs (78 kg)    Admission Body Weight: 170 lb (77.1 kg) (2/6 bed)  Current Body Weight: 169 lb (76.7 kg), 98.3 % IBW.  Weight Source: Bed Scale (2/8)  Current BMI (kg/m2): 23.6  Usual Body Weight: 158 lb (71.7 kg) (3/2022 actual per EMR)  % Weight Change (Calculated): 7  Weight Adjustment For: No Adjustment                 BMI Categories: Normal Weight (BMI 22.0 to 24.9) age over 72    Estimated Daily Nutrient Needs:  Energy Requirements Based On: Formula  Weight Used for Energy Requirements: Current  Energy (kcal/day):   Weight Used for Protein Requirements: Current  Protein (g/day): 75-85 (1-1.2 as tolerated w/ ROSEANNE on CKD)  Method Used for Fluid Requirements: Standard Renal  Fluid (ml/day): per renal    Nutrition Diagnosis:   Inadequate oral intake related to inadequate protein-energy intake (not much of an appetite) as evidenced by intake 0-25%    Nutrition Interventions:   Food and/or Nutrient Delivery: Continue Current Diet, Start Oral Nutrition Supplement     Coordination of Nutrition Care: Continue to monitor while inpatient       Goals:     Goals: PO intake 50% or greater, by next RD assessment       Nutrition Monitoring and Evaluation:      Food/Nutrient Intake Outcomes: Food and Nutrient Intake, Supplement Intake  Physical Signs/Symptoms Outcomes: Biochemical Data, GI Status, Fluid Status or Edema, Nutrition Focused Physical Findings, Skin, Weight    Discharge Planning:     Too soon to determine     José Lynch, RD, LD  Contact: [phone]

## 2023-02-10 NOTE — PROGRESS NOTES
Palliative Care Department  [phone]  Palliative Care Progress Note  Provider Shelah Merlin, APRN - [address]  [id number]  Hospital Day: 6  Date of Initial Consult: 2/9/2023  Referring Provider: Niki Pinedo MD  Palliative Medicine was consulted for assistance with: Goals of care, CODE STATUS discussion    HPI:   Jack Henao is a 80 y.o. with a medical history of A-fib, BPH, CAD, diabetic neuropathy, diabetes, hypertension, hyperlipidemia, pancreatitis, s/p CABG, TAVR, cholecystectomy left pleural effusion who was admitted on 2/5/2023 from home with a CHIEF COMPLAINT of loss of consciousness. Patient was sitting on toilet and passed out for about 5 minutes. Patient did not fall and he did not hit his head. His hemoglobin was found to be 7.5 in the ED and was found to be Hemoccult positive. Two units of blood given and general surgery consulted. Cardiology consulted for syncope. Nephrology consulted for ROSEANNE. Palliative medicine consulted for further assistance. ASSESSMENT/PLAN:     Pertinent Hospital Diagnoses     GI bleed with acute blood loss anemia  CKD stage III  Hypotension and syncope      Palliative Care Encounter / Counseling Regarding Goals of Care  Please see detailed goals of care discussion as below  At this time, Jack Henao, Does Not have capacity for medical decision-making.   Capacity is time limited and situation/question specific  During encounter Sania Seo was surrogate medical decision-maker  Outcome of goals of care meeting:   Continue current medical management  Change CODE STATUS to limited/DNI  Monitor PO intake  Code status Limited DNI  Advanced Directives: no POA or living will in [facility]  Surrogate/Legal NOK:  Adriana Jones (spouse) 2014 [facility] (child) [phone]      Spiritual assessment: no spiritual distress identified  Bereavement and grief: to be determined  Referrals to: none today  SUBJECTIVE:     Current medical issues leading to Palliative Medicine involvement include   Active Hospital Problems    Diagnosis Date Noted    Palliative care encounter [Z51.5] 02/09/2023     Priority: Medium    GI bleed [K92.2] 02/05/2023     Priority: Medium    Mild protein-calorie malnutrition ([facility] [E44.1] 02/01/2022    Gout [M10.9] 11/18/2021    Chronic kidney disease [N18.9] 11/18/2021    Aortic valve stenosis [I35.0] 11/18/2021    Heart valve disease [I38] 11/18/2021    Long term current use of anticoagulant therapy [Z79.01] 04/08/2016    CAD (coronary artery disease) [I25.10]     S/P CABG (coronary artery bypass graft) [Z95.1]     DM (diabetes mellitus) ([facility] [E11.9]     HTN (hypertension) [I10]     Hyperlipemia [E78.5]     A-fib (HCC) [I48.91]     Diabetic neuropathy (HCC) [E11.40]     BPH (benign prostatic hyperplasia) [N40.0]        Details of Conversation:    Chart reviewed. Patient seen at the bedside, alert and oriented x4. Patient is much more alert today than yesterday. No family present at the bedside. Patient states that he does not have much of an appetite. Encourage patient to consume as much nutrition as possible to avoid further weakness and malnutrition. Patient understands. Continue current medical management and code status. Monitor clinical progression and PO intake. All questions and concerns addressed palliative medicine to follow.        OBJECTIVE:   Prognosis: Guarded    Physical Exam:  /62   Pulse 61   Temp 98.4 °F (36.9 °C) (Oral)   Resp 18   Ht 5' 11\" (1.803 m)   Wt 169 lb 2 oz (76.7 kg)   SpO2 95%   BMI 23.59 kg/m²   Constitutional:  Elderly, thin, awake, alert  Eyes: no scleral icterus, normal lids, no discharge  ENMT:  Normocephalic, atraumatic, mucosa moist, EOMI  Neck:  trachea midline, no JVD  Lungs:  CTA bilaterally  Heart[de-identified] Paced  Abd:  Soft, non tender, non distended, bowel sounds present  Ext:  Moving all extremities, +edema, pulses present  Skin:  Warm and dry  Psych: non-anxious affect  Neuro:  A&Ox3, follows commands    Objective data reviewed: labs, images, records, medication use, vitals, and chart    Discussed patient and the plan of care with the other IDT members: Palliative Medicine IDT Team, Floor Nurse, Patient, and Family    Time/Communication  Greater than 50% of time spent, total 25 minutes in counseling and coordination of care at the bedside regarding goals of care, diagnosis and prognosis, and see above. Thank you for allowing Palliative Medicine to participate in the care of Carmen Bradford.

## 2023-02-10 NOTE — PROGRESS NOTES
Internal Medicine Progress Note    Patient's name: Raymundo Causey  : 1931  Chief complaints (on day of admission): Loss of Consciousness (Lost consciousness x 5 mins while on BSC at home, did not fall.)  Admission date: 2023  Date of service: 2/10/2023   Room: [address] MED SURG/TELE  Primary care physician: Jagdish Garcia MD  Reason for visit: Follow-up for LOC    Subjective  Carmelo Gilbert was seen and examined at bedside     2/10  Doing ok today   Asleep again   Fluids have been stopped   Wakes up easily   Tells me he wants to go home today   Discussed with nursing staff  DC planning, once cleared by nephrology       He is tired in bed but awakens easily to talk to me   He is moving all extremities when I ask him   His hand  strength is good BL UE  No new complaints at this time   Told him we are waiting for his kidney function to improve   Discussed with nursing staff  Will ask palliative to see today    Review of Systems  There are no new complaints of chest pain, shortness of breath, abdominal pain, nausea, vomiting, diarrhea, constipation unless otherwise mentioned above.      Hospital Medications  Current Facility-Administered Medications   Medication Dose Route Frequency Provider Last Rate Last Admin    hydrALAZINE (APRESOLINE) injection 10 mg  10 mg IntraVENous Q4H PRN Violet Elder MD        carvedilol (COREG) tablet 25 mg  25 mg Oral BID Rachel Araujo MD   25 mg at 23 171    isosorbide dinitrate (ISORDIL) tablet 10 mg  10 mg Oral TID Rachel Araujo MD   10 mg at 23    0.9 % sodium chloride infusion   IntraVENous Continuous Jed Najera MD 75 mL/hr at 02/10/23 0206 New Bag at 02/10/23 0206    hydrALAZINE (APRESOLINE) tablet 50 mg  50 mg Oral 3 times per day Jed Najera MD   50 mg at 02/10/23 0613    0.9 % sodium chloride infusion   IntraVENous PRN Violet Elder MD        0.9 % sodium chloride infusion   IntraVENous PRN Violet Elder MD        melatonin tablet 6 mg 6 mg Oral Nightly PRN Keegan Perry, APRN - CNP   6 mg at 02/08/23 2110    pantoprazole (PROTONIX) tablet 40 mg  40 mg Oral BID AC Christian Prasad    40 mg at 02/10/23 8958    sodium chloride flush 0.9 % injection 10 mL  10 mL IntraVENous 2 times per day Franklyn Jalloh MD   10 mL at 02/09/23 2058    sodium chloride flush 0.9 % injection 10 mL  10 mL IntraVENous PRN Franklyn Jalloh MD        0.9 % sodium chloride infusion   IntraVENous PRN MD miguelito [facility]) tablet 8.6 mg  1 tablet Oral Daily PRN Franklyn Jalloh MD   8.6 mg at 02/08/23 2254    acetaminophen (TYLENOL) tablet 650 mg  650 mg Oral Q6H PRN Franklyn Jalloh MD        Or    acetaminophen (TYLENOL) suppository 650 mg  650 mg Rectal Q6H PRN Franklyn Jalloh MD        [facility] by [facility]] pravastatin (PRAVACHOL) tablet 40 mg  40 mg Oral Nightly Franklyn Jalloh MD   40 mg at 02/07/23 2124    [Held by provider] torsemide (DEMADEX) tablet 20 mg  20 mg Oral Daily Franklyn Jalloh MD           PRN Medications  hydrALAZINE, sodium chloride, sodium chloride, melatonin, sodium chloride flush, sodium chloride, senna, acetaminophen **OR** acetaminophen    Objective  Most Recent Recorded Vitals  /62   Pulse 61   Temp 98.4 °F (36.9 °C) (Oral)   Resp 18   Ht 5' 11\" (1.803 m)   Wt 169 lb 2 oz (76.7 kg)   SpO2 95%   BMI 23.59 kg/m²   I/O last 3 completed shifts: In: 2920 [P.O.:420; I.V.:2500]  Out: 700 [Urine:700]  No intake/output data recorded.     Physical Exam:  General: AAO to person/place/time/purpose, NAD, no labored breathing  Eyes: conjunctivae/corneas clear, sclera non icteric  Skin: color/texture/turgor normal, no rashes or lesions  Lungs: CTAB, no retractions/use of accessory muscles, no vocal fremitus, no rhonchi, no crackle, no rales  Heart: regular rate, regular rhythm, no murmur  Abdomen: soft, NT, bowel sounds normal  Extremities: atraumatic, no edema  Neurologic: cranial nerves 2-12 grossly intact, no slurred speech    Most Recent Labs  Lab Results   Component Value Date    WBC 7.9 02/10/2023    HGB 7.0 (L) 02/10/2023    HCT 22.3 (L) 02/10/2023     (L) 02/10/2023     02/10/2023    K 3.9 02/10/2023     02/10/2023    CREATININE 3.3 (H) 02/10/2023    BUN 64 (H) 02/10/2023    CO2 22 02/10/2023    GLUCOSE 124 (H) 02/10/2023    ALT 39 02/10/2023    AST 39 02/10/2023    INR 1.6 02/01/2022    LABA1C 6.1 (H) 02/05/2023    LABMICR <12.0 05/18/2022       US RETROPERITONEAL COMPLETE   Final Result   1. Normal size kidneys. Increased echotexture of the renal parenchymal   relative sonolucent appearance of the renal pyramids can chronic renal   parenchymal process. Please correlate clinically. 2.  No signs for obstructive uropathy. 3.  Moderate volume ascites in the abdomen. 4.  Mild to moderate left-sided pleural effusion         CT Head W/O Contrast   Final Result   1. There is no acute intracranial abnormality. Specifically, there is no   intracranial hemorrhage. 2. Atrophy and periventricular leukomalacia,         XR CHEST PORTABLE   Final Result   1. Airspace disease within the retrocardiac region   2. Small left pleural effusion   3.  Trace right pleural effusion             Echocardiogram       Assessment   Active Hospital Problems    Diagnosis     Palliative care encounter [Z51.5]      Priority: Medium    GI bleed [K92.2]      Priority: Medium    Mild protein-calorie malnutrition ([facility] [E44.1]     Gout [M10.9]     Chronic kidney disease [N18.9]     Aortic valve stenosis [I35.0]     Heart valve disease [I38]     Long term current use of anticoagulant therapy [Z79.01]     CAD (coronary artery disease) [I25.10]     S/P CABG (coronary artery bypass graft) [Z95.1]     DM (diabetes mellitus) (HCC) [E11.9]     HTN (hypertension) [I10]     Hyperlipemia [E78.5]     A-fib (HCC) [I48.91]     Diabetic neuropathy (HCC) [E11.40]     BPH (benign prostatic hyperplasia) [N40.0]          Plan  GI bleed with acute blood loss anemia:   General surgery following-- plans for EGD 2/6 hiatal hernia, relux changes possible barrets esophagus and mild gastritis with a normal duodenum   PPI  Follow H&H--pRBC if Hb < 7  Check Fe/Ferritin/TIBC/Vitamin B12/Folate  Hold Eliquis anticoagulation (h/o afib)     ROSEANNE on CKD3: Follow BMP  Baseline sCr ~ 1.8 to 2.0  Monitor Cr   Avoid nephrotoxins   3.8 --> 3.6 -->   Renal US negative for obstruction   Nephrology consultation noted   On IVFs now as per nephrology management     Hypotension and syncope related to GIB:  Gentle IVF  Hold Coreg, Cardura, Demadex  Noted BNP elevation   Cardio on board   Resume anti htn rx 2/8 his blood pressure is now un controlled     Transaminitis   Hold statin   Monitor lfts     [facility] 16/24  DVT prophylaxis   Code status Full  Medications, labs and imaging reviewed   Discharge plan: DC to [facility] soon once cleared by nephrology     Electronically signed by Niki Pinedo MD on 2/10/2023 at 9:11 AM    I can be reached through [facility].

## 2023-02-10 NOTE — PROGRESS NOTES
[facility] MD Dani Ngo MD Emilio Meyers, MD Shelvia Salinas, MD Morrie Crown, SOLITARIO Otero, DEMARCO  Progress Note    2/10/2023    SUBJECTIVE:   2/9:  Seen while sitting up in chair. NAD. Denies chest pain or SOB. Denies nausea, vomiting or abd pain. Spouse in room and she reports that his appetite and oral intake is not good. 2/10 NAD stable vitals   PROBLEM LIST:    Principal Problem:    GI bleed  Active Problems:    Palliative care encounter    CAD (coronary artery disease)    S/P CABG (coronary artery bypass graft)    DM (diabetes mellitus) (HCC)    Hyperlipemia    HTN (hypertension)    A-fib (HCC)    BPH (benign prostatic hyperplasia)    Diabetic neuropathy ([facility]    Long term current use of anticoagulant therapy    Aortic valve stenosis    Chronic kidney disease    Heart valve disease    Gout    Mild protein-calorie malnutrition ([facility]  Resolved Problems:    Cellulitis    Syncope without other cardiovascular symptoms         DIET:    ADULT DIET;  Regular; GI Pitkin (GERD/Peptic Ulcer)     MEDS (scheduled):    carvedilol  25 mg Oral BID    isosorbide dinitrate  10 mg Oral TID    hydrALAZINE  50 mg Oral 3 times per day    pantoprazole  40 mg Oral BID AC    sodium chloride flush  10 mL IntraVENous 2 times per day    [Held by provider] pravastatin  40 mg Oral Nightly    [Held by provider] torsemide  20 mg Oral Daily       MEDS (infusions):   sodium chloride      sodium chloride      sodium chloride         MEDS (prn):  hydrALAZINE, sodium chloride, sodium chloride, melatonin, sodium chloride flush, sodium chloride, senna, acetaminophen **OR** acetaminophen    PHYSICAL EXAM:     Patient Vitals for the past 24 hrs:   BP Temp Temp src Pulse Resp SpO2   02/10/23 0654 129/62 98.4 °F (36.9 °C) Oral 61 18 95 %   02/09/23 1845 122/60 97.8 °F (36.6 °C) Oral 67 18 93 %     @      Intake/Output Summary (Last 24 hours) at 2/10/2023 1005  Last data filed at 2/10/2023 0206  Gross per 24 hour   Intake 2680 ml   Output --   Net 2680 ml           Wt Readings from Last 3 Encounters:   02/09/23 169 lb 2 oz (76.7 kg)   01/13/23 169 lb (76.7 kg)   04/29/22 159 lb 1.6 oz (72.2 kg)       Constitutional:  in no acute distress. Awake and alert  HEENT: NC/AT, EOMI, sclera and conjunctiva are clear and anicteric, mucus membranes moist  Neck: Trachea midline, no JVD  Cardiovascular: S1, S2 irregular rhythm, no murmur,or rub  Respiratory:  CTAB. No crackles, no wheeze  Gastrointestinal:  Soft, nontender, nondistended, NABS  :  sotomayor draining yellow urine  Ext: 1+BLE edema, feet warm  Skin: dry, no rash  Neuro: awake, alert, interactive      DATA:    Recent Labs     02/08/23  0432 02/08/23  1255 02/08/23  1922 02/09/23  0202 02/10/23  0254   WBC 10.9  --   --  10.5 7.9   HGB 7.9*   < > 8.0* 7.7* 7.0*   HCT 23.9*   < > 24.8* 24.0* 22.3*   MCV 87.2  --   --  90.6 91.8   *  --   --  125* 118*    < > = values in this interval not displayed. Recent Labs     02/08/23  0432 02/09/23  0202 02/10/23  0254    139 138   K 4.9 4.4 3.9    104 104   CO2 21* 23 22   MG 2.4  --   --    PHOS 4.7*  --   --    BUN 66* 67* 64*   CREATININE 3.9* 3.6* 3.3*   ALT 61* 52* 39   AST 86* 58* 39   BILITOT 0.9 1.0 1.1   ALKPHOS 77 74 66         Lab Results   Component Value Date    LABPROT 1.4 (H) 02/08/2023    LABPROT 1.4 02/08/2023    LABPROT 1.3 (H) 07/11/2022    LABPROT 1.3 07/11/2022       ASSESSMENT:  Acute Kidney Injury on chronic kidney disease in the setting of hypotension, intravascular volume depletion poor oral intake and GI bleed  Chronic Kidney 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

## 2023-02-10 NOTE — PROGRESS NOTES
[address] TREATMENT NOTE      Date:2/10/2023  Patient Name: Agustín Sheridan  MRN: [id number]  : 1931  Room: [address] [place] [address]       Evaluating OT: Jasmina Daly OTR/L   [id number]       Referring Roxy Jensen MD    Specific Provider Orders/Date:OT eval and treat 2023       Diagnosis:  GI bleed [K92.2]     Pertinent Medical History: AVR 2022, pacemaker, neuropathy     Precautions:  Fall Risk, alarm       Assessment of current deficits    [x] Functional mobility            [x]ADLs           [x] Strength                  []Cognition    [x] Functional transfers          [x] IADLs         [x] Safety Awareness   [x]Endurance    [] Fine Coordination                         [x] Balance      [] Vision/perception   []Sensation      []Gross Motor Coordination             [] ROM           [] Delirium                   [] Motor Control      OT PLAN OF CARE   OT POC based on physician orders, patient diagnosis and results of clinical assessment     Frequency/Duration  2-4 days/wk for 2 weeks PRN   Specific OT Treatment Interventions to include:   ADL retraining/adapted techniques and AE recommendations to increase functional independence within precautions                    Energy conservation techniques to improve tolerance for selfcare routine   Functional transfer/mobility training/DME recommendations for increased independence, safety and fall prevention         Patient/family education to increase safety and functional independence             Environmental modifications for safe mobility and completion of ADLs                             Therapeutic activity to improve functional performance during ADLs. Therapeutic exercise to improve tolerance and functional strength for ADLs    Balance retraining/tolerance tasks for facilitation of postural control with dynamic challenges during ADLs .       Positioning to improve functional independence Recommended Adaptive Equipment: TBD      Home Living: Pt lives with wife, 2 story with steps to enter. Bed/bath on 2nd. Patient has been staying on 1st floor, sleeping in lift chair, BSC/urinal, sponge bathing in kitchen     Equipment owned: walker      Prior Level of Function: recently wife assisting more  with ADLs , assist  with IADLs; ambulated with walker      Pain Level: no pain this session ;   Cognition: A&O:  pleasant, following commands, conversing               Memory:  fair               Sequencing:  fair              Problem solving:  fair               Judgement/safety:  fair                Functional Assessment:  AM-PAC Daily Activity Raw Score: 14/24    Initial Eval Status  Date: 2/8/2023 Treatment Status  Date: 2/10/23 STGs = LTGs  Time frame: 10-14 days   Feeding Independent       Grooming Mod  A,seated   B shoulder ROM limited    Min A    UB Dressing Mod  A Mod A to don/doff gown  Min A    LB Dressing Max A  Max A to don socks  Min A    Bathing Mod A    Min A    Toileting Mod A  Max A for hygiene while standing, incontinent of bowel. Dickerson leaking, nursing notified and aware  SBA    Bed Mobility  Up in recliner upon entry Supine to sit min A  SBA    Functional Transfers CGA  Sit- stand from recliner, bed   sit <> stand min A SBA/supervison    Functional Mobility CGA, w/walker  Steps next to bed   Cueing for hand placement  Min A with WW in room  SBA/supervision  with good tolerance    Balance Sitting:     Static:  no SOB during session     Dynamic:Mod A   Standing:CGA  Sitting balance SBA  Standing balance CGA with WW  Independent/supervision    Activity Tolerance No SOB  Fair, fatigued with minimal exertion  Good  with ADL activity      Comments:  patient cleared with nursing and agreeable to session. Patient fatigued but good effort demonstrated within limitations.  Patient in chair with call light in reach and alarm on    Education/treatment: ADL and functional transfer/activity performed to increase safety and independence during self care tasks. Education provided on safety awareness, adl reeducation, functional transfer training    Pt has made progress towards set goals.      Time In: 1:50  Time Out: 2:13     Min Units   Therapeutic Ex [id number]     Therapeutic Activities 47382 12 2   ADL/Self Care [id number]     Orthotic Management [id number]     Neuro Re-Ed [id number]     Non-Billable Time     TOTAL TIMED TREATMENT [address]/L [id number]

## 2023-02-10 NOTE — PROGRESS NOTES
INPATIENT CARDIOLOGY FOLLOW-UP    Name: Mahogany Chua    Age: 80 y.o. Date of Admission: 2/5/2023 11:05 AM    Date of Service: 2/10/2023    Primary Cardiologist: Dr. Harman Moreno    Chief Complaint: Follow-up for syncope, GI bleed    Interim History:  Feels weak. Denies chest pain, shortness of breath, palpitations, or recurrent syncope. Creatinine down to 3.3, remains on IV fluids and proBNP is greater than 70,000. Hemoglobin is 7.0 this morning.     Review of Systems:   Negative except as described above    Problem List:  Patient Active Problem List   Diagnosis    CAD (coronary artery disease)    S/P CABG (coronary artery bypass graft)    DM (diabetes mellitus) ([facility]    Hyperlipemia    HTN (hypertension)    A-fib (HCC)    BPH (benign prostatic hyperplasia)    Diabetic neuropathy ([facility] [facility]    Long term current use of anticoagulant therapy    Aortic valve stenosis    Chronic kidney disease    Heart valve disease    Gout    Mild protein-calorie malnutrition (HCC)    [facility]    GI bleed    Palliative care encounter       Current Medications:    Current Facility-Administered Medications:     hydrALAZINE (APRESOLINE) injection 10 mg, 10 mg, IntraVENous, Q4H PRN, Franklyn Jalloh MD    carvedilol (COREG) tablet 25 mg, 25 mg, Oral, BID, Orlando Hoang MD, 25 mg at 02/10/23 0943    isosorbide dinitrate (ISORDIL) tablet 10 mg, 10 mg, Oral, TID, Orlando Hoang MD, 10 mg at 02/10/23 0943    hydrALAZINE (APRESOLINE) tablet 50 mg, 50 mg, Oral, 3 times per day, Kavitha Tucker MD, 50 mg at 02/10/23 0613    0.9 % sodium chloride infusion, , IntraVENous, PRN, Franklyn Jalloh MD    0.9 % sodium chloride infusion, , IntraVENous, PRN, Franklyn Jalloh MD    melatonin tablet 6 mg, 6 mg, Oral, Nightly PRN, Keegan Carter, APRN - CNP, 6 mg at 02/08/23 2110    pantoprazole (PROTONIX) tablet 40 mg, 40 mg, Oral, BID AC, Christian Prasad DO, 40 mg at 02/10/23 3894    sodium chloride flush 0.9 % injection 10 mL, 10 mL, IntraVENous, 2 times per day, Az Henson MD, 10 mL at 02/10/23 0944    sodium chloride flush 0.9 % injection 10 mL, 10 mL, IntraVENous, PRN, Az Henson MD    0.9 % sodium chloride infusion, , IntraVENous, PRN, Az Henson MD    miguelito Schmid) tablet 8.6 mg, 1 tablet, Oral, Daily PRN, Az Henson MD, 8.6 mg at 02/08/23 6673    acetaminophen (TYLENOL) tablet 650 mg, 650 mg, Oral, Q6H PRN **OR** acetaminophen (TYLENOL) suppository 650 mg, 650 mg, Rectal, Q6H PRN, Az Henson MD    [facility] AT [place] by provider] pravastatin (PRAVACHOL) tablet 40 mg, 40 mg, Oral, Nightly, Az Henson MD, 40 mg at 02/07/23 2124    [Held by provider] torsemide (DEMADEX) tablet 20 mg, 20 mg, Oral, Daily, Az Henson MD    Physical Exam:  /62   Pulse 61   Temp 98.4 °F (36.9 °C) (Oral)   Resp 18   Ht 5' 11\" (1.803 m)   Wt 169 lb 2 oz (76.7 kg)   SpO2 95%   BMI 23.59 kg/m²   Wt Readings from Last 3 Encounters:   02/09/23 169 lb 2 oz (76.7 kg)   01/13/23 169 lb (76.7 kg)   04/29/22 159 lb 1.6 oz (72.2 kg)     Appearance: Frail-appearing elderly gentleman, awake, alert, no acute respiratory distress  Skin: Intact, no rash  Head: Normocephalic, atraumatic  Eyes: EOMI, no conjunctival erythema  ENMT: No pharyngeal erythema, MMM, no rhinorrhea  Neck: Supple, no elevated JVP, no carotid bruits  Lungs: Few basilar rales  Cardiac: PMI nondisplaced, irregular rhythm with a normal rate, S1 & S2 normal, systolic  Abdomen: Soft, nontender, +bowel sounds  Extremities: Moves all extremities x 4, 1+ lower extremity edema L>R  Neurologic: No focal motor deficits apparent, normal mood and affect  Peripheral Pulses: Intact posterior tibial pulses bilaterally    Intake/Output:    Intake/Output Summary (Last 24 hours) at 2/10/2023 1100  Last data filed at 2/10/2023 0206  Gross per 24 hour   Intake 2680 ml   Output --   Net 2680 ml     No intake/output data recorded.     Laboratory Tests:  Recent Labs     02/08/23  0432 02/09/23  0202 02/10/23  0254    139 138   K 4.9 4.4 3.9    104 104   CO2 21* 23 22   BUN 66* 67* 64*   CREATININE 3.9* 3.6* 3.3*   GLUCOSE 141* 146* 124*   CALCIUM 8.9 9.0 8.4*     Lab Results   Component Value Date/Time    MG 2.4 02/08/2023 04:32 AM     Recent Labs     02/08/23  0432 02/09/23  0202 02/10/23  0254   ALKPHOS 77 74 66   ALT 61* 52* 39   AST 86* 58* 39   PROT 6.6 6.2* 5.8*   BILITOT 0.9 1.0 1.1   LABALBU 3.6 3.3* 3.0*     Recent Labs     02/08/23 0432 02/08/23  1255 02/08/23  1922 02/09/23  0202 02/10/23  0254   WBC 10.9  --   --  10.5 7.9   RBC 2.74*  --   --  2.65* 2.43*   HGB 7.9*   < > 8.0* 7.7* 7.0*   HCT 23.9*   < > 24.8* 24.0* 22.3*   MCV 87.2  --   --  90.6 91.8   MCH 28.8  --   --  29.1 28.8   MCHC 33.1  --   --  32.1 31.4*   RDW 16.3*  --   --  16.2* 16.2*   *  --   --  125* 118*   MPV 11.2  --   --  11.1 10.8    < > = values in this interval not displayed.      No results found for: CKTOTAL, CKMB, CKMBINDEX, TROPONINI  Lab Results   Component Value Date    INR 1.6 02/01/2022    INR 2.4 01/29/2022    INR 2.5 06/24/2020    PROTIME 17.7 (H) 02/01/2022    PROTIME 26.4 (H) 01/29/2022    PROTIME 29.8 06/24/2020     No results found for: TSHFT4, TSH  Lab Results   Component Value Date    LABA1C 6.1 (H) 02/05/2023     No results found for: EAG  Lab Results   Component Value Date    CHOL 156 10/03/2018    CHOL 171 03/14/2018    CHOL 150 02/08/2017     Lab Results   Component Value Date    TRIG 182 06/24/2020    TRIG 218 02/28/2020    TRIG 122 10/30/2019     Lab Results   Component Value Date     (A) 06/24/2020    HDL 40 02/28/2020    HDL 36 10/30/2019     Lab Results   Component Value Date    LDLCALC 96 10/03/2018    LDLCALC 96 03/14/2018    LDLCALC 70 02/08/2017    LDLCHOLESTEROL 122 06/24/2020    LDLCHOLESTEROL 142 02/28/2020    LDLCHOLESTEROL 108 10/30/2019     Lab Results   Component Value Date    VLDL 145 06/24/2020    VLDL 185 02/28/2020    VLDL 132 10/30/2019     Lab Results Component Value Date    CHOLHDLRATIO 3.3 2020    CHOLHDLRATIO 3.5 2020    CHOLHDLRATIO 3.0 10/30/2019     Recent Labs     23  0202   PROBNP >70,000*         Cardiac Tests:    EK2023: V-paced 68 bpm    Telemetry: Atrial fibrillation, ventricular paced 60 bpm    Chest X-ray:   23    Impression   1. Airspace disease within the retrocardiac region   2. Small left pleural effusion   3. Trace right pleural effusion     Echocardiogram:   TTE 23   Summary   Micro-bubble contrast injected to enhance left ventricular visualization. Normal left ventricular size. LV systolic function is moderately-severely reduced. Ejection fraction is visually estimated at 30-35%. Abnormal diastolic function. Severe hypokinesis basal-mid anteroseptal, basal-mid inferoseptal, and   apical septal walls. Moderate left ventricular concentric hypertrophy noted. Abnormal LV septal motion consistent with post-operative state, paced   rhythm. Moderately dilated right ventricle with severely reduced function. Biatrial dilation. Trace-mild mitral regurgitation. Mild tricuspid regurgitation. 34 mm Evolut Pro+ TAVR valve implanted 2022. Mean gradient 7 mmHg; Mild paravalvular leak. TTE 3/1/22 [place]   Summary   Ejection fraction is visually estimated at 30-35%. Dilated right ventricle with normal right ventricular function (TAPSE 1.9   cm). Indeterminate diastolic function. Severely dilated left atrium by volume index. Dilated right atrium. Moderate mitral regurgitation. Hx of TAVR with a 34 mm Evolut Pro+ bioprosthetic valve on 2022. Mild paravalvular aortic regurgitation. AV peak velocity 1.8 m/s. AV mean gradient 6 mmHg. Aortic valve area 2.0 cm2. Mild tricuspid regurgitation. PASP is estimated at 67 mmHg. Left-sided pleural effusion.     Stress test:      Cardiac catheterization:     Device interrogation 2023  Greater than 8 years battery life remaining  VVIR  99.1% ventricular pacing  0.9% ventricular sensing  Normal device function    ----------------------------------------------------------------------------------------------------------------------------------------------------------------  IMPRESSION:  Syncope likely due to severe anemia. No recurrence  CAD/CABG 2009 LIMA-LAD, V-OM1, V-RPDA, V-RPL. 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  proBNP 31,000 -> >70,000  Ischemic cardiomyopathy EF 30-35%  Pleural effusion, ascites  Acute GI bleed, EGD with gastritis no active bleeding  Acute blood loss anemia Hgb 5.8 -> 7.0 posttransfusion  ROSEANNE/CKD creatinine 2.3 -> 3.9 baseline 1.5-1.8. Downtrending 3.6  Mild thrombocytopenia 120s  Hypertension, now running high better controlled  UTI  Type 2 diabetes  DNR/DNI    RECOMMENDATIONS:    Remain off apixaban  Continue GDMT  Continue carvedilol 25 mg twice daily  No ACE/ARB/ARNI/MRA/SGLT2i due to renal failure  Continue hydralazine/nitrate combination, uptitrate as needed  Becoming hypervolemic  IV fluids per nephrology, eventually turn towards diuresis  Discontinue doxazosin given HFrEF; if used for BPH consider tamsulosin or other alternative  Further care per primary service and consultants  Patient now DNR, considering hospice depending on clinical course  Will be available as needed over the weekend, please call with questions  Outpatient follow-up with Dr. Jaz Greene MD, [address] [place] [address] Cardiology    NOTE: This report was transcribed using voice recognition software. Every effort was made to ensure accuracy; however, inadvertent computerized transcription errors may be present.

## 2023-02-11 LAB
ALBUMIN SERPL-MCNC: 3 G/DL (ref 3.5–5.2)
ALP BLD-CCNC: 81 U/L (ref 40–129)
ALT SERPL-CCNC: 28 U/L (ref 0–40)
ANION GAP SERPL CALCULATED.3IONS-SCNC: 11 MMOL/L (ref 7–16)
AST SERPL-CCNC: 26 U/L (ref 0–39)
BASOPHILS ABSOLUTE: 0.01 E9/L (ref 0–0.2)
BASOPHILS RELATIVE PERCENT: 0.1 % (ref 0–2)
BILIRUB SERPL-MCNC: 1.5 MG/DL (ref 0–1.2)
BUN BLDV-MCNC: 70 MG/DL (ref 6–23)
CALCIUM SERPL-MCNC: 8.7 MG/DL (ref 8.6–10.2)
CHLORIDE BLD-SCNC: 106 MMOL/L (ref 98–107)
CO2: 22 MMOL/L (ref 22–29)
CREAT SERPL-MCNC: 3.2 MG/DL (ref 0.7–1.2)
EOSINOPHILS ABSOLUTE: 0.11 E9/L (ref 0.05–0.5)
EOSINOPHILS RELATIVE PERCENT: 1.5 % (ref 0–6)
GFR SERPL CREATININE-BSD FRML MDRD: 18 ML/MIN/1.73
GLUCOSE BLD-MCNC: 116 MG/DL (ref 74–99)
HCT VFR BLD CALC: 23.5 % (ref 37–54)
HEMOGLOBIN: 7.2 G/DL (ref 12.5–16.5)
IMMATURE GRANULOCYTES #: 0.04 E9/L
IMMATURE GRANULOCYTES %: 0.6 % (ref 0–5)
LYMPHOCYTES ABSOLUTE: 1.35 E9/L (ref 1.5–4)
LYMPHOCYTES RELATIVE PERCENT: 18.9 % (ref 20–42)
MCH RBC QN AUTO: 28.6 PG (ref 26–35)
MCHC RBC AUTO-ENTMCNC: 30.6 % (ref 32–34.5)
MCV RBC AUTO: 93.3 FL (ref 80–99.9)
MONOCYTES ABSOLUTE: 0.95 E9/L (ref 0.1–0.95)
MONOCYTES RELATIVE PERCENT: 13.3 % (ref 2–12)
NEUTROPHILS ABSOLUTE: 4.7 E9/L (ref 1.8–7.3)
NEUTROPHILS RELATIVE PERCENT: 65.6 % (ref 43–80)
ORGANISM: ABNORMAL
PDW BLD-RTO: 16.4 FL (ref 11.5–15)
PLATELET # BLD: 135 E9/L (ref 130–450)
PMV BLD AUTO: 11 FL (ref 7–12)
POTASSIUM REFLEX MAGNESIUM: 3.9 MMOL/L (ref 3.5–5)
RBC # BLD: 2.52 E12/L (ref 3.8–5.8)
SODIUM BLD-SCNC: 139 MMOL/L (ref 132–146)
TOTAL PROTEIN: 5.8 G/DL (ref 6.4–8.3)
URINE CULTURE, ROUTINE: ABNORMAL
WBC # BLD: 7.2 E9/L (ref 4.5–11.5)

## 2023-02-11 PROCEDURE — 97530 THERAPEUTIC ACTIVITIES: CPT

## 2023-02-11 PROCEDURE — 6370000000 HC RX 637 (ALT 250 FOR IP): Performed by: INTERNAL MEDICINE

## 2023-02-11 PROCEDURE — 6370000000 HC RX 637 (ALT 250 FOR IP): Performed by: STUDENT IN AN ORGANIZED HEALTH CARE EDUCATION/TRAINING PROGRAM

## 2023-02-11 PROCEDURE — 2580000003 HC RX 258: Performed by: HOSPITALIST

## 2023-02-11 PROCEDURE — 6370000000 HC RX 637 (ALT 250 FOR IP): Performed by: NURSE PRACTITIONER

## 2023-02-11 PROCEDURE — 85025 COMPLETE CBC W/AUTO DIFF WBC: CPT

## 2023-02-11 PROCEDURE — 1200000000 HC SEMI PRIVATE

## 2023-02-11 PROCEDURE — 80053 COMPREHEN METABOLIC PANEL: CPT

## 2023-02-11 PROCEDURE — 36415 COLL VENOUS BLD VENIPUNCTURE: CPT

## 2023-02-11 PROCEDURE — 6360000002 HC RX W HCPCS: Performed by: HOSPITALIST

## 2023-02-11 PROCEDURE — 2580000003 HC RX 258: Performed by: STUDENT IN AN ORGANIZED HEALTH CARE EDUCATION/TRAINING PROGRAM

## 2023-02-11 RX ADMIN — HYDRALAZINE HYDROCHLORIDE 50 MG: 50 TABLET, FILM COATED ORAL at 22:12

## 2023-02-11 RX ADMIN — CARVEDILOL 25 MG: 25 TABLET, FILM COATED ORAL at 09:22

## 2023-02-11 RX ADMIN — PANTOPRAZOLE SODIUM 40 MG: 40 TABLET, DELAYED RELEASE ORAL at 16:05

## 2023-02-11 RX ADMIN — ISOSORBIDE DINITRATE 10 MG: 10 TABLET ORAL at 09:23

## 2023-02-11 RX ADMIN — Medication 10 ML: at 22:00

## 2023-02-11 RX ADMIN — CARVEDILOL 25 MG: 25 TABLET, FILM COATED ORAL at 16:05

## 2023-02-11 RX ADMIN — HYDRALAZINE HYDROCHLORIDE 50 MG: 50 TABLET, FILM COATED ORAL at 16:21

## 2023-02-11 RX ADMIN — ISOSORBIDE DINITRATE 10 MG: 10 TABLET ORAL at 16:21

## 2023-02-11 RX ADMIN — HYDRALAZINE HYDROCHLORIDE 50 MG: 50 TABLET, FILM COATED ORAL at 07:37

## 2023-02-11 RX ADMIN — PANTOPRAZOLE SODIUM 40 MG: 40 TABLET, DELAYED RELEASE ORAL at 07:37

## 2023-02-11 RX ADMIN — WATER 1000 MG: 1 INJECTION INTRAMUSCULAR; INTRAVENOUS; SUBCUTANEOUS at 16:05

## 2023-02-11 RX ADMIN — ISOSORBIDE DINITRATE 10 MG: 10 TABLET ORAL at 22:12

## 2023-02-11 RX ADMIN — Medication 10 ML: at 09:25

## 2023-02-11 RX ADMIN — Medication 6 MG: at 22:12

## 2023-02-11 NOTE — PROGRESS NOTES
Internal Medicine Progress Note    Patient's name: Eloisa Haney  : 1931  Chief complaints (on day of admission): Loss of Consciousness (Lost consciousness x 5 mins while on BSC at home, did not fall.)  Admission date: 2023  Date of service: 2023   Room: [address] MED SURG/TELE  Primary care physician: Nahun Garcia MD  Reason for visit: Follow-up for LOC    Subjective  Kasia Solomon was seen and examined at bedside. Patient is stating that he wants to leave. However, his wife is stating that she is unable to care for him at home and that she is awaiting him to go to [place] [address]. Patient states he is eating and drinking okay. More fatigued and tired today. Review of Systems  There are no new complaints of chest pain, shortness of breath, abdominal pain, nausea, vomiting, diarrhea, constipation unless otherwise mentioned above.      Hospital Medications  Current Facility-Administered Medications   Medication Dose Route Frequency Provider Last Rate Last Admin    hydrALAZINE (APRESOLINE) injection 10 mg  10 mg IntraVENous Q4H PRN Bobo Vo MD        carvedilol (COREG) tablet 25 mg  25 mg Oral BID Maria Isabel Yao MD   25 mg at 23 9096    isosorbide dinitrate (ISORDIL) tablet 10 mg  10 mg Oral TID Maria Isabel Yao MD   10 mg at 23 3104    hydrALAZINE (APRESOLINE) tablet 50 mg  50 mg Oral 3 times per day Sofia Le MD   50 mg at 23 0737    0.9 % sodium chloride infusion   IntraVENous PRN Bobo Vo MD        0.9 % sodium chloride infusion   IntraVENous PRN Bobo Vo MD        melatonin tablet 6 mg  6 mg Oral Nightly PRN EDMUNDO Pandey - CNP   6 mg at 02/10/23 2254    pantoprazole (PROTONIX) tablet 40 mg  40 mg Oral BID AC Christian Prasad DO   40 mg at 23 0737    sodium chloride flush 0.9 % injection 10 mL  10 mL IntraVENous 2 times per day Bobo Vo MD   10 mL at 23 0925    sodium chloride flush 0.9 % injection 10 mL  10 mL IntraVENous PRN Melinda Tenorio MD        0.9 % sodium chloride infusion   IntraVENous PRN Melinda Tenorio MD        [facility]) tablet 8.6 mg  1 tablet Oral Daily PRN Melinda Tenorio MD   8.6 mg at 02/08/23 2056    acetaminophen (TYLENOL) tablet 650 mg  650 mg Oral Q6H PRN Melinda Tenorio MD        Or    acetaminophen (TYLENOL) suppository 650 mg  650 mg Rectal Q6H PRN Melinda Tenorio MD        [facility]] pravastatin (PRAVACHOL) tablet 40 mg  40 mg Oral Nightly Melinda Tenorio MD   40 mg at 02/07/23 2124    [Held by provider] torsemide (DEMADEX) tablet 20 mg  20 mg Oral Daily Melinda Tenorio MD           PRN Medications  hydrALAZINE, sodium chloride, sodium chloride, melatonin, sodium chloride flush, sodium chloride, senna, acetaminophen **OR** acetaminophen    Objective  Most Recent Recorded Vitals  BP (!) 144/68   Pulse 77   Temp 98.6 °F (37 °C) (Oral)   Resp 16   Ht 5' 11\" (1.803 m)   Wt 182 lb 9 oz (82.8 kg)   SpO2 93%   BMI 25.46 kg/m²   I/O last 3 completed shifts: In: 2500 [I.V.:2500]  Out: 200 [Urine:200]  I/O this shift:   In: 480 [P.O.:480]  Out: -     Physical Exam:  General: AAO to person/place/time/purpose, NAD, no labored breathing  Eyes: conjunctivae/corneas clear, sclera non icteric  Skin: color/texture/turgor normal, no rashes or lesions  Lungs: CTAB, no retractions/use of accessory muscles, no vocal fremitus, no rhonchi, no crackle, no rales  Heart: regular rate, regular rhythm, no murmur  Abdomen: soft, NT, bowel sounds normal  Extremities: atraumatic, no edema  Neurologic: cranial nerves 2-12 grossly intact, no slurred speech    Most Recent Labs  Lab Results   Component Value Date    WBC 7.2 02/11/2023    HGB 7.2 (L) 02/11/2023    HCT 23.5 (L) 02/11/2023     02/11/2023     02/11/2023    K 3.9 02/11/2023     02/11/2023    CREATININE 3.2 (H) 02/11/2023    BUN 70 (H) 02/11/2023    CO2 22 02/11/2023    GLUCOSE 116 (H) 02/11/2023    ALT 28 02/11/2023    AST 26 02/11/2023 INR 1.6 02/01/2022    LABA1C 6.1 (H) 02/05/2023    LABMICR <12.0 05/18/2022       US RETROPERITONEAL COMPLETE   Final Result   1. Normal size kidneys. Increased echotexture of the renal parenchymal   relative sonolucent appearance of the renal pyramids can chronic renal   parenchymal process. Please correlate clinically. 2.  No signs for obstructive uropathy. 3.  Moderate volume ascites in the abdomen. 4.  Mild to moderate left-sided pleural effusion         CT Head W/O Contrast   Final Result   1. There is no acute intracranial abnormality. Specifically, there is no   intracranial hemorrhage. 2. Atrophy and periventricular leukomalacia,         XR CHEST PORTABLE   Final Result   1. Airspace disease within the retrocardiac region   2. Small left pleural effusion   3. Trace right pleural effusion             Echocardiogram       Assessment   Active Hospital Problems    Diagnosis     Palliative care encounter [Z51.5]      Priority: Medium    GI bleed [K92.2]      Priority: Medium    Mild protein-calorie malnutrition ([address] [E44.1]     Gout [M10.9]     Chronic kidney disease [N18.9]     Aortic valve stenosis [I35.0]     Heart valve disease [I38]     Long term current use of anticoagulant therapy [Z79.01]     CAD (coronary artery disease) [I25.10]     S/P CABG (coronary artery bypass graft) [Z95.1]     DM (diabetes mellitus) (HCC) [E11.9]     HTN (hypertension) [I10]     Hyperlipemia [E78.5]     A-fib (HCC) [I48.91]     Diabetic neuropathy (HCC) [E11.40]     BPH (benign prostatic hyperplasia) [N40.0]          Plan  GI bleed with acute blood loss anemia:   General surgery following-- plans for EGD 2/6 hiatal hernia, relux changes possible barrets esophagus and mild gastritis with a normal duodenum   PPI  Follow H&H--pRBC if Hb < 7  Check Fe/Ferritin/TIBC/Vitamin B12/Folate  Hold Eliquis anticoagulation (h/o afib)     ROSEANNE on CKD3:   Follow BMP  Baseline sCr ~ 1.8 to 2.0  Monitor Cr   Avoid nephrotoxins 3.8 --> 3.6 --> 3.2  Renal US negative for obstruction   Nephrology consultation appreciated    Hypotension and syncope related to GIB:  Resolved after gentle IVF  Cardio on board   Resume anti htn rx 2/8 his blood pressure is now un controlled     Transaminitis   Hold statin   Monitor lfts     [facility] 16/24  DVT prophylaxis   Code status Full  Medications, labs and imaging reviewed   Discharge plan: DC to [facility] if creatinine continues to improve over the weekend    Electronically signed by Steven Alvarez MD on 2/11/2023 at 2:06 PM    I can be reached through [employer].

## 2023-02-11 NOTE — PROGRESS NOTES
Physical Therapy  Facility/Department: [address] MED SURG/TELE  Physical Therapy Treatment Note    Name: Neyda Mckoy  : 1931  MRN: [id number]  Date of Service: 2023      Attending Provider:  Az Henson MD    Evaluating PT:  Sebastián Menjivar. Arron Carrera P.T. Room #:  9983/6689-W  Diagnosis:  GI bleed [K92.2], low Hgb, syncope. Precautions:  Hgb after blood transfusions is 7.9 23, falls, bed/chair alarm    SUBJECTIVE:    Pt lives with his wife in a 2 story home with 2 stairs and no rail to enter. There are 12 steps and 1 rail to 2nd floor bed and bath. Pt ambulated with ww and has a WC if needed. OBJECTIVE:   Initial Evaluation  Date: 23 Treatment  2023 Short Term/ Long Term   Goals   Was pt agreeable to Eval/treatment? yes yes    Does pt have pain? No c/o pain No complaints    Bed Mobility  Rolling: SBA  Supine to sit: MIN A  Sit to supine: MOD A  Scooting: MIN A Rolling: SBA  Supine to sit: Min A  Scooting: Min A seated to EOB Independent   Transfers Sit to stand: MIN A  Stand to sit: MIN A  Stand pivot: MIN A with ww Sit <> stand: Min A Independent    Ambulation   25 feet with ww MIN A 25 feet x 1 using [employer] for support Min A for balance 200 feet with ww supervision   Stair negotiation: ascended and descended NA, pt fatigued with amb  12 steps with 1 rail SBA   AM-PAC 6 Clicks /        Pt is alert and able to follow instruction  Balance: poor dynamic using [employer] for support    Pt performed therapeutic exercise of the following: NT    Patient education/treatment  Pt was educated on UE usage to assist with transfer safety, gait mechanics promoting upright posture    Patient response to education:   Pt verbalized understanding Pt demonstrated skill Pt requires further education in this area   yes yes yes     ASSESSMENT:   Comments: Nurse ok with Rx. Pt found in bed, assisted to EOB, sat EOB SBA for balance.  Gait slow, inconsistent and laboring., Pt unsteady throughout, required constant hands on assist for balance and safety. Pt deconditioned, fatigued after activity, is unsafe to gait or transfer alone presently. Pt was left in a bedside chair with call light in reach, waffle cushion in place    Chair/bed alarm: chair alarm active    Time in 0828   Time out 0845   Total Treatment Time 17 minutes   CPT codes:     Therapeutic activities 73600 17 minutes   Therapeutic exercises 63223 0 minutes       Pt is making consistent progress toward established Physical Therapy goals. Continue with physical therapy current plan of care.     Kelin Contreras PTA   License Number: [id number]

## 2023-02-12 LAB
ALBUMIN SERPL-MCNC: 3.2 G/DL (ref 3.5–5.2)
ALP BLD-CCNC: 77 U/L (ref 40–129)
ALT SERPL-CCNC: 25 U/L (ref 0–40)
ANION GAP SERPL CALCULATED.3IONS-SCNC: 11 MMOL/L (ref 7–16)
AST SERPL-CCNC: 19 U/L (ref 0–39)
BASOPHILS ABSOLUTE: 0.01 E9/L (ref 0–0.2)
BASOPHILS RELATIVE PERCENT: 0.1 % (ref 0–2)
BILIRUB SERPL-MCNC: 1.4 MG/DL (ref 0–1.2)
BUN BLDV-MCNC: 71 MG/DL (ref 6–23)
CALCIUM SERPL-MCNC: 8.8 MG/DL (ref 8.6–10.2)
CHLORIDE BLD-SCNC: 104 MMOL/L (ref 98–107)
CO2: 21 MMOL/L (ref 22–29)
CREAT SERPL-MCNC: 3.1 MG/DL (ref 0.7–1.2)
EOSINOPHILS ABSOLUTE: 0.09 E9/L (ref 0.05–0.5)
EOSINOPHILS RELATIVE PERCENT: 1.1 % (ref 0–6)
GFR SERPL CREATININE-BSD FRML MDRD: 18 ML/MIN/1.73
GLUCOSE BLD-MCNC: 153 MG/DL (ref 74–99)
HCT VFR BLD CALC: 23.5 % (ref 37–54)
HEMOGLOBIN: 7.2 G/DL (ref 12.5–16.5)
IMMATURE GRANULOCYTES #: 0.09 E9/L
IMMATURE GRANULOCYTES %: 1.1 % (ref 0–5)
LYMPHOCYTES ABSOLUTE: 1.48 E9/L (ref 1.5–4)
LYMPHOCYTES RELATIVE PERCENT: 17.7 % (ref 20–42)
MAGNESIUM: 1.9 MG/DL (ref 1.6–2.6)
MCH RBC QN AUTO: 28.6 PG (ref 26–35)
MCHC RBC AUTO-ENTMCNC: 30.6 % (ref 32–34.5)
MCV RBC AUTO: 93.3 FL (ref 80–99.9)
MONOCYTES ABSOLUTE: 1.06 E9/L (ref 0.1–0.95)
MONOCYTES RELATIVE PERCENT: 12.7 % (ref 2–12)
NEUTROPHILS ABSOLUTE: 5.62 E9/L (ref 1.8–7.3)
NEUTROPHILS RELATIVE PERCENT: 67.3 % (ref 43–80)
PDW BLD-RTO: 16.8 FL (ref 11.5–15)
PHOSPHORUS: 3.4 MG/DL (ref 2.5–4.5)
PLATELET # BLD: 149 E9/L (ref 130–450)
PMV BLD AUTO: 11 FL (ref 7–12)
POTASSIUM REFLEX MAGNESIUM: 4 MMOL/L (ref 3.5–5)
POTASSIUM SERPL-SCNC: 4 MMOL/L (ref 3.5–5)
RBC # BLD: 2.52 E12/L (ref 3.8–5.8)
SODIUM BLD-SCNC: 136 MMOL/L (ref 132–146)
TOTAL PROTEIN: 6 G/DL (ref 6.4–8.3)
WBC # BLD: 8.4 E9/L (ref 4.5–11.5)

## 2023-02-12 PROCEDURE — 6370000000 HC RX 637 (ALT 250 FOR IP): Performed by: STUDENT IN AN ORGANIZED HEALTH CARE EDUCATION/TRAINING PROGRAM

## 2023-02-12 PROCEDURE — 36415 COLL VENOUS BLD VENIPUNCTURE: CPT

## 2023-02-12 PROCEDURE — 6370000000 HC RX 637 (ALT 250 FOR IP): Performed by: INTERNAL MEDICINE

## 2023-02-12 PROCEDURE — 80048 BASIC METABOLIC PNL TOTAL CA: CPT

## 2023-02-12 PROCEDURE — 1200000000 HC SEMI PRIVATE

## 2023-02-12 PROCEDURE — 2580000003 HC RX 258: Performed by: STUDENT IN AN ORGANIZED HEALTH CARE EDUCATION/TRAINING PROGRAM

## 2023-02-12 PROCEDURE — 2580000003 HC RX 258: Performed by: HOSPITALIST

## 2023-02-12 PROCEDURE — 6360000002 HC RX W HCPCS: Performed by: HOSPITALIST

## 2023-02-12 PROCEDURE — 83735 ASSAY OF MAGNESIUM: CPT

## 2023-02-12 PROCEDURE — 85025 COMPLETE CBC W/AUTO DIFF WBC: CPT

## 2023-02-12 PROCEDURE — 51798 US URINE CAPACITY MEASURE: CPT

## 2023-02-12 PROCEDURE — 80053 COMPREHEN METABOLIC PANEL: CPT

## 2023-02-12 PROCEDURE — 84100 ASSAY OF PHOSPHORUS: CPT

## 2023-02-12 RX ADMIN — ISOSORBIDE DINITRATE 10 MG: 10 TABLET ORAL at 21:58

## 2023-02-12 RX ADMIN — WATER 1000 MG: 1 INJECTION INTRAMUSCULAR; INTRAVENOUS; SUBCUTANEOUS at 15:12

## 2023-02-12 RX ADMIN — CARVEDILOL 25 MG: 25 TABLET, FILM COATED ORAL at 18:04

## 2023-02-12 RX ADMIN — PANTOPRAZOLE SODIUM 40 MG: 40 TABLET, DELAYED RELEASE ORAL at 18:04

## 2023-02-12 RX ADMIN — HYDRALAZINE HYDROCHLORIDE 50 MG: 50 TABLET, FILM COATED ORAL at 22:18

## 2023-02-12 RX ADMIN — HYDRALAZINE HYDROCHLORIDE 50 MG: 50 TABLET, FILM COATED ORAL at 07:39

## 2023-02-12 RX ADMIN — CARVEDILOL 25 MG: 25 TABLET, FILM COATED ORAL at 08:54

## 2023-02-12 RX ADMIN — Medication 10 ML: at 08:55

## 2023-02-12 RX ADMIN — HYDRALAZINE HYDROCHLORIDE 50 MG: 50 TABLET, FILM COATED ORAL at 15:12

## 2023-02-12 RX ADMIN — ISOSORBIDE DINITRATE 10 MG: 10 TABLET ORAL at 15:12

## 2023-02-12 RX ADMIN — ISOSORBIDE DINITRATE 10 MG: 10 TABLET ORAL at 08:54

## 2023-02-12 RX ADMIN — ACETAMINOPHEN 650 MG: 325 TABLET ORAL at 12:53

## 2023-02-12 RX ADMIN — PANTOPRAZOLE SODIUM 40 MG: 40 TABLET, DELAYED RELEASE ORAL at 07:39

## 2023-02-12 ASSESSMENT — PAIN DESCRIPTION - LOCATION: LOCATION: FOOT

## 2023-02-12 ASSESSMENT — PAIN DESCRIPTION - DESCRIPTORS: DESCRIPTORS: SORE

## 2023-02-12 ASSESSMENT — PAIN SCALES - GENERAL: PAINLEVEL_OUTOF10: 3

## 2023-02-12 ASSESSMENT — PAIN DESCRIPTION - ORIENTATION: ORIENTATION: RIGHT

## 2023-02-12 NOTE — PROGRESS NOTES
[facility] MD Mahesh Fortune MD Ezequiel Peers, MD Waddell Lan, MD Deanne Rubens, SOLITARIO Otero, DEMARCO  Progress Note    2/12/2023    SUBJECTIVE:   2/9:  Seen while sitting up in chair. NAD. Denies chest pain or SOB. Denies nausea, vomiting or abd pain. Spouse in room and she reports that his appetite and oral intake is not good. 2/10 NAD stable vitals     2/12: Right heel is bothering him. No signs of pressure ulcer but it hurts. Asked to get the Dickerson out. Breathing comfortably on room air. Ongoing fatigue and generalized weakness debilitation. PROBLEM LIST:    Principal Problem:    GI bleed  Active Problems:    Palliative care encounter    CAD (coronary artery disease)    S/P CABG (coronary artery bypass graft)    DM (diabetes mellitus) (HCC)    Hyperlipemia    HTN (hypertension)    A-fib (HCC)    BPH (benign prostatic hyperplasia)    Diabetic neuropathy ([facility]    Long term current use of anticoagulant therapy    Aortic valve stenosis    Chronic kidney disease    Heart valve disease    Gout    Mild protein-calorie malnutrition ([address]  Resolved Problems:    Cellulitis    Syncope without other cardiovascular symptoms         DIET:    ADULT DIET;  Regular; GI [place] (GERD/Peptic Ulcer)  ADULT ORAL NUTRITION SUPPLEMENT; Breakfast, Dinner; Standard 4 oz Oral Supplement  ADULT ORAL NUTRITION SUPPLEMENT; Lunch; Frozen Oral Supplement     MEDS (scheduled):    cefTRIAXone (ROCEPHIN) IV  1,000 mg IntraVENous Q24H    carvedilol  25 mg Oral BID    isosorbide dinitrate  10 mg Oral TID    hydrALAZINE  50 mg Oral 3 times per day    pantoprazole  40 mg Oral BID AC    sodium chloride flush  10 mL IntraVENous 2 times per day    [Held by provider] pravastatin  40 mg Oral Nightly    [Held by provider] torsemide  20 mg Oral Daily       MEDS (infusions):   sodium chloride      sodium chloride      sodium chloride         MEDS (prn):  hydrALAZINE, sodium chloride, sodium chloride, melatonin, sodium chloride flush, sodium chloride, senna, acetaminophen **OR** acetaminophen    PHYSICAL EXAM:     Patient Vitals for the past 24 hrs:   BP Temp Temp src Pulse Resp SpO2 Weight   02/12/23 0854 137/61 -- -- 71 -- -- --   02/12/23 0645 (!) 157/68 98.2 °F (36.8 °C) Oral 71 16 93 % --   02/12/23 0615 -- -- -- -- -- -- 184 lb (83.5 kg)   02/11/23 2200 (!) 140/64 98.5 °F (36.9 °C) Oral 75 16 95 % --   02/11/23 2046 -- -- -- 64 -- -- --   02/11/23 1600 134/66 -- -- 76 -- -- --   @      Intake/Output Summary (Last 24 hours) at 2/12/2023 1157  Last data filed at 2/11/2023 1240  Gross per 24 hour   Intake 480 ml   Output --   Net 480 ml         Wt Readings from Last 3 Encounters:   02/12/23 184 lb (83.5 kg)   01/13/23 169 lb (76.7 kg)   04/29/22 159 lb 1.6 oz (72.2 kg)       Constitutional:  in no acute distress. Awake and alert  HEENT: NC/AT, EOMI, sclera and conjunctiva are clear and anicteric, mucus membranes moist  Neck: Trachea midline, no JVD  Cardiovascular: S1, S2 irregular rhythm, no murmur,or rub  Respiratory:  CTAB.  No crackles, no wheeze  Gastrointestinal:  Soft, nontender, nondistended, NABS  :  sotomayor draining yellow urine  Ext: 1+BLE edema, feet warm  Skin: dry, no rash  Neuro: awake, alert, interactive      DATA:    Recent Labs     02/10/23  0254 02/11/23  0250 02/12/23  0333   WBC 7.9 7.2 8.4   HGB 7.0* 7.2* 7.2*   HCT 22.3* 23.5* 23.5*   MCV 91.8 93.3 93.3   * 135 149     Recent Labs     02/10/23  0254 02/11/23  0250 02/12/23  0333    139 136   K 3.9 3.9 4.0  4.0    106 104   CO2 22 22 21*   MG  --   --  1.9   PHOS  --   --  3.4   BUN 64* 70* 71*   CREATININE 3.3* 3.2* 3.1*   ALT 39 28 25   AST 39 26 19   BILITOT 1.1 1.5* 1.4*   ALKPHOS 66 81 77       Lab Results   Component Value Date    LABPROT 1.4 (H) 02/08/2023    LABPROT 1.4 02/08/2023    LABPROT 1.3 (H) 07/11/2022    LABPROT 1.3 07/11/2022       Assessment  Acute Kidney Injury on chronic kidney disease in the setting of hypotension, intravascular volume depletion poor oral intake and GI bleed  Chronic Kidney 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  Hypertension (hypotension on admission to ED)  GI bleed  Atria fib     -Creatinine trending down slowly 3.9-->3.6-->3.3--> 3.1  mg/dl  -BP stable  -euvolemic   -renal US-negative for obstructive uropathy  -Moderate left sided pleural effusion /abdominal ascites reported      Recommendation    Remove Dickerson  Voiding trial: If no void in 4 hours, encouraged to void and check postvoid residual, if greater than 200 cc, replace the Dickerson  Avoid nephrotoxins   If cr stable/better in am then should be ok to dc from renal POV with fu BMP next week   Office fu in 2 weeks        Electronically signed by Marino Li MD on 2/12/2023

## 2023-02-13 VITALS
TEMPERATURE: 98.1 F | RESPIRATION RATE: 15 BRPM | OXYGEN SATURATION: 96 % | SYSTOLIC BLOOD PRESSURE: 113 MMHG | DIASTOLIC BLOOD PRESSURE: 47 MMHG | WEIGHT: 177.4 LBS | HEART RATE: 64 BPM | BODY MASS INDEX: 24.84 KG/M2 | HEIGHT: 71 IN

## 2023-02-13 LAB
ALBUMIN SERPL-MCNC: 3.1 G/DL (ref 3.5–5.2)
ALP BLD-CCNC: 71 U/L (ref 40–129)
ALT SERPL-CCNC: 22 U/L (ref 0–40)
ANION GAP SERPL CALCULATED.3IONS-SCNC: 12 MMOL/L (ref 7–16)
AST SERPL-CCNC: 17 U/L (ref 0–39)
BASOPHILS ABSOLUTE: 0.02 E9/L (ref 0–0.2)
BASOPHILS RELATIVE PERCENT: 0.2 % (ref 0–2)
BILIRUB SERPL-MCNC: 1.4 MG/DL (ref 0–1.2)
BUN BLDV-MCNC: 74 MG/DL (ref 6–23)
CALCIUM SERPL-MCNC: 8.9 MG/DL (ref 8.6–10.2)
CHLORIDE BLD-SCNC: 104 MMOL/L (ref 98–107)
CO2: 21 MMOL/L (ref 22–29)
CREAT SERPL-MCNC: 2.9 MG/DL (ref 0.7–1.2)
EOSINOPHILS ABSOLUTE: 0.12 E9/L (ref 0.05–0.5)
EOSINOPHILS RELATIVE PERCENT: 1.2 % (ref 0–6)
GFR SERPL CREATININE-BSD FRML MDRD: 20 ML/MIN/1.73
GLUCOSE BLD-MCNC: 136 MG/DL (ref 74–99)
HCT VFR BLD CALC: 25.7 % (ref 37–54)
HEMOGLOBIN: 8 G/DL (ref 12.5–16.5)
IMMATURE GRANULOCYTES #: 0.15 E9/L
IMMATURE GRANULOCYTES %: 1.5 % (ref 0–5)
LYMPHOCYTES ABSOLUTE: 1.83 E9/L (ref 1.5–4)
LYMPHOCYTES RELATIVE PERCENT: 18.4 % (ref 20–42)
MAGNESIUM: 1.8 MG/DL (ref 1.6–2.6)
MCH RBC QN AUTO: 28.4 PG (ref 26–35)
MCHC RBC AUTO-ENTMCNC: 31.1 % (ref 32–34.5)
MCV RBC AUTO: 91.1 FL (ref 80–99.9)
MONOCYTES ABSOLUTE: 1.27 E9/L (ref 0.1–0.95)
MONOCYTES RELATIVE PERCENT: 12.8 % (ref 2–12)
NEUTROPHILS ABSOLUTE: 6.54 E9/L (ref 1.8–7.3)
NEUTROPHILS RELATIVE PERCENT: 65.9 % (ref 43–80)
PDW BLD-RTO: 17 FL (ref 11.5–15)
PHOSPHORUS: 3.2 MG/DL (ref 2.5–4.5)
PLATELET # BLD: 156 E9/L (ref 130–450)
PMV BLD AUTO: 10.5 FL (ref 7–12)
POTASSIUM REFLEX MAGNESIUM: 4.2 MMOL/L (ref 3.5–5)
POTASSIUM SERPL-SCNC: 4.2 MMOL/L (ref 3.5–5)
RBC # BLD: 2.82 E12/L (ref 3.8–5.8)
SARS-COV-2, NAAT: NOT DETECTED
SODIUM BLD-SCNC: 137 MMOL/L (ref 132–146)
TOTAL PROTEIN: 6.2 G/DL (ref 6.4–8.3)
WBC # BLD: 9.9 E9/L (ref 4.5–11.5)

## 2023-02-13 PROCEDURE — 6360000002 HC RX W HCPCS: Performed by: HOSPITALIST

## 2023-02-13 PROCEDURE — 6370000000 HC RX 637 (ALT 250 FOR IP): Performed by: INTERNAL MEDICINE

## 2023-02-13 PROCEDURE — 2580000003 HC RX 258: Performed by: STUDENT IN AN ORGANIZED HEALTH CARE EDUCATION/TRAINING PROGRAM

## 2023-02-13 PROCEDURE — 87635 SARS-COV-2 COVID-19 AMP PRB: CPT

## 2023-02-13 PROCEDURE — 84100 ASSAY OF PHOSPHORUS: CPT

## 2023-02-13 PROCEDURE — 97530 THERAPEUTIC ACTIVITIES: CPT

## 2023-02-13 PROCEDURE — 2580000003 HC RX 258: Performed by: HOSPITALIST

## 2023-02-13 PROCEDURE — 80053 COMPREHEN METABOLIC PANEL: CPT

## 2023-02-13 PROCEDURE — 36415 COLL VENOUS BLD VENIPUNCTURE: CPT

## 2023-02-13 PROCEDURE — 99231 SBSQ HOSP IP/OBS SF/LOW 25: CPT | Performed by: NURSE PRACTITIONER

## 2023-02-13 PROCEDURE — 6370000000 HC RX 637 (ALT 250 FOR IP): Performed by: STUDENT IN AN ORGANIZED HEALTH CARE EDUCATION/TRAINING PROGRAM

## 2023-02-13 PROCEDURE — 85025 COMPLETE CBC W/AUTO DIFF WBC: CPT

## 2023-02-13 PROCEDURE — 6370000000 HC RX 637 (ALT 250 FOR IP): Performed by: NURSE PRACTITIONER

## 2023-02-13 PROCEDURE — 83735 ASSAY OF MAGNESIUM: CPT

## 2023-02-13 PROCEDURE — 51702 INSERT TEMP BLADDER CATH: CPT

## 2023-02-13 RX ORDER — CEFDINIR 300 MG/1
300 CAPSULE ORAL DAILY
Qty: 7 CAPSULE | Refills: 0 | DISCHARGE
Start: 2023-02-13 | End: 2023-02-20

## 2023-02-13 RX ADMIN — HYDRALAZINE HYDROCHLORIDE 50 MG: 50 TABLET, FILM COATED ORAL at 21:23

## 2023-02-13 RX ADMIN — PANTOPRAZOLE SODIUM 40 MG: 40 TABLET, DELAYED RELEASE ORAL at 15:35

## 2023-02-13 RX ADMIN — ISOSORBIDE DINITRATE 10 MG: 10 TABLET ORAL at 09:00

## 2023-02-13 RX ADMIN — ISOSORBIDE DINITRATE 10 MG: 10 TABLET ORAL at 21:23

## 2023-02-13 RX ADMIN — CARVEDILOL 25 MG: 25 TABLET, FILM COATED ORAL at 18:11

## 2023-02-13 RX ADMIN — Medication 10 ML: at 09:01

## 2023-02-13 RX ADMIN — HYDRALAZINE HYDROCHLORIDE 50 MG: 50 TABLET, FILM COATED ORAL at 15:35

## 2023-02-13 RX ADMIN — Medication 6 MG: at 01:23

## 2023-02-13 RX ADMIN — ISOSORBIDE DINITRATE 10 MG: 10 TABLET ORAL at 15:35

## 2023-02-13 RX ADMIN — CARVEDILOL 25 MG: 25 TABLET, FILM COATED ORAL at 09:00

## 2023-02-13 RX ADMIN — Medication 10 ML: at 21:23

## 2023-02-13 RX ADMIN — HYDRALAZINE HYDROCHLORIDE 50 MG: 50 TABLET, FILM COATED ORAL at 09:00

## 2023-02-13 RX ADMIN — WATER 1000 MG: 1 INJECTION INTRAMUSCULAR; INTRAVENOUS; SUBCUTANEOUS at 15:35

## 2023-02-13 RX ADMIN — PANTOPRAZOLE SODIUM 40 MG: 40 TABLET, DELAYED RELEASE ORAL at 09:00

## 2023-02-13 NOTE — CONSULTS
2/13/2023 9:03 AM  Anny Moses  [id number]     Chief Complaint:    Urinary retention      History of Present Illness: The patient is a 80 y.o. male patient who presented to the hospital with complaints of loss of consciousness and weakness. He was admitted for GI bleed. Urology was asked to evaluate for urinary retention. He had a sotomayor inserted on the day of his admission after he was bladder scanned for 850ml. The sotomayor was removed yesterday. He was only voiding 25 to 50 mL at a time. He was bladder scanned for >999mL and a Sotomayor catheter was placed. Possibly only replaced for 250ml  Patient does have ascites  Has history of BPH   May have been taking Doxazosin prior to arrival   No alpha blockers currently secondary to syncopal episode     Past Medical History:   Diagnosis Date    A-fib [facility])     BPH (benign prostatic hyperplasia)     CAD (coronary artery disease)     Diabetic neuropathy (HCC)     DM (diabetes mellitus) ([address]     HTN (hypertension)     Hyperlipemia     Pancreatitis 09/01/2010    Pleural effusion on left 03/01/2022    seen on echo    S/P CABG (coronary artery bypass graft)          Past Surgical History:   Procedure Laterality Date    AORTIC VALVE REPLACEMENT N/A 2/2/2022    TRANSCATHETER AORTIC VALVE REPLACEMENT FEMORAL APPROACH performed by Ricardo Vargas MD at [address] [place] [address]  09/27/2010    COLONOSCOPY      CORONARY ARTERY BYPASS GRAFT  12/01/2009    CABG x4 with left internal mammary artery to the LAD. reverse saphenous vein grafts to the 1st marginal branches ofcircumflex, posterior descending branch of the right coronary artery, and posterolateral branch to the right coronary artery, and modified maze procedure with Medtronic bipolar radiofrequency ablation, and excision of left atrial appendage.     PACEMAKER INSERTION N/A 02/03/2022    Medtronic Micra VR Leadless Pacemaker  (Dr. Sammy Cox)    TONSILLECTOMY      UPPER GASTROINTESTINAL ENDOSCOPY N/A 2/6/2023    EGD BIOPSY performed by Mitzi Joshi MD at [facility] ENDOSCOPY       Medications Prior to Admission:    Medications Prior to Admission: traZODone (DESYREL) 50 MG tablet, Take 50 mg by mouth nightly  torsemide (DEMADEX) 20 MG tablet, Take 1 tablet by mouth daily  hydrALAZINE (APRESOLINE) 50 MG tablet, TAKE 1 TABLET BY MOUTH THREE TIMES A DAY  carvedilol (COREG) 12.5 MG tablet, TAKE 1 TABLET BY MOUTH TWICE A DAY  ELIQUIS 2.5 MG TABS tablet, TAKE 1 TABLET BY MOUTH TWICE A DAY  isosorbide dinitrate (ISORDIL) 20 MG tablet, TAKE 1 TABLET BY MOUTH THREE TIMES A DAY  doxazosin (CARDURA) 4 MG tablet, TAKE 1 TABLET BY MOUTH EVERY DAY  pravastatin (PRAVACHOL) 40 MG tablet, Take one daily    Allergies:    Patient has no known allergies. Social History:    reports that he has never smoked. He has never used smokeless tobacco. He reports current alcohol use. He reports that he does not use drugs. Family History:   Non-contributory to this Urological problem  family history includes Cancer in his father; Heart Disease in his mother; High Blood Pressure in his mother. Review of Systems:  Constitutional: No fever or chills   Respiratory: negative for cough and hemoptysis  Cardiovascular: negative for chest pain and dyspnea  Gastrointestinal: negative for abdominal pain, diarrhea, nausea and vomiting   Derm: negative for rash and skin lesion(s)  Neurological: negative for seizures and tremors  Musculoskeletal: Negative    Psychiatric: Negative   : As above in the HPI, otherwise negative  All other reviews are negative    Physical Exam:     Vitals:  BP (!) 168/72   Pulse 62   Temp 98.5 °F (36.9 °C) (Oral)   Resp 16   Ht 5' 11\" (1.803 m)   Wt 177 lb 6.4 oz (80.5 kg)   SpO2 95%   BMI 24.74 kg/m²     General: Awake and alert, no acute distress  HEENT:  Normocephalic, atraumatic. Lungs:  Respirations symmetric and non-labored.   Abdomen:  soft, nontender, no masses  Extremities:  No clubbing, cyanosis, or edema  Skin:  Warm and dry, no open lesions or rashes  Neuro: There are no motor or sensory deficits in the 4 quadrant extremities   Rectal: deferred  Genitourinary: Dickerson catheter with yellow urine    Labs:     Recent Labs     02/11/23  0250 02/12/23 0333 02/13/23  0102   WBC 7.2 8.4 9.9   RBC 2.52* 2.52* 2.82*   HGB 7.2* 7.2* 8.0*   HCT 23.5* 23.5* 25.7*   MCV 93.3 93.3 91.1   MCH 28.6 28.6 28.4   MCHC 30.6* 30.6* 31.1*   RDW 16.4* 16.8* 17.0*    149 156   MPV 11.0 11.0 10.5         Recent Labs     02/11/23 0250 02/12/23 0333 02/13/23  0102   CREATININE 3.2* 3.1* 2.9*       No results found for: PSA    Imaging:   Narrative   EXAMINATION:   RETROPERITONEAL ULTRASOUND OF THE KIDNEYS AND URINARY BLADDER       2/8/2023       COMPARISON:   None       HISTORY:   ORDERING SYSTEM PROVIDED HISTORY: ROSEANNE   TECHNOLOGIST PROVIDED HISTORY:       Reason for exam:->ROSEANNE   What reading provider will be dictating this exam?->CRC       FINDINGS:       Kidneys:       Right kidney: 9 x 4.6 x 5.4 cm; cortical thickness 12.4 mm. Left kidney: 9 x 5 x 5 cm; cortical thickness: 15.8 mm. There is a relative hyper echoic echotexture for renal parenchyma in relation   to a solid lucent appearance for renal pyramids which can be indication for   chronic renal parenchymal disease. Please correlate clinically. In both kidneys there are no renal calculus, signs for obstructive uropathy   or hydronephrosis or renal mass cysts. Presence of moderate volume ascites in the abdomen the in lesser spleen,   towards the lower pelvic. There is also a mild to moderate left-sided   pleural effusion. The liver has upper normal size. There is slight heterogeneity of the   echotexture of the liver parenchyma with slight coarsened appearance but   without focal lesions are seen. The liver was not specifically evaluated on   this study           Bladder:       Bladder not evaluated.   There is a Dickerson catheter in the bladder. The   bladder was empty at the time the study. Impression   1. Normal size kidneys. Increased echotexture of the renal parenchymal   relative sonolucent appearance of the renal pyramids can chronic renal   parenchymal process. Please correlate clinically. 2.  No signs for obstructive uropathy. 3.  Moderate volume ascites in the abdomen. 4.  Mild to moderate left-sided pleural effusion               Assessment/plan:  Urinary retention  BPH  ROSEANNE on CKD  UTI    Urine culture reviewed  Antibiotics per primary  Continue to watch creatinine  Nephrology following  Renal ultrasound without any evidence of any hydronephrosis  Continue Dickerson catheter  We will follow-up in the office as an outpatient for voiding trial in approximately 1 week  Hold on any further acute  interventions at this time  Please call us with further questions or concerns        Electronically signed by EDMUNDO Bourgeois CNP on 2/13/2023 at 9:03 AM  [facility]     I agree with the nurse practitioner assessment and plan. I personally evaluated the patient and made any changes to reflect my impression and plan.      Hansel Howard MD

## 2023-02-13 NOTE — DISCHARGE INSTRUCTIONS
Place the patients sotomayor to leg bag on discharge from the hospital.  Please give the patient a night bag (large bag) and sotomayor instructions upon discharge. Please have the patient contact the office for sotomayor removal instructions. The catheter may go red (hematuria), may go clear, and may go red. This is a normal process, as long as the catheter is draining. Call the office if any concerns should arise for further instructions, [phone]. Call upon discharge to schedule a follow-up visit with Destinee Encinas/Mode/Gina ([facility]) at [phone] About Indwelling Urinary Catheter Care to Prevent Infection  Overview     A urinary catheter is a flexible plastic tube that's used to drain urine from your bladder when you can't urinate on your own. The catheter allows urine to drain from the bladder into a bag. Two types of drainage bags may be used with a urinary catheter. A bedside bag is a large bag that you can hang on the side of your bed or on a chair. You can use it overnight or anytime you will be sitting or lying down for a long time. A leg bag is a small bag that you can use during the day. It is usually attached to your thigh or calf and hidden under your clothes. Having a urinary catheter increases your risk of getting a urinary tract infection. Germs may get on the catheter and cause an infection in your bladder or kidneys. The longer you have a catheter, the more likely it is that you will get an infection. You can help prevent this problem with good hygiene and careful handling of your catheter and drainage bags. How can you help prevent infection? Take care to stay clean  Always wash your hands well before and after you handle your catheter. Clean the skin around the catheter daily using soap and water. Dry with a clean towel afterward. You can shower with your catheter and drainage bag in place unless your doctor told you not to.   When you clean around the catheter, check the surrounding skin for signs of infection. Look for things like pus and irritated, swollen, red, or tender skin around the catheter. Be careful with your drainage bag  Always keep the drainage bag below the level of your bladder. This will help keep urine from flowing back into your bladder. Check often to see that urine is flowing through the catheter into the drainage bag. Empty the drainage bag when it is half full. This will keep it from overflowing or backing up. When you empty the drainage bag, do not let the tubing or drain spout touch anything. Keep the cap that comes with the tubing, and cover the tip of the tubing when not in use. Be careful with your catheter  Do not unhook the catheter from the drain tube until you are ready to change the tubing and bag. That could let germs get into the tube. Make sure that the catheter tubing does not get twisted or kinked. Do not tug or pull on the catheter. And make sure that the drainage bag does not drag or pull on the catheter. Do not put powder or lotion on the skin around the catheter. Talk with your doctor about your options for sexual intercourse while wearing a catheter. How do you empty the bag? If your doctor has asked you to keep a record, write down the amount of urine in the bag before you empty it. Wash your hands before and after you touch the bag. Remove the drain spout from its sleeve at the bottom of the drainage bag. Open the valve on the drain spout. Let the urine flow out into the toilet or a container. Be careful not to let the tubing or drain spout touch anything. After you empty the bag, close the valve. Then put the drain spout back into its sleeve at the bottom of the collection bag. How do you switch to a bedside bag for overnight use? Wash your hands before and after you handle the bags. Empty the leg bag that is attached to the tubing and catheter.   Put a clean towel under the tubing attached to the leg bag.  Use an alcohol wipe to clean the tip of the tubing attached to the bedside bag. To stop the flow of urine, pinch the catheter with your fingers just above the tubing connection. Use a twisting motion to disconnect the leg bag tubing from the catheter. Then securely connect the catheter to the tubing from the bedside bag. How do you clean a bedside bag? Many people clean their bedside bag in the morning if they switch to a leg bag. To clean a bedside drainage bag:  Remove the bedside bag and attach the leg bag. Fill the bedside bag with 2 parts vinegar and 3 parts water. Let it stand for 20 minutes. Empty the bag, and let it air dry. When should you call for help? Call your doctor now or seek immediate medical care if:    You have symptoms of a urinary infection. These may include:  Pain or burning when you urinate. A frequent need to urinate without being able to pass much urine. Pain in the flank, which is just below the rib cage and above the waist on either side of the back. Blood in your urine. A fever. Your urine smells bad. You see large blood clots in your urine. No urine or very little urine is flowing into the bag for 4 or more hours. Watch closely for changes in your health, and be sure to contact your doctor if:    The area around the catheter becomes irritated, swollen, red, or tender, or there is pus draining from it. Urine is leaking from the place where the catheter enters your body. Follow-up care is a key part of your treatment and safety. Be sure to make and go to all appointments, and call your doctor if you are having problems. It's also a good idea to know your test results and keep a list of the medicines you take. Where can you learn more? Go to [email or web address]://[email or web address].[employer]. org and sign in to your [employer] account.  Enter U010 in the [employer] box to learn more about \"Learning About Indwelling Urinary Catheter Care to Prevent Infection. \"     If you do not have an account, please click on the \"Sign Up Now\" link. Current as of: February 10, 2021               Content Version: 12.9  © 9418-6010 Healthwise, Incorporated. Care instructions adapted under license by [facility] ([facility]). If you have questions about a medical condition or this instruction, always ask your healthcare professional. [address] any warranty or liability for your use of this information.

## 2023-02-13 NOTE — PROGRESS NOTES
Internal Medicine Progress Note    Patient's name: Ruben Shah  : 1931  Chief complaints (on day of admission): Loss of Consciousness (Lost consciousness x 5 mins while on BSC at home, did not fall.)  Admission date: 2023  Date of service: 2023   Room: [address] MED SURG/TELE  Primary care physician: Dick Dance, MD  Reason for visit: Follow-up for GIB, ROSEANNE    Subjective  Moise was seen and examined. He was resting comfortably in his bed. He has some mild baseline confusion. He denies new complaints or issues. He told me that he \"feels okay\". He did have difficulty with urination yesterday and bladder scan revealed significant urine. Dickerson catheter was placed. Urology consultation is pending. Wife called at [phone]. There was no answer. I did not leave a voicemail. Review of Systems  There are no new complaints of chest pain, shortness of breath, abdominal pain, nausea, vomiting, diarrhea, constipation.     Hospital Medications  Current Facility-Administered Medications   Medication Dose Route Frequency Provider Last Rate Last Admin    cefTRIAXone (ROCEPHIN) 1,000 mg in sterile water 10 mL IV syringe  1,000 mg IntraVENous Q24H Suzanne Brush MD   1,000 mg at 23 1512    hydrALAZINE (APRESOLINE) injection 10 mg  10 mg IntraVENous Q4H PRN Patito Arcos MD        carvedilol (COREG) tablet 25 mg  25 mg Oral BID Dorothy Rojas MD   25 mg at 23 0900    isosorbide dinitrate (ISORDIL) tablet 10 mg  10 mg Oral TID Dorothy Rojas MD   10 mg at 23 0900    hydrALAZINE (APRESOLINE) tablet 50 mg  50 mg Oral 3 times per day Mica Robles MD   50 mg at 23 09    0.9 % sodium chloride infusion   IntraVENous PRN Patito Arcos MD        melatonin tablet 6 mg  6 mg Oral Nightly PRN EDMUNDO Lemons - CNP   6 mg at 23 0123    pantoprazole (PROTONIX) tablet 40 mg  40 mg Oral BID FELIZ Prasad DO   40 mg at 23 0900    sodium chloride flush 0.9 % injection 10 mL  10 mL IntraVENous 2 times per day Karine Dasilva MD   10 mL at 02/13/23 0901    sodium chloride flush 0.9 % injection 10 mL  10 mL IntraVENous PRN Karine Dasilva MD        0.9 % sodium chloride infusion   IntraVENous PRN Karine Dasilva MD        senna [facility]) tablet 8.6 mg  1 tablet Oral Daily PRN Karine Dasilva MD   8.6 mg at 02/08/23 0519    acetaminophen (TYLENOL) tablet 650 mg  650 mg Oral Q6H PRN Karine Dasilva MD   650 mg at 02/12/23 1253    Or    acetaminophen (TYLENOL) suppository 650 mg  650 mg Rectal Q6H PRN Karine Dasilva MD        [facility]] pravastatin (PRAVACHOL) tablet 40 mg  40 mg Oral Nightly Karine Dasilva MD   40 mg at 02/07/23 2124    [Held by provider] torsemide (DEMADEX) tablet 20 mg  20 mg Oral Daily Karine Dasilva MD           PRN Medications  hydrALAZINE, sodium chloride, melatonin, sodium chloride flush, sodium chloride, senna, acetaminophen **OR** acetaminophen    Objective  Most Recent Recorded Vitals  BP (!) 168/72   Pulse 62   Temp 98.5 °F (36.9 °C) (Oral)   Resp 16   Ht 5' 11\" (1.803 m)   Wt 177 lb 6.4 oz (80.5 kg)   SpO2 95%   BMI 24.74 kg/m²   I/O last 3 completed shifts: In: 180 [P.O.:180]  Out: 950 [Urine:950]  No intake/output data recorded.     Physical Exam:  General: AAO to person/place/time/purpose, NAD, no labored breathing, mild baseline confusion  Eyes: conjunctivae/corneas clear, sclera non icteric  Skin: color/texture/turgor normal, no rashes or lesions  Lungs: CTAB, no retractions/use of accessory muscles, no vocal fremitus, no rhonchi, no crackle, no rales  Heart: regular rate, regular rhythm, no murmur  Abdomen: soft, NT, bowel sounds normal  : Dickerson catheter in place  Extremities: atraumatic, no edema  Neurologic: cranial nerves 2-12 grossly intact, no slurred speech    Most Recent Labs  Lab Results   Component Value Date    WBC 9.9 02/13/2023    HGB 8.0 (L) 02/13/2023    HCT 25.7 (L) 02/13/2023     02/13/2023     02/13/2023 K 4.2 02/13/2023    K 4.2 02/13/2023     02/13/2023    CREATININE 2.9 (H) 02/13/2023    BUN 74 (H) 02/13/2023    CO2 21 (L) 02/13/2023    GLUCOSE 136 (H) 02/13/2023    ALT 22 02/13/2023    AST 17 02/13/2023    INR 1.6 02/01/2022    LABA1C 6.1 (H) 02/05/2023    LABMICR <12.0 05/18/2022       US RETROPERITONEAL COMPLETE   Final Result   1. Normal size kidneys. Increased echotexture of the renal parenchymal   relative sonolucent appearance of the renal pyramids can chronic renal   parenchymal process. Please correlate clinically. 2.  No signs for obstructive uropathy. 3.  Moderate volume ascites in the abdomen. 4.  Mild to moderate left-sided pleural effusion         CT Head W/O Contrast   Final Result   1. There is no acute intracranial abnormality. Specifically, there is no   intracranial hemorrhage. 2. Atrophy and periventricular leukomalacia,         XR CHEST PORTABLE   Final Result   1. Airspace disease within the retrocardiac region   2. Small left pleural effusion   3.  Trace right pleural effusion             Assessment   Active Hospital Problems    Diagnosis     GI bleed [K92.2]      Priority: High    Palliative care encounter [Z51.5]      Priority: Medium    Mild protein-calorie malnutrition ([facility] [address] [E44.1]     Gout [M10.9]     Chronic kidney disease [N18.9]     Aortic valve stenosis [I35.0]     Heart valve disease [I38]     Long term current use of anticoagulant therapy [Z79.01]     CAD (coronary artery disease) [I25.10]     S/P CABG (coronary artery bypass graft) [Z95.1]     DM (diabetes mellitus) (HCC) [E11.9]     HTN (hypertension) [I10]     Hyperlipemia [E78.5]     A-fib (HCC) [I48.91]     Diabetic neuropathy (HCC) [E11.40]     BPH (benign prostatic hyperplasia) [N40.0]          Plan  GI bleed with stable acute blood loss anemia:   General surgery following-SP EGD on 2/6 with possible Bhat's esophagus  PPI  Follow H&H--pRBC if Hb < 7  noted Fe/Ferritin/TIBC/Vitamin B12/Folate    ROSEANNE (resolving) on CKD3: Follow BMP  Baseline serum creatinine about 1.8-2.0  Renal ultrasound negative noted  Nephrology following and managing IVF hydration  Demadex on hold     Hypotension/syncope related to GI bleed  Cardiology following  Hypotension resolved after IVF hydration  Antihypertension medications resumed on 2/8    Transaminitis, resolved: Follow liver function studies  Hold statin for now    Urinary retention possibly related to UTI:  Dickesron catheter removed on 2/12 but urinary retention developed and Dickerson catheter was replaced the night of 2/12  Urology consultation pending    Vladimir UTI:   UA noted  Urine cx noted  IV Rocephin    PT AM-PAC-- 16/24  Follow labs   DVT prophylaxis  Please see orders for further management and care. Discharge plan: [facility]/SNF soon-possibly as soon as later today    Electronically signed by Best Whaley DO on 2/13/2023 at 10:47 AM    I can be reached through [employer].

## 2023-02-13 NOTE — PROGRESS NOTES
Continue working on healthy eating and moving (start low and slow, work up to 30 min, 5x/week)    BG goals:  Fasting and before meals <130, >70  2 hour after eating <180    We only need 1/2 of these numbers to be within target then your A1c will be within target    Medication changes   5 clicks with meals  2 clicks with snacks    Follow up   April 1st    Call me sooner if any problems/concerns and/or questions develop including consistent low BGs <70 or consistent high BGs >200  [phone] (Unit Coordinator)    [phone] (Nurse)    Smoking:   Try keep working on reducing the amount of cigarettes    Updated Dr. Nickie Moreau with consultants plans via perfect serve. Awaiting discharge orders.

## 2023-02-13 NOTE — DISCHARGE SUMMARY
Internal Medicine Discharge Summary    NAME: Raymundo Causey :  1931  MRN:  [id number] Qing Jones MD    ADMITTED: 2023   DISCHARGED: 2023 10:00 PM    ADMITTING PHYSICIAN: Dina Smiley DO    PCP: Jagdish Garcia MD    CONSULTANT(S):   IP CONSULT TO INTERNAL MEDICINE  IP CONSULT TO GENERAL SURGERY  IP CONSULT TO SOCIAL WORK  IP CONSULT TO CARDIOLOGY  IP CONSULT TO NEPHROLOGY  IP CONSULT TO NEPHROLOGY  IP CONSULT TO PALLIATIVE CARE  IP CONSULT TO UROLOGY     ADMITTING DIAGNOSIS:   GI bleed [K92.2]     Please see H&P for further details    DISCHARGE DIAGNOSES:   Active Hospital Problems    Diagnosis     GI bleed [K92.2]      Priority: High    Palliative care encounter [Z51.5]      Priority: Medium    Mild protein-calorie malnutrition ([facility] [E44.1]     Gout [M10.9]     Chronic kidney disease [N18.9]     Aortic valve stenosis [I35.0]     Heart valve disease [I38]     Long term current use of anticoagulant therapy [Z79.01]     CAD (coronary artery disease) [I25.10]     S/P CABG (coronary artery bypass graft) [Z95.1]     DM (diabetes mellitus) (HCC) [E11.9]     HTN (hypertension) [I10]     Hyperlipemia [E78.5]     A-fib (HCC) [I48.91]     Diabetic neuropathy (HCC) [E11.40]     BPH (benign prostatic hyperplasia) [N40.0]        BRIEF HISTORY OF PRESENT ILLNESS: Raymudno Causey is a 80 y.o. male patient of Jagdish Garcia MD who  has a past medical history of A-fib ([facility] BPH (benign prostatic hyperplasia), CAD (coronary artery disease), Diabetic neuropathy ([facility] DM (diabetes mellitus) ([address] HTN (hypertension), Hyperlipemia, Pancreatitis, Pleural effusion on left, and S/P CABG (coronary artery bypass graft). who originally had concerns including Loss of Consciousness (Lost consciousness x 5 mins while on BSC at home, did not fall. ). at presentation on 2023, and was found to have GI bleed [K92.2] after workup. Please see H&P for further details.     HOSPITAL COURSE:   The patient presented to the hospital with the chief complaint of Loss of Consciousness (Lost consciousness x 5 mins while on BSC at home, did not fall.)  . The patient was admitted to the hospital.     GI bleed with stable acute blood loss anemia:   General surgery following-SP EGD on 2/6 with possible Bhat's esophagus  PPI  Follow H&H--pRBC if Hb < 7  noted Fe/Ferritin/TIBC/Vitamin B12/Folate     ROSEANNE (resolving) on CKD3: Follow BMP  Baseline serum creatinine about 1.8-2.0  Renal ultrasound negative noted  Nephrology following and managing IVF hydration  Demadex on hold      Hypotension/syncope related to GI bleed  Cardiology following  Hypotension resolved after IVF hydration  Antihypertension medications resumed on 2/8     Transaminitis, resolved: Follow liver function studies  Hold statin for now     Urinary retention possibly related to UTI:  Dickerson catheter removed on 2/12 but urinary retention developed and Dickerson catheter was replaced the night of 2/12-- OP voiding trial   Urology consultation appreciated     Vladimir UTI:   UA noted  Urine cx noted  IV Rocephin-- Omnicef on DC     PT AM-PAC-- 16/24      BRIEF PHYSICAL EXAMINATION AND LABORATORIES ON DAY OF DISCHARGE:  VITALS:  BP (!) 113/47   Pulse 64   Temp 98.1 °F (36.7 °C) (Oral)   Resp 15   Ht 5' 11\" (1.803 m)   Wt 177 lb 6.4 oz (80.5 kg)   SpO2 96%   BMI 24.74 kg/m²     Please see note from the same day.      LABS[de-identified]  Recent Labs     02/12/23 0333 02/13/23 0102    137   K 4.0  4.0 4.2  4.2    104   CO2 21* 21*   BUN 71* 74*   CREATININE 3.1* 2.9*   GLUCOSE 153* 136*   CALCIUM 8.8 8.9     Recent Labs     02/12/23 0333 02/13/23  0102   ALKPHOS 77 71   ALT 25 22   AST 19 17   PROT 6.0* 6.2*   BILITOT 1.4* 1.4*   LABALBU 3.2* 3.1*     Recent Labs     02/12/23 0333 02/13/23  0102   WBC 8.4 9.9   RBC 2.52* 2.82*   HGB 7.2* 8.0*   HCT 23.5* 25.7*   MCV 93.3 91.1   MCH 28.6 28.4   MCHC 30.6* 31.1*   RDW 16.8* 17.0*    156   MPV 11.0 10.5     Lab Results   Component Value Date    LABA1C 6.1 (H) 02/05/2023    LABA1C 5.9 (H) 02/01/2022    LABA1C 5.9 06/24/2020     Lab Results   Component Value Date    INR 1.6 02/01/2022    INR 2.4 01/29/2022    INR 2.5 06/24/2020    PROTIME 17.7 (H) 02/01/2022    PROTIME 26.4 (H) 01/29/2022    PROTIME 29.8 06/24/2020      No results found for: TSH  Lab Results   Component Value Date    TRIG 182 06/24/2020    TRIG 218 02/28/2020    TRIG 122 10/30/2019     (A) 06/24/2020    HDL 40 02/28/2020    HDL 36 10/30/2019    LDLCALC 96 10/03/2018    LDLCALC 96 03/14/2018    LDLCALC 70 02/08/2017     Recent Labs     02/12/23  0333 02/13/23  0102   MG 1.9 1.8       No results for input(s): CKTOTAL, CKMB, TROPONINI in the last 72 hours. No results for input(s): LACTA in the last 72 hours. IMAGING:  CT Head W/O Contrast    Result Date: 2/5/2023  EXAMINATION: CT OF THE HEAD WITHOUT CONTRAST  2/5/2023 11:52 am TECHNIQUE: CT of the head was performed without the administration of intravenous contrast. Automated exposure control, iterative reconstruction, and/or weight based adjustment of the mA/kV was utilized to reduce the radiation dose to as low as reasonably achievable. COMPARISON: None. HISTORY: ORDERING SYSTEM PROVIDED HISTORY: syncope TECHNOLOGIST PROVIDED HISTORY: Reason for exam:->syncope Has a \"code stroke\" or \"stroke alert\" been called? ->No Decision Support Exception - unselect if not a suspected or confirmed emergency medical condition->Emergency Medical Condition (MA) FINDINGS: BRAIN/VENTRICLES: There is no acute intracranial hemorrhage, mass effect or midline shift. No abnormal extra-axial fluid collection. The gray-white differentiation is maintained without evidence of an acute infarct. There is no evidence of hydrocephalus. The ventricles, cisterns and sulci are prominent consistent with atrophy.   There is decreased attenuation within the periventricular white matter consistent with periventricular leukomalacia. ORBITS: The visualized portion of the orbits demonstrate no acute abnormality. SINUSES: The visualized paranasal sinuses and mastoid air cells demonstrate no acute abnormality. SOFT TISSUES/SKULL:  No acute abnormality of the visualized skull or soft tissues. 1. There is no acute intracranial abnormality. Specifically, there is no intracranial hemorrhage. 2. Atrophy and periventricular leukomalacia,     XR CHEST PORTABLE    Result Date: 2/5/2023  EXAMINATION: ONE XRAY VIEW OF THE CHEST 2/5/2023 11:33 am COMPARISON: 02/04/2022 HISTORY: ORDERING SYSTEM PROVIDED HISTORY: chf syncope TECHNOLOGIST PROVIDED HISTORY: Reason for exam:->chf syncope FINDINGS: The heart is enlarged. Postop sternotomy changes are noted There is a trace right pleural effusion. No right lung infiltrate is noted There is airspace disease within the retrocardiac region. There is a small left pleural effusion. 1. Airspace disease within the retrocardiac region 2. Small left pleural effusion 3. Trace right pleural effusion     US RETROPERITONEAL COMPLETE    Result Date: 2/8/2023  EXAMINATION: RETROPERITONEAL ULTRASOUND OF THE KIDNEYS AND URINARY BLADDER 2/8/2023 COMPARISON: None HISTORY: ORDERING SYSTEM PROVIDED HISTORY: ROSEANNE TECHNOLOGIST PROVIDED HISTORY: Reason for exam:->ROSEANNE What reading provider will be dictating this exam?->CRC FINDINGS: Kidneys: Right kidney: 9 x 4.6 x 5.4 cm; cortical thickness 12.4 mm. Left kidney: 9 x 5 x 5 cm; cortical thickness: 15.8 mm. There is a relative hyper echoic echotexture for renal parenchyma in relation to a solid lucent appearance for renal pyramids which can be indication for chronic renal parenchymal disease. Please correlate clinically. In both kidneys there are no renal calculus, signs for obstructive uropathy or hydronephrosis or renal mass cysts. Presence of moderate volume ascites in the abdomen the in lesser spleen, towards the lower pelvic.   There is also a mild to moderate left-sided pleural effusion. The liver has upper normal size. There is slight heterogeneity of the echotexture of the liver parenchyma with slight coarsened appearance but without focal lesions are seen. The liver was not specifically evaluated on this study Bladder: Bladder not evaluated. There is a Dickerson catheter in the bladder. The bladder was empty at the time the study. 1.  Normal size kidneys. Increased echotexture of the renal parenchymal relative sonolucent appearance of the renal pyramids can chronic renal parenchymal process. Please correlate clinically. 2.  No signs for obstructive uropathy. 3.  Moderate volume ascites in the abdomen. 4.  Mild to moderate left-sided pleural effusion       DISPOSITION:  The patient's condition is fair.    The patient is being discharged to nursing home    DISCHARGE MEDICATIONS:      Medication List        START taking these medications      cefdinir 300 MG capsule  Commonly known as: OMNICEF  Take 1 capsule by mouth daily for 7 days            CHANGE how you take these medications      pantoprazole 20 MG tablet  Commonly known as: PROTONIX  Take 1 tablet by mouth 2 times daily (before meals)  What changed: when to take this            CONTINUE taking these medications      carvedilol 12.5 MG tablet  Commonly known as: COREG  TAKE 1 TABLET BY MOUTH TWICE A DAY     Eliquis 2.5 MG Tabs tablet  Generic drug: apixaban  TAKE 1 TABLET BY MOUTH TWICE A DAY     hydrALAZINE 50 MG tablet  Commonly known as: APRESOLINE  TAKE 1 TABLET BY MOUTH THREE TIMES A DAY     isosorbide dinitrate 20 MG tablet  Commonly known as: ISORDIL  TAKE 1 TABLET BY MOUTH THREE TIMES A DAY     pravastatin 40 MG tablet  Commonly known as: PRAVACHOL  Take one daily     torsemide 20 MG tablet  Commonly known as: DEMADEX  Take 1 tablet by mouth daily     traZODone 50 MG tablet  Commonly known as: DESYREL            STOP taking these medications      doxazosin 4 MG tablet  Commonly known as: CARDURA               Where to Get Your Medications        These medications were sent to [address], [address]  [place], [address]      Phone: [phone]   pantoprazole 20 MG tablet       Information about where to get these medications is not yet available    Ask your nurse or doctor about these medications  cefdinir 300 MG capsule           INTERNAL MEDICINE INSTRUCTIONS:  Follow-up with primary care physician as directed in discharge paperwork. Please review results of imaging studies with PCP. Follow-up with consultants as directed by them. If recurrence or worsening of symptoms go to the ED or call primary care physician. Diet: ADULT DIET; Regular; GI [place] (GERD/Peptic Ulcer)  ADULT ORAL NUTRITION SUPPLEMENT; Breakfast, Dinner; Standard 4 oz Oral Supplement  ADULT ORAL NUTRITION SUPPLEMENT; Lunch; Frozen Oral Supplement    FOLLOW-IP  Davon Berry MD  [address] [place] [address]  [phone]    Schedule an appointment as soon as possible for a visit in 2 week(s)  hospital follow up, gastritis, hiatal hernia, possible Bhat's esophagus    [address]   [place] [id number]  [address] [facility] [facility] 177 [phone]    Schedule an appointment as soon as possible for a visit in 1 week(s)  for follow-up appointment from this hospital stay    Gene Ricks MD  [facility] [address] [facility]  [phone]    Follow up      Brien Cantu [address] [place] [phone]    Schedule an appointment as soon as possible for a visit in 2 week(s)  Pt to have BMP in one week and follow up 2weeks      Preparing for this patient's discharge, including paperwork, orders, instructions, and meeting with patient did required 34 minutes.    Electronically signed by Miguel Eckert DO on 2/14/2023 at 7:19 AM

## 2023-02-13 NOTE — PROGRESS NOTES
Pt voicing difficulties with emptying bladder; incomplete emptying. Only voiding roughly 25-50 mL at a time of urine with noted distention, leaking urine to incontinence pad, and urge to void present. Instructed pt to urinate; only able to produce 25 mL. PVR results wide range; >200 to >999. Call placed to MELISSA Hendrickson to update - new order received to consult urology and reinsert sotomayor catheter for retention.

## 2023-02-13 NOTE — PROGRESS NOTES
Nurse to nurse phoned to [facility]. Pt to be transported tonight @ 2100 via [facility]. Phoned pt's wife Delayne Letters as per her request with time/no answer. LM. Wife aware of dc plan for today to [facility] and is in agreement.

## 2023-02-13 NOTE — PROGRESS NOTES
Physical Therapy  Facility/Department: [address] MED SURG/TELE  Physical Therapy Treatment Note    Name: Carol Vann  : 1931  MRN: [id number]  Date of Service: 2023      Attending Provider:  Bob Ngo DO    Evaluating PT:  Nury Harrison, P.T. Room #:  2360/2999-P  Diagnosis:  GI bleed [K92.2], low Hgb, syncope. Precautions:  Hgb after blood transfusions is 7.9 23, falls, bed/chair alarm    SUBJECTIVE:    Pt lives with his wife in a 2 story home with 2 stairs and no rail to enter. There are 12 steps and 1 rail to 2nd floor bed and bath. Pt ambulated with ww and has a WC if needed. OBJECTIVE:   Initial Evaluation  Date: 23 Treatment  2023 Short Term/ Long Term   Goals   Was pt agreeable to Eval/treatment? yes yes    Does pt have pain? No c/o pain No complaints    Bed Mobility  Rolling: SBA  Supine to sit: MIN A  Sit to supine: MOD A  Scooting: MIN A Rolling: NT  Supine to sit: NT  Scooting: NT Independent   Transfers Sit to stand: MIN A  Stand to sit: MIN A  Stand pivot: MIN A with ww Sit <> stand: Min A Independent    Ambulation   25 feet with ww MIN A 35 feet x 1 using [employer] for support Min/CGA for balance 200 feet with ww supervision   Stair negotiation: ascended and descended NA, pt fatigued with amb  12 steps with 1 rail SBA   AM-PAC 6 Clicks 35/10 33/52        Pt is alert and able to follow instruction  Balance: fair minus/poor dynamic using [employer] for support    Pt performed therapeutic exercise of the following: NT    Patient education/treatment  Pt was educated on UE usage to assist with transfer safety, gait mechanics promoting upright posture    Patient response to education:   Pt verbalized understanding Pt demonstrated skill Pt requires further education in this area   yes yes yes     ASSESSMENT:   Comments: Nurse ok with Rx. Pt found in a bedside chair. Wife present and with good encouragement.  Gait slow, consistent this session and laboring., Pt remains unsteady throughout, required constant hands on assist for balance and safety. Pt stood approx 1 minute using [employer] for support SBA while receiving hygiene care. Pt deconditioned, fatigued after activity, is unsafe to gait or transfer alone presently. Pt was left in a bedside chair as found with call light in reach, waffle cushion in place    Chair/bed alarm: chair alarm active    Time in 1405  Time out 1422   Total Treatment Time 17 minutes   CPT codes:     Therapeutic activities 71786 17 minutes   Therapeutic exercises 71291 0 minutes       Pt is making minimal progress toward established Physical Therapy goals. Continue with physical therapy current plan of care.     [facility]   License Number: [id number]

## 2023-02-13 NOTE — PROGRESS NOTES
Palliative Care Department  [phone]  Palliative Care Progress Note  Provider Erik Ward, APRN - [address]  [id number]  Hospital Day: 9  Date of Initial Consult: 2/9/2023  Referring Provider: Bobo Vo MD  Palliative Medicine was consulted for assistance with: Goals of care, CODE STATUS discussion    HPI:   Eloisa Haney is a 80 y.o. with a medical history of A-fib, BPH, CAD, diabetic neuropathy, diabetes, hypertension, hyperlipidemia, pancreatitis, s/p CABG, TAVR, cholecystectomy left pleural effusion who was admitted on 2/5/2023 from home with a CHIEF COMPLAINT of loss of consciousness. Patient was sitting on toilet and passed out for about 5 minutes. Patient did not fall and he did not hit his head. His hemoglobin was found to be 7.5 in the ED and was found to be Hemoccult positive. Two units of blood given and general surgery consulted. Cardiology consulted for syncope. Nephrology consulted for ROSEANNE. Palliative medicine consulted for further assistance. ASSESSMENT/PLAN:     Pertinent Hospital Diagnoses     GI bleed with acute blood loss anemia  CKD stage III  Hypotension and syncope      Palliative Care Encounter / Counseling Regarding Goals of Care  Please see detailed goals of care discussion as below  At this time, Eloisa Haney, Does Not have capacity for medical decision-making.   Capacity is time limited and situation/question specific  During encounter Cain Mclain was surrogate medical decision-maker  Outcome of goals of care meeting:   Continue current medical management  Change CODE STATUS to limited/DNI  Monitor PO intake  Code status Limited DNI  Advanced Directives: no POA or living will in [facility]  Surrogate/Legal NOK:  Mckenzie Sethi (spouse) [facility] (child) [phone]      Spiritual assessment: no spiritual distress identified  Bereavement and grief: to be determined  Referrals to: none today  SUBJECTIVE:     Current medical issues leading to Palliative Medicine involvement include   Active Hospital Problems    Diagnosis Date Noted    Palliative care encounter [Z51.5] 02/09/2023     Priority: Medium    GI bleed [K92.2] 02/05/2023     Priority: Medium    Mild protein-calorie malnutrition ([facility] [E44.1] 02/01/2022    Gout [M10.9] 11/18/2021    Chronic kidney disease [N18.9] 11/18/2021    Aortic valve stenosis [I35.0] 11/18/2021    Heart valve disease [I38] 11/18/2021    Long term current use of anticoagulant therapy [Z79.01] 04/08/2016    CAD (coronary artery disease) [I25.10]     S/P CABG (coronary artery bypass graft) [Z95.1]     DM (diabetes mellitus) ([facility] [E11.9]     HTN (hypertension) [I10]     Hyperlipemia [E78.5]     A-fib (HCC) [I48.91]     Diabetic neuropathy (HCC) [E11.40]     BPH (benign prostatic hyperplasia) [N40.0]        Details of Conversation:    Chart reviewed. Update received from nursing. Patient seen siting up in the a chair, alert and oriented x4. Able to hold meaningful conversation. Wife, Fariha Honeycutt, at bedside. Reiterated previous palliative medicine discussions regarding goals of care and code status options. At this time Hari Drake states he has no questions or concerns. Plan is to be discharged to facility later this afternoon. Emotional support given and all questions addressed. Goals of care and code status have been established. There are no further PM needs at this time. PM will now sign off. If new PM needs arise, please re-consult. Thank you.     OBJECTIVE:   Prognosis: Guarded    Physical Exam:  BP (!) 168/72   Pulse 62   Temp 98.5 °F (36.9 °C) (Oral)   Resp 16   Ht 5' 11\" (1.803 m)   Wt 177 lb 6.4 oz (80.5 kg)   SpO2 95%   BMI 24.74 kg/m²   Constitutional:  Elderly, thin, awake, alert  Eyes: no scleral icterus, normal lids, no discharge  ENMT:  Normocephalic, atraumatic, mucosa moist, EOMI  Neck:  trachea midline, no JVD  Lungs:  CTA bilaterally  Heart[de-identified] Paced  Abd:  Soft, non tender, non distended, bowel sounds present  Ext:  Moving all extremities, +edema, pulses present  Skin:  Warm and dry  Psych: non-anxious affect  Neuro:  A&Ox3, follows commands    Objective data reviewed: labs, images, records, medication use, vitals, and chart    Discussed patient and the plan of care with the other IDT members: Palliative Medicine IDT Team, Floor Nurse, Patient, and Family    Time/Communication  Greater than 50% of time spent, total 25 minutes in counseling and coordination of care at the bedside regarding goals of care, diagnosis and prognosis, and see above. Thank you for allowing Palliative Medicine to participate in the care of Carmen Bradford.

## 2023-02-13 NOTE — PROGRESS NOTES
[facility] MD Roque Gustafson, MD Mazin Dey, MD Tuan Miller, SOLITARIO Otero, DEMARCO  Progress Note    2/13/2023    SUBJECTIVE:   2/9:  Seen while sitting up in chair. NAD. Denies chest pain or SOB. Denies nausea, vomiting or abd pain. Spouse in room and she reports that his appetite and oral intake is not good. 2/10 NAD stable vitals     2/12: Right heel is bothering him. No signs of pressure ulcer but it hurts. Asked to get the Dickerson out. Breathing comfortably on room air. Ongoing fatigue and generalized weakness debilitation. 2/13: Denies new complaint. Voiding trial unsuccessful, Dickerson catheter replaced. Appetite so-so. Ongoing mild swelling      PROBLEM LIST:    Principal Problem:    GI bleed  Active Problems:    Palliative care encounter    CAD (coronary artery disease)    S/P CABG (coronary artery bypass graft)    DM (diabetes mellitus) (HCC)    Hyperlipemia    HTN (hypertension)    A-fib (HCC)    BPH (benign prostatic hyperplasia)    Diabetic neuropathy ([address]    Long term current use of anticoagulant therapy    Aortic valve stenosis    Chronic kidney disease    Heart valve disease    Gout    Mild protein-calorie malnutrition ([address]  Resolved Problems:    Cellulitis    Syncope without other cardiovascular symptoms         DIET:    ADULT DIET;  Regular; GI [place] (GERD/Peptic Ulcer)  ADULT ORAL NUTRITION SUPPLEMENT; Breakfast, Dinner; Standard 4 oz Oral Supplement  ADULT ORAL NUTRITION SUPPLEMENT; Lunch; Frozen Oral Supplement     MEDS (scheduled):    cefTRIAXone (ROCEPHIN) IV  1,000 mg IntraVENous Q24H    carvedilol  25 mg Oral BID    isosorbide dinitrate  10 mg Oral TID    hydrALAZINE  50 mg Oral 3 times per day    pantoprazole  40 mg Oral BID AC    sodium chloride flush  10 mL IntraVENous 2 times per day    [Held by provider] pravastatin  40 mg Oral Nightly    [Held by provider] torsemide  20 mg Oral Daily       MEDS (infusions):   sodium chloride      sodium chloride         MEDS (prn):  hydrALAZINE, sodium chloride, melatonin, sodium chloride flush, sodium chloride, senna, acetaminophen **OR** acetaminophen    PHYSICAL EXAM:     Patient Vitals for the past 24 hrs:   BP Temp Temp src Pulse Resp SpO2 Weight   02/13/23 0648 (!) 168/72 98.5 °F (36.9 °C) Oral 62 16 95 % --   02/13/23 0110 -- -- -- -- -- -- 177 lb 6.4 oz (80.5 kg)   02/12/23 2218 (!) 147/66 -- -- -- -- -- --   02/12/23 2158 (!) 147/66 -- -- -- -- -- --   02/12/23 1830 (!) 143/67 97.8 °F (36.6 °C) Oral 61 16 94 % --   02/12/23 1804 131/61 -- -- 62 -- -- --   02/12/23 1512 (!) 154/70 -- -- -- -- -- --   @      Intake/Output Summary (Last 24 hours) at 2/13/2023 1336  Last data filed at 2/13/2023 0545  Gross per 24 hour   Intake --   Output 750 ml   Net -750 ml         Wt Readings from Last 3 Encounters:   02/13/23 177 lb 6.4 oz (80.5 kg)   01/13/23 169 lb (76.7 kg)   04/29/22 159 lb 1.6 oz (72.2 kg)       Constitutional:  in no acute distress. Awake and alert  HEENT: NC/AT, EOMI, sclera and conjunctiva are clear and anicteric, mucus membranes moist  Neck: Trachea midline, no JVD  Cardiovascular: S1, S2 irregular rhythm, no murmur,or rub  Respiratory:  CTAB.  No crackles, no wheeze  Gastrointestinal:  Soft, nontender, nondistended, NABS  :  sotomayor draining yellow urine  Ext: 1+BLE edema, feet warm  Skin: dry, no rash  Neuro: awake, alert, interactive      DATA:    Recent Labs     02/11/23  0250 02/12/23  0333 02/13/23  0102   WBC 7.2 8.4 9.9   HGB 7.2* 7.2* 8.0*   HCT 23.5* 23.5* 25.7*   MCV 93.3 93.3 91.1    149 156     Recent Labs     02/11/23  0250 02/12/23  0333 02/13/23  0102    136 137   K 3.9 4.0  4.0 4.2  4.2    104 104   CO2 22 21* 21*   MG  --  1.9 1.8   PHOS  --  3.4 3.2   BUN 70* 71* 74*   CREATININE 3.2* 3.1* 2.9*   ALT 28 25 22   AST 26 19 17   BILITOT 1.5* 1.4* 1.4*   ALKPHOS 81 77 71       Lab Results   Component Value Date LABPROT 1.4 (H) 02/08/2023    LABPROT 1.4 02/08/2023    LABPROT 1.3 (H) 07/11/2022    LABPROT 1.3 07/11/2022       Assessment  Acute Kidney Injury on chronic kidney disease in the setting of hypotension, intravascular volume depletion poor oral intake and GI bleed  Chronic Kidney 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    O/w continue current management  Avoid nephrotoxins   If cr stable/better in am then should be ok to dc from renal POV with fu BMP next week   Office fu in 2 weeks        Electronically signed by Juliette Marin MD on 2/13/2023

## 2023-02-13 NOTE — PROGRESS NOTES
Call placed to [address] [place] [address] to confirm admission tonight. Spoke with Beth Britton at [facility] who stated he should be good for admission tonight. Notified her we will call for transport then call n to n.

## 2023-02-13 NOTE — PROGRESS NOTES
I was called by nursing because the patient has not been urinating well, bladder scan was 999cc. Patient has a lot of edema per nursing. Orders give to insert catheter and consult urology. Dr. Alicia Jackman or Dr. Staci Reddy will see the patient in the morning. Please call with urgent matters.

## 2023-03-03 ENCOUNTER — OFFICE VISIT (OUTPATIENT)
Dept: CARDIOLOGY CLINIC | Age: 88
End: 2023-03-03

## 2023-03-03 VITALS
HEART RATE: 60 BPM | HEIGHT: 71 IN | SYSTOLIC BLOOD PRESSURE: 118 MMHG | RESPIRATION RATE: 18 BRPM | DIASTOLIC BLOOD PRESSURE: 54 MMHG | WEIGHT: 177 LBS | BODY MASS INDEX: 24.78 KG/M2

## 2023-03-03 DIAGNOSIS — I25.10 CORONARY ARTERY DISEASE INVOLVING NATIVE CORONARY ARTERY OF NATIVE HEART WITHOUT ANGINA PECTORIS: Primary | ICD-10-CM

## 2023-03-03 RX ORDER — FERROUS SULFATE 325(65) MG
325 TABLET ORAL
COMMUNITY

## 2023-03-03 RX ORDER — ACETAMINOPHEN 650 MG/1
650 SUPPOSITORY RECTAL EVERY 4 HOURS PRN
COMMUNITY

## 2023-03-03 RX ORDER — ATORVASTATIN CALCIUM 10 MG/1
10 TABLET, FILM COATED ORAL DAILY
COMMUNITY

## 2023-03-03 RX ORDER — ONDANSETRON 4 MG/1
4 TABLET, FILM COATED ORAL EVERY 8 HOURS PRN
COMMUNITY

## 2023-03-03 RX ORDER — OMEPRAZOLE 20 MG/1
20 CAPSULE, DELAYED RELEASE ORAL DAILY
COMMUNITY

## 2023-03-03 RX ORDER — ACETAMINOPHEN 325 MG/1
650 TABLET ORAL EVERY 6 HOURS PRN
COMMUNITY

## 2023-03-03 NOTE — PROGRESS NOTES
CHIEF COMPLAINT: CAD/-CABG/Edema/VHD    HISTORY OF PRESENT ILLNESS: Patient is a 80 y.o. male seen at the request of Nicolas Parrish MD.      The patient presents today for follow-up. Prior TAVR. No CP or SOB. Some edema.      Past Medical History:   Diagnosis Date    A-fib ([facility]     BPH (benign prostatic hyperplasia)     CAD (coronary artery disease)     Diabetic neuropathy (HCC)     DM (diabetes mellitus) ([facility]     HTN (hypertension)     Hyperlipemia     Pancreatitis 09/01/2010    Pleural effusion on left 03/01/2022    seen on echo    S/P CABG (coronary artery bypass graft)        Patient Active Problem List   Diagnosis    CAD (coronary artery disease)    S/P CABG (coronary artery bypass graft)    DM (diabetes mellitus) ([facility]    Hyperlipemia    HTN (hypertension)    A-fib (HCC)    BPH (benign prostatic hyperplasia)    Diabetic neuropathy ([facility]    Long term current use of anticoagulant therapy    Aortic valve stenosis    Chronic kidney disease    Heart valve disease    Gout    Mild protein-calorie malnutrition (HCC)    Complete AV block (HCC)    GI bleed    Palliative care encounter       No Known Allergies    Current Outpatient Medications   Medication Sig Dispense Refill    ondansetron (ZOFRAN) 4 MG tablet Take 4 mg by mouth every 8 hours as needed for Nausea or Vomiting      ferrous sulfate (IRON 325) 325 (65 Fe) MG tablet Take 325 mg by mouth daily (with breakfast)      omeprazole (PRILOSEC) 20 MG delayed release capsule Take 20 mg by mouth daily      atorvastatin (LIPITOR) 10 MG tablet Take 10 mg by mouth daily      acetaminophen (TYLENOL) 650 MG suppository Place 650 mg rectally every 4 hours as needed for Fever      acetaminophen (TYLENOL) 325 MG tablet Take 650 mg by mouth every 6 hours as needed for Pain      traZODone (DESYREL) 50 MG tablet Take 50 mg by mouth nightly      torsemide (DEMADEX) 20 MG tablet Take 1 tablet by mouth daily 90 tablet 3    hydrALAZINE (APRESOLINE) 50 MG tablet TAKE 1 TABLET BY MOUTH THREE TIMES A  tablet 3    carvedilol (COREG) 12.5 MG tablet TAKE 1 TABLET BY MOUTH TWICE A  tablet 1    ELIQUIS 2.5 MG TABS tablet TAKE 1 TABLET BY MOUTH TWICE A  tablet 1    isosorbide dinitrate (ISORDIL) 20 MG tablet TAKE 1 TABLET BY MOUTH THREE TIMES A  tablet 1    pantoprazole (PROTONIX) 20 MG tablet Take 1 tablet by mouth 2 times daily (before meals) (Patient not taking: Reported on 3/3/2023) 60 tablet 1    pravastatin (PRAVACHOL) 40 MG tablet Take one daily (Patient not taking: Reported on 3/3/2023) 90 tablet 0     No current facility-administered medications for this visit. Social History     Socioeconomic History    Marital status:      Spouse name: Not on file    Number of children: Not on file    Years of education: Not on file    Highest education level: Not on file   Occupational History    Not on file   Tobacco Use    Smoking status: Never    Smokeless tobacco: Never   Vaping Use    Vaping Use: Never used   Substance and Sexual Activity    Alcohol use: Yes     Comment: rarely- 1 beer    Drug use: No    Sexual activity: Not on file   Other Topics Concern    Not on file   Social History Narrative    Not on file     Social Determinants of Health     Financial Resource Strain: Not on file   Food Insecurity: Not on file   Transportation Needs: Not on file   Physical Activity: Not on file   Stress: Not on file   Social Connections: Not on file   Intimate Partner Violence: Not on file   Housing Stability: Not on file       Family History   Problem Relation Age of Onset    High Blood Pressure Mother     Heart Disease Mother     Cancer Father        Review of Systems:  Heart: as above   Lungs: as above   Eyes: denies changes in vision or discharge. Ears: denies changes in hearing or pain. Nose: denies epistaxis or masses   Throat: denies sore throat or trouble swallowing. Neuro: denies numbness, tingling, tremors.    Skin: denies rashes or itching. : denies hematuria, dysuria   GI: denies vomiting, diarrhea   Psych: denies mood changed, anxiety, depression. All other systems negative. Physical Exam   BP (!) 118/54   Pulse 60   Resp 18   Ht 5' 11\" (1.803 m)   Wt 177 lb (80.3 kg)   BMI 24.69 kg/m²   Constitutional: Oriented to person, place, and time. Well-developed and well-nourished. No distress. Head: Normocephalic and atraumatic. Eyes: EOM are normal. Pupils are equal, round, and reactive to light. Neck: Normal range of motion. Neck supple. No hepatojugular reflux and no JVD present. Carotid bruit is not present. No tracheal deviation present. No thyromegaly present. Cardiovascular: Normal rate, regular rhythm, normal heart sounds and intact distal pulses. Exam reveals no gallop and no friction rub. No murmur heard. Pulmonary/Chest: Effort normal and breath sounds normal. No respiratory distress. No wheezes. No rales. No tenderness. Abdominal: Soft. Bowel sounds are normal. No distension and no mass. No tenderness. No rebound and no guarding. Musculoskeletal: Normal range of motion. No edema and no tenderness. Lymphadenopathy:   No cervical adenopathy. No groin adenopathy. Neurological: Alert and oriented to person, place, and time. Skin: Skin is warm and dry. No rash noted. Not diaphoretic. No erythema. Psychiatric: Normal mood and affect. Behavior is normal.     EKG personally reviewed 03/03/23:  Afib/V paced.     ASSESSMENT AND PLAN:  Patient Active Problem List   Diagnosis    CAD (coronary artery disease)    S/P CABG (coronary artery bypass graft)    DM (diabetes mellitus) ([facility]    Hyperlipemia    HTN (hypertension)    A-fib (HCC)    BPH (benign prostatic hyperplasia)    Diabetic neuropathy ([facility]    Long term current use of anticoagulant therapy    Aortic valve stenosis    Chronic kidney disease    Heart valve disease    Gout    Mild protein-calorie malnutrition (HCC)    Complete AV block (HCC)    GI bleed Palliative care encounter     1. CAD/CABG: CABG in 2009 with LIMA to LAD, SVG to OM1, SVG to RPDA, SVG to RPL with modified maze and SALENA exclusion. Grafts patent on CTA. Continue BB/statin. 2. VHD: Post TAVR with 34-mm Medtronic Evolut PRO+ valve. 3. Chronic Systolic CHF:     BB/hydralazine/nitrate/demadex. 4. Afib: In afib. Stable symptoms. Eliquis. 5. Pacer: Per EP. 6. Lipids: Statin. 7. HTN: Observe. 8. Diabetes:  As per PCP. 9. CKD: Follow labs. 10. PAD: ASA/statin. Dolores Whyte D.O.   Cardiologist  Cardiology, [facility]

## 2023-03-07 RX ORDER — PANTOPRAZOLE SODIUM 20 MG/1
TABLET, DELAYED RELEASE ORAL
Qty: 60 TABLET | Refills: 1 | OUTPATIENT
Start: 2023-03-07

## 2023-03-16 ENCOUNTER — TELEPHONE (OUTPATIENT)
Dept: ADMINISTRATIVE | Age: 88
End: 2023-03-16

## 2023-03-16 NOTE — TELEPHONE ENCOUNTER
For the purpose of the NCDR registry, I called Brooklyn Gilbert; but spoke to his wife to conduct the 1 Year TAVR KCCQ-12 over the phone. She answered all the questions. The KCCQ-12 will be scanned into the chart. See [email or web address].

## 2023-04-04 RX ORDER — APIXABAN 2.5 MG/1
TABLET, FILM COATED ORAL
Qty: 180 TABLET | Refills: 1 | OUTPATIENT
Start: 2023-04-04

## 2023-04-06 RX ORDER — CARVEDILOL 12.5 MG/1
TABLET ORAL
Qty: 180 TABLET | Refills: 3 | Status: SHIPPED | OUTPATIENT
Start: 2023-04-06

## 2023-09-11 ENCOUNTER — OFFICE VISIT (OUTPATIENT)
Dept: CARDIOLOGY CLINIC | Age: 88
End: 2023-09-11
Payer: MEDICARE

## 2023-09-11 VITALS
DIASTOLIC BLOOD PRESSURE: 60 MMHG | HEIGHT: 71 IN | SYSTOLIC BLOOD PRESSURE: 138 MMHG | BODY MASS INDEX: 23.8 KG/M2 | RESPIRATION RATE: 16 BRPM | WEIGHT: 170 LBS | HEART RATE: 61 BPM

## 2023-09-11 DIAGNOSIS — I25.10 CORONARY ARTERY DISEASE INVOLVING NATIVE CORONARY ARTERY OF NATIVE HEART WITHOUT ANGINA PECTORIS: Primary | ICD-10-CM

## 2023-09-11 PROCEDURE — G8427 DOCREV CUR MEDS BY ELIG CLIN: HCPCS | Performed by: INTERNAL MEDICINE

## 2023-09-11 PROCEDURE — G8420 CALC BMI NORM PARAMETERS: HCPCS | Performed by: INTERNAL MEDICINE

## 2023-09-11 PROCEDURE — 1123F ACP DISCUSS/DSCN MKR DOCD: CPT | Performed by: INTERNAL MEDICINE

## 2023-09-11 PROCEDURE — 1036F TOBACCO NON-USER: CPT | Performed by: INTERNAL MEDICINE

## 2023-09-11 PROCEDURE — 93000 ELECTROCARDIOGRAM COMPLETE: CPT | Performed by: INTERNAL MEDICINE

## 2023-09-11 PROCEDURE — 99213 OFFICE O/P EST LOW 20 MIN: CPT | Performed by: INTERNAL MEDICINE

## 2023-09-11 NOTE — PROGRESS NOTES
MOUTH TWICE A  tablet 1    isosorbide dinitrate (ISORDIL) 20 MG tablet TAKE 1 TABLET BY MOUTH THREE TIMES A  tablet 1    pravastatin (PRAVACHOL) 40 MG tablet Take one daily 90 tablet 0    ondansetron (ZOFRAN) 4 MG tablet Take 4 mg by mouth every 8 hours as needed for Nausea or Vomiting (Patient not taking: Reported on 9/11/2023)      acetaminophen (TYLENOL) 650 MG suppository Place 650 mg rectally every 4 hours as needed for Fever (Patient not taking: Reported on 9/11/2023)      acetaminophen (TYLENOL) 325 MG tablet Take 650 mg by mouth every 6 hours as needed for Pain (Patient not taking: Reported on 9/11/2023)       No current facility-administered medications for this visit. Social History     Socioeconomic History    Marital status:      Spouse name: Not on file    Number of children: Not on file    Years of education: Not on file    Highest education level: Not on file   Occupational History    Not on file   Tobacco Use    Smoking status: Never    Smokeless tobacco: Never   Vaping Use    Vaping Use: Never used   Substance and Sexual Activity    Alcohol use: Yes     Comment: rarely- 1 beer    Drug use: No    Sexual activity: Not on file   Other Topics Concern    Not on file   Social History Narrative    Not on file     Social Determinants of Health     Financial Resource Strain: Not on file   Food Insecurity: Not on file   Transportation Needs: Not on file   Physical Activity: Not on file   Stress: Not on file   Social Connections: Not on file   Intimate Partner Violence: Not on file   Housing Stability: Not on file       Family History   Problem Relation Age of Onset    High Blood Pressure Mother     Heart Disease Mother     Cancer Father      Review of Systems:  Heart: as above   Lungs: as above   Eyes: denies changes in vision or discharge. Ears: denies changes in hearing or pain. Nose: denies epistaxis or masses   Throat: denies sore throat or trouble swallowing.    Neuro:

## 2023-11-18 PROCEDURE — 93296 REM INTERROG EVL PM/IDS: CPT | Performed by: STUDENT IN AN ORGANIZED HEALTH CARE EDUCATION/TRAINING PROGRAM

## 2023-11-18 PROCEDURE — 93294 REM INTERROG EVL PM/LDLS PM: CPT | Performed by: STUDENT IN AN ORGANIZED HEALTH CARE EDUCATION/TRAINING PROGRAM

## 2024-02-13 RX ORDER — TORSEMIDE 20 MG/1
20 TABLET ORAL DAILY
Qty: 90 TABLET | Refills: 3 | Status: SHIPPED | OUTPATIENT
Start: 2024-02-13

## 2024-04-08 RX ORDER — CARVEDILOL 12.5 MG/1
TABLET ORAL
Qty: 180 TABLET | Refills: 3 | Status: SHIPPED | OUTPATIENT
Start: 2024-04-08

## 2024-10-03 ENCOUNTER — OFFICE VISIT (OUTPATIENT)
Dept: CARDIOLOGY CLINIC | Age: 89
End: 2024-10-03
Payer: MEDICARE

## 2024-10-03 VITALS
DIASTOLIC BLOOD PRESSURE: 68 MMHG | HEIGHT: 71 IN | WEIGHT: 167 LBS | RESPIRATION RATE: 17 BRPM | BODY MASS INDEX: 23.38 KG/M2 | HEART RATE: 65 BPM | SYSTOLIC BLOOD PRESSURE: 140 MMHG

## 2024-10-03 DIAGNOSIS — I25.10 CORONARY ARTERY DISEASE INVOLVING NATIVE CORONARY ARTERY OF NATIVE HEART WITHOUT ANGINA PECTORIS: Primary | ICD-10-CM

## 2024-10-03 PROCEDURE — 1036F TOBACCO NON-USER: CPT | Performed by: INTERNAL MEDICINE

## 2024-10-03 PROCEDURE — 1123F ACP DISCUSS/DSCN MKR DOCD: CPT | Performed by: INTERNAL MEDICINE

## 2024-10-03 PROCEDURE — 93000 ELECTROCARDIOGRAM COMPLETE: CPT | Performed by: INTERNAL MEDICINE

## 2024-10-03 PROCEDURE — 99214 OFFICE O/P EST MOD 30 MIN: CPT | Performed by: INTERNAL MEDICINE

## 2024-10-03 PROCEDURE — G8427 DOCREV CUR MEDS BY ELIG CLIN: HCPCS | Performed by: INTERNAL MEDICINE

## 2024-10-03 PROCEDURE — G8484 FLU IMMUNIZE NO ADMIN: HCPCS | Performed by: INTERNAL MEDICINE

## 2024-10-03 PROCEDURE — G8420 CALC BMI NORM PARAMETERS: HCPCS | Performed by: INTERNAL MEDICINE

## 2024-10-03 RX ORDER — DOXAZOSIN 4 MG/1
4 TABLET ORAL DAILY
COMMUNITY
Start: 2024-09-23

## 2024-10-03 RX ORDER — ALLOPURINOL 100 MG/1
100 TABLET ORAL EVERY 12 HOURS
COMMUNITY
Start: 2024-09-10

## 2024-10-03 NOTE — PROGRESS NOTES
CHIEF COMPLAINT: CAD/-CABG/Edema/VHD    HISTORY OF PRESENT ILLNESS: Patient is a 89 y.o. male seen at the request of Umesh Alexandre MD.      The patient presents today for follow-up.    Prior TAVR.    No CP or SOB. Stable edema.     Past Medical History:   Diagnosis Date    A-fib (HCC)     BPH (benign prostatic hyperplasia)     CAD (coronary artery disease)     Diabetic neuropathy (HCC)     DM (diabetes mellitus) (HCC)     HTN (hypertension)     Hyperlipemia     Pancreatitis 09/01/2010    Pleural effusion on left 03/01/2022    seen on echo    S/P CABG (coronary artery bypass graft)        Patient Active Problem List   Diagnosis    CAD (coronary artery disease)    S/P CABG (coronary artery bypass graft)    DM (diabetes mellitus) (HCC)    Hyperlipemia    HTN (hypertension)    A-fib (HCC)    BPH (benign prostatic hyperplasia)    Diabetic neuropathy (HCC)    Long term current use of anticoagulant therapy    Aortic valve stenosis    Chronic kidney disease    Heart valve disease    Gout    Mild protein-calorie malnutrition (HCC)    Complete AV block (HCC)    GI bleed    Palliative care encounter       No Known Allergies    Current Outpatient Medications   Medication Sig Dispense Refill    allopurinol (ZYLOPRIM) 100 MG tablet Take 1 tablet by mouth in the morning and 1 tablet in the evening.      doxazosin (CARDURA) 4 MG tablet Take 1 tablet by mouth daily      carvedilol (COREG) 12.5 MG tablet TAKE 1 TABLET BY MOUTH TWICE A  tablet 3    torsemide (DEMADEX) 20 MG tablet TAKE 1 TABLET BY MOUTH EVERY DAY 90 tablet 3    ferrous sulfate (IRON 325) 325 (65 Fe) MG tablet Take 1 tablet by mouth every other day      omeprazole (PRILOSEC) 20 MG delayed release capsule Take 1 capsule by mouth daily      hydrALAZINE (APRESOLINE) 50 MG tablet TAKE 1 TABLET BY MOUTH THREE TIMES A  tablet 3    ELIQUIS 2.5 MG TABS tablet TAKE 1 TABLET BY MOUTH TWICE A  tablet 1    isosorbide dinitrate (ISORDIL) 20 MG

## 2024-12-12 ENCOUNTER — TELEPHONE (OUTPATIENT)
Dept: NON INVASIVE DIAGNOSTICS | Age: 88
End: 2024-12-12

## 2024-12-12 NOTE — TELEPHONE ENCOUNTER
Called patient and left message for patient to call office back to see if he has a home monitor device and if so send to a remote transmission.      Nunu Lyles, [facility]

## 2024-12-12 NOTE — TELEPHONE ENCOUNTER
Wife called to cancel apt stated pt is refusing to go to any apts, she is having a hard time with him and will call back to reschedule if she can talk him into going.

## 2024-12-23 RX ORDER — TORSEMIDE 20 MG/1
20 TABLET ORAL DAILY
Qty: 90 TABLET | Refills: 3 | Status: SHIPPED | OUTPATIENT
Start: 2024-12-23

## 2025-03-05 RX ORDER — CARVEDILOL 12.5 MG/1
TABLET ORAL
Qty: 180 TABLET | Refills: 3 | Status: SHIPPED | OUTPATIENT
Start: 2025-03-05

## 2025-03-06 ENCOUNTER — TELEPHONE (OUTPATIENT)
Dept: NON INVASIVE DIAGNOSTICS | Age: 89
End: 2025-03-06

## 2025-03-06 NOTE — TELEPHONE ENCOUNTER
----- Message from TOVA FUNG RN sent at 3/3/2025  3:06 PM EST -----  Medtronic device. TB patient due for office visit

## 2025-03-20 PROCEDURE — 93296 REM INTERROG EVL PM/IDS: CPT | Performed by: STUDENT IN AN ORGANIZED HEALTH CARE EDUCATION/TRAINING PROGRAM

## 2025-03-20 PROCEDURE — 93294 REM INTERROG EVL PM/LDLS PM: CPT | Performed by: STUDENT IN AN ORGANIZED HEALTH CARE EDUCATION/TRAINING PROGRAM

## 2025-04-03 ENCOUNTER — TELEPHONE (OUTPATIENT)
Dept: NON INVASIVE DIAGNOSTICS | Age: 89
End: 2025-04-03

## 2025-04-03 NOTE — TELEPHONE ENCOUNTER
Patient wife called to cancel 4/4/25 appointment.  Patient is now on Hospice and would like to cancel all appointments.

## (undated) DEVICE — GOWN,SIRUS,FABRNF,L,20/CS: Brand: MEDLINE

## (undated) DEVICE — GLOVE ORANGE PI 8   MSG9080

## (undated) DEVICE — SHEATH INTRO 16FR L28CM 0.035IN GWIRE HYDRPHLC LOK

## (undated) DEVICE — GRADUATE TRIANG MEASURE 1000ML BLK PRNT

## (undated) DEVICE — GUIDEWIRE VASC L260CM 0.035IN J TIP L3MM PTFE FIX COR NAMIC

## (undated) DEVICE — GOWN,SIRUS,FABRNF,XL,20/CS: Brand: MEDLINE

## (undated) DEVICE — PADDLE INTERN DEFIB CHILD

## (undated) DEVICE — SET CARDIAC II

## (undated) DEVICE — DRAPE EQUIP BANDED BG 36X28 IN W/ROUNDED CORNER SNAPKOVER

## (undated) DEVICE — TOWEL,OR,DSP,ST,BLUE,STD,6/PK,12PK/CS: Brand: MEDLINE

## (undated) DEVICE — CATH ANGIO 5FR .045IN AL1 100CM EXPO

## (undated) DEVICE — DRAPE,REIN 53X77,STERILE: Brand: MEDLINE

## (undated) DEVICE — SET TRNQT W/ STD 7IN 12FR 5 TB 1 SNR DLP

## (undated) DEVICE — HOLDER NDL WEBST SNAG FREE

## (undated) DEVICE — SYRINGE MED 50ML LUERLOCK TIP

## (undated) DEVICE — BLADE CLIPPER GEN PURP NS

## (undated) DEVICE — LOOP VES W25MM THK1MM MAXI RED SIL FLD REPELLENT 100 PER

## (undated) DEVICE — PICO 7 10CM X 20CM: Brand: PICO™ 7

## (undated) DEVICE — COVER PRB W14XL147CM TELESCOPICALLY FLD EXT LEN CIV-FLEX

## (undated) DEVICE — GUIDEWIRE VASC L260CM DIA0.035IN TAPR L11CM FLPY TIP L4CM

## (undated) DEVICE — CLIP LIG M BLU TI HRT SHP WIRE HORZ 600 PER BX

## (undated) DEVICE — SYRINGE 20ML LL S/C 50

## (undated) DEVICE — BOWL MED L 32OZ PLAS W/ MOLD GRAD EZ OPN PEEL PCH

## (undated) DEVICE — DRAPE SHEET: Brand: UNBRANDED

## (undated) DEVICE — SET INTRO SHTH 5FR L45CM GRY INTEGR SIDE PRT HAEMOSTASIS

## (undated) DEVICE — SYRINGE MED 10ML LUERLOCK TIP W/O SFTY DISP

## (undated) DEVICE — KIT MED IMAG CNTRST AGNT W/ IOPAMIDOL REUSE

## (undated) DEVICE — FORCEPS BX OVL CUP FEN DISPOSABLE CAP L 160CM RAD JAW 4

## (undated) DEVICE — INTENDED FOR TISSUE SEPARATION, AND OTHER PROCEDURES THAT REQUIRE A SHARP SURGICAL BLADE TO PUNCTURE OR CUT.: Brand: BARD-PARKER ® STAINLESS STEEL BLADES

## (undated) DEVICE — LABEL MED CARD SURG 4 IN PANEL STRL

## (undated) DEVICE — BLOCK BITE 60FR RUBBER ADLT DENTAL

## (undated) DEVICE — INTRO SHEATH W/6.0FRX10CMX.0385IN

## (undated) DEVICE — TUBING PRSS MON L12IN PVC RIG NONEXPANDING M TO FEM CONN

## (undated) DEVICE — KIT HND CTRL 3 W STPCOCK ROT END 54IN PREM HI PRSS TBNG AT

## (undated) DEVICE — DRAPE, MULTI FENESTRATED, HYBRID OR, STE: Brand: MEDLINE

## (undated) DEVICE — ELECTRODE PT RET AD L9FT HI MOIST COND ADH HYDRGEL CORDED

## (undated) DEVICE — PERCLOSE™ PROSTYLE™ SUTURE-MEDIATED CLOSURE AND REPAIR SYSTEM: Brand: PERCLOSE™ PROSTYLE™

## (undated) DEVICE — STAPLER SKIN L39MM DIA0.53MM CRWN 5.7MM S STL FIX HD PROX

## (undated) DEVICE — KIT MFLD ISOLATN NACL CNTRST PRT TBNG SPIK W/ PRSS TRNSDUC

## (undated) DEVICE — GUIDEWIRE ANGIO L150CM OD0.035IN STR FIX PTFE SLD SUPP

## (undated) DEVICE — ANGIOPLASTY ADD-ON PACK: Brand: MEDLINE INDUSTRIES, INC.

## (undated) DEVICE — GLOVE ORTHO 8   MSG9480

## (undated) DEVICE — PACK SURG CARDIAC CATH DYNJ38860] MEDLINE INDUSTRIES INC]

## (undated) DEVICE — CATHETER ANGIO PIG 0.045 IN 5 FRX110 CM VENTRICULAR EXPO

## (undated) DEVICE — SURGICAL PROCEDURE PACK BASIC

## (undated) DEVICE — SOLUTION IRRIG 1000ML STRL H2O USP PLAS POUR BTL

## (undated) DEVICE — DRESSING TRNSPAR W4XL55IN ACRYL SUP FLM W ADH WTRPRF OPSITE

## (undated) DEVICE — ALCOHOL RUBBING ISO 16OZ 70%

## (undated) DEVICE — GLOVE ORANGE PI 7 1/2   MSG9075

## (undated) DEVICE — 3M™ IOBAN™ 2 ANTIMICROBIAL INCISE DRAPE 6650EZ: Brand: IOBAN™ 2

## (undated) DEVICE — ADHESIVE SKIN CLOSURE TOP 36 CC HI VISC DERMBND MINI

## (undated) DEVICE — TAPE ADH W2INXL10YD PLAS TRNSPAR H2O RESIST HYPOALRG CURAD

## (undated) DEVICE — MEDI-TRACE CADENCE ADULT, PRE-CONNECT  DEFIBRILLATION ELECTRODE (10 PR/PK) - PHYSIO-CONTROL: Brand: MEDI-TRACE CADENCE

## (undated) DEVICE — COVER,TABLE,60X90,STERILE: Brand: MEDLINE

## (undated) DEVICE — INTRODUCER SHTH 6FR L12CM DIA0.038IN HEMSTAS CLOSE TOL

## (undated) DEVICE — GUIDEWIRE VASC L145CM 0.035IN J TIP L3MM PTFE FIX COR NAMIC

## (undated) DEVICE — AMPLATZ EXTRA STIFF WIRE GUIDE: Brand: AMPLATZ

## (undated) DEVICE — CATHETER DIAG AD 5FR L100CM COR GRY HYDRPHLC NYL MPA 2 W/ 2

## (undated) DEVICE — NEEDLE SPNL 20GA L3.5IN YEL HUB S STL REG WALL FIT STYL W/

## (undated) DEVICE — LOADING SYS L-EVPROP34US: Brand: EVOLUT™ PRO+

## (undated) DEVICE — GLOVE SURG SZ 65 THK91MIL LTX FREE SYN POLYISOPRENE

## (undated) DEVICE — LARGE BORE STOPCOCK WITH ROTATING MALE LUER LOCK

## (undated) DEVICE — CLOTH SURG PREP PREOPERATIVE CHLORHEXIDINE GLUC 2% READYPREP

## (undated) DEVICE — SET CARDIAC I

## (undated) DEVICE — PERCUTANEOUS ENTRY THINWALL NEEDLE  ONE-PART: Brand: COOK

## (undated) DEVICE — SPONGE GZ W4XL4IN RAYON POLY FILL CVR W/ NONWOVEN FAB STERILE

## (undated) DEVICE — APPLICATOR MEDICATED 26 CC SOLUTION HI LT ORNG CHLORAPREP

## (undated) DEVICE — GLOVE ORANGE PI 7   MSG9070

## (undated) DEVICE — CATHETER DIAG 5FR L110CM ID0.045IN VENT VASC PGTL 145 EXPO

## (undated) DEVICE — SOLUTION IV 1000ML 0.9% SOD CHL PH 5 INJ USP VIAFLX PLAS

## (undated) DEVICE — GLOVE SURG SZ 6 THK91MIL LTX FREE SYN POLYISOPRENE ANTI

## (undated) DEVICE — MEDIA CONTRAST RX ISOVUE-300 61% 30ML VIALS

## (undated) DEVICE — AGENT HEMSTAT W4XL8IN OXIDIZED REGENERATED CELOS ABSRB

## (undated) DEVICE — DRESSING FOAM W22XL25CM FILVE LAYR FOAM DP DEF SAFETAC